# Patient Record
Sex: FEMALE | Race: ASIAN | Employment: OTHER | URBAN - METROPOLITAN AREA
[De-identification: names, ages, dates, MRNs, and addresses within clinical notes are randomized per-mention and may not be internally consistent; named-entity substitution may affect disease eponyms.]

---

## 2017-03-13 PROBLEM — I48.92 NEW ONSET ATRIAL FLUTTER (HCC): Status: ACTIVE | Noted: 2017-03-13

## 2017-08-07 PROBLEM — H81.12 BENIGN PAROXYSMAL POSITIONAL VERTIGO OF LEFT EAR: Status: ACTIVE | Noted: 2017-08-07

## 2018-03-16 ENCOUNTER — TELEPHONE (OUTPATIENT)
Dept: FAMILY MEDICINE CLINIC | Age: 78
End: 2018-03-16

## 2018-03-19 ENCOUNTER — OFFICE VISIT (OUTPATIENT)
Dept: FAMILY MEDICINE CLINIC | Age: 78
End: 2018-03-19
Payer: MEDICARE

## 2018-03-19 VITALS
HEART RATE: 84 BPM | WEIGHT: 107 LBS | TEMPERATURE: 97.9 F | DIASTOLIC BLOOD PRESSURE: 90 MMHG | HEIGHT: 59 IN | BODY MASS INDEX: 21.57 KG/M2 | SYSTOLIC BLOOD PRESSURE: 172 MMHG | RESPIRATION RATE: 16 BRPM | OXYGEN SATURATION: 96 %

## 2018-03-19 DIAGNOSIS — H81.312 VERTIGO, AURAL, LEFT: ICD-10-CM

## 2018-03-19 DIAGNOSIS — I25.810 ATHEROSCLEROSIS OF CORONARY ARTERY BYPASS GRAFT OF NATIVE HEART WITHOUT ANGINA PECTORIS: ICD-10-CM

## 2018-03-19 DIAGNOSIS — R51.9 NONINTRACTABLE HEADACHE, UNSPECIFIED CHRONICITY PATTERN, UNSPECIFIED HEADACHE TYPE: ICD-10-CM

## 2018-03-19 DIAGNOSIS — I20.9 ANGINAL SYNDROME (HCC): ICD-10-CM

## 2018-03-19 DIAGNOSIS — I48.92 ATRIAL FLUTTER, UNSPECIFIED TYPE (HCC): ICD-10-CM

## 2018-03-19 DIAGNOSIS — I10 ESSENTIAL HYPERTENSION: Primary | ICD-10-CM

## 2018-03-19 PROCEDURE — 1090F PRES/ABSN URINE INCON ASSESS: CPT | Performed by: NURSE PRACTITIONER

## 2018-03-19 PROCEDURE — G8420 CALC BMI NORM PARAMETERS: HCPCS | Performed by: NURSE PRACTITIONER

## 2018-03-19 PROCEDURE — 4040F PNEUMOC VAC/ADMIN/RCVD: CPT | Performed by: NURSE PRACTITIONER

## 2018-03-19 PROCEDURE — G8598 ASA/ANTIPLAT THER USED: HCPCS | Performed by: NURSE PRACTITIONER

## 2018-03-19 PROCEDURE — G8400 PT W/DXA NO RESULTS DOC: HCPCS | Performed by: NURSE PRACTITIONER

## 2018-03-19 PROCEDURE — 1123F ACP DISCUSS/DSCN MKR DOCD: CPT | Performed by: NURSE PRACTITIONER

## 2018-03-19 PROCEDURE — G8427 DOCREV CUR MEDS BY ELIG CLIN: HCPCS | Performed by: NURSE PRACTITIONER

## 2018-03-19 PROCEDURE — 99213 OFFICE O/P EST LOW 20 MIN: CPT | Performed by: NURSE PRACTITIONER

## 2018-03-19 PROCEDURE — 1036F TOBACCO NON-USER: CPT | Performed by: NURSE PRACTITIONER

## 2018-03-19 PROCEDURE — G8482 FLU IMMUNIZE ORDER/ADMIN: HCPCS | Performed by: NURSE PRACTITIONER

## 2018-03-19 RX ORDER — POTASSIUM CHLORIDE 20 MEQ/1
TABLET, EXTENDED RELEASE ORAL
Qty: 30 TABLET | Refills: 2 | Status: SHIPPED | OUTPATIENT
Start: 2018-03-19 | End: 2019-03-27 | Stop reason: SDUPTHER

## 2018-03-19 RX ORDER — ATORVASTATIN CALCIUM 40 MG/1
TABLET, FILM COATED ORAL
Qty: 30 TABLET | Refills: 3 | Status: SHIPPED | OUTPATIENT
Start: 2018-03-19 | End: 2018-09-17 | Stop reason: SDUPTHER

## 2018-03-19 RX ORDER — MECLIZINE HYDROCHLORIDE 25 MG/1
25 TABLET ORAL 3 TIMES DAILY PRN
Qty: 90 TABLET | Refills: 0 | Status: SHIPPED | OUTPATIENT
Start: 2018-03-19 | End: 2019-03-27 | Stop reason: SDUPTHER

## 2018-03-19 RX ORDER — ATENOLOL 25 MG/1
25 TABLET ORAL 2 TIMES DAILY
Qty: 60 TABLET | Refills: 5 | Status: SHIPPED | OUTPATIENT
Start: 2018-03-19 | End: 2019-03-27 | Stop reason: SDUPTHER

## 2018-03-19 ASSESSMENT — ENCOUNTER SYMPTOMS
DIARRHEA: 0
DOUBLE VISION: 0
WHEEZING: 0
BLURRED VISION: 1
NAUSEA: 0
PHOTOPHOBIA: 0
CONSTIPATION: 0
COUGH: 0
VOMITING: 0
SHORTNESS OF BREATH: 0

## 2018-03-19 NOTE — PROGRESS NOTES
HPI:  Patient comes in today for   Chief Complaint   Patient presents with    Headache     started 1 week ago pt states \"left side of face and eye hurts goes to the back of head\"     Hypertension     pt's BP high at this time 180/98    . Patient states she has not checked her BP at home for the past week since she has had a headache. She was previously on atenolol but is not currently. Patient has taken tylenol for the headache with relief. Prior to Visit Medications    Medication Sig Taking?  Authorizing Provider   meclizine (ANTIVERT) 25 MG tablet Take 1 tablet by mouth 3 times daily as needed for Dizziness Yes Qi Baker CNP   potassium chloride (KLOR-CON M) 20 MEQ extended release tablet TAKE ONE TABLET BY MOUTH EVERY DAY Yes Qi Baker CNP   apixaban (ELIQUIS) 5 MG TABS tablet TAKE ONE TABLET BY MOUTH TWO TIMES A DAY Yes Qi Baker CNP   atorvastatin (LIPITOR) 40 MG tablet TAKE ONE TABLET BY MOUTH EVERY DAY Yes Qi Baker CNP   atenolol (TENORMIN) 25 MG tablet Take 1 tablet by mouth 2 times daily Yes Qi Baker CNP   omeprazole (PRILOSEC) 40 MG delayed release capsule TAKE ONE CAPSULE BY MOUTH EVERY DAY Yes Qi Baker CNP   RANEXA 500 MG extended release tablet TAKE ONE TABLET BY MOUTH TWO TIMES A DAY Yes Chris Fabian DO   furosemide (LASIX) 40 MG tablet Take 1 tablet by mouth every other day Yes Qi Baker CNP   amLODIPine (NORVASC) 10 MG tablet TAKE ONE TABLET BY MOUTH EVERY DAY Yes Chris Fabian DO   nebivolol (BYSTOLIC) 20 MG TABS tablet Take 1 tablet by mouth daily Yes Qi Baker CNP   PROAIR  (90 Base) MCG/ACT inhaler INHALE TWO PUFFS BY MOUTH EVERY SIX HOURS AS NEEDED for wheezing  Yes Qi Baker CNP   hydrocortisone (ANUSOL-HC) 25 MG suppository Place 1 suppository rectally 2 times daily Yes Suzy Fulton MD   aspirin 81 MG EC tablet Take 81 mg by mouth daily Yes Historical Provider, MD   vitamin B-12 (CYANOCOBALAMIN) 1000 MCG tablet Take 1,000 mcg by mouth daily Yes Historical Provider, MD   vitamin E 400 UNIT capsule Take 400 Units by mouth daily. Yes Historical Provider, MD   folic acid (FOLVITE) 1 MG tablet Take 1 mg by mouth daily. Yes Historical Provider, MD         Allergies   Allergen Reactions    Dronedarone Hives         Review of Systems  Review of Systems   Constitutional: Negative for malaise/fatigue and weight loss. Eyes: Positive for blurred vision. Negative for double vision and photophobia. Respiratory: Negative for cough, shortness of breath and wheezing. Cardiovascular: Negative for chest pain and palpitations. Gastrointestinal: Negative for constipation, diarrhea, nausea and vomiting. Neurological: Positive for dizziness (intermittent chronic) and headaches. Negative for weakness. VS:  BP (!) 172/90   Pulse 84   Temp 97.9 °F (36.6 °C) (Oral)   Resp 16   Ht 4' 11\" (1.499 m)   Wt 107 lb (48.5 kg)   SpO2 96%   BMI 21.61 kg/m²     Physical Exam  Physical Exam   Constitutional: She is oriented to person, place, and time. She appears well-developed and well-nourished. No distress. HENT:   Head: Normocephalic and atraumatic. Right Ear: External ear normal.   Left Ear: External ear normal.   Nose: Nose normal.   Mouth/Throat: Oropharynx is clear and moist.   Eyes: EOM are normal. Pupils are equal, round, and reactive to light. Neck: No tracheal deviation present. No thyromegaly present. Cardiovascular: Normal rate, regular rhythm and intact distal pulses. No murmur heard. Pulmonary/Chest: Effort normal and breath sounds normal. She has no wheezes. She has no rales. She exhibits no tenderness. Abdominal: Soft. Bowel sounds are normal. There is no tenderness. Lymphadenopathy:     She has no cervical adenopathy. Neurological: She is alert and oriented to person, place, and time. No cranial nerve deficit. Coordination normal.   Skin: Skin is warm and dry.    Psychiatric: She

## 2018-03-21 ENCOUNTER — TELEPHONE (OUTPATIENT)
Dept: FAMILY MEDICINE CLINIC | Age: 78
End: 2018-03-21

## 2018-03-21 NOTE — TELEPHONE ENCOUNTER
Patient called to ask if patient should be on Bystolic 23XT and Atenolol 25mg bid. Spoke to [facility] she would like to hold the Atenolol for now and have patient daughter monitor BP and headaches then call with update on Friday. Patient daughter and pharmacy notified.

## 2018-05-01 ENCOUNTER — APPOINTMENT (OUTPATIENT)
Dept: CT IMAGING | Age: 78
DRG: 292 | End: 2018-05-01
Payer: MEDICARE

## 2018-05-01 ENCOUNTER — HOSPITAL ENCOUNTER (INPATIENT)
Age: 78
LOS: 2 days | Discharge: HOME | DRG: 292 | End: 2018-05-03
Attending: EMERGENCY MEDICINE | Admitting: INTERNAL MEDICINE
Payer: MEDICARE

## 2018-05-01 ENCOUNTER — APPOINTMENT (OUTPATIENT)
Dept: GENERAL RADIOLOGY | Age: 78
DRG: 292 | End: 2018-05-01
Payer: MEDICARE

## 2018-05-01 DIAGNOSIS — I50.9 ACUTE ON CHRONIC CONGESTIVE HEART FAILURE, UNSPECIFIED CONGESTIVE HEART FAILURE TYPE: Primary | ICD-10-CM

## 2018-05-01 PROBLEM — I50.31 ACUTE DIASTOLIC CHF (CONGESTIVE HEART FAILURE) (HCC): Status: ACTIVE | Noted: 2018-05-01

## 2018-05-01 LAB
ALBUMIN SERPL-MCNC: 4 G/DL (ref 3.5–5.2)
ALBUMIN SERPL-MCNC: 4.2 G/DL (ref 3.5–5.2)
ALP BLD-CCNC: 104 U/L (ref 35–104)
ALP BLD-CCNC: 113 U/L (ref 35–104)
ALT SERPL-CCNC: 15 U/L (ref 0–32)
ALT SERPL-CCNC: 15 U/L (ref 0–32)
ANION GAP SERPL CALCULATED.3IONS-SCNC: 12 MMOL/L (ref 7–16)
APTT: 36.7 SEC (ref 24.5–35.1)
AST SERPL-CCNC: 31 U/L (ref 0–31)
AST SERPL-CCNC: 32 U/L (ref 0–31)
BASOPHILS ABSOLUTE: 0.03 E9/L (ref 0–0.2)
BASOPHILS RELATIVE PERCENT: 0.5 % (ref 0–2)
BILIRUB SERPL-MCNC: 1.3 MG/DL (ref 0–1.2)
BILIRUB SERPL-MCNC: 1.9 MG/DL (ref 0–1.2)
BILIRUBIN DIRECT: 0.6 MG/DL (ref 0–0.3)
BILIRUBIN, INDIRECT: 1.3 MG/DL (ref 0–1)
BUN BLDV-MCNC: 23 MG/DL (ref 8–23)
CALCIUM SERPL-MCNC: 9.1 MG/DL (ref 8.6–10.2)
CHLORIDE BLD-SCNC: 100 MMOL/L (ref 98–107)
CO2: 27 MMOL/L (ref 22–29)
CREAT SERPL-MCNC: 0.6 MG/DL (ref 0.5–1)
EKG ATRIAL RATE: 87 BPM
EKG P-R INTERVAL: 154 MS
EKG Q-T INTERVAL: 364 MS
EKG QRS DURATION: 84 MS
EKG QTC CALCULATION (BAZETT): 438 MS
EKG R AXIS: 94 DEGREES
EKG T AXIS: -18 DEGREES
EKG VENTRICULAR RATE: 87 BPM
EOSINOPHILS ABSOLUTE: 0.18 E9/L (ref 0.05–0.5)
EOSINOPHILS RELATIVE PERCENT: 2.8 % (ref 0–6)
GFR AFRICAN AMERICAN: >60
GFR NON-AFRICAN AMERICAN: >60 ML/MIN/1.73
GLUCOSE BLD-MCNC: 95 MG/DL (ref 74–109)
HCT VFR BLD CALC: 42.1 % (ref 34–48)
HEMOGLOBIN: 13.9 G/DL (ref 11.5–15.5)
IMMATURE GRANULOCYTES #: 0.03 E9/L
IMMATURE GRANULOCYTES %: 0.5 % (ref 0–5)
INR BLD: 1.3
LIPASE: 23 U/L (ref 13–60)
LYMPHOCYTES ABSOLUTE: 1.41 E9/L (ref 1.5–4)
LYMPHOCYTES RELATIVE PERCENT: 21.6 % (ref 20–42)
MAGNESIUM: 1.8 MG/DL (ref 1.6–2.6)
MCH RBC QN AUTO: 31.2 PG (ref 26–35)
MCHC RBC AUTO-ENTMCNC: 33 % (ref 32–34.5)
MCV RBC AUTO: 94.6 FL (ref 80–99.9)
MONOCYTES ABSOLUTE: 0.58 E9/L (ref 0.1–0.95)
MONOCYTES RELATIVE PERCENT: 8.9 % (ref 2–12)
NEUTROPHILS ABSOLUTE: 4.3 E9/L (ref 1.8–7.3)
NEUTROPHILS RELATIVE PERCENT: 65.7 % (ref 43–80)
PDW BLD-RTO: 14.3 FL (ref 11.5–15)
PLATELET # BLD: 111 E9/L (ref 130–450)
PMV BLD AUTO: 11.3 FL (ref 7–12)
POTASSIUM SERPL-SCNC: 4.5 MMOL/L (ref 3.5–5)
PRO-BNP: 1288 PG/ML (ref 0–450)
PROTHROMBIN TIME: 15 SEC (ref 9.3–12.4)
RBC # BLD: 4.45 E12/L (ref 3.5–5.5)
SODIUM BLD-SCNC: 139 MMOL/L (ref 132–146)
TOTAL PROTEIN: 6.9 G/DL (ref 6.4–8.3)
TOTAL PROTEIN: 7.6 G/DL (ref 6.4–8.3)
TROPONIN: <0.01 NG/ML (ref 0–0.03)
TROPONIN: <0.01 NG/ML (ref 0–0.03)
WBC # BLD: 6.5 E9/L (ref 4.5–11.5)

## 2018-05-01 PROCEDURE — 6360000004 HC RX CONTRAST MEDICATION: Performed by: RADIOLOGY

## 2018-05-01 PROCEDURE — 93005 ELECTROCARDIOGRAM TRACING: CPT | Performed by: EMERGENCY MEDICINE

## 2018-05-01 PROCEDURE — 6360000002 HC RX W HCPCS: Performed by: INTERNAL MEDICINE

## 2018-05-01 PROCEDURE — 6370000000 HC RX 637 (ALT 250 FOR IP): Performed by: INTERNAL MEDICINE

## 2018-05-01 PROCEDURE — 2500000003 HC RX 250 WO HCPCS: Performed by: EMERGENCY MEDICINE

## 2018-05-01 PROCEDURE — 6360000002 HC RX W HCPCS: Performed by: EMERGENCY MEDICINE

## 2018-05-01 PROCEDURE — 83735 ASSAY OF MAGNESIUM: CPT

## 2018-05-01 PROCEDURE — 83880 ASSAY OF NATRIURETIC PEPTIDE: CPT

## 2018-05-01 PROCEDURE — 84484 ASSAY OF TROPONIN QUANT: CPT

## 2018-05-01 PROCEDURE — 85730 THROMBOPLASTIN TIME PARTIAL: CPT

## 2018-05-01 PROCEDURE — 1200000000 HC SEMI PRIVATE

## 2018-05-01 PROCEDURE — 96374 THER/PROPH/DIAG INJ IV PUSH: CPT

## 2018-05-01 PROCEDURE — 71045 X-RAY EXAM CHEST 1 VIEW: CPT

## 2018-05-01 PROCEDURE — 99285 EMERGENCY DEPT VISIT HI MDM: CPT

## 2018-05-01 PROCEDURE — 36415 COLL VENOUS BLD VENIPUNCTURE: CPT

## 2018-05-01 PROCEDURE — 83690 ASSAY OF LIPASE: CPT

## 2018-05-01 PROCEDURE — 6370000000 HC RX 637 (ALT 250 FOR IP): Performed by: EMERGENCY MEDICINE

## 2018-05-01 PROCEDURE — 85025 COMPLETE CBC W/AUTO DIFF WBC: CPT

## 2018-05-01 PROCEDURE — 93306 TTE W/DOPPLER COMPLETE: CPT

## 2018-05-01 PROCEDURE — 80076 HEPATIC FUNCTION PANEL: CPT

## 2018-05-01 PROCEDURE — 80053 COMPREHEN METABOLIC PANEL: CPT

## 2018-05-01 PROCEDURE — 85610 PROTHROMBIN TIME: CPT

## 2018-05-01 PROCEDURE — 71275 CT ANGIOGRAPHY CHEST: CPT

## 2018-05-01 PROCEDURE — 96376 TX/PRO/DX INJ SAME DRUG ADON: CPT

## 2018-05-01 RX ORDER — ASPIRIN 81 MG/1
81 TABLET ORAL DAILY
Status: DISCONTINUED | OUTPATIENT
Start: 2018-05-01 | End: 2018-05-03 | Stop reason: HOSPADM

## 2018-05-01 RX ORDER — SILDENAFIL CITRATE 20 MG/1
10 TABLET ORAL 3 TIMES DAILY
Status: DISCONTINUED | OUTPATIENT
Start: 2018-05-02 | End: 2018-05-03 | Stop reason: HOSPADM

## 2018-05-01 RX ORDER — ATORVASTATIN CALCIUM 40 MG/1
40 TABLET, FILM COATED ORAL DAILY
Status: DISCONTINUED | OUTPATIENT
Start: 2018-05-01 | End: 2018-05-03 | Stop reason: HOSPADM

## 2018-05-01 RX ORDER — FOLIC ACID 1 MG/1
1 TABLET ORAL DAILY
Status: DISCONTINUED | OUTPATIENT
Start: 2018-05-01 | End: 2018-05-03 | Stop reason: HOSPADM

## 2018-05-01 RX ORDER — MECLIZINE HCL 12.5 MG/1
25 TABLET ORAL 3 TIMES DAILY PRN
Status: DISCONTINUED | OUTPATIENT
Start: 2018-05-01 | End: 2018-05-03 | Stop reason: HOSPADM

## 2018-05-01 RX ORDER — POTASSIUM CHLORIDE 20 MEQ/1
20 TABLET, EXTENDED RELEASE ORAL DAILY
Status: DISCONTINUED | OUTPATIENT
Start: 2018-05-01 | End: 2018-05-02

## 2018-05-01 RX ORDER — ALBUTEROL SULFATE 90 UG/1
1 AEROSOL, METERED RESPIRATORY (INHALATION) EVERY 6 HOURS PRN
Status: DISCONTINUED | OUTPATIENT
Start: 2018-05-01 | End: 2018-05-03 | Stop reason: HOSPADM

## 2018-05-01 RX ORDER — RANOLAZINE 500 MG/1
500 TABLET, EXTENDED RELEASE ORAL 2 TIMES DAILY
Status: DISCONTINUED | OUTPATIENT
Start: 2018-05-01 | End: 2018-05-03 | Stop reason: HOSPADM

## 2018-05-01 RX ORDER — ONDANSETRON 2 MG/ML
4 INJECTION INTRAMUSCULAR; INTRAVENOUS EVERY 6 HOURS PRN
Status: DISCONTINUED | OUTPATIENT
Start: 2018-05-01 | End: 2018-05-03 | Stop reason: HOSPADM

## 2018-05-01 RX ORDER — PANTOPRAZOLE SODIUM 40 MG/1
40 TABLET, DELAYED RELEASE ORAL
Status: DISCONTINUED | OUTPATIENT
Start: 2018-05-02 | End: 2018-05-03 | Stop reason: HOSPADM

## 2018-05-01 RX ORDER — FUROSEMIDE 10 MG/ML
40 INJECTION INTRAMUSCULAR; INTRAVENOUS ONCE
Status: COMPLETED | OUTPATIENT
Start: 2018-05-01 | End: 2018-05-01

## 2018-05-01 RX ORDER — FUROSEMIDE 10 MG/ML
40 INJECTION INTRAMUSCULAR; INTRAVENOUS 2 TIMES DAILY
Status: DISCONTINUED | OUTPATIENT
Start: 2018-05-01 | End: 2018-05-03 | Stop reason: HOSPADM

## 2018-05-01 RX ORDER — AMLODIPINE BESYLATE 5 MG/1
10 TABLET ORAL DAILY
Status: DISCONTINUED | OUTPATIENT
Start: 2018-05-01 | End: 2018-05-03 | Stop reason: HOSPADM

## 2018-05-01 RX ORDER — ATENOLOL 50 MG/1
25 TABLET ORAL 2 TIMES DAILY
Status: DISCONTINUED | OUTPATIENT
Start: 2018-05-01 | End: 2018-05-03 | Stop reason: HOSPADM

## 2018-05-01 RX ORDER — LABETALOL HYDROCHLORIDE 5 MG/ML
10 INJECTION, SOLUTION INTRAVENOUS ONCE
Status: COMPLETED | OUTPATIENT
Start: 2018-05-01 | End: 2018-05-01

## 2018-05-01 RX ADMIN — LABETALOL HYDROCHLORIDE 10 MG: 5 INJECTION, SOLUTION INTRAVENOUS at 03:13

## 2018-05-01 RX ADMIN — FOLIC ACID 1 MG: 1 TABLET ORAL at 11:23

## 2018-05-01 RX ADMIN — FUROSEMIDE 40 MG: 10 INJECTION, SOLUTION INTRAMUSCULAR; INTRAVENOUS at 17:53

## 2018-05-01 RX ADMIN — ASPIRIN 81 MG: 81 TABLET, COATED ORAL at 11:23

## 2018-05-01 RX ADMIN — ATENOLOL 50 MG: 50 TABLET ORAL at 07:34

## 2018-05-01 RX ADMIN — ATORVASTATIN CALCIUM 40 MG: 40 TABLET, FILM COATED ORAL at 11:22

## 2018-05-01 RX ADMIN — IOPAMIDOL 80 ML: 755 INJECTION, SOLUTION INTRAVENOUS at 02:28

## 2018-05-01 RX ADMIN — AMLODIPINE BESYLATE 10 MG: 5 TABLET ORAL at 07:33

## 2018-05-01 RX ADMIN — APIXABAN 5 MG: 5 TABLET, FILM COATED ORAL at 20:20

## 2018-05-01 RX ADMIN — RANOLAZINE 500 MG: 500 TABLET, FILM COATED, EXTENDED RELEASE ORAL at 20:21

## 2018-05-01 RX ADMIN — POTASSIUM CHLORIDE 20 MEQ: 20 TABLET, EXTENDED RELEASE ORAL at 11:22

## 2018-05-01 RX ADMIN — NITROGLYCERIN 1 INCH: 20 OINTMENT TOPICAL at 04:20

## 2018-05-01 RX ADMIN — FUROSEMIDE 40 MG: 10 INJECTION, SOLUTION INTRAMUSCULAR; INTRAVENOUS at 04:21

## 2018-05-01 ASSESSMENT — ENCOUNTER SYMPTOMS
CHEST TIGHTNESS: 0
RHINORRHEA: 0
CONSTIPATION: 0
NAUSEA: 0
VOMITING: 0
WHEEZING: 0
COUGH: 0
ABDOMINAL PAIN: 0
SHORTNESS OF BREATH: 0
DIARRHEA: 0
SORE THROAT: 0
TROUBLE SWALLOWING: 0
BLOOD IN STOOL: 0

## 2018-05-01 ASSESSMENT — PAIN SCALES - GENERAL
PAINLEVEL_OUTOF10: 3
PAINLEVEL_OUTOF10: 0

## 2018-05-01 ASSESSMENT — PAIN DESCRIPTION - DESCRIPTORS: DESCRIPTORS: ACHING

## 2018-05-01 ASSESSMENT — PAIN DESCRIPTION - LOCATION: LOCATION: HEAD

## 2018-05-02 PROBLEM — E44.1 MILD PROTEIN-CALORIE MALNUTRITION (HCC): Chronic | Status: ACTIVE | Noted: 2018-05-02

## 2018-05-02 LAB
ANION GAP SERPL CALCULATED.3IONS-SCNC: 11 MMOL/L (ref 7–16)
BASOPHILS ABSOLUTE: 0.05 E9/L (ref 0–0.2)
BASOPHILS RELATIVE PERCENT: 0.8 % (ref 0–2)
BUN BLDV-MCNC: 15 MG/DL (ref 8–23)
CALCIUM SERPL-MCNC: 9 MG/DL (ref 8.6–10.2)
CHLORIDE BLD-SCNC: 98 MMOL/L (ref 98–107)
CHOLESTEROL, TOTAL: 133 MG/DL (ref 0–199)
CO2: 33 MMOL/L (ref 22–29)
CREAT SERPL-MCNC: 0.8 MG/DL (ref 0.5–1)
EOSINOPHILS ABSOLUTE: 0.21 E9/L (ref 0.05–0.5)
EOSINOPHILS RELATIVE PERCENT: 3.4 % (ref 0–6)
GFR AFRICAN AMERICAN: >60
GFR NON-AFRICAN AMERICAN: >60 ML/MIN/1.73
GLUCOSE BLD-MCNC: 99 MG/DL (ref 74–109)
HCT VFR BLD CALC: 43.1 % (ref 34–48)
HDLC SERPL-MCNC: 66 MG/DL
HEMOGLOBIN: 14.3 G/DL (ref 11.5–15.5)
IMMATURE GRANULOCYTES #: 0.01 E9/L
IMMATURE GRANULOCYTES %: 0.2 % (ref 0–5)
LDL CHOLESTEROL CALCULATED: 55 MG/DL (ref 0–99)
LYMPHOCYTES ABSOLUTE: 1.77 E9/L (ref 1.5–4)
LYMPHOCYTES RELATIVE PERCENT: 29.1 % (ref 20–42)
MAGNESIUM: 1.8 MG/DL (ref 1.6–2.6)
MCH RBC QN AUTO: 30.6 PG (ref 26–35)
MCHC RBC AUTO-ENTMCNC: 33.2 % (ref 32–34.5)
MCV RBC AUTO: 92.3 FL (ref 80–99.9)
MONOCYTES ABSOLUTE: 0.65 E9/L (ref 0.1–0.95)
MONOCYTES RELATIVE PERCENT: 10.7 % (ref 2–12)
NEUTROPHILS ABSOLUTE: 3.4 E9/L (ref 1.8–7.3)
NEUTROPHILS RELATIVE PERCENT: 55.8 % (ref 43–80)
PDW BLD-RTO: 14.3 FL (ref 11.5–15)
PHOSPHORUS: 4.2 MG/DL (ref 2.5–4.5)
PLATELET # BLD: 145 E9/L (ref 130–450)
PMV BLD AUTO: 11.5 FL (ref 7–12)
POTASSIUM SERPL-SCNC: 3 MMOL/L (ref 3.5–5)
RBC # BLD: 4.67 E12/L (ref 3.5–5.5)
SODIUM BLD-SCNC: 142 MMOL/L (ref 132–146)
TRIGL SERPL-MCNC: 61 MG/DL (ref 0–149)
TSH SERPL DL<=0.05 MIU/L-ACNC: 2.98 UIU/ML (ref 0.27–4.2)
VLDLC SERPL CALC-MCNC: 12 MG/DL
WBC # BLD: 6.1 E9/L (ref 4.5–11.5)

## 2018-05-02 PROCEDURE — 85025 COMPLETE CBC W/AUTO DIFF WBC: CPT

## 2018-05-02 PROCEDURE — 6370000000 HC RX 637 (ALT 250 FOR IP): Performed by: INTERNAL MEDICINE

## 2018-05-02 PROCEDURE — 6360000002 HC RX W HCPCS: Performed by: INTERNAL MEDICINE

## 2018-05-02 PROCEDURE — 1200000000 HC SEMI PRIVATE

## 2018-05-02 PROCEDURE — 36415 COLL VENOUS BLD VENIPUNCTURE: CPT

## 2018-05-02 PROCEDURE — 80061 LIPID PANEL: CPT

## 2018-05-02 PROCEDURE — 80048 BASIC METABOLIC PNL TOTAL CA: CPT

## 2018-05-02 PROCEDURE — 83735 ASSAY OF MAGNESIUM: CPT

## 2018-05-02 PROCEDURE — 80074 ACUTE HEPATITIS PANEL: CPT

## 2018-05-02 PROCEDURE — 84100 ASSAY OF PHOSPHORUS: CPT

## 2018-05-02 PROCEDURE — 84443 ASSAY THYROID STIM HORMONE: CPT

## 2018-05-02 RX ORDER — POTASSIUM CHLORIDE 20 MEQ/1
40 TABLET, EXTENDED RELEASE ORAL 2 TIMES DAILY WITH MEALS
Status: DISCONTINUED | OUTPATIENT
Start: 2018-05-02 | End: 2018-05-03 | Stop reason: HOSPADM

## 2018-05-02 RX ADMIN — RANOLAZINE 500 MG: 500 TABLET, FILM COATED, EXTENDED RELEASE ORAL at 10:21

## 2018-05-02 RX ADMIN — RANOLAZINE 500 MG: 500 TABLET, FILM COATED, EXTENDED RELEASE ORAL at 21:29

## 2018-05-02 RX ADMIN — ATENOLOL 25 MG: 50 TABLET ORAL at 21:29

## 2018-05-02 RX ADMIN — APIXABAN 5 MG: 5 TABLET, FILM COATED ORAL at 10:20

## 2018-05-02 RX ADMIN — ATORVASTATIN CALCIUM 40 MG: 40 TABLET, FILM COATED ORAL at 10:20

## 2018-05-02 RX ADMIN — ASPIRIN 81 MG: 81 TABLET, COATED ORAL at 10:21

## 2018-05-02 RX ADMIN — SILDENAFIL 10 MG: 20 TABLET ORAL at 21:30

## 2018-05-02 RX ADMIN — AMLODIPINE BESYLATE 10 MG: 5 TABLET ORAL at 09:00

## 2018-05-02 RX ADMIN — SILDENAFIL 10 MG: 20 TABLET ORAL at 15:21

## 2018-05-02 RX ADMIN — ATENOLOL 25 MG: 50 TABLET ORAL at 10:20

## 2018-05-02 RX ADMIN — APIXABAN 5 MG: 5 TABLET, FILM COATED ORAL at 21:29

## 2018-05-02 RX ADMIN — POTASSIUM CHLORIDE 40 MEQ: 20 TABLET, EXTENDED RELEASE ORAL at 10:20

## 2018-05-02 RX ADMIN — PANTOPRAZOLE SODIUM 40 MG: 40 TABLET, DELAYED RELEASE ORAL at 06:57

## 2018-05-02 RX ADMIN — SILDENAFIL 10 MG: 20 TABLET ORAL at 10:28

## 2018-05-02 RX ADMIN — FOLIC ACID 1 MG: 1 TABLET ORAL at 10:20

## 2018-05-02 RX ADMIN — FUROSEMIDE 40 MG: 10 INJECTION, SOLUTION INTRAMUSCULAR; INTRAVENOUS at 10:19

## 2018-05-02 RX ADMIN — FUROSEMIDE 40 MG: 10 INJECTION, SOLUTION INTRAMUSCULAR; INTRAVENOUS at 17:34

## 2018-05-02 RX ADMIN — POTASSIUM CHLORIDE 40 MEQ: 20 TABLET, EXTENDED RELEASE ORAL at 17:34

## 2018-05-02 ASSESSMENT — PAIN SCALES - GENERAL
PAINLEVEL_OUTOF10: 0
PAINLEVEL_OUTOF10: 0

## 2018-05-03 VITALS
OXYGEN SATURATION: 92 % | TEMPERATURE: 98.6 F | SYSTOLIC BLOOD PRESSURE: 100 MMHG | RESPIRATION RATE: 18 BRPM | DIASTOLIC BLOOD PRESSURE: 52 MMHG | HEART RATE: 69 BPM | HEIGHT: 59 IN | WEIGHT: 110 LBS | BODY MASS INDEX: 22.18 KG/M2

## 2018-05-03 LAB
ANION GAP SERPL CALCULATED.3IONS-SCNC: 10 MMOL/L (ref 7–16)
BASOPHILS ABSOLUTE: 0.05 E9/L (ref 0–0.2)
BASOPHILS RELATIVE PERCENT: 1.1 % (ref 0–2)
BUN BLDV-MCNC: 21 MG/DL (ref 8–23)
CALCIUM SERPL-MCNC: 9.1 MG/DL (ref 8.6–10.2)
CHLORIDE BLD-SCNC: 99 MMOL/L (ref 98–107)
CO2: 29 MMOL/L (ref 22–29)
CREAT SERPL-MCNC: 1.1 MG/DL (ref 0.5–1)
EOSINOPHILS ABSOLUTE: 0.16 E9/L (ref 0.05–0.5)
EOSINOPHILS RELATIVE PERCENT: 3.5 % (ref 0–6)
GFR AFRICAN AMERICAN: 58
GFR NON-AFRICAN AMERICAN: 48 ML/MIN/1.73
GLUCOSE BLD-MCNC: 92 MG/DL (ref 74–109)
HAV IGM SER IA-ACNC: NORMAL
HCT VFR BLD CALC: 41.2 % (ref 34–48)
HEMOGLOBIN: 13.5 G/DL (ref 11.5–15.5)
HEPATITIS B CORE IGM ANTIBODY: NORMAL
HEPATITIS B SURFACE ANTIGEN INTERPRETATION: NORMAL
HEPATITIS C ANTIBODY INTERPRETATION: NORMAL
IMMATURE GRANULOCYTES #: 0.01 E9/L
IMMATURE GRANULOCYTES %: 0.2 % (ref 0–5)
LYMPHOCYTES ABSOLUTE: 1.57 E9/L (ref 1.5–4)
LYMPHOCYTES RELATIVE PERCENT: 34.3 % (ref 20–42)
MCH RBC QN AUTO: 30.6 PG (ref 26–35)
MCHC RBC AUTO-ENTMCNC: 32.8 % (ref 32–34.5)
MCV RBC AUTO: 93.4 FL (ref 80–99.9)
MONOCYTES ABSOLUTE: 0.63 E9/L (ref 0.1–0.95)
MONOCYTES RELATIVE PERCENT: 13.8 % (ref 2–12)
NEUTROPHILS ABSOLUTE: 2.16 E9/L (ref 1.8–7.3)
NEUTROPHILS RELATIVE PERCENT: 47.1 % (ref 43–80)
PDW BLD-RTO: 14.5 FL (ref 11.5–15)
PLATELET # BLD: 144 E9/L (ref 130–450)
PMV BLD AUTO: 11.6 FL (ref 7–12)
POTASSIUM SERPL-SCNC: 4.7 MMOL/L (ref 3.5–5)
RBC # BLD: 4.41 E12/L (ref 3.5–5.5)
SODIUM BLD-SCNC: 138 MMOL/L (ref 132–146)
WBC # BLD: 4.6 E9/L (ref 4.5–11.5)

## 2018-05-03 PROCEDURE — 80048 BASIC METABOLIC PNL TOTAL CA: CPT

## 2018-05-03 PROCEDURE — 85025 COMPLETE CBC W/AUTO DIFF WBC: CPT

## 2018-05-03 PROCEDURE — 36415 COLL VENOUS BLD VENIPUNCTURE: CPT

## 2018-05-03 PROCEDURE — 6370000000 HC RX 637 (ALT 250 FOR IP): Performed by: INTERNAL MEDICINE

## 2018-05-03 PROCEDURE — 6360000002 HC RX W HCPCS: Performed by: INTERNAL MEDICINE

## 2018-05-03 RX ORDER — SILDENAFIL CITRATE 20 MG/1
10 TABLET ORAL 3 TIMES DAILY
Qty: 30 TABLET | Refills: 0 | Status: SHIPPED | OUTPATIENT
Start: 2018-05-03 | End: 2018-06-04 | Stop reason: SDUPTHER

## 2018-05-03 RX ADMIN — FUROSEMIDE 40 MG: 10 INJECTION, SOLUTION INTRAMUSCULAR; INTRAVENOUS at 08:57

## 2018-05-03 RX ADMIN — ATORVASTATIN CALCIUM 40 MG: 40 TABLET, FILM COATED ORAL at 08:52

## 2018-05-03 RX ADMIN — RANOLAZINE 500 MG: 500 TABLET, FILM COATED, EXTENDED RELEASE ORAL at 08:52

## 2018-05-03 RX ADMIN — AMLODIPINE BESYLATE 10 MG: 5 TABLET ORAL at 08:57

## 2018-05-03 RX ADMIN — SILDENAFIL 10 MG: 20 TABLET ORAL at 14:27

## 2018-05-03 RX ADMIN — SILDENAFIL 10 MG: 20 TABLET ORAL at 08:52

## 2018-05-03 RX ADMIN — FOLIC ACID 1 MG: 1 TABLET ORAL at 08:57

## 2018-05-03 RX ADMIN — APIXABAN 5 MG: 5 TABLET, FILM COATED ORAL at 08:57

## 2018-05-03 RX ADMIN — PANTOPRAZOLE SODIUM 40 MG: 40 TABLET, DELAYED RELEASE ORAL at 05:34

## 2018-05-03 RX ADMIN — POTASSIUM CHLORIDE 40 MEQ: 20 TABLET, EXTENDED RELEASE ORAL at 08:52

## 2018-05-03 RX ADMIN — ASPIRIN 81 MG: 81 TABLET, COATED ORAL at 08:52

## 2018-05-04 ENCOUNTER — CARE COORDINATION (OUTPATIENT)
Dept: CASE MANAGEMENT | Age: 78
End: 2018-05-04

## 2018-05-07 ENCOUNTER — OFFICE VISIT (OUTPATIENT)
Dept: FAMILY MEDICINE CLINIC | Age: 78
End: 2018-05-07
Payer: MEDICARE

## 2018-05-07 VITALS
WEIGHT: 104 LBS | BODY MASS INDEX: 20.96 KG/M2 | RESPIRATION RATE: 16 BRPM | OXYGEN SATURATION: 98 % | HEIGHT: 59 IN | TEMPERATURE: 97.9 F | SYSTOLIC BLOOD PRESSURE: 122 MMHG | HEART RATE: 56 BPM | DIASTOLIC BLOOD PRESSURE: 60 MMHG

## 2018-05-07 DIAGNOSIS — I50.31 ACUTE DIASTOLIC CHF (CONGESTIVE HEART FAILURE) (HCC): Primary | ICD-10-CM

## 2018-05-07 DIAGNOSIS — G47.00 INSOMNIA, UNSPECIFIED TYPE: ICD-10-CM

## 2018-05-07 DIAGNOSIS — I10 ESSENTIAL HYPERTENSION: ICD-10-CM

## 2018-05-07 DIAGNOSIS — R07.9 CHEST PAIN, UNSPECIFIED TYPE: ICD-10-CM

## 2018-05-07 DIAGNOSIS — R63.0 POOR APPETITE: ICD-10-CM

## 2018-05-07 DIAGNOSIS — E44.1 MILD PROTEIN-CALORIE MALNUTRITION (HCC): ICD-10-CM

## 2018-05-07 PROCEDURE — 1111F DSCHRG MED/CURRENT MED MERGE: CPT | Performed by: NURSE PRACTITIONER

## 2018-05-07 PROCEDURE — 99496 TRANSJ CARE MGMT HIGH F2F 7D: CPT | Performed by: NURSE PRACTITIONER

## 2018-05-07 RX ORDER — AMLODIPINE BESYLATE 10 MG/1
TABLET ORAL
Qty: 30 TABLET | Refills: 3 | Status: SHIPPED | OUTPATIENT
Start: 2018-05-07 | End: 2018-10-12 | Stop reason: SDUPTHER

## 2018-05-07 ASSESSMENT — ENCOUNTER SYMPTOMS
ABDOMINAL PAIN: 0
COUGH: 0
NAUSEA: 0
SHORTNESS OF BREATH: 1
DIARRHEA: 0
CHEST TIGHTNESS: 1
WHEEZING: 1
SORE THROAT: 0
CONSTIPATION: 0
RHINORRHEA: 1
VOMITING: 0

## 2018-05-18 RX ORDER — RANOLAZINE 500 MG/1
500 TABLET, EXTENDED RELEASE ORAL 2 TIMES DAILY
Qty: 60 TABLET | Refills: 3 | Status: SHIPPED | OUTPATIENT
Start: 2018-05-18 | End: 2018-10-05 | Stop reason: SDUPTHER

## 2018-06-04 RX ORDER — SILDENAFIL CITRATE 20 MG/1
10 TABLET ORAL 3 TIMES DAILY
Qty: 30 TABLET | Refills: 1 | Status: CANCELLED | OUTPATIENT
Start: 2018-06-04

## 2018-06-04 RX ORDER — SILDENAFIL CITRATE 20 MG/1
10 TABLET ORAL 3 TIMES DAILY
Qty: 30 TABLET | Refills: 1 | Status: SHIPPED | OUTPATIENT
Start: 2018-06-04 | End: 2018-08-03 | Stop reason: SDUPTHER

## 2018-07-02 DIAGNOSIS — I25.810 ATHEROSCLEROSIS OF CORONARY ARTERY BYPASS GRAFT OF NATIVE HEART WITHOUT ANGINA PECTORIS: ICD-10-CM

## 2018-08-02 DIAGNOSIS — I10 ESSENTIAL HYPERTENSION: ICD-10-CM

## 2018-08-02 RX ORDER — NEBIVOLOL 20 MG/1
20 TABLET ORAL DAILY
Qty: 90 TABLET | Refills: 2 | Status: SHIPPED | OUTPATIENT
Start: 2018-08-02 | End: 2019-03-27 | Stop reason: SDUPTHER

## 2018-08-03 RX ORDER — SILDENAFIL CITRATE 20 MG/1
TABLET ORAL
Qty: 30 TABLET | Refills: 2 | Status: SHIPPED | OUTPATIENT
Start: 2018-08-03 | End: 2018-10-14 | Stop reason: SDUPTHER

## 2018-08-22 ENCOUNTER — HOSPITAL ENCOUNTER (OUTPATIENT)
Dept: PULMONOLOGY | Age: 78
Discharge: HOME | End: 2018-08-22
Payer: MEDICARE

## 2018-08-22 PROCEDURE — 94010 BREATHING CAPACITY TEST: CPT

## 2018-08-22 PROCEDURE — 94060 EVALUATION OF WHEEZING: CPT

## 2018-09-17 DIAGNOSIS — I25.810 ATHEROSCLEROSIS OF CORONARY ARTERY BYPASS GRAFT OF NATIVE HEART WITHOUT ANGINA PECTORIS: ICD-10-CM

## 2018-09-17 RX ORDER — ATORVASTATIN CALCIUM 40 MG/1
TABLET, FILM COATED ORAL
Qty: 30 TABLET | Refills: 0 | Status: SHIPPED | OUTPATIENT
Start: 2018-09-17 | End: 2019-03-11 | Stop reason: SDUPTHER

## 2018-10-05 RX ORDER — RANOLAZINE 500 MG/1
500 TABLET, EXTENDED RELEASE ORAL 2 TIMES DAILY
Qty: 60 TABLET | Refills: 3 | Status: SHIPPED | OUTPATIENT
Start: 2018-10-05 | End: 2019-03-11 | Stop reason: SDUPTHER

## 2018-10-12 DIAGNOSIS — I10 ESSENTIAL HYPERTENSION: ICD-10-CM

## 2018-10-12 RX ORDER — OMEPRAZOLE 40 MG/1
CAPSULE, DELAYED RELEASE ORAL
Qty: 30 CAPSULE | Refills: 4 | Status: SHIPPED | OUTPATIENT
Start: 2018-10-12 | End: 2019-03-27 | Stop reason: SDUPTHER

## 2018-10-12 RX ORDER — AMLODIPINE BESYLATE 10 MG/1
TABLET ORAL
Qty: 30 TABLET | Refills: 3 | Status: SHIPPED | OUTPATIENT
Start: 2018-10-12 | End: 2019-03-27 | Stop reason: SDUPTHER

## 2018-10-15 RX ORDER — SILDENAFIL CITRATE 20 MG/1
TABLET ORAL
Qty: 30 TABLET | Refills: 1 | Status: SHIPPED | OUTPATIENT
Start: 2018-10-15 | End: 2018-11-14 | Stop reason: SDUPTHER

## 2018-10-29 DIAGNOSIS — I25.810 ATHEROSCLEROSIS OF CORONARY ARTERY BYPASS GRAFT OF NATIVE HEART WITHOUT ANGINA PECTORIS: ICD-10-CM

## 2018-10-29 RX ORDER — APIXABAN 5 MG/1
TABLET, FILM COATED ORAL
Qty: 60 TABLET | Refills: 1 | Status: SHIPPED | OUTPATIENT
Start: 2018-10-29 | End: 2019-01-21 | Stop reason: SDUPTHER

## 2018-11-14 RX ORDER — SILDENAFIL CITRATE 20 MG/1
TABLET ORAL
Qty: 30 TABLET | Refills: 2 | Status: SHIPPED | OUTPATIENT
Start: 2018-11-14 | End: 2019-01-14 | Stop reason: SDUPTHER

## 2019-01-14 RX ORDER — SILDENAFIL CITRATE 20 MG/1
TABLET ORAL
Qty: 30 TABLET | Refills: 1 | Status: SHIPPED | OUTPATIENT
Start: 2019-01-14 | End: 2019-03-27 | Stop reason: SDUPTHER

## 2019-01-21 DIAGNOSIS — I25.810 ATHEROSCLEROSIS OF CORONARY ARTERY BYPASS GRAFT OF NATIVE HEART WITHOUT ANGINA PECTORIS: ICD-10-CM

## 2019-02-01 ENCOUNTER — APPOINTMENT (OUTPATIENT)
Dept: CT IMAGING | Age: 79
End: 2019-02-01
Payer: MEDICARE

## 2019-02-01 ENCOUNTER — HOSPITAL ENCOUNTER (EMERGENCY)
Age: 79
Discharge: HOME | End: 2019-02-02
Attending: EMERGENCY MEDICINE
Payer: MEDICARE

## 2019-02-01 VITALS
WEIGHT: 101 LBS | RESPIRATION RATE: 16 BRPM | DIASTOLIC BLOOD PRESSURE: 68 MMHG | BODY MASS INDEX: 18.58 KG/M2 | OXYGEN SATURATION: 98 % | HEART RATE: 80 BPM | HEIGHT: 62 IN | SYSTOLIC BLOOD PRESSURE: 151 MMHG | TEMPERATURE: 97.7 F

## 2019-02-01 DIAGNOSIS — R10.11 RIGHT UPPER QUADRANT ABDOMINAL PAIN: ICD-10-CM

## 2019-02-01 DIAGNOSIS — R11.0 NAUSEA: Primary | ICD-10-CM

## 2019-02-01 LAB
ALBUMIN SERPL-MCNC: 4.9 G/DL (ref 3.5–5.2)
ALP BLD-CCNC: 154 U/L (ref 35–104)
ALT SERPL-CCNC: 15 U/L (ref 0–32)
ANION GAP SERPL CALCULATED.3IONS-SCNC: 16 MMOL/L (ref 7–16)
APTT: 51.1 SEC (ref 24.5–35.1)
AST SERPL-CCNC: 32 U/L (ref 0–31)
BASOPHILS ABSOLUTE: 0.06 E9/L (ref 0–0.2)
BASOPHILS RELATIVE PERCENT: 0.9 % (ref 0–2)
BILIRUB SERPL-MCNC: 1.6 MG/DL (ref 0–1.2)
BUN BLDV-MCNC: 10 MG/DL (ref 8–23)
CALCIUM SERPL-MCNC: 10 MG/DL (ref 8.6–10.2)
CHLORIDE BLD-SCNC: 96 MMOL/L (ref 98–107)
CO2: 27 MMOL/L (ref 22–29)
CREAT SERPL-MCNC: 0.6 MG/DL (ref 0.5–1)
EOSINOPHILS ABSOLUTE: 0.1 E9/L (ref 0.05–0.5)
EOSINOPHILS RELATIVE PERCENT: 1.6 % (ref 0–6)
GFR AFRICAN AMERICAN: >60
GFR NON-AFRICAN AMERICAN: >60 ML/MIN/1.73
GLUCOSE BLD-MCNC: 112 MG/DL (ref 74–99)
HCT VFR BLD CALC: 50.6 % (ref 34–48)
HEMOGLOBIN: 17.2 G/DL (ref 11.5–15.5)
IMMATURE GRANULOCYTES #: 0.01 E9/L
IMMATURE GRANULOCYTES %: 0.2 % (ref 0–5)
LACTIC ACID: 1.6 MMOL/L (ref 0.5–2.2)
LIPASE: 23 U/L (ref 13–60)
LYMPHOCYTES ABSOLUTE: 1.28 E9/L (ref 1.5–4)
LYMPHOCYTES RELATIVE PERCENT: 20.2 % (ref 20–42)
MAGNESIUM: 1.9 MG/DL (ref 1.6–2.6)
MCH RBC QN AUTO: 30.9 PG (ref 26–35)
MCHC RBC AUTO-ENTMCNC: 34 % (ref 32–34.5)
MCV RBC AUTO: 90.8 FL (ref 80–99.9)
MONOCYTES ABSOLUTE: 0.45 E9/L (ref 0.1–0.95)
MONOCYTES RELATIVE PERCENT: 7.1 % (ref 2–12)
NEUTROPHILS ABSOLUTE: 4.45 E9/L (ref 1.8–7.3)
NEUTROPHILS RELATIVE PERCENT: 70 % (ref 43–80)
PDW BLD-RTO: 13.2 FL (ref 11.5–15)
PLATELET # BLD: 184 E9/L (ref 130–450)
PMV BLD AUTO: 10.7 FL (ref 7–12)
POTASSIUM REFLEX MAGNESIUM: 3.4 MMOL/L (ref 3.5–5)
RBC # BLD: 5.57 E12/L (ref 3.5–5.5)
SODIUM BLD-SCNC: 139 MMOL/L (ref 132–146)
TOTAL PROTEIN: 9.1 G/DL (ref 6.4–8.3)
TROPONIN: <0.01 NG/ML (ref 0–0.03)
WBC # BLD: 6.4 E9/L (ref 4.5–11.5)

## 2019-02-01 PROCEDURE — 99284 EMERGENCY DEPT VISIT MOD MDM: CPT

## 2019-02-01 PROCEDURE — 2580000003 HC RX 258: Performed by: STUDENT IN AN ORGANIZED HEALTH CARE EDUCATION/TRAINING PROGRAM

## 2019-02-01 PROCEDURE — 83690 ASSAY OF LIPASE: CPT

## 2019-02-01 PROCEDURE — 83605 ASSAY OF LACTIC ACID: CPT

## 2019-02-01 PROCEDURE — 74177 CT ABD & PELVIS W/CONTRAST: CPT

## 2019-02-01 PROCEDURE — 84484 ASSAY OF TROPONIN QUANT: CPT

## 2019-02-01 PROCEDURE — 80053 COMPREHEN METABOLIC PANEL: CPT

## 2019-02-01 PROCEDURE — 96374 THER/PROPH/DIAG INJ IV PUSH: CPT

## 2019-02-01 PROCEDURE — 85025 COMPLETE CBC W/AUTO DIFF WBC: CPT

## 2019-02-01 PROCEDURE — 6360000004 HC RX CONTRAST MEDICATION: Performed by: RADIOLOGY

## 2019-02-01 PROCEDURE — 36415 COLL VENOUS BLD VENIPUNCTURE: CPT

## 2019-02-01 PROCEDURE — 85730 THROMBOPLASTIN TIME PARTIAL: CPT

## 2019-02-01 PROCEDURE — 83735 ASSAY OF MAGNESIUM: CPT

## 2019-02-01 PROCEDURE — 6360000002 HC RX W HCPCS: Performed by: STUDENT IN AN ORGANIZED HEALTH CARE EDUCATION/TRAINING PROGRAM

## 2019-02-01 PROCEDURE — 96375 TX/PRO/DX INJ NEW DRUG ADDON: CPT

## 2019-02-01 RX ORDER — 0.9 % SODIUM CHLORIDE 0.9 %
1000 INTRAVENOUS SOLUTION INTRAVENOUS ONCE
Status: COMPLETED | OUTPATIENT
Start: 2019-02-01 | End: 2019-02-02

## 2019-02-01 RX ORDER — ONDANSETRON 4 MG/1
4 TABLET, ORALLY DISINTEGRATING ORAL EVERY 8 HOURS PRN
Qty: 30 TABLET | Refills: 0 | Status: SHIPPED | OUTPATIENT
Start: 2019-02-01 | End: 2019-03-27 | Stop reason: SDUPTHER

## 2019-02-01 RX ORDER — ONDANSETRON 2 MG/ML
4 INJECTION INTRAMUSCULAR; INTRAVENOUS ONCE
Status: COMPLETED | OUTPATIENT
Start: 2019-02-01 | End: 2019-02-01

## 2019-02-01 RX ORDER — BUMETANIDE 1 MG/1
2 TABLET ORAL DAILY
COMMUNITY
End: 2019-03-27 | Stop reason: SDUPTHER

## 2019-02-01 RX ORDER — MORPHINE SULFATE 4 MG/ML
4 INJECTION, SOLUTION INTRAMUSCULAR; INTRAVENOUS ONCE
Status: COMPLETED | OUTPATIENT
Start: 2019-02-01 | End: 2019-02-01

## 2019-02-01 RX ADMIN — IOPAMIDOL 80 ML: 755 INJECTION, SOLUTION INTRAVENOUS at 23:01

## 2019-02-01 RX ADMIN — MORPHINE SULFATE 4 MG: 4 INJECTION, SOLUTION INTRAMUSCULAR; INTRAVENOUS at 22:46

## 2019-02-01 RX ADMIN — ONDANSETRON 4 MG: 2 INJECTION INTRAMUSCULAR; INTRAVENOUS at 22:46

## 2019-02-01 RX ADMIN — SODIUM CHLORIDE 1000 ML: 9 INJECTION, SOLUTION INTRAVENOUS at 22:46

## 2019-02-01 ASSESSMENT — PAIN DESCRIPTION - FREQUENCY: FREQUENCY: INTERMITTENT

## 2019-02-01 ASSESSMENT — PAIN SCALES - GENERAL
PAINLEVEL_OUTOF10: 7
PAINLEVEL_OUTOF10: 7

## 2019-02-01 ASSESSMENT — PAIN DESCRIPTION - PAIN TYPE: TYPE: ACUTE PAIN

## 2019-02-01 ASSESSMENT — PAIN DESCRIPTION - ORIENTATION: ORIENTATION: RIGHT;UPPER

## 2019-02-01 ASSESSMENT — PAIN DESCRIPTION - ONSET: ONSET: ON-GOING

## 2019-02-01 ASSESSMENT — PAIN DESCRIPTION - LOCATION: LOCATION: ABDOMEN

## 2019-02-01 ASSESSMENT — PAIN DESCRIPTION - DESCRIPTORS: DESCRIPTORS: DISCOMFORT;DULL

## 2019-02-02 ASSESSMENT — ENCOUNTER SYMPTOMS
NAUSEA: 1
WHEEZING: 0
TROUBLE SWALLOWING: 0
BELCHING: 0
ABDOMINAL DISTENTION: 0
HEMATOCHEZIA: 0
PHOTOPHOBIA: 0
CONSTIPATION: 0
SORE THROAT: 0
VOICE CHANGE: 0
COUGH: 0
DIARRHEA: 0
CHEST TIGHTNESS: 0
SHORTNESS OF BREATH: 0
ABDOMINAL PAIN: 1
HEMATEMESIS: 0
VOMITING: 0

## 2019-02-04 ENCOUNTER — CARE COORDINATION (OUTPATIENT)
Dept: CARE COORDINATION | Age: 79
End: 2019-02-04

## 2019-02-08 LAB
EKG ATRIAL RATE: 78 BPM
EKG P-R INTERVAL: 252 MS
EKG Q-T INTERVAL: 320 MS
EKG QRS DURATION: 86 MS
EKG QTC CALCULATION (BAZETT): 364 MS
EKG R AXIS: 151 DEGREES
EKG T AXIS: -59 DEGREES
EKG VENTRICULAR RATE: 78 BPM

## 2019-03-08 DIAGNOSIS — I25.810 ATHEROSCLEROSIS OF CORONARY ARTERY BYPASS GRAFT OF NATIVE HEART WITHOUT ANGINA PECTORIS: ICD-10-CM

## 2019-03-11 RX ORDER — RANOLAZINE 500 MG/1
500 TABLET, EXTENDED RELEASE ORAL 2 TIMES DAILY
Qty: 14 TABLET | Refills: 0 | Status: SHIPPED | OUTPATIENT
Start: 2019-03-11 | End: 2019-03-25 | Stop reason: SDUPTHER

## 2019-03-11 RX ORDER — ATORVASTATIN CALCIUM 40 MG/1
TABLET, FILM COATED ORAL
Qty: 14 TABLET | Refills: 0 | Status: SHIPPED | OUTPATIENT
Start: 2019-03-11 | End: 2019-03-27 | Stop reason: SDUPTHER

## 2019-03-16 DIAGNOSIS — I25.810 ATHEROSCLEROSIS OF CORONARY ARTERY BYPASS GRAFT OF NATIVE HEART WITHOUT ANGINA PECTORIS: ICD-10-CM

## 2019-03-25 RX ORDER — RANOLAZINE 500 MG/1
500 TABLET, EXTENDED RELEASE ORAL 2 TIMES DAILY
Qty: 14 TABLET | Refills: 0 | Status: SHIPPED | OUTPATIENT
Start: 2019-03-25 | End: 2019-03-27 | Stop reason: SDUPTHER

## 2019-03-27 ENCOUNTER — CARE COORDINATION (OUTPATIENT)
Dept: CARE COORDINATION | Age: 79
End: 2019-03-27

## 2019-03-27 ENCOUNTER — OFFICE VISIT (OUTPATIENT)
Dept: FAMILY MEDICINE CLINIC | Age: 79
End: 2019-03-27
Payer: MEDICARE

## 2019-03-27 VITALS
BODY MASS INDEX: 19.32 KG/M2 | HEART RATE: 59 BPM | DIASTOLIC BLOOD PRESSURE: 76 MMHG | SYSTOLIC BLOOD PRESSURE: 128 MMHG | OXYGEN SATURATION: 98 % | TEMPERATURE: 98.6 F | HEIGHT: 62 IN | RESPIRATION RATE: 16 BRPM | WEIGHT: 105 LBS

## 2019-03-27 DIAGNOSIS — I25.810 ATHEROSCLEROSIS OF CORONARY ARTERY BYPASS GRAFT OF NATIVE HEART WITHOUT ANGINA PECTORIS: ICD-10-CM

## 2019-03-27 DIAGNOSIS — R11.0 NAUSEA: ICD-10-CM

## 2019-03-27 DIAGNOSIS — I50.31 ACUTE DIASTOLIC CHF (CONGESTIVE HEART FAILURE) (HCC): Primary | ICD-10-CM

## 2019-03-27 DIAGNOSIS — I10 ESSENTIAL HYPERTENSION: ICD-10-CM

## 2019-03-27 DIAGNOSIS — K21.9 GASTROESOPHAGEAL REFLUX DISEASE WITHOUT ESOPHAGITIS: ICD-10-CM

## 2019-03-27 DIAGNOSIS — F51.01 PRIMARY INSOMNIA: ICD-10-CM

## 2019-03-27 DIAGNOSIS — I25.84 CORONARY ARTERY DISEASE DUE TO CALCIFIED CORONARY LESION: ICD-10-CM

## 2019-03-27 DIAGNOSIS — I25.10 CORONARY ARTERY DISEASE DUE TO CALCIFIED CORONARY LESION: ICD-10-CM

## 2019-03-27 DIAGNOSIS — H81.312 VERTIGO, AURAL, LEFT: ICD-10-CM

## 2019-03-27 PROCEDURE — G8427 DOCREV CUR MEDS BY ELIG CLIN: HCPCS | Performed by: FAMILY MEDICINE

## 2019-03-27 PROCEDURE — G8420 CALC BMI NORM PARAMETERS: HCPCS | Performed by: FAMILY MEDICINE

## 2019-03-27 PROCEDURE — 1123F ACP DISCUSS/DSCN MKR DOCD: CPT | Performed by: FAMILY MEDICINE

## 2019-03-27 PROCEDURE — 1036F TOBACCO NON-USER: CPT | Performed by: FAMILY MEDICINE

## 2019-03-27 PROCEDURE — G8598 ASA/ANTIPLAT THER USED: HCPCS | Performed by: FAMILY MEDICINE

## 2019-03-27 PROCEDURE — 4040F PNEUMOC VAC/ADMIN/RCVD: CPT | Performed by: FAMILY MEDICINE

## 2019-03-27 PROCEDURE — G8400 PT W/DXA NO RESULTS DOC: HCPCS | Performed by: FAMILY MEDICINE

## 2019-03-27 PROCEDURE — G8510 SCR DEP NEG, NO PLAN REQD: HCPCS | Performed by: FAMILY MEDICINE

## 2019-03-27 PROCEDURE — G8484 FLU IMMUNIZE NO ADMIN: HCPCS | Performed by: FAMILY MEDICINE

## 2019-03-27 PROCEDURE — 3288F FALL RISK ASSESSMENT DOCD: CPT | Performed by: FAMILY MEDICINE

## 2019-03-27 PROCEDURE — 1090F PRES/ABSN URINE INCON ASSESS: CPT | Performed by: FAMILY MEDICINE

## 2019-03-27 PROCEDURE — 99214 OFFICE O/P EST MOD 30 MIN: CPT | Performed by: FAMILY MEDICINE

## 2019-03-27 RX ORDER — AMITRIPTYLINE HYDROCHLORIDE 10 MG/1
10 TABLET, FILM COATED ORAL NIGHTLY
Qty: 90 TABLET | Refills: 1 | Status: SHIPPED | OUTPATIENT
Start: 2019-03-27 | End: 2019-12-05 | Stop reason: SDUPTHER

## 2019-03-27 RX ORDER — NEBIVOLOL 20 MG/1
20 TABLET ORAL DAILY
Qty: 90 TABLET | Refills: 2 | Status: SHIPPED | OUTPATIENT
Start: 2019-03-27 | End: 2019-06-10 | Stop reason: SDUPTHER

## 2019-03-27 RX ORDER — AMLODIPINE BESYLATE 10 MG/1
TABLET ORAL
Qty: 30 TABLET | Refills: 3 | Status: SHIPPED | OUTPATIENT
Start: 2019-03-27 | End: 2019-08-29 | Stop reason: SDUPTHER

## 2019-03-27 RX ORDER — SILDENAFIL CITRATE 20 MG/1
TABLET ORAL
Qty: 30 TABLET | Refills: 1 | Status: SHIPPED | OUTPATIENT
Start: 2019-03-27 | End: 2019-08-19 | Stop reason: SDUPTHER

## 2019-03-27 RX ORDER — ATENOLOL 25 MG/1
25 TABLET ORAL 2 TIMES DAILY
Qty: 60 TABLET | Refills: 5 | Status: SHIPPED | OUTPATIENT
Start: 2019-03-27 | End: 2019-09-15

## 2019-03-27 RX ORDER — RANOLAZINE 500 MG/1
500 TABLET, EXTENDED RELEASE ORAL 2 TIMES DAILY
Qty: 14 TABLET | Refills: 0 | Status: SHIPPED | OUTPATIENT
Start: 2019-03-27 | End: 2019-04-24 | Stop reason: SDUPTHER

## 2019-03-27 RX ORDER — FOLIC ACID 1 MG/1
1 TABLET ORAL DAILY
Qty: 30 TABLET | Refills: 2 | Status: SHIPPED | OUTPATIENT
Start: 2019-03-27 | End: 2019-07-10 | Stop reason: SDUPTHER

## 2019-03-27 RX ORDER — ATORVASTATIN CALCIUM 40 MG/1
TABLET, FILM COATED ORAL
Qty: 14 TABLET | Refills: 0 | Status: SHIPPED | OUTPATIENT
Start: 2019-03-27 | End: 2019-05-13 | Stop reason: SDUPTHER

## 2019-03-27 RX ORDER — OMEPRAZOLE 40 MG/1
CAPSULE, DELAYED RELEASE ORAL
Qty: 30 CAPSULE | Refills: 4 | Status: SHIPPED | OUTPATIENT
Start: 2019-03-27 | End: 2019-07-10 | Stop reason: SDUPTHER

## 2019-03-27 RX ORDER — BUMETANIDE 1 MG/1
2 TABLET ORAL DAILY
Qty: 60 TABLET | Refills: 2 | Status: SHIPPED | OUTPATIENT
Start: 2019-03-27 | End: 2019-12-05 | Stop reason: SDUPTHER

## 2019-03-27 RX ORDER — HYDROCORTISONE ACETATE 25 MG/1
25 SUPPOSITORY RECTAL 2 TIMES DAILY
Qty: 10 SUPPOSITORY | Refills: 1 | Status: SHIPPED | OUTPATIENT
Start: 2019-03-27 | End: 2019-09-15

## 2019-03-27 RX ORDER — POTASSIUM CHLORIDE 20 MEQ/1
TABLET, EXTENDED RELEASE ORAL
Qty: 30 TABLET | Refills: 2 | Status: SHIPPED
Start: 2019-03-27 | End: 2022-02-18 | Stop reason: SDUPTHER

## 2019-03-27 RX ORDER — ONDANSETRON 4 MG/1
4 TABLET, ORALLY DISINTEGRATING ORAL EVERY 8 HOURS PRN
Qty: 30 TABLET | Refills: 0 | Status: SHIPPED | OUTPATIENT
Start: 2019-03-27 | End: 2019-09-15

## 2019-03-27 RX ORDER — MECLIZINE HYDROCHLORIDE 25 MG/1
25 TABLET ORAL 3 TIMES DAILY PRN
Qty: 90 TABLET | Refills: 0 | Status: SHIPPED | OUTPATIENT
Start: 2019-03-27 | End: 2019-09-15

## 2019-03-27 RX ORDER — FUROSEMIDE 40 MG/1
40 TABLET ORAL EVERY OTHER DAY
Qty: 60 TABLET | Refills: 1 | Status: SHIPPED | OUTPATIENT
Start: 2019-03-27 | End: 2019-09-15

## 2019-03-27 ASSESSMENT — PATIENT HEALTH QUESTIONNAIRE - PHQ9
SUM OF ALL RESPONSES TO PHQ9 QUESTIONS 1 & 2: 0
2. FEELING DOWN, DEPRESSED OR HOPELESS: 0
1. LITTLE INTEREST OR PLEASURE IN DOING THINGS: 0
SUM OF ALL RESPONSES TO PHQ QUESTIONS 1-9: 0
SUM OF ALL RESPONSES TO PHQ QUESTIONS 1-9: 0

## 2019-03-27 ASSESSMENT — ENCOUNTER SYMPTOMS
WHEEZING: 0
BLOOD IN STOOL: 0
CONSTIPATION: 0
DIARRHEA: 0

## 2019-04-01 ENCOUNTER — CARE COORDINATION (OUTPATIENT)
Dept: CARE COORDINATION | Age: 79
End: 2019-04-01

## 2019-04-05 ENCOUNTER — TELEPHONE (OUTPATIENT)
Dept: FAMILY MEDICINE CLINIC | Age: 79
End: 2019-04-05

## 2019-04-08 ENCOUNTER — CARE COORDINATION (OUTPATIENT)
Dept: CARE COORDINATION | Age: 79
End: 2019-04-08

## 2019-04-15 ENCOUNTER — CARE COORDINATION (OUTPATIENT)
Dept: CARE COORDINATION | Age: 79
End: 2019-04-15

## 2019-04-15 NOTE — LETTER
4/15/2019     Blair Brown  [address]    Dear Blair Brown    I recently attempted to contact you to discuss your healthcare needs. My name is Duane Greco and I am a registered nurse who partners with David Thomson DO to improve patients health. As a member of your health care team, I would work with other providers involved in your care, offer education for your specific health conditions, and connect you with additional resources as needed. I will collaborate with Kim Pollack DO to support you in following your treatment plan. The additional support I provide is no additional cost to you. My primary focus is to help you achieve specific goals and improve your health. I look forward to working with you. Please contact me at your earliest convenience at [phone].     In good health,    Duane Greco RN

## 2019-04-24 DIAGNOSIS — I25.84 CORONARY ARTERY DISEASE DUE TO CALCIFIED CORONARY LESION: ICD-10-CM

## 2019-04-24 DIAGNOSIS — I25.10 CORONARY ARTERY DISEASE DUE TO CALCIFIED CORONARY LESION: ICD-10-CM

## 2019-04-24 RX ORDER — RANOLAZINE 500 MG/1
500 TABLET, EXTENDED RELEASE ORAL 2 TIMES DAILY
Qty: 60 TABLET | Refills: 1 | Status: SHIPPED | OUTPATIENT
Start: 2019-04-24 | End: 2019-06-18 | Stop reason: SDUPTHER

## 2019-05-13 DIAGNOSIS — I25.810 ATHEROSCLEROSIS OF CORONARY ARTERY BYPASS GRAFT OF NATIVE HEART WITHOUT ANGINA PECTORIS: ICD-10-CM

## 2019-05-13 RX ORDER — ATORVASTATIN CALCIUM 40 MG/1
TABLET, FILM COATED ORAL
Qty: 30 TABLET | Refills: 2 | Status: SHIPPED | OUTPATIENT
Start: 2019-05-13 | End: 2019-08-29 | Stop reason: SDUPTHER

## 2019-05-13 NOTE — TELEPHONE ENCOUNTER
Requested Prescriptions     Pending Prescriptions Disp Refills    atorvastatin (LIPITOR) 40 MG tablet 14 tablet 0     Sig: TAKE ONE TABLET BY MOUTH EVERY DAY       Next appt is   Last appt was 3/27/2019

## 2019-06-10 DIAGNOSIS — I10 ESSENTIAL HYPERTENSION: ICD-10-CM

## 2019-06-10 RX ORDER — NEBIVOLOL 20 MG/1
20 TABLET ORAL DAILY
Qty: 90 TABLET | Refills: 2 | Status: SHIPPED
Start: 2019-06-10 | End: 2020-03-05 | Stop reason: SDUPTHER

## 2019-06-18 DIAGNOSIS — I25.10 CORONARY ARTERY DISEASE DUE TO CALCIFIED CORONARY LESION: ICD-10-CM

## 2019-06-18 DIAGNOSIS — I25.84 CORONARY ARTERY DISEASE DUE TO CALCIFIED CORONARY LESION: ICD-10-CM

## 2019-06-18 RX ORDER — RANOLAZINE 500 MG/1
500 TABLET, EXTENDED RELEASE ORAL 2 TIMES DAILY
Qty: 60 TABLET | Refills: 1 | Status: SHIPPED | OUTPATIENT
Start: 2019-06-18 | End: 2019-07-24 | Stop reason: SDUPTHER

## 2019-06-24 ENCOUNTER — OFFICE VISIT (OUTPATIENT)
Dept: FAMILY MEDICINE CLINIC | Age: 79
End: 2019-06-24
Payer: MEDICARE

## 2019-06-24 VITALS
BODY MASS INDEX: 21.41 KG/M2 | HEART RATE: 65 BPM | SYSTOLIC BLOOD PRESSURE: 126 MMHG | RESPIRATION RATE: 12 BRPM | HEIGHT: 58 IN | WEIGHT: 102 LBS | TEMPERATURE: 98.2 F | DIASTOLIC BLOOD PRESSURE: 60 MMHG | OXYGEN SATURATION: 95 %

## 2019-06-24 DIAGNOSIS — S32.050D CLOSED COMPRESSION FRACTURE OF L5 LUMBAR VERTEBRA WITH ROUTINE HEALING, SUBSEQUENT ENCOUNTER: Primary | ICD-10-CM

## 2019-06-24 DIAGNOSIS — M54.16 LUMBAR BACK PAIN WITH RADICULOPATHY AFFECTING RIGHT LOWER EXTREMITY: Primary | ICD-10-CM

## 2019-06-24 DIAGNOSIS — M54.16 LUMBAR BACK PAIN WITH RADICULOPATHY AFFECTING RIGHT LOWER EXTREMITY: ICD-10-CM

## 2019-06-24 DIAGNOSIS — I25.810 ATHEROSCLEROSIS OF CORONARY ARTERY BYPASS GRAFT OF NATIVE HEART WITHOUT ANGINA PECTORIS: ICD-10-CM

## 2019-06-24 PROCEDURE — 1123F ACP DISCUSS/DSCN MKR DOCD: CPT | Performed by: FAMILY MEDICINE

## 2019-06-24 PROCEDURE — G8420 CALC BMI NORM PARAMETERS: HCPCS | Performed by: FAMILY MEDICINE

## 2019-06-24 PROCEDURE — G8598 ASA/ANTIPLAT THER USED: HCPCS | Performed by: FAMILY MEDICINE

## 2019-06-24 PROCEDURE — 1036F TOBACCO NON-USER: CPT | Performed by: FAMILY MEDICINE

## 2019-06-24 PROCEDURE — 1090F PRES/ABSN URINE INCON ASSESS: CPT | Performed by: FAMILY MEDICINE

## 2019-06-24 PROCEDURE — G8427 DOCREV CUR MEDS BY ELIG CLIN: HCPCS | Performed by: FAMILY MEDICINE

## 2019-06-24 PROCEDURE — 99213 OFFICE O/P EST LOW 20 MIN: CPT | Performed by: FAMILY MEDICINE

## 2019-06-24 PROCEDURE — 4040F PNEUMOC VAC/ADMIN/RCVD: CPT | Performed by: FAMILY MEDICINE

## 2019-06-24 PROCEDURE — G8400 PT W/DXA NO RESULTS DOC: HCPCS | Performed by: FAMILY MEDICINE

## 2019-06-24 RX ORDER — PREDNISONE 5 MG/1
TABLET ORAL
Qty: 21 EACH | Refills: 0 | Status: SHIPPED | OUTPATIENT
Start: 2019-06-24 | End: 2019-09-15

## 2019-06-24 ASSESSMENT — ENCOUNTER SYMPTOMS
ABDOMINAL DISTENTION: 0
ABDOMINAL PAIN: 0
CHEST TIGHTNESS: 0
BACK PAIN: 0
APNEA: 0

## 2019-06-24 NOTE — PROGRESS NOTES
HPI:  Patient comes in today for   Chief Complaint   Patient presents with    Lower Back Pain     Pt ha c/o Lower Rt sided back pain that shoots down Rt leg. Pt states she injured her back while watering plants 1 week ago. Has tried OTC tylenol, and creams with no relief. Rates pain 5/10   . Pulled a fire pot 10 days ago and she got acute lumbar pain, now radiating down right buttocks, thigh and not below the Knee. Review of Systems  Review of Systems   Constitutional: Negative for activity change and appetite change. HENT: Negative for congestion and ear discharge. Respiratory: Negative for apnea and chest tightness. Gastrointestinal: Negative for abdominal distention and abdominal pain. Endocrine: Negative for cold intolerance. Genitourinary: Negative for difficulty urinating. Musculoskeletal: Negative for arthralgias and back pain. Neurological: Negative for dizziness and facial asymmetry. PAin radiating Right lower extremity     Psychiatric/Behavioral: Negative for agitation. PE:  VS:  /60   Pulse 65   Temp 98.2 °F (36.8 °C) (Oral)   Resp 12   Ht 4' 10\" (1.473 m)   Wt 102 lb (46.3 kg)   LMP  (LMP Unknown)   SpO2 95%   Breastfeeding? No   BMI 21.32 kg/m²   Physical Exam   Constitutional: She appears well-developed. HENT:   Head: Normocephalic. Eyes: Pupils are equal, round, and reactive to light. Neck: Normal range of motion. No thyromegaly present. Cardiovascular: Normal rate. No murmur heard. Pulmonary/Chest: Effort normal.   Abdominal: Soft. She exhibits no distension. Musculoskeletal: Normal range of motion. Neurological: She is alert. Skin: Skin is warm. Capillary refill takes less than 2 seconds. Psychiatric: She has a normal mood and affect. Assessment/Plan:  Alexa Chaidez was seen today for lower back pain.     Diagnoses and all orders for this visit:    Lumbar back pain with radiculopathy affecting right lower extremity  - XR LUMBAR SPINE (MIN 4 VIEWS); Future  -     predniSONE 5 MG (21) TBPK; Take 6 pills on day 1, 5 pills on day 2,4 pills on day 3, 3 pills on day 4, 2 pills on day 5, 1 pill on day 6. Atherosclerosis of coronary artery bypass graft of native heart without angina pectoris  -     apixaban (ELIQUIS) 5 MG TABS tablet; TAKE ONE TABLET BY MOUTH TWO TIMES A DAY          CANDIDA Walters.

## 2019-07-02 ENCOUNTER — TELEPHONE (OUTPATIENT)
Dept: FAMILY MEDICINE CLINIC | Age: 79
End: 2019-07-02

## 2019-07-02 DIAGNOSIS — M54.16 LUMBAR BACK PAIN WITH RADICULOPATHY AFFECTING RIGHT LOWER EXTREMITY: ICD-10-CM

## 2019-07-02 DIAGNOSIS — R93.7 ABNORMAL MRI, LUMBAR SPINE: Primary | ICD-10-CM

## 2019-07-02 DIAGNOSIS — S32.050D CLOSED COMPRESSION FRACTURE OF L5 LUMBAR VERTEBRA WITH ROUTINE HEALING, SUBSEQUENT ENCOUNTER: ICD-10-CM

## 2019-07-10 DIAGNOSIS — K21.9 GASTROESOPHAGEAL REFLUX DISEASE WITHOUT ESOPHAGITIS: ICD-10-CM

## 2019-07-10 DIAGNOSIS — M54.16 LUMBAR BACK PAIN WITH RADICULOPATHY AFFECTING RIGHT LOWER EXTREMITY: ICD-10-CM

## 2019-07-10 DIAGNOSIS — R93.7 ABNORMAL MRI, LUMBAR SPINE: Primary | ICD-10-CM

## 2019-07-10 DIAGNOSIS — R93.7 ABNORMAL MRI, LUMBAR SPINE: ICD-10-CM

## 2019-07-10 RX ORDER — FOLIC ACID 1 MG/1
1 TABLET ORAL DAILY
Qty: 30 TABLET | Refills: 2 | Status: SHIPPED | OUTPATIENT
Start: 2019-07-10 | End: 2019-11-10 | Stop reason: SDUPTHER

## 2019-07-10 RX ORDER — OMEPRAZOLE 40 MG/1
CAPSULE, DELAYED RELEASE ORAL
Qty: 30 CAPSULE | Refills: 4 | Status: SHIPPED
Start: 2019-07-10 | End: 2020-02-10 | Stop reason: SDUPTHER

## 2019-07-24 DIAGNOSIS — I25.10 CORONARY ARTERY DISEASE DUE TO CALCIFIED CORONARY LESION: ICD-10-CM

## 2019-07-24 DIAGNOSIS — I25.84 CORONARY ARTERY DISEASE DUE TO CALCIFIED CORONARY LESION: ICD-10-CM

## 2019-07-24 RX ORDER — RANOLAZINE 500 MG/1
500 TABLET, EXTENDED RELEASE ORAL 2 TIMES DAILY
Qty: 60 TABLET | Refills: 1 | Status: SHIPPED | OUTPATIENT
Start: 2019-07-24 | End: 2019-10-16 | Stop reason: SDUPTHER

## 2019-07-24 NOTE — TELEPHONE ENCOUNTER
Requested Prescriptions      No prescriptions requested or ordered in this encounter       Next appt is Visit date not found  Last appt was 6/24/2019

## 2019-08-05 ENCOUNTER — CARE COORDINATION (OUTPATIENT)
Dept: CARE COORDINATION | Age: 79
End: 2019-08-05

## 2019-08-15 ENCOUNTER — CARE COORDINATION (OUTPATIENT)
Dept: CARE COORDINATION | Age: 79
End: 2019-08-15

## 2019-08-15 NOTE — CARE COORDINATION
Patient Excluded from Care Coordination? Yes     The patient will be excluded from Care Coordination for the following reason: Patient declined care coordination.   Feels she is doing very well and has no needs

## 2019-08-19 RX ORDER — SILDENAFIL CITRATE 20 MG/1
TABLET ORAL
Qty: 45 TABLET | Refills: 1 | Status: SHIPPED | OUTPATIENT
Start: 2019-08-19 | End: 2019-10-22 | Stop reason: SDUPTHER

## 2019-08-28 RX ORDER — ALBUTEROL SULFATE 90 UG/1
AEROSOL, METERED RESPIRATORY (INHALATION)
Qty: 18 G | Refills: 2 | Status: SHIPPED
Start: 2019-08-28 | End: 2021-09-10 | Stop reason: SDUPTHER

## 2019-08-29 DIAGNOSIS — I25.810 ATHEROSCLEROSIS OF CORONARY ARTERY BYPASS GRAFT OF NATIVE HEART WITHOUT ANGINA PECTORIS: ICD-10-CM

## 2019-08-29 DIAGNOSIS — I10 ESSENTIAL HYPERTENSION: ICD-10-CM

## 2019-08-29 RX ORDER — ATORVASTATIN CALCIUM 40 MG/1
TABLET, FILM COATED ORAL
Qty: 30 TABLET | Refills: 2 | Status: SHIPPED | OUTPATIENT
Start: 2019-08-29 | End: 2019-12-05 | Stop reason: SDUPTHER

## 2019-08-29 RX ORDER — AMLODIPINE BESYLATE 10 MG/1
TABLET ORAL
Qty: 30 TABLET | Refills: 3 | Status: SHIPPED | OUTPATIENT
Start: 2019-08-29 | End: 2020-01-06

## 2019-09-15 ENCOUNTER — HOSPITAL ENCOUNTER (INPATIENT)
Age: 79
LOS: 6 days | Discharge: HOME HEALTH | DRG: 308 | End: 2019-09-21
Attending: EMERGENCY MEDICINE | Admitting: INTERNAL MEDICINE
Payer: MEDICARE

## 2019-09-15 ENCOUNTER — APPOINTMENT (OUTPATIENT)
Dept: GENERAL RADIOLOGY | Age: 79
DRG: 308 | End: 2019-09-15
Payer: MEDICARE

## 2019-09-15 DIAGNOSIS — I48.91 ATRIAL FIBRILLATION WITH RVR (HCC): Primary | ICD-10-CM

## 2019-09-15 DIAGNOSIS — R42 DIZZINESS: ICD-10-CM

## 2019-09-15 LAB
ANION GAP SERPL CALCULATED.3IONS-SCNC: 12 MMOL/L (ref 7–16)
BASOPHILS ABSOLUTE: 0.04 E9/L (ref 0–0.2)
BASOPHILS RELATIVE PERCENT: 0.8 % (ref 0–2)
BUN BLDV-MCNC: 10 MG/DL (ref 8–23)
CALCIUM SERPL-MCNC: 9.7 MG/DL (ref 8.6–10.2)
CHLORIDE BLD-SCNC: 103 MMOL/L (ref 98–107)
CO2: 25 MMOL/L (ref 22–29)
CREAT SERPL-MCNC: 0.7 MG/DL (ref 0.5–1)
EOSINOPHILS ABSOLUTE: 0.16 E9/L (ref 0.05–0.5)
EOSINOPHILS RELATIVE PERCENT: 3.1 % (ref 0–6)
GFR AFRICAN AMERICAN: >60
GFR NON-AFRICAN AMERICAN: >60 ML/MIN/1.73
GLUCOSE BLD-MCNC: 142 MG/DL (ref 74–99)
HCT VFR BLD CALC: 43.4 % (ref 34–48)
HEMOGLOBIN: 14.9 G/DL (ref 11.5–15.5)
IMMATURE GRANULOCYTES #: 0.02 E9/L
IMMATURE GRANULOCYTES %: 0.4 % (ref 0–5)
LYMPHOCYTES ABSOLUTE: 1.37 E9/L (ref 1.5–4)
LYMPHOCYTES RELATIVE PERCENT: 26.8 % (ref 20–42)
MAGNESIUM: 2.1 MG/DL (ref 1.6–2.6)
MCH RBC QN AUTO: 32.5 PG (ref 26–35)
MCHC RBC AUTO-ENTMCNC: 34.3 % (ref 32–34.5)
MCV RBC AUTO: 94.6 FL (ref 80–99.9)
MONOCYTES ABSOLUTE: 0.53 E9/L (ref 0.1–0.95)
MONOCYTES RELATIVE PERCENT: 10.4 % (ref 2–12)
NEUTROPHILS ABSOLUTE: 3 E9/L (ref 1.8–7.3)
NEUTROPHILS RELATIVE PERCENT: 58.5 % (ref 43–80)
PDW BLD-RTO: 12.6 FL (ref 11.5–15)
PLATELET # BLD: 140 E9/L (ref 130–450)
PMV BLD AUTO: 10.2 FL (ref 7–12)
POTASSIUM SERPL-SCNC: 3.7 MMOL/L (ref 3.5–5)
PRO-BNP: 700 PG/ML (ref 0–450)
RBC # BLD: 4.59 E12/L (ref 3.5–5.5)
SODIUM BLD-SCNC: 140 MMOL/L (ref 132–146)
TROPONIN: <0.01 NG/ML (ref 0–0.03)
TSH SERPL DL<=0.05 MIU/L-ACNC: 2.15 UIU/ML (ref 0.27–4.2)
WBC # BLD: 5.1 E9/L (ref 4.5–11.5)

## 2019-09-15 PROCEDURE — 83880 ASSAY OF NATRIURETIC PEPTIDE: CPT

## 2019-09-15 PROCEDURE — 84484 ASSAY OF TROPONIN QUANT: CPT

## 2019-09-15 PROCEDURE — 84443 ASSAY THYROID STIM HORMONE: CPT

## 2019-09-15 PROCEDURE — 2580000003 HC RX 258: Performed by: STUDENT IN AN ORGANIZED HEALTH CARE EDUCATION/TRAINING PROGRAM

## 2019-09-15 PROCEDURE — 36415 COLL VENOUS BLD VENIPUNCTURE: CPT

## 2019-09-15 PROCEDURE — 93005 ELECTROCARDIOGRAM TRACING: CPT | Performed by: STUDENT IN AN ORGANIZED HEALTH CARE EDUCATION/TRAINING PROGRAM

## 2019-09-15 PROCEDURE — 83735 ASSAY OF MAGNESIUM: CPT

## 2019-09-15 PROCEDURE — 99285 EMERGENCY DEPT VISIT HI MDM: CPT

## 2019-09-15 PROCEDURE — 71046 X-RAY EXAM CHEST 2 VIEWS: CPT

## 2019-09-15 PROCEDURE — 2060000000 HC ICU INTERMEDIATE R&B

## 2019-09-15 PROCEDURE — 85025 COMPLETE CBC W/AUTO DIFF WBC: CPT

## 2019-09-15 PROCEDURE — 80048 BASIC METABOLIC PNL TOTAL CA: CPT

## 2019-09-15 PROCEDURE — 2500000003 HC RX 250 WO HCPCS: Performed by: STUDENT IN AN ORGANIZED HEALTH CARE EDUCATION/TRAINING PROGRAM

## 2019-09-15 RX ORDER — 0.9 % SODIUM CHLORIDE 0.9 %
500 INTRAVENOUS SOLUTION INTRAVENOUS ONCE
Status: COMPLETED | OUTPATIENT
Start: 2019-09-15 | End: 2019-09-15

## 2019-09-15 RX ORDER — DILTIAZEM HYDROCHLORIDE 5 MG/ML
10 INJECTION INTRAVENOUS ONCE
Status: DISCONTINUED | OUTPATIENT
Start: 2019-09-15 | End: 2019-09-21 | Stop reason: HOSPADM

## 2019-09-15 RX ADMIN — DILTIAZEM HYDROCHLORIDE 5 MG/HR: 5 INJECTION INTRAVENOUS at 20:46

## 2019-09-15 RX ADMIN — SODIUM CHLORIDE 500 ML: 9 INJECTION, SOLUTION INTRAVENOUS at 19:02

## 2019-09-15 ASSESSMENT — ENCOUNTER SYMPTOMS
ABDOMINAL PAIN: 0
WHEEZING: 0
SORE THROAT: 0
BLOOD IN STOOL: 0
BACK PAIN: 0
SHORTNESS OF BREATH: 0
CHEST TIGHTNESS: 0
RHINORRHEA: 0
DIARRHEA: 0
VOMITING: 0
COUGH: 0
CONSTIPATION: 0
NAUSEA: 0

## 2019-09-15 ASSESSMENT — PAIN SCALES - GENERAL: PAINLEVEL_OUTOF10: 3

## 2019-09-15 ASSESSMENT — PAIN DESCRIPTION - DESCRIPTORS: DESCRIPTORS: ACHING

## 2019-09-15 ASSESSMENT — PAIN DESCRIPTION - LOCATION: LOCATION: CHEST

## 2019-09-15 ASSESSMENT — PAIN DESCRIPTION - PAIN TYPE: TYPE: ACUTE PAIN

## 2019-09-15 NOTE — ED PROVIDER NOTES
she will get lightheaded and at times short of breath. My findings: Soha Layne is a [de-identified] y.o. female whom is in no distress. Physical exam reveals patient is alert and oriented. Pupils equal reactive light. Lungs are clear and equal bilaterally, abdomen soft nontender. No peripheral edema. Her heart is regular while at rest but, with less of exertion, she will go into atrial fibrillation with RVR rates as high as 140. My plan: Symptomatic and supportive care. Labs, x-ray, EKG, rate control. Electronically signed by Aristeo Rodriguez DO on 9/15/19 at 8:07 PM          [TG]   2008 Patient's A. fib with RVR was initially paroxysmal, and seems to be more steadily in A. fib with RVR at this point. Will initiate Cardizem. [TG]      ED Course User Index  [TG] Aristeo Rodriguez DO       --------------------------------------------- PAST HISTORY ---------------------------------------------  Past Medical History:  has a past medical history of CAD (coronary artery disease), GERD (gastroesophageal reflux disease), History of echocardiogram, History of echocardiogram, Hyperlipidemia, Hypertension, MI (myocardial infarction) ([address] Migraines, and Normal cardiac stress test.    Past Surgical History:  has a past surgical history that includes Cholecystectomy; Coronary artery bypass graft; Cardiac catheterization; ECHO Compl W Dop Color Flow (2/27/2012); ECHO Compl W Dop Color Flow (3/25/2013); and Colonoscopy (4/16/13). Social History:  reports that she quit smoking about 19 years ago. Her smoking use included cigarettes. She has a 10.00 pack-year smoking history. She has never used smokeless tobacco. She reports that she does not drink alcohol or use drugs. Family History: family history is not on file. The patients home medications have been reviewed.     Allergies: Dronedarone    -------------------------------------------------- RESULTS CHEST STANDARD (2 VW)   Final Result   1. 8mm diameter rounded nodular density overlying right lung base on   PA view, suggesting overlapping breast nipple shadow. If confirmation   is clinically indicated, repeat frontal inspiratory view of the chest   with nipple markers in place may be obtained. Lungs are otherwise   clear bilaterally. 2. Enlarged globular cardiac silhouette. Pericardial effusion cannot   be excluded. EKG: This EKG is signed and interpreted by me. Rate: 80  Rhythm: Sinus  Interpretation: Sinus rhythm with a right axis deviation. No ST elevations or depressions. No signs of acute ischemia. Comparison: changes compared to previous EKG      ------------------------- NURSING NOTES AND VITALS REVIEWED ---------------------------  Date / Time Roomed:  9/15/2019  5:59 PM  ED Bed Assignment:  05/05    The nursing notes within the ED encounter and vital signs as below have been reviewed. Patient Vitals for the past 24 hrs:   BP Temp Temp src Pulse Resp SpO2 Height Weight   09/15/19 1846 (!) 161/101 -- -- 117 20 -- -- --   09/15/19 1800 119/81 97 °F (36.1 °C) Oral 122 18 96 % 4' 10\" (1.473 m) 102 lb (46.3 kg)       Oxygen Saturation Interpretation: Normal    ------------------------------------------ PROGRESS NOTES ------------------------------------------    Counseling:  I have spoken with the patient and discussed todays results, in addition to providing specific details for the plan of care and counseling regarding the diagnosis and prognosis. Their questions are answered at this time and they are agreeable with the plan of admission.    --------------------------------- ADDITIONAL PROVIDER NOTES ---------------------------------  Consultations:  Spoke with Dr. Rodriguez Daniels. Discussed case. They will admit the patient.     This patient's ED course included: a personal history and physicial examination, re-evaluation prior to disposition, multiple bedside re-evaluations, IV medications,

## 2019-09-15 NOTE — ED NOTES
The patient was ambulated to the bathroom she complained of dizziness and she was unsteady. She was a contact guard assist to the bathroom. Dr. Carlton Thomason notified.       Tianna Delong RN  09/15/19 9541

## 2019-09-15 NOTE — ED NOTES
Orthostatic BP and pulse obtained. The patient's heart rhythm changed several times during the procedure including ST, Afib, and NSR. Dr. Lynn Prescott notified.       Lesle Sacks, RN  09/15/19 1771

## 2019-09-16 LAB
ALBUMIN SERPL-MCNC: 4.1 G/DL (ref 3.5–5.2)
ALP BLD-CCNC: 119 U/L (ref 35–104)
ALT SERPL-CCNC: 10 U/L (ref 0–32)
ANION GAP SERPL CALCULATED.3IONS-SCNC: 10 MMOL/L (ref 7–16)
AST SERPL-CCNC: 22 U/L (ref 0–31)
BILIRUB SERPL-MCNC: 1.6 MG/DL (ref 0–1.2)
BUN BLDV-MCNC: 7 MG/DL (ref 8–23)
CALCIUM SERPL-MCNC: 9 MG/DL (ref 8.6–10.2)
CHLORIDE BLD-SCNC: 107 MMOL/L (ref 98–107)
CHOLESTEROL, TOTAL: 123 MG/DL (ref 0–199)
CO2: 26 MMOL/L (ref 22–29)
CREAT SERPL-MCNC: 0.6 MG/DL (ref 0.5–1)
EKG ATRIAL RATE: 80 BPM
EKG P AXIS: 46 DEGREES
EKG P-R INTERVAL: 130 MS
EKG Q-T INTERVAL: 416 MS
EKG QRS DURATION: 86 MS
EKG QTC CALCULATION (BAZETT): 479 MS
EKG R AXIS: 116 DEGREES
EKG T AXIS: 36 DEGREES
EKG VENTRICULAR RATE: 80 BPM
GFR AFRICAN AMERICAN: >60
GFR NON-AFRICAN AMERICAN: >60 ML/MIN/1.73
GLUCOSE BLD-MCNC: 86 MG/DL (ref 74–99)
HBA1C MFR BLD: 5.1 % (ref 4–5.6)
HCT VFR BLD CALC: 42 % (ref 34–48)
HDLC SERPL-MCNC: 63 MG/DL
HEMOGLOBIN: 13.9 G/DL (ref 11.5–15.5)
LDL CHOLESTEROL CALCULATED: 49 MG/DL (ref 0–99)
LEFT VENTRICULAR EJECTION FRACTION MODE: NORMAL
LV EF: 64 %
LV EF: 64 %
MAGNESIUM: 2.1 MG/DL (ref 1.6–2.6)
MCH RBC QN AUTO: 31.7 PG (ref 26–35)
MCHC RBC AUTO-ENTMCNC: 33.1 % (ref 32–34.5)
MCV RBC AUTO: 95.7 FL (ref 80–99.9)
PDW BLD-RTO: 12.6 FL (ref 11.5–15)
PHOSPHORUS: 2.7 MG/DL (ref 2.5–4.5)
PLATELET # BLD: 129 E9/L (ref 130–450)
PMV BLD AUTO: 10.3 FL (ref 7–12)
POTASSIUM SERPL-SCNC: 4.1 MMOL/L (ref 3.5–5)
RBC # BLD: 4.39 E12/L (ref 3.5–5.5)
SODIUM BLD-SCNC: 143 MMOL/L (ref 132–146)
TOTAL PROTEIN: 6.5 G/DL (ref 6.4–8.3)
TRIGL SERPL-MCNC: 55 MG/DL (ref 0–149)
TROPONIN: <0.01 NG/ML (ref 0–0.03)
TROPONIN: <0.01 NG/ML (ref 0–0.03)
VLDLC SERPL CALC-MCNC: 11 MG/DL
WBC # BLD: 4.3 E9/L (ref 4.5–11.5)

## 2019-09-16 PROCEDURE — 80061 LIPID PANEL: CPT

## 2019-09-16 PROCEDURE — 83036 HEMOGLOBIN GLYCOSYLATED A1C: CPT

## 2019-09-16 PROCEDURE — 2580000003 HC RX 258: Performed by: INTERNAL MEDICINE

## 2019-09-16 PROCEDURE — 36415 COLL VENOUS BLD VENIPUNCTURE: CPT

## 2019-09-16 PROCEDURE — 93306 TTE W/DOPPLER COMPLETE: CPT

## 2019-09-16 PROCEDURE — 85027 COMPLETE CBC AUTOMATED: CPT

## 2019-09-16 PROCEDURE — 80053 COMPREHEN METABOLIC PANEL: CPT

## 2019-09-16 PROCEDURE — 83735 ASSAY OF MAGNESIUM: CPT

## 2019-09-16 PROCEDURE — 6370000000 HC RX 637 (ALT 250 FOR IP): Performed by: INTERNAL MEDICINE

## 2019-09-16 PROCEDURE — 93010 ELECTROCARDIOGRAM REPORT: CPT | Performed by: INTERNAL MEDICINE

## 2019-09-16 PROCEDURE — 93005 ELECTROCARDIOGRAM TRACING: CPT | Performed by: INTERNAL MEDICINE

## 2019-09-16 PROCEDURE — 84484 ASSAY OF TROPONIN QUANT: CPT

## 2019-09-16 PROCEDURE — 84100 ASSAY OF PHOSPHORUS: CPT

## 2019-09-16 PROCEDURE — 2060000000 HC ICU INTERMEDIATE R&B

## 2019-09-16 RX ORDER — BUMETANIDE 1 MG/1
2 TABLET ORAL DAILY
Status: DISCONTINUED | OUTPATIENT
Start: 2019-09-16 | End: 2019-09-21 | Stop reason: HOSPADM

## 2019-09-16 RX ORDER — ATORVASTATIN CALCIUM 40 MG/1
40 TABLET, FILM COATED ORAL DAILY
Status: DISCONTINUED | OUTPATIENT
Start: 2019-09-16 | End: 2019-09-21 | Stop reason: HOSPADM

## 2019-09-16 RX ORDER — ACETAMINOPHEN 325 MG/1
650 TABLET ORAL EVERY 4 HOURS PRN
Status: DISCONTINUED | OUTPATIENT
Start: 2019-09-16 | End: 2019-09-21 | Stop reason: HOSPADM

## 2019-09-16 RX ORDER — ALBUTEROL SULFATE 90 UG/1
2 AEROSOL, METERED RESPIRATORY (INHALATION) EVERY 6 HOURS PRN
Status: DISCONTINUED | OUTPATIENT
Start: 2019-09-16 | End: 2019-09-21 | Stop reason: HOSPADM

## 2019-09-16 RX ORDER — PANTOPRAZOLE SODIUM 40 MG/1
40 TABLET, DELAYED RELEASE ORAL
Status: DISCONTINUED | OUTPATIENT
Start: 2019-09-16 | End: 2019-09-21 | Stop reason: HOSPADM

## 2019-09-16 RX ORDER — POTASSIUM CHLORIDE 20 MEQ/1
20 TABLET, EXTENDED RELEASE ORAL 2 TIMES DAILY
Status: DISCONTINUED | OUTPATIENT
Start: 2019-09-16 | End: 2019-09-21 | Stop reason: HOSPADM

## 2019-09-16 RX ORDER — SILDENAFIL CITRATE 20 MG/1
10 TABLET ORAL 3 TIMES DAILY
Status: DISCONTINUED | OUTPATIENT
Start: 2019-09-16 | End: 2019-09-21 | Stop reason: HOSPADM

## 2019-09-16 RX ORDER — SODIUM CHLORIDE 0.9 % (FLUSH) 0.9 %
10 SYRINGE (ML) INJECTION EVERY 12 HOURS SCHEDULED
Status: DISCONTINUED | OUTPATIENT
Start: 2019-09-16 | End: 2019-09-21 | Stop reason: HOSPADM

## 2019-09-16 RX ORDER — SODIUM CHLORIDE 0.9 % (FLUSH) 0.9 %
10 SYRINGE (ML) INJECTION PRN
Status: DISCONTINUED | OUTPATIENT
Start: 2019-09-16 | End: 2019-09-21 | Stop reason: HOSPADM

## 2019-09-16 RX ORDER — RANOLAZINE 500 MG/1
500 TABLET, EXTENDED RELEASE ORAL 2 TIMES DAILY
Status: DISCONTINUED | OUTPATIENT
Start: 2019-09-16 | End: 2019-09-21 | Stop reason: HOSPADM

## 2019-09-16 RX ORDER — FOLIC ACID 1 MG/1
1 TABLET ORAL DAILY
Status: DISCONTINUED | OUTPATIENT
Start: 2019-09-16 | End: 2019-09-21 | Stop reason: HOSPADM

## 2019-09-16 RX ORDER — NEBIVOLOL 5 MG/1
20 TABLET ORAL DAILY
Status: DISCONTINUED | OUTPATIENT
Start: 2019-09-16 | End: 2019-09-16

## 2019-09-16 RX ORDER — AMITRIPTYLINE HYDROCHLORIDE 10 MG/1
10 TABLET, FILM COATED ORAL NIGHTLY
Status: DISCONTINUED | OUTPATIENT
Start: 2019-09-16 | End: 2019-09-21 | Stop reason: HOSPADM

## 2019-09-16 RX ADMIN — RANOLAZINE 500 MG: 500 TABLET, FILM COATED, EXTENDED RELEASE ORAL at 20:08

## 2019-09-16 RX ADMIN — SILDENAFIL CITRATE 10 MG: 20 TABLET ORAL at 20:08

## 2019-09-16 RX ADMIN — APIXABAN 2.5 MG: 2.5 TABLET, FILM COATED ORAL at 20:08

## 2019-09-16 RX ADMIN — SILDENAFIL CITRATE 10 MG: 20 TABLET ORAL at 14:07

## 2019-09-16 RX ADMIN — RANOLAZINE 500 MG: 500 TABLET, FILM COATED, EXTENDED RELEASE ORAL at 09:39

## 2019-09-16 RX ADMIN — AMITRIPTYLINE HYDROCHLORIDE 10 MG: 10 TABLET, FILM COATED ORAL at 01:30

## 2019-09-16 RX ADMIN — POTASSIUM CHLORIDE 20 MEQ: 20 TABLET, EXTENDED RELEASE ORAL at 01:31

## 2019-09-16 RX ADMIN — SILDENAFIL CITRATE 10 MG: 20 TABLET ORAL at 09:39

## 2019-09-16 RX ADMIN — FOLIC ACID 1 MG: 1 TABLET ORAL at 09:40

## 2019-09-16 RX ADMIN — POTASSIUM CHLORIDE 20 MEQ: 20 TABLET, EXTENDED RELEASE ORAL at 09:40

## 2019-09-16 RX ADMIN — RANOLAZINE 500 MG: 500 TABLET, FILM COATED, EXTENDED RELEASE ORAL at 01:30

## 2019-09-16 RX ADMIN — Medication 10 ML: at 20:07

## 2019-09-16 RX ADMIN — PANTOPRAZOLE SODIUM 40 MG: 40 TABLET, DELAYED RELEASE ORAL at 05:43

## 2019-09-16 RX ADMIN — BUMETANIDE 2 MG: 1 TABLET ORAL at 09:39

## 2019-09-16 RX ADMIN — ATORVASTATIN CALCIUM 40 MG: 40 TABLET, FILM COATED ORAL at 09:40

## 2019-09-16 RX ADMIN — AMITRIPTYLINE HYDROCHLORIDE 10 MG: 10 TABLET, FILM COATED ORAL at 20:08

## 2019-09-16 RX ADMIN — APIXABAN 5 MG: 5 TABLET, FILM COATED ORAL at 01:30

## 2019-09-16 RX ADMIN — NEBIVOLOL HYDROCHLORIDE 20 MG: 5 TABLET ORAL at 09:39

## 2019-09-16 RX ADMIN — POTASSIUM CHLORIDE 20 MEQ: 20 TABLET, EXTENDED RELEASE ORAL at 20:09

## 2019-09-16 RX ADMIN — Medication 10 ML: at 09:44

## 2019-09-16 RX ADMIN — APIXABAN 5 MG: 5 TABLET, FILM COATED ORAL at 09:40

## 2019-09-16 ASSESSMENT — PAIN SCALES - GENERAL
PAINLEVEL_OUTOF10: 0

## 2019-09-16 NOTE — PROGRESS NOTES
Dr. Cecille Lugo did call concerning this patients low heart rate. Doctor was notified that stat EKG was performed and Bystolic was discontinued by Dr. Janice Terry. No new orders were given at this time.

## 2019-09-16 NOTE — PROGRESS NOTES
[facility]  Internal Medicine  -Resident Progress Note-    PCP:  Geovany Jung DO  Admitting Physician:  Meet Foster DO  Consultants:  None at this time cardiolgoy  Chief Complaint:    Chief Complaint   Patient presents with    Dizziness     PT had sudden onset of dizziness. per EMS pt in AFIB and Aflutter       History of Present Illness  Bobbi was seen and examined at bedside today; family was present for the examination. Cardizem drip was stopped. HR is now controlled. Family states that Mohit Bobby has been missing doses of her medications at home. She still complains of intermittent palpitations. Review of Systems  All bolded are positive; please see HPI  General:  Fever, chills, diaphoresis, fatigue, malaise, night sweats, weight loss  Psychological:  Anxiety, disorientation, hallucinations. ENT:  Epistaxis, headaches, vertigo, visual changes. Cardiovascular:  Chest pain, irregular heartbeats, palpitations, paroxysmal nocturnal dyspnea. Respiratory:  Shortness of breath, coughing, sputum production, hemoptysis, wheezing, orthopnea.   Gastrointestinal:  Nausea, vomiting, diarrhea, heartburn, constipation, abdominal pain, hematemesis, hematochezia, melena, acholic stools  Genito-Urinary:  Dysuria, urgency, frequency, hematuria  Musculoskeletal:  Joint pain, joint stiffness, joint swelling, muscle pain  Neurology:  Headache, focal neurological deficits, weakness, numbness, paresthesia  Derm:  Rashes, ulcers, excoriations, bruising  Extremities:  Decreased ROM, peripheral edema, mottling    Physical Examination  Vitals:  /75   Pulse 82   Temp 97.9 °F (36.6 °C) (Oral)   Resp 16   Ht 4' 10\" (1.473 m)   Wt 98 lb 11.2 oz (44.8 kg)   LMP  (LMP Unknown)   SpO2 95%   BMI 20.63 kg/m²   General Appearance:  awake, alert, and oriented to person, place, time, and purpose; appears stated age and cooperative; no apparent distress no labored breathing  HEENT:  PERRL; EOMI; sclera

## 2019-09-16 NOTE — H&P
left ventricle shows normal wall thicknesses, fairly good systolic function, ejection fraction is 62%. There is no definite focal wall abnormalities seen. Diastolic filling is indeterminate. There is evidence of rapid left ventricular filling seen. 3.  The mitral valve appears structurally normal and shows no significant mitral stenosis with a mitral valve area of 4.6 cm2 by pressure half time and the maximal gradient of 6.7 mmHg. There is 1+ mitral regurgitation. 4.  The aortic valve appear structurally normal shows a maximal gradient of 10.7 mmHg. Aortic valve area estimated at 1.7 cm2. However, on visualization, there is no evidence of significant aortic stenosis nor insufficiency seen. 5.  There is no pulmonic stenosis. There is 2+ pulmonic insufficiency. There is severe tricuspid regurgitation with the tricuspid valve showing lack of coaptation of the leaflets, most likely secondary to tricuspid annular dilatation. There is severe tricuspid regurgitation with pulmonary pressures measured at 46 mmHg. However, given the fact that there is not a thin-defined jet pulmonary pressures may be actually somewhat higher. 6.  There is no significant pericardial effusion nor any definite  intracavitary mass or thrombus or shunt identified. The vena cava appears to be dilated most likely secondary to severe tricuspid regurgitation, pulmonary hypertension, elevated right-sided filling pressures. Aortic arch is normal in size and shows no evidence of coarctation of aorta.      ED TRIAGE VITALS  BP: (!) 161/101, Temp: 97 °F (36.1 °C), Pulse: 117, Resp: 20, SpO2: 96 %    Vitals:    09/15/19 1800 09/15/19 1846   BP: 119/81 (!) 161/101   Pulse: 122 117   Resp: 18 20   Temp: 97 °F (36.1 °C)    TempSrc: Oral    SpO2: 96%    Weight: 102 lb (46.3 kg)    Height: 4' 10\" (1.473 m)          Histories  Past Medical History:   Diagnosis Date    CAD (coronary artery disease)     GERD (gastroesophageal reflux disease)     History of echocardiogram 03/14/2017    EF 62%    History of echocardiogram 05/01/2018    EF 62%    Hyperlipidemia     Hypertension     [address] [facility]my[facility]) [address] [address]     Migraines     Normal cardiac stress test 5/2010     Past Surgical History:   Procedure Laterality Date    CARDIAC CATHETERIZATION      2002    CHOLECYSTECTOMY      COLONOSCOPY  4/16/13    DR CAMERON     CORONARY ARTERY BYPASS GRAFT      CABG x 4 in 2000    ECHO COMPL W DOP COLOR FLOW  2/27/2012         ECHO COMPL W DOP COLOR FLOW  3/25/2013          History reviewed. No pertinent family history. Home Medications  Prior to Admission medications    Medication Sig Start Date End Date Taking? Authorizing Provider   atorvastatin (LIPITOR) 40 MG tablet TAKE ONE TABLET BY MOUTH EVERY DAY 8/29/19   Reesa Fabry, DO   amLODIPine (NORVASC) 10 MG tablet TAKE ONE TABLET BY MOUTH EVERY DAY 8/29/19   Reesa Fabry, DO   albuterol sulfate HFA (VENTOLIN HFA) 108 (90 Base) MCG/ACT inhaler INHALE TWO PUFFS BY MOUTH EVERY 6 HOURS AS NEEDED FOR WHEEZING 8/28/19   MAYCOL Valerio - CNP   sildenafil (REVATIO) 20 MG tablet take 1/2 tablet by mouth 3 times daily.  8/19/19   Reesa Fabry, DO   ranolazine (RANEXA) 500 MG extended release tablet Take 1 tablet by mouth 2 times daily 7/24/19   MAYCOL Valerio - CNP   folic acid (FOLVITE) 1 MG tablet Take 1 tablet by mouth daily 7/10/19   Reesa Fabry, DO   omeprazole (PRILOSEC) 40 MG delayed release capsule TAKE ONE CAPSULE BY MOUTH EVERY DAY 7/10/19   Reesa Fabry, DO   apixaban (ELIQUIS) 5 MG TABS tablet TAKE ONE TABLET BY MOUTH TWO TIMES A DAY 6/24/19   Reesa Fabry, DO   predniSONE 5 MG (21) TBPK Take 6 pills on day 1, 5 pills on day 2,4 pills on day 3, 3 pills on day 4, 2 pills on day 5, 1 pill on day 6. 6/24/19   Reesa Fabry, DO   nebivolol (BYSTOLIC) 20 MG TABS tablet Take 1 tablet by mouth daily 6/10/19   Reesa Fabry, DO Physically abused: Not on file     Forced sexual activity: Not on file   Other Topics Concern    Not on file   Social History Narrative    Not on file       Review of Systems  All bolded are positive; please see HPI  General:  Fever, chills, diaphoresis, fatigue, malaise, night sweats, weight loss dizziness  Psychological:  Anxiety, disorientation, hallucinations. ENT:  Epistaxis, headaches, vertigo, visual changes. Cardiovascular:  Chest pain, irregular heartbeats, palpitations, paroxysmal nocturnal dyspnea. Respiratory:  Shortness of breath, coughing, sputum production, hemoptysis, wheezing, orthopnea.   Gastrointestinal:  Nausea, vomiting, diarrhea, heartburn, constipation, abdominal pain, hematemesis, hematochezia, melena, acholic stools  Genito-Urinary:  Dysuria, urgency, frequency, hematuria  Musculoskeletal:  Joint pain, joint stiffness, joint swelling, muscle pain  Neurology:  Headache, focal neurological deficits, weakness, numbness, paresthesia  Derm:  Rashes, ulcers, excoriations, bruising  Extremities:  Decreased ROM, peripheral edema, mottling    Physical Examination  Vitals:  BP (!) 161/101   Pulse 117   Temp 97 °F (36.1 °C) (Oral)   Resp 20   Ht 4' 10\" (1.473 m)   Wt 102 lb (46.3 kg)   LMP  (LMP Unknown)   SpO2 96%   BMI 21.32 kg/m²   General Appearance:  awake, alert, and oriented to person, place, time, and purpose; appears stated age and cooperative; no apparent distress no labored breathing  HEENT:  NCAT; PERRL; conjunctiva pink, sclera clear  Neck:  no adenopathy, bruit, JVD, tenderness, masses, or nodules; supple, symmetrical, trachea midline, thyroid not enlarged  Lung:  clear to auscultation bilaterally; no use of accessory muscles; no rhonchi, rales, or crackles  Heart:  Irregularly irregular, varying between 80-115without murmur, rub, or gallop +systolic murmur  Abdomen:  soft, nontender, nondistended; normoactive bowel sounds; no organomegaly  Extremities:  extremities

## 2019-09-16 NOTE — CARE COORDINATION
SOCIAL WORK / DISCHARGE PLANNING:  Sw met with pt at bedside to discuss transition to care. Pt reports to reside alone, denies having any dme. States dtr comes to check on her and assist prn. Sw consult noted for [facility]. Pt has used [facility] in the past and is willing to use them again. Referral called to [facility] [address] [place] [address]. Sw will follow, assist prn.                  Electronically signed by VIANCA Mcknight on 9/16/2019 at 3:13 PM

## 2019-09-16 NOTE — PROGRESS NOTES
[facility] notified this RN that patient had two pauses. First pause 4.56 & second pause 2.4. Dr. Taylor Yun notified face-to-face. Message left for Dr. Lilliana Rodney on answering machine at office. Awaiting orders.

## 2019-09-17 LAB
ALBUMIN SERPL-MCNC: 4.1 G/DL (ref 3.5–5.2)
ALP BLD-CCNC: 126 U/L (ref 35–104)
ALT SERPL-CCNC: 11 U/L (ref 0–32)
ANION GAP SERPL CALCULATED.3IONS-SCNC: 12 MMOL/L (ref 7–16)
AST SERPL-CCNC: 22 U/L (ref 0–31)
BILIRUB SERPL-MCNC: 1.3 MG/DL (ref 0–1.2)
BUN BLDV-MCNC: 19 MG/DL (ref 8–23)
CALCIUM SERPL-MCNC: 9.4 MG/DL (ref 8.6–10.2)
CHLORIDE BLD-SCNC: 103 MMOL/L (ref 98–107)
CO2: 25 MMOL/L (ref 22–29)
CREAT SERPL-MCNC: 0.8 MG/DL (ref 0.5–1)
GFR AFRICAN AMERICAN: >60
GFR NON-AFRICAN AMERICAN: >60 ML/MIN/1.73
GLUCOSE BLD-MCNC: 92 MG/DL (ref 74–99)
HCT VFR BLD CALC: 45.3 % (ref 34–48)
HEMOGLOBIN: 15 G/DL (ref 11.5–15.5)
MCH RBC QN AUTO: 32 PG (ref 26–35)
MCHC RBC AUTO-ENTMCNC: 33.1 % (ref 32–34.5)
MCV RBC AUTO: 96.6 FL (ref 80–99.9)
PDW BLD-RTO: 12.7 FL (ref 11.5–15)
PLATELET # BLD: 146 E9/L (ref 130–450)
PMV BLD AUTO: 10.8 FL (ref 7–12)
POTASSIUM SERPL-SCNC: 4.1 MMOL/L (ref 3.5–5)
RBC # BLD: 4.69 E12/L (ref 3.5–5.5)
SODIUM BLD-SCNC: 140 MMOL/L (ref 132–146)
TOTAL PROTEIN: 6.9 G/DL (ref 6.4–8.3)
WBC # BLD: 5.7 E9/L (ref 4.5–11.5)

## 2019-09-17 PROCEDURE — 6370000000 HC RX 637 (ALT 250 FOR IP): Performed by: INTERNAL MEDICINE

## 2019-09-17 PROCEDURE — 85027 COMPLETE CBC AUTOMATED: CPT

## 2019-09-17 PROCEDURE — 36415 COLL VENOUS BLD VENIPUNCTURE: CPT

## 2019-09-17 PROCEDURE — 2580000003 HC RX 258: Performed by: INTERNAL MEDICINE

## 2019-09-17 PROCEDURE — 80053 COMPREHEN METABOLIC PANEL: CPT

## 2019-09-17 PROCEDURE — 2060000000 HC ICU INTERMEDIATE R&B

## 2019-09-17 RX ADMIN — Medication 10 ML: at 20:23

## 2019-09-17 RX ADMIN — AMITRIPTYLINE HYDROCHLORIDE 10 MG: 10 TABLET, FILM COATED ORAL at 20:18

## 2019-09-17 RX ADMIN — POTASSIUM CHLORIDE 20 MEQ: 20 TABLET, EXTENDED RELEASE ORAL at 08:32

## 2019-09-17 RX ADMIN — SILDENAFIL CITRATE 10 MG: 20 TABLET ORAL at 14:12

## 2019-09-17 RX ADMIN — Medication 10 ML: at 14:51

## 2019-09-17 RX ADMIN — ATORVASTATIN CALCIUM 40 MG: 40 TABLET, FILM COATED ORAL at 14:10

## 2019-09-17 RX ADMIN — SILDENAFIL CITRATE 10 MG: 20 TABLET ORAL at 20:18

## 2019-09-17 RX ADMIN — SILDENAFIL CITRATE 10 MG: 20 TABLET ORAL at 08:34

## 2019-09-17 RX ADMIN — APIXABAN 2.5 MG: 2.5 TABLET, FILM COATED ORAL at 20:19

## 2019-09-17 RX ADMIN — RANOLAZINE 500 MG: 500 TABLET, FILM COATED, EXTENDED RELEASE ORAL at 20:18

## 2019-09-17 RX ADMIN — PANTOPRAZOLE SODIUM 40 MG: 40 TABLET, DELAYED RELEASE ORAL at 06:40

## 2019-09-17 RX ADMIN — BUMETANIDE 2 MG: 1 TABLET ORAL at 08:32

## 2019-09-17 RX ADMIN — FOLIC ACID 1 MG: 1 TABLET ORAL at 08:32

## 2019-09-17 RX ADMIN — RANOLAZINE 500 MG: 500 TABLET, FILM COATED, EXTENDED RELEASE ORAL at 08:32

## 2019-09-17 RX ADMIN — APIXABAN 2.5 MG: 2.5 TABLET, FILM COATED ORAL at 08:32

## 2019-09-17 RX ADMIN — POTASSIUM CHLORIDE 20 MEQ: 20 TABLET, EXTENDED RELEASE ORAL at 20:18

## 2019-09-17 ASSESSMENT — PAIN SCALES - GENERAL
PAINLEVEL_OUTOF10: 0

## 2019-09-17 NOTE — PROCEDURES
[address]                                 ECHOCARDIOGRAM    PATIENT NAME: Scar Haynes                   :        1940  MED REC NO:   [id number]                            ROOM:       0636  ACCOUNT NO:   [de-identified]                           ADMIT DATE: 09/15/2019  PROVIDER:     José Asher MD    STUDY PERFORMED:  2019    ECHOCARDIOGRAPHER:  Te Barnes. INDICATIONS:  Atrial fibrillation, rapid ventricular response rate. 2-D MEASUREMENTS:  Left ventricular outflow tract 2.0 cm. Right  ventricle diastole 3.7. Left ventricle diastole 3.6, systole 2.2. Septal and posterior wall thickness of the left ventricle 1.1 cm. Left  atrial dimension 3.7 cm. Left atrial area 14.9 cm2. Right atrial area  34 cm2. Aortic root diameter 3.0 cm. Left atrial second measurement  dimension 4.4 cm. Aortic valve cusp separation 1.3 cm. IMPRESSION:  1. The right ventricle is moderately dilated at 3.8 cm. The right  atrium is severely dilated at 35 cm2. Left atrium is mildly dilated. Left ventricle and aortic root remained within normal limits. 2.  The left ventricle shows normal wall thicknesses, good  contractility. Ejection fraction is 64%. There is diastolic filling  dysfunction, stage I. There is some flattening of the interventricular  septum noted most likely due to a pressure volume overload from the  right ventricle. There are no other definite focal wall motion  abnormalities seen. 3.  The mitral valve appears structurally normal, shows a maximal  gradient of 2.6 mmHg. Mitral valve area by pressure half-time 6.5 cm2. There is no significant mitral stenosis. There is trace to 1+ mitral  regurgitation only. 4.  The aortic valve is sclerotic, but the leaflets open well. Maximal  gradient 6.9 mmHg. Aortic valve area 2 cm2.   There is no significant  aortic stenosis nor

## 2019-09-17 NOTE — PROGRESS NOTES
[address] [facility]  Internal Medicine  -Resident Progress Note-    PCP:  Toni Brandon DO  Admitting Physician:  Treva Woodruff DO  Consultants:  None at this time cardiolgoy  Chief Complaint:    Chief Complaint   Patient presents with    Dizziness     PT had sudden onset of dizziness. per EMS pt in AFIB and Aflutter       History of Present Illness  Bobbi was seen and examined at bedside today; No family was present for examination. She was bradycardic yesterday and b-blocker stopped. Pt reports diminished appetite. Agrees to try to become more ambulatory today. Pt reports no other concerns. Review of Systems  All bolded are positive; please see HPI  General:  Fever, chills, diaphoresis, fatigue, malaise, night sweats, weight loss  Psychological:  Anxiety, disorientation, hallucinations. ENT:  Epistaxis, headaches, vertigo, visual changes. Cardiovascular:  Chest pain, irregular heartbeats, palpitations, paroxysmal nocturnal dyspnea. Respiratory:  Shortness of breath, coughing, sputum production, hemoptysis, wheezing, orthopnea.   Gastrointestinal:  Nausea, vomiting, diarrhea, heartburn, constipation, abdominal pain, hematemesis, hematochezia, melena, acholic stools  Genito-Urinary:  Dysuria, urgency, frequency, hematuria  Musculoskeletal:  Joint pain, joint stiffness, joint swelling, muscle pain  Neurology:  Headache, focal neurological deficits, weakness, numbness, paresthesia  Derm:  Rashes, ulcers, excoriations, bruising  Extremities:  Decreased ROM, peripheral edema, mottling    Physical Examination  Vitals:  /64   Pulse 55   Temp 97.9 °F (36.6 °C)   Resp 16   Ht 4' 10\" (1.473 m)   Wt 97 lb 4.8 oz (44.1 kg)   LMP  (LMP Unknown)   SpO2 99%   BMI 20.34 kg/m²   General Appearance:  awake, alert, and oriented to person, place, time, and purpose; appears stated age and cooperative; no apparent distress no labored breathing  HEENT:  PERRL; EOMI; sclera clear; buccal mucosa - 8.3 g/dL    Alb 4.1 3.5 - 5.2 g/dL    Total Bilirubin 1.3 (H) 0.0 - 1.2 mg/dL    Alkaline Phosphatase 126 (H) 35 - 104 U/L    ALT 11 0 - 32 U/L    AST 22 0 - 31 U/L   CBC    Collection Time: 09/17/19  6:49 AM   Result Value Ref Range    WBC 5.7 4.5 - 11.5 E9/L    RBC 4.69 3.50 - 5.50 E12/L    Hemoglobin 15.0 11.5 - 15.5 g/dL    Hematocrit 45.3 34.0 - 48.0 %    MCV 96.6 80.0 - 99.9 fL    MCH 32.0 26.0 - 35.0 pg    MCHC 33.1 32.0 - 34.5 %    RDW 12.7 11.5 - 15.0 fL    Platelets 935 439 - 965 E9/L    MPV 10.8 7.0 - 12.0 fL       Imaging  Xr Chest Standard (2 Vw)    Result Date: 9/15/2019  Location:[facility] Exam: XR CHEST (2 VW) Indications: Chest pain, atrial fibrillation, coronary artery disease, hypertension, former smoker Findings: PA and lateral views of the chest were obtained and compared to the previous exam of 5/1/2018 . Multiple poststernotomy wires and mediastinal clips are identified. 8mm diameter rounded nodular density is noted overlying the right lung base on the frontal view, not localized on the lateral view and located in a position suggestive of overlapping nipple shadow from the breast. Mild soft tissue density within the lung apices most likely represents chronic pleural thickening. The lungs are otherwise clear bilaterally. No pleural effusion or pneumothorax is seen bilaterally. Cardiac silhouette enlarged and globular, similar to previous study. No findings to suggest pulmonary edema are noted. The mediastinal contour and visualized osseous structures are not significantly changed. Degenerative changes of the thoracic spine are seen. 1. 8mm diameter rounded nodular density overlying right lung base on PA view, suggesting overlapping breast nipple shadow. If confirmation is clinically indicated, repeat frontal inspiratory view of the chest with nipple markers in place may be obtained. Lungs are otherwise clear bilaterally. 2. Enlarged globular cardiac silhouette.  Pericardial effusion cannot be excluded. Microbiology  None        Assessment and Plan  Patient is a [de-identified] y.o. female who presented with dizziness. The active problem list is as follows:    · Atrial fibrillation/flutter with RVR  · Cor pulmonale  · Coronary artery disease s/p CABG x 4  · Severe tricuspid regurgitation  · Pulmonic insufficiency  · Pulmonary hypertension moderate to severe  · Ebstein's anomaly  · Essential hypertension  · Hyperlipidemia  · COPD  · Previous tobacco abuse     -Off cardizem drip  -Bistolic discontinued due to patient being bradycardic yesterday  -Cardiology consulted. If pacemaker is needed, cardiology recommends she should have it done at tertiary care center due to severity of her TP and cor pulmonale.  -Echocardiogram EF 08%, diastolic dysfucntion stage I, severe TR, pulmonary HTN  -Cardiac enzymes have remained normal  -Encouraged medicine compliance at home    · Routine labs in AM.  · DVT prophylaxis. · Please see orders for further management and care.     The plan was discussed with Dr. Radha Dawson    9:03 AM  9/17/2019

## 2019-09-17 NOTE — CONSULTS
Past Surgical History:   Procedure Laterality Date    CARDIAC CATHETERIZATION         2002    CHOLECYSTECTOMY        COLONOSCOPY   4/16/13     DR CAMERON     CORONARY ARTERY BYPASS GRAFT         CABG x 4 in 2000    ECHO COMPL W DOP COLOR FLOW   2/27/2012          ECHO COMPL W DOP COLOR FLOW   3/25/2013               Family History   History reviewed. No pertinent family history.        Home Medications  Home Medications           Prior to Admission medications    Medication Sig Start Date End Date Taking? Authorizing Provider   atorvastatin (LIPITOR) 40 MG tablet TAKE ONE TABLET BY MOUTH EVERY DAY 8/29/19     Kim Smoker, DO   amLODIPine (NORVASC) 10 MG tablet TAKE ONE TABLET BY MOUTH EVERY DAY 8/29/19     Kim Smoker, DO   albuterol sulfate HFA (VENTOLIN HFA) 108 (90 Base) MCG/ACT inhaler INHALE TWO PUFFS BY MOUTH EVERY 6 HOURS AS NEEDED FOR WHEEZING 8/28/19     MAYCOL Chacko CNP   sildenafil (REVATIO) 20 MG tablet take 1/2 tablet by mouth 3 times daily.  8/19/19     Kim Smoker, DO   ranolazine (RANEXA) 500 MG extended release tablet Take 1 tablet by mouth 2 times daily 7/24/19     MAYCOL Chacko CNP   folic acid (FOLVITE) 1 MG tablet Take 1 tablet by mouth daily 7/10/19     Kim Smoker, DO   omeprazole (PRILOSEC) 40 MG delayed release capsule TAKE ONE CAPSULE BY MOUTH EVERY DAY 7/10/19     Kim Smoker, DO   apixaban (ELIQUIS) 5 MG TABS tablet TAKE ONE TABLET BY MOUTH TWO TIMES A DAY 6/24/19     Kim Smoker, DO   predniSONE 5 MG (21) TBPK Take 6 pills on day 1, 5 pills on day 2,4 pills on day 3, 3 pills on day 4, 2 pills on day 5, 1 pill on day 6. 6/24/19     Kim Smoker, DO   nebivolol (BYSTOLIC) 20 MG TABS tablet Take 1 tablet by mouth daily 6/10/19     Kim Smoker, DO   bumetanide (BUMEX) 1 MG tablet Take 2 tablets by mouth daily 3/27/19     Kim Smoker, DO   potassium chloride (KLOR-CON M) 20 MEQ extended file   Other Topics Concern    Not on file   Social History Narrative    Not on file            Review of Systems  All bolded are positive; please see HPI  General:  Fever, chills, diaphoresis, fatigue, malaise, night sweats, weight loss dizziness  Psychological:  Anxiety, disorientation, hallucinations. ENT:  Epistaxis, headaches, vertigo, visual changes. Cardiovascular:  Chest pain, irregular heartbeats, palpitations, paroxysmal nocturnal dyspnea. Respiratory:  Shortness of breath, coughing, sputum production, hemoptysis, wheezing, orthopnea. Gastrointestinal:  Nausea, vomiting, diarrhea, heartburn, constipation, abdominal pain, hematemesis, hematochezia, melena, acholic stools  Genito-Urinary:  Dysuria, urgency, frequency, hematuria  Musculoskeletal:  Joint pain, joint stiffness, joint swelling, muscle pain  Neurology:  Headache, focal neurological deficits, weakness, numbness, paresthesia  Derm:  Rashes, ulcers, excoriations, bruising  Extremities:  Decreased ROM, peripheral edema, mottling     Physical Examination  Vitals:  BP (!) 161/101   Pulse 117   Temp 97 °F (36.1 °C) (Oral)   Resp 20   Ht 4' 10\" (1.473 m)   Wt 102 lb (46.3 kg)   LMP  (LMP Unknown)   SpO2 96%   BMI 21.32 kg/m²   General Appearance:  awake, alert, and oriented to person, place, time, and purpose; appears stated age and cooperative; no apparent distress no labored breathing  HEENT:  NCAT; PERRL; conjunctiva pink, sclera clear  Neck:  no adenopathy, bruit, JVD, tenderness, masses, or nodules; supple, symmetrical, trachea midline, thyroid not enlarged  Lung:  clear to auscultation bilaterally; no use of accessory muscles; no rhonchi, rales, or crackles  Heart: Sinus kayla 52; no rub, or gallop +systolic murmur grade 3/6 TR  Abdomen:  soft, nontender, nondistended; normoactive bowel sounds; no organomegaly  Extremities:  extremities normal, atraumatic, no cyanosis or edema  Musculokeletal:  no joint swelling, no muscle tenderness.

## 2019-09-18 LAB
ALBUMIN SERPL-MCNC: 3.8 G/DL (ref 3.5–5.2)
ALP BLD-CCNC: 125 U/L (ref 35–104)
ALT SERPL-CCNC: 12 U/L (ref 0–32)
ANION GAP SERPL CALCULATED.3IONS-SCNC: 10 MMOL/L (ref 7–16)
AST SERPL-CCNC: 25 U/L (ref 0–31)
BILIRUB SERPL-MCNC: 1.1 MG/DL (ref 0–1.2)
BUN BLDV-MCNC: 21 MG/DL (ref 8–23)
CALCIUM SERPL-MCNC: 9.4 MG/DL (ref 8.6–10.2)
CHLORIDE BLD-SCNC: 103 MMOL/L (ref 98–107)
CO2: 28 MMOL/L (ref 22–29)
CREAT SERPL-MCNC: 0.9 MG/DL (ref 0.5–1)
GFR AFRICAN AMERICAN: >60
GFR NON-AFRICAN AMERICAN: >60 ML/MIN/1.73
GLUCOSE BLD-MCNC: 88 MG/DL (ref 74–99)
HCT VFR BLD CALC: 44.4 % (ref 34–48)
HEMOGLOBIN: 14.9 G/DL (ref 11.5–15.5)
MCH RBC QN AUTO: 32 PG (ref 26–35)
MCHC RBC AUTO-ENTMCNC: 33.6 % (ref 32–34.5)
MCV RBC AUTO: 95.5 FL (ref 80–99.9)
PDW BLD-RTO: 12.3 FL (ref 11.5–15)
PLATELET # BLD: 156 E9/L (ref 130–450)
PMV BLD AUTO: 10.6 FL (ref 7–12)
POTASSIUM SERPL-SCNC: 4.3 MMOL/L (ref 3.5–5)
RBC # BLD: 4.65 E12/L (ref 3.5–5.5)
SODIUM BLD-SCNC: 141 MMOL/L (ref 132–146)
TOTAL PROTEIN: 6.9 G/DL (ref 6.4–8.3)
WBC # BLD: 5.6 E9/L (ref 4.5–11.5)

## 2019-09-18 PROCEDURE — 2580000003 HC RX 258: Performed by: INTERNAL MEDICINE

## 2019-09-18 PROCEDURE — 36415 COLL VENOUS BLD VENIPUNCTURE: CPT

## 2019-09-18 PROCEDURE — 85027 COMPLETE CBC AUTOMATED: CPT

## 2019-09-18 PROCEDURE — 97161 PT EVAL LOW COMPLEX 20 MIN: CPT | Performed by: PHYSICAL THERAPIST

## 2019-09-18 PROCEDURE — 80053 COMPREHEN METABOLIC PANEL: CPT

## 2019-09-18 PROCEDURE — 6370000000 HC RX 637 (ALT 250 FOR IP): Performed by: INTERNAL MEDICINE

## 2019-09-18 PROCEDURE — 2060000000 HC ICU INTERMEDIATE R&B

## 2019-09-18 PROCEDURE — 97530 THERAPEUTIC ACTIVITIES: CPT | Performed by: PHYSICAL THERAPIST

## 2019-09-18 RX ADMIN — APIXABAN 2.5 MG: 2.5 TABLET, FILM COATED ORAL at 09:46

## 2019-09-18 RX ADMIN — PANTOPRAZOLE SODIUM 40 MG: 40 TABLET, DELAYED RELEASE ORAL at 06:03

## 2019-09-18 RX ADMIN — APIXABAN 2.5 MG: 2.5 TABLET, FILM COATED ORAL at 20:52

## 2019-09-18 RX ADMIN — Medication 10 ML: at 09:48

## 2019-09-18 RX ADMIN — RANOLAZINE 500 MG: 500 TABLET, FILM COATED, EXTENDED RELEASE ORAL at 20:51

## 2019-09-18 RX ADMIN — POTASSIUM CHLORIDE 20 MEQ: 20 TABLET, EXTENDED RELEASE ORAL at 20:52

## 2019-09-18 RX ADMIN — SILDENAFIL CITRATE 10 MG: 20 TABLET ORAL at 09:48

## 2019-09-18 RX ADMIN — FOLIC ACID 1 MG: 1 TABLET ORAL at 09:46

## 2019-09-18 RX ADMIN — BUMETANIDE 2 MG: 1 TABLET ORAL at 09:46

## 2019-09-18 RX ADMIN — SILDENAFIL CITRATE 10 MG: 20 TABLET ORAL at 20:52

## 2019-09-18 RX ADMIN — SILDENAFIL CITRATE 10 MG: 20 TABLET ORAL at 13:27

## 2019-09-18 RX ADMIN — AMITRIPTYLINE HYDROCHLORIDE 10 MG: 10 TABLET, FILM COATED ORAL at 20:52

## 2019-09-18 RX ADMIN — ATORVASTATIN CALCIUM 40 MG: 40 TABLET, FILM COATED ORAL at 09:47

## 2019-09-18 RX ADMIN — RANOLAZINE 500 MG: 500 TABLET, FILM COATED, EXTENDED RELEASE ORAL at 09:47

## 2019-09-18 RX ADMIN — Medication 10 ML: at 20:51

## 2019-09-18 RX ADMIN — POTASSIUM CHLORIDE 20 MEQ: 20 TABLET, EXTENDED RELEASE ORAL at 09:50

## 2019-09-18 NOTE — PROGRESS NOTES
Room #:   3362/5434-17  Patient Name: Bertell Lefort    PHYSICAL THERAPY INITIAL EVALUATION    Tentative placement recommendation: Home Health Physical Therapy if needed  Equipment recommendation: none at this time      Plan of care: Patient will be seen   daily Monday-Friday  for therapeutic exercise, functional retraining, endurance activities, balance exercises, family and patient education. AM-PAC Basic Mobility        AM-[facility] Mobility Inpatient   How much difficulty turning over in bed?: None  How much difficulty sitting down on / standing up from a chair with arms?: A Little  How much difficulty moving from lying on back to sitting on side of bed?: A Little  How much help from another person moving to and from a bed to a chair?: A Little  How much help from another person needed to walk in hospital room?: A Little  How much help from another person for climbing 3-5 steps with a railing?: A Little  AM-[facility] Inpatient Mobility Raw Score : 19  AM-PAC Inpatient T-Scale Score : 45.44  Mobility Inpatient CMS 0-100% Score: 41.77  Mobility Inpatient CMS G-Code Modifier : CK    Order:  EVALUATE AND TREAT    Diagnosis/Problem list:    1. Atrial fibrillation with RVR ([address]    2.  Dizziness        Patient Active Problem List   Diagnosis    Chest pain radiating to jaw    CAD (coronary artery disease), native coronary artery    Atherosclerosis of coronary artery bypass graft    Postsurgical aortocoronary bypass status    Other and unspecified angina pectoris    Chest pain    Essential hypertension    Paresthesia of both hands    New onset atrial flutter (HCC)    Benign paroxysmal positional vertigo of left ear    Acute diastolic CHF (congestive heart failure) (HCC)    Mild protein-calorie malnutrition (HCC)    Atrial fibrillation with RVR (HCC)       Past medical history:       Diagnosis Date    CAD (coronary artery disease)     GERD (gastroesophageal reflux disease)     History of echocardiogram

## 2019-09-18 NOTE — PROGRESS NOTES
[facility] [facility]  Internal Medicine  -Resident Progress Note-    PCP:  Kimberly Boyd DO  Admitting Physician:  Gayatri Tirado DO  Consultants:  None at this time cardiolgoy  Chief Complaint:    Chief Complaint   Patient presents with    Dizziness     PT had sudden onset of dizziness. per EMS pt in AFIB and Aflutter       History of Present Illness  Bobbi was seen and examined at bedside today; family was present for examination. Pt reports feeling weak and tired today. Denies dizziness and lightheadedness. Pacemaker is recommended by cardiology. Reports diminished appetite. No other complaints or concerns. Review of Systems  All bolded are positive; please see HPI  General:  Fever, chills, diaphoresis, fatigue, malaise, night sweats, weight loss  Psychological:  Anxiety, disorientation, hallucinations. ENT:  Epistaxis, headaches, vertigo, visual changes. Cardiovascular:  Chest pain, irregular heartbeats, palpitations, paroxysmal nocturnal dyspnea. Respiratory:  Shortness of breath, coughing, sputum production, hemoptysis, wheezing, orthopnea.   Gastrointestinal:  Nausea, vomiting, diarrhea, heartburn, constipation, abdominal pain, hematemesis, hematochezia, melena, acholic stools  Genito-Urinary:  Dysuria, urgency, frequency, hematuria  Musculoskeletal:  Joint pain, joint stiffness, joint swelling, muscle pain  Neurology:  Headache, focal neurological deficits, weakness, numbness, paresthesia  Derm:  Rashes, ulcers, excoriations, bruising  Extremities:  Decreased ROM, peripheral edema, mottling    Physical Examination  Vitals:  /71   Pulse 61   Temp 97.9 °F (36.6 °C) (Oral)   Resp 15   Ht 4' 10\" (1.473 m)   Wt 96 lb 8 oz (43.8 kg)   LMP  (LMP Unknown)   SpO2 96%   BMI 20.17 kg/m²   General Appearance:  awake, alert, and oriented to person, place, time, and purpose; appears stated age and cooperative; no apparent distress no labored breathing  HEENT:  PERRL; EOMI; sclera tricuspid regurgitation  · Pulmonic insufficiency  · Pulmonary hypertension moderate to severe  · Ebstein's anomaly  · Essential hypertension  · Hyperlipidemia  · COPD  · Previous tobacco abuse     -Off cardizem drip  -Bistolic discontinued due to patient being bradycardic   -Cardiology consulted. Recommends pacemaker, cardiology recommends she should have it done at tertiary care center due to severity of her TP and cor pulmonale. To be set up by cardiology for transfer  -Echocardiogram EF 67%, diastolic dysfucntion stage I, severe TR, pulmonary HTN  -Cardiac enzymes have remained normal  -Encouraged medicine compliance at home    · Routine labs in AM.  · DVT prophylaxis. · Please see orders for further management and care.     The plan was discussed with Dr. Alf Hardy    10:00 AM  9/18/2019

## 2019-09-19 LAB
ALBUMIN SERPL-MCNC: 3.9 G/DL (ref 3.5–5.2)
ALP BLD-CCNC: 127 U/L (ref 35–104)
ALT SERPL-CCNC: 17 U/L (ref 0–32)
ANION GAP SERPL CALCULATED.3IONS-SCNC: 11 MMOL/L (ref 7–16)
AST SERPL-CCNC: 27 U/L (ref 0–31)
BILIRUB SERPL-MCNC: 1.5 MG/DL (ref 0–1.2)
BUN BLDV-MCNC: 16 MG/DL (ref 8–23)
CALCIUM SERPL-MCNC: 9.2 MG/DL (ref 8.6–10.2)
CHLORIDE BLD-SCNC: 100 MMOL/L (ref 98–107)
CO2: 29 MMOL/L (ref 22–29)
CREAT SERPL-MCNC: 0.8 MG/DL (ref 0.5–1)
EKG ATRIAL RATE: 159 BPM
EKG ATRIAL RATE: 43 BPM
EKG P AXIS: 48 DEGREES
EKG P-R INTERVAL: 128 MS
EKG Q-T INTERVAL: 454 MS
EKG Q-T INTERVAL: 520 MS
EKG QRS DURATION: 82 MS
EKG QRS DURATION: 82 MS
EKG QTC CALCULATION (BAZETT): 439 MS
EKG QTC CALCULATION (BAZETT): 460 MS
EKG R AXIS: 104 DEGREES
EKG R AXIS: 108 DEGREES
EKG T AXIS: 0 DEGREES
EKG T AXIS: 38 DEGREES
EKG VENTRICULAR RATE: 43 BPM
EKG VENTRICULAR RATE: 62 BPM
GFR AFRICAN AMERICAN: >60
GFR NON-AFRICAN AMERICAN: >60 ML/MIN/1.73
GLUCOSE BLD-MCNC: 89 MG/DL (ref 74–99)
HCT VFR BLD CALC: 44.5 % (ref 34–48)
HEMOGLOBIN: 15.1 G/DL (ref 11.5–15.5)
MCH RBC QN AUTO: 32.3 PG (ref 26–35)
MCHC RBC AUTO-ENTMCNC: 33.9 % (ref 32–34.5)
MCV RBC AUTO: 95.3 FL (ref 80–99.9)
PDW BLD-RTO: 12.4 FL (ref 11.5–15)
PLATELET # BLD: 157 E9/L (ref 130–450)
PMV BLD AUTO: 10.8 FL (ref 7–12)
POTASSIUM SERPL-SCNC: 3.6 MMOL/L (ref 3.5–5)
RBC # BLD: 4.67 E12/L (ref 3.5–5.5)
SODIUM BLD-SCNC: 140 MMOL/L (ref 132–146)
TOTAL PROTEIN: 7.1 G/DL (ref 6.4–8.3)
WBC # BLD: 5.2 E9/L (ref 4.5–11.5)

## 2019-09-19 PROCEDURE — 2060000000 HC ICU INTERMEDIATE R&B

## 2019-09-19 PROCEDURE — 6370000000 HC RX 637 (ALT 250 FOR IP): Performed by: INTERNAL MEDICINE

## 2019-09-19 PROCEDURE — 80053 COMPREHEN METABOLIC PANEL: CPT

## 2019-09-19 PROCEDURE — 97530 THERAPEUTIC ACTIVITIES: CPT

## 2019-09-19 PROCEDURE — 97110 THERAPEUTIC EXERCISES: CPT

## 2019-09-19 PROCEDURE — 2580000003 HC RX 258: Performed by: INTERNAL MEDICINE

## 2019-09-19 PROCEDURE — 97165 OT EVAL LOW COMPLEX 30 MIN: CPT

## 2019-09-19 PROCEDURE — 2700000000 HC OXYGEN THERAPY PER DAY

## 2019-09-19 PROCEDURE — 85027 COMPLETE CBC AUTOMATED: CPT

## 2019-09-19 PROCEDURE — 36415 COLL VENOUS BLD VENIPUNCTURE: CPT

## 2019-09-19 RX ADMIN — SILDENAFIL CITRATE 10 MG: 20 TABLET ORAL at 22:03

## 2019-09-19 RX ADMIN — APIXABAN 2.5 MG: 2.5 TABLET, FILM COATED ORAL at 09:08

## 2019-09-19 RX ADMIN — POTASSIUM CHLORIDE 20 MEQ: 20 TABLET, EXTENDED RELEASE ORAL at 22:04

## 2019-09-19 RX ADMIN — FOLIC ACID 1 MG: 1 TABLET ORAL at 09:08

## 2019-09-19 RX ADMIN — BUMETANIDE 2 MG: 1 TABLET ORAL at 09:08

## 2019-09-19 RX ADMIN — RANOLAZINE 500 MG: 500 TABLET, FILM COATED, EXTENDED RELEASE ORAL at 22:04

## 2019-09-19 RX ADMIN — Medication 10 ML: at 09:08

## 2019-09-19 RX ADMIN — SILDENAFIL CITRATE 10 MG: 20 TABLET ORAL at 13:30

## 2019-09-19 RX ADMIN — APIXABAN 2.5 MG: 2.5 TABLET, FILM COATED ORAL at 22:04

## 2019-09-19 RX ADMIN — Medication 10 ML: at 22:06

## 2019-09-19 RX ADMIN — RANOLAZINE 500 MG: 500 TABLET, FILM COATED, EXTENDED RELEASE ORAL at 09:08

## 2019-09-19 RX ADMIN — AMITRIPTYLINE HYDROCHLORIDE 10 MG: 10 TABLET, FILM COATED ORAL at 22:04

## 2019-09-19 RX ADMIN — ATORVASTATIN CALCIUM 40 MG: 40 TABLET, FILM COATED ORAL at 09:07

## 2019-09-19 RX ADMIN — PANTOPRAZOLE SODIUM 40 MG: 40 TABLET, DELAYED RELEASE ORAL at 06:02

## 2019-09-19 RX ADMIN — POTASSIUM CHLORIDE 20 MEQ: 20 TABLET, EXTENDED RELEASE ORAL at 09:08

## 2019-09-19 RX ADMIN — SILDENAFIL CITRATE 10 MG: 20 TABLET ORAL at 09:11

## 2019-09-19 ASSESSMENT — PAIN SCALES - GENERAL: PAINLEVEL_OUTOF10: 0

## 2019-09-19 NOTE — PROGRESS NOTES
Subjective:    +The patient is awake and alert. No p+roblems overnight. Denies chest pain, angina, and dyspnea. Denies abdominal pain. Tolerating diet. No nausea or vomiting. Current Facility-Administered Medications: sodium chloride flush 0.9 % injection 10 mL, 10 mL, Intravenous, 2 times per day  sodium chloride flush 0.9 % injection 10 mL, 10 mL, Intravenous, PRN  magnesium hydroxide (MILK OF MAGNESIA) 400 MG/5ML suspension 30 mL, 30 mL, Oral, Daily PRN  acetaminophen (TYLENOL) tablet 650 mg, 650 mg, Oral, Q4H PRN  perflutren lipid microspheres (DEFINITY) injection 1.65 mg, 1.5 mL, Intravenous, ONCE PRN  albuterol sulfate  (90 Base) MCG/ACT inhaler 2 puff, 2 puff, Inhalation, Q6H PRN  amitriptyline (ELAVIL) tablet 10 mg, 10 mg, Oral, Nightly  atorvastatin (LIPITOR) tablet 40 mg, 40 mg, Oral, Daily  bumetanide (BUMEX) tablet 2 mg, 2 mg, Oral, Daily  folic acid (FOLVITE) tablet 1 mg, 1 mg, Oral, Daily  pantoprazole (PROTONIX) tablet 40 mg, 40 mg, Oral, QAM AC  potassium chloride (KLOR-CON M) extended release tablet 20 mEq, 20 mEq, Oral, BID  ranolazine (RANEXA) extended release tablet 500 mg, 500 mg, Oral, BID  sildenafil (REVATIO) tablet 10 mg, 10 mg, Oral, TID  apixaban (ELIQUIS) tablet 2.5 mg, 2.5 mg, Oral, BID  diltiazem injection 10 mg, 10 mg, Intravenous, Once    Objective:    /79   Pulse 84   Temp 97.8 °F (36.6 °C) (Oral)   Resp 17   Ht 4' 10\" (1.473 m)   Wt 96 lb 8 oz (43.8 kg)   LMP  (LMP Unknown)   SpO2 95%   BMI 20.17 kg/m²   In: 310 [P.O.:300; I.V.:10]  Out: 0    In: 310   Out: 0  ,    Slow A Flutter//min with 2:1 AV block HR 74; grade 3/6 TR murmurs, no gallops, or rubs.   CTA bilaterally, no wheeze, rales or rhonchi  bowel sounds present, nontender, nondistended, no masses  No clubbing, cyanosis, or edema  No neuro changes     CBC:   Lab Results   Component Value Date    WBC 5.6 09/18/2019    RBC 4.65 09/18/2019    HGB 14.9 09/18/2019    HCT 44.4 09/18/2019    MCV

## 2019-09-19 NOTE — PROGRESS NOTES
[facility] Transfer line called for an update on status of transfer. Transfer line stated they had no information of patient or transfer order. Patient information given   To begin transfer process. Informed [facility] is very full, may take awhile for patient to have bed available to transfer.      UNABLE TO FAX FACESHEET TO TRANSFER LINE   [phone]   Printer not working currently   HECTOR OrozcoN

## 2019-09-19 NOTE — PROGRESS NOTES
[address] [facility]  Internal Medicine  -Resident Progress Note-    PCP:  Toni Brandon DO  Admitting Physician:  Treva Woodruff DO  Consultants:  Cardiology  Chief Complaint:    Chief Complaint   Patient presents with    Dizziness     PT had sudden onset of dizziness. per EMS pt in AFIB and Aflutter       History of Present Illness  Bobbi was seen and examined at bedside today; family was present for examination. Denies dizziness and lightheadedness. Pacemaker is recommended by cardiology. Transfer has been set up to [facility]. She is anxious. Review of Systems  All bolded are positive; please see HPI  General:  Fever, chills, diaphoresis, fatigue, malaise, night sweats, weight loss  Psychological:  Anxiety, disorientation, hallucinations. ENT:  Epistaxis, headaches, vertigo, visual changes. Cardiovascular:  Chest pain, irregular heartbeats, palpitations, paroxysmal nocturnal dyspnea. Respiratory:  Shortness of breath, coughing, sputum production, hemoptysis, wheezing, orthopnea.   Gastrointestinal:  Nausea, vomiting, diarrhea, heartburn, constipation, abdominal pain, hematemesis, hematochezia, melena, acholic stools  Genito-Urinary:  Dysuria, urgency, frequency, hematuria  Musculoskeletal:  Joint pain, joint stiffness, joint swelling, muscle pain  Neurology:  Headache, focal neurological deficits, weakness, numbness, paresthesia  Derm:  Rashes, ulcers, excoriations, bruising  Extremities:  Decreased ROM, peripheral edema, mottling    Physical Examination  Vitals:  BP (!) 140/77   Pulse 69   Temp 98 °F (36.7 °C)   Resp 16   Ht 4' 10\" (1.473 m)   Wt 96 lb (43.5 kg)   LMP  (LMP Unknown)   SpO2 97%   BMI 20.06 kg/m²   General Appearance:  awake, alert, and oriented to person, place, time, and purpose; appears stated age and cooperative; no apparent distress no labored breathing  HEENT:  PERRL; EOMI; sclera clear; buccal mucosa moist  Neck:  supple; trachea midline; no thyromegaly; no JVD; no %    MCV 95.3 80.0 - 99.9 fL    MCH 32.3 26.0 - 35.0 pg    MCHC 33.9 32.0 - 34.5 %    RDW 12.4 11.5 - 15.0 fL    Platelets 344 060 - 303 E9/L    MPV 10.8 7.0 - 12.0 fL       Imaging  Xr Chest Standard (2 Vw)    Result Date: 9/15/2019  Location:200 Exam: XR CHEST (2 VW) Indications: Chest pain, atrial fibrillation, coronary artery disease, hypertension, former smoker Findings: PA and lateral views of the chest were obtained and compared to the previous exam of 5/1/2018 . Multiple poststernotomy wires and mediastinal clips are identified. 8mm diameter rounded nodular density is noted overlying the right lung base on the frontal view, not localized on the lateral view and located in a position suggestive of overlapping nipple shadow from the breast. Mild soft tissue density within the lung apices most likely represents chronic pleural thickening. The lungs are otherwise clear bilaterally. No pleural effusion or pneumothorax is seen bilaterally. Cardiac silhouette enlarged and globular, similar to previous study. No findings to suggest pulmonary edema are noted. The mediastinal contour and visualized osseous structures are not significantly changed. Degenerative changes of the thoracic spine are seen. 1. 8mm diameter rounded nodular density overlying right lung base on PA view, suggesting overlapping breast nipple shadow. If confirmation is clinically indicated, repeat frontal inspiratory view of the chest with nipple markers in place may be obtained. Lungs are otherwise clear bilaterally. 2. Enlarged globular cardiac silhouette. Pericardial effusion cannot be excluded. Microbiology  None        Assessment and Plan  Patient is a [de-identified] y.o. female who presented with dizziness.   The active problem list is as follows:    · Atrial fibrillation/flutter with RVR  · Cor pulmonale  · Coronary artery disease s/p CABG x 4  · Severe tricuspid regurgitation  · Pulmonic insufficiency  · Pulmonary hypertension moderate to

## 2019-09-19 NOTE — PROGRESS NOTES
Occupational Therapy  Occupational Therapy Initial Assessment      Date:2019  Patient Name: Fanta Alejandro  MRN: [id number]  : 1940  Room: [address] [place] [address]    Evaluating OT: Cyndy Knutson OTR/L [id number]    Recommendation for Placement: [facility] if needed  Recommended Adaptive Equipment: none anticipated  AM-PAC Daily Activity Raw Score: 1824    Diagnosis:   1. [facility] fibrillation with RVR ([facility]    2. Dizziness      Pertinent Medical History:   Past Medical History:   Diagnosis Date    CAD (coronary artery disease)     GERD (gastroesophageal reflux disease)     History of echocardiogram 2017    EF 62%    History of echocardiogram 2018    EF 62%    Hyperlipidemia     Hypertension     MI (myocardial infarction) ([facility]     Migraines     Normal cardiac stress test 2010      Precautions: Falls, A-fib (pt will be getting pacemaker placed at [facility] upon d/c)     Home Living: Patient lives alone in an apartment with Elevator to enter, dtr checks on patient and assists PRN  Bathroom Setup: tub shower with shower chair   Equipment owned: shower chair    Prior Level of Function: independent with ADLs, assist with IADLs from daughter; ambulated independently no device   Driving: no  Occupation: n/a    Pain Level: pt c/o 0/10 pain this session; nursing aware  Cognition: A&O: 4/4; Follows 2 step directions   Memory:  good     Sequencing:  good     Problem solving:  good     Judgement/safety:  fair       Functional Assessment:   Initial Eval Status  Date: 19 Treatment Status  Date: Short Term Goals  Treatment frequency: PRN   Feeding Set up   Independent    Grooming Supervision   Independent    UB Dressing Supervision   Independent    LB Dressing Supervision   Independent    Bathing Minimal Assist  Independent    Toileting Supervision   Independent    Bed Mobility  Supine to sit: Supervision   Sit to supine: n/t, pt up in chair at end of eval   Supine to sit:  Independent   Sit to supine: Motor Control []    Plan of Care:   ADL retraining [x]   Equipment needs [x]   Neuromuscular re-education [x] Energy Conservation Techniques [x]  Functional Transfer training [x] Patient and/or Family Education [x]  Functional Mobility training [x]  Environmental Modifications [x]  Cognitive re-training []   Compensatory techniques for ADLs [x]  Splinting Needs []   Positioning to improve overall function [x]   Therapeutic Activity [x]  Therapeutic Exercise  [x]  Visual/Perceptual: []    Delirium prevention/treatment  []   Other:  []    Rehab Potential: Good for established goals     Patient / Family Goal: None stated     Patient and/or family were instructed diagnosis, prognosis/goals and plan of care. Demonstrated fair understanding.     Tx Time in: 0  Tx Time out: 906  Evaluation + 10 treatment time    Darell Saunders, OTR/L [id number]

## 2019-09-20 LAB
ALBUMIN SERPL-MCNC: 4 G/DL (ref 3.5–5.2)
ALP BLD-CCNC: 133 U/L (ref 35–104)
ALT SERPL-CCNC: 15 U/L (ref 0–32)
ANION GAP SERPL CALCULATED.3IONS-SCNC: 11 MMOL/L (ref 7–16)
AST SERPL-CCNC: 29 U/L (ref 0–31)
BILIRUB SERPL-MCNC: 1.2 MG/DL (ref 0–1.2)
BUN BLDV-MCNC: 16 MG/DL (ref 8–23)
CALCIUM SERPL-MCNC: 9.5 MG/DL (ref 8.6–10.2)
CHLORIDE BLD-SCNC: 100 MMOL/L (ref 98–107)
CO2: 30 MMOL/L (ref 22–29)
CREAT SERPL-MCNC: 0.8 MG/DL (ref 0.5–1)
GFR AFRICAN AMERICAN: >60
GFR NON-AFRICAN AMERICAN: >60 ML/MIN/1.73
GLUCOSE BLD-MCNC: 87 MG/DL (ref 74–99)
HCT VFR BLD CALC: 44.2 % (ref 34–48)
HEMOGLOBIN: 15.2 G/DL (ref 11.5–15.5)
MCH RBC QN AUTO: 32.2 PG (ref 26–35)
MCHC RBC AUTO-ENTMCNC: 34.4 % (ref 32–34.5)
MCV RBC AUTO: 93.6 FL (ref 80–99.9)
PDW BLD-RTO: 12.2 FL (ref 11.5–15)
PLATELET # BLD: 163 E9/L (ref 130–450)
PMV BLD AUTO: 11.1 FL (ref 7–12)
POTASSIUM SERPL-SCNC: 3.5 MMOL/L (ref 3.5–5)
RBC # BLD: 4.72 E12/L (ref 3.5–5.5)
SODIUM BLD-SCNC: 141 MMOL/L (ref 132–146)
TOTAL PROTEIN: 6.7 G/DL (ref 6.4–8.3)
WBC # BLD: 5.5 E9/L (ref 4.5–11.5)

## 2019-09-20 PROCEDURE — 6370000000 HC RX 637 (ALT 250 FOR IP): Performed by: INTERNAL MEDICINE

## 2019-09-20 PROCEDURE — 2580000003 HC RX 258: Performed by: INTERNAL MEDICINE

## 2019-09-20 PROCEDURE — 97110 THERAPEUTIC EXERCISES: CPT

## 2019-09-20 PROCEDURE — 2060000000 HC ICU INTERMEDIATE R&B

## 2019-09-20 PROCEDURE — 85027 COMPLETE CBC AUTOMATED: CPT

## 2019-09-20 PROCEDURE — 97530 THERAPEUTIC ACTIVITIES: CPT

## 2019-09-20 PROCEDURE — 36415 COLL VENOUS BLD VENIPUNCTURE: CPT

## 2019-09-20 PROCEDURE — 80053 COMPREHEN METABOLIC PANEL: CPT

## 2019-09-20 PROCEDURE — 97116 GAIT TRAINING THERAPY: CPT

## 2019-09-20 RX ADMIN — BUMETANIDE 2 MG: 1 TABLET ORAL at 08:27

## 2019-09-20 RX ADMIN — APIXABAN 2.5 MG: 2.5 TABLET, FILM COATED ORAL at 08:28

## 2019-09-20 RX ADMIN — Medication 10 ML: at 08:28

## 2019-09-20 RX ADMIN — Medication 10 ML: at 20:41

## 2019-09-20 RX ADMIN — RANOLAZINE 500 MG: 500 TABLET, FILM COATED, EXTENDED RELEASE ORAL at 08:27

## 2019-09-20 RX ADMIN — AMITRIPTYLINE HYDROCHLORIDE 10 MG: 10 TABLET, FILM COATED ORAL at 20:40

## 2019-09-20 RX ADMIN — POTASSIUM CHLORIDE 20 MEQ: 20 TABLET, EXTENDED RELEASE ORAL at 20:41

## 2019-09-20 RX ADMIN — SILDENAFIL CITRATE 10 MG: 20 TABLET ORAL at 20:40

## 2019-09-20 RX ADMIN — ATORVASTATIN CALCIUM 40 MG: 40 TABLET, FILM COATED ORAL at 08:27

## 2019-09-20 RX ADMIN — FOLIC ACID 1 MG: 1 TABLET ORAL at 08:28

## 2019-09-20 RX ADMIN — RANOLAZINE 500 MG: 500 TABLET, FILM COATED, EXTENDED RELEASE ORAL at 20:41

## 2019-09-20 RX ADMIN — POTASSIUM CHLORIDE 20 MEQ: 20 TABLET, EXTENDED RELEASE ORAL at 08:29

## 2019-09-20 RX ADMIN — SILDENAFIL CITRATE 10 MG: 20 TABLET ORAL at 13:51

## 2019-09-20 RX ADMIN — APIXABAN 2.5 MG: 2.5 TABLET, FILM COATED ORAL at 20:40

## 2019-09-20 RX ADMIN — PANTOPRAZOLE SODIUM 40 MG: 40 TABLET, DELAYED RELEASE ORAL at 06:03

## 2019-09-20 RX ADMIN — SILDENAFIL CITRATE 10 MG: 20 TABLET ORAL at 08:27

## 2019-09-20 ASSESSMENT — PAIN SCALES - GENERAL
PAINLEVEL_OUTOF10: 0
PAINLEVEL_OUTOF10: 0

## 2019-09-20 NOTE — PROGRESS NOTES
Occupational Therapy  O.T. Bedside Treatment Note    Date:2019  Patient Name: Mini Duran  MRN: [id number]  : 1940  ROOM #: [id number]/[id number]    Problem list / Diagnosis:   Patient Active Problem List   Diagnosis    Chest pain radiating to jaw    CAD (coronary artery disease), native coronary artery    Atherosclerosis of coronary artery bypass graft    Postsurgical aortocoronary bypass status    Other and unspecified angina pectoris    Chest pain    Essential hypertension    Paresthesia of both hands    New onset atrial flutter (HCC)    Benign paroxysmal positional vertigo of left ear    Acute diastolic CHF (congestive heart failure) (HCC)    Mild protein-calorie malnutrition (HCC)    Atrial fibrillation with RVR ([facility]     Past Medical History:   Past Medical History:   Diagnosis Date    CAD (coronary artery disease)     GERD (gastroesophageal reflux disease)     History of echocardiogram 2017    EF 62%    History of echocardiogram 2018    EF 62%    Hyperlipidemia     Hypertension     MI (myocardial infarction) ([facility]     Migraines     Normal cardiac stress test 2010     Onset/Medical history: medical history reviewed  Past medical history: reviewed    The admitting diagnosis and active problem list as listed above have been reviewed prior to treatment. Nursing cleared the patient for treatment. Patient is agreeable to treatment. Discharge recommendations: Home  Equipment prescriptions needed: TBD    [facility] Daily Activity Inpatient        Precautions: falls    S:  - Pt cleared for treatment through nursing.  - Pain:  Pt c/o 0/10 pain; pt was willing to participate in therapy. Nurse notified. - no family present. O:    Function Assessment    Status  Comment  Goal    UE dressing:  Minimal Assist  To tie/adjust back of gown, and To change gown at EOB Independent    Bed Mobility:    Independent for supine to sit; Independent for scooting;    Independent for

## 2019-09-20 NOTE — PLAN OF CARE
Problem: Falls - Risk of:  Goal: Will remain free from falls  Description  Will remain free from falls  Outcome: Met This Shift     Problem: Falls - Risk of:  Goal: Absence of physical injury  Description  Absence of physical injury  Outcome: Met This Shift     Problem: Cardiac:  Goal: Ability to maintain an adequate cardiac output will improve  Description  Ability to maintain an adequate cardiac output will improve  Outcome: Met This Shift     Problem: Cardiac:  Goal: Hemodynamic stability will improve  Description  Hemodynamic stability will improve  Outcome: Met This Shift     Problem: Fluid Volume:  Goal: Ability to achieve and maintain adequate urine output will improve  Description  Ability to achieve and maintain adequate urine output will improve  Outcome: Met This Shift     Problem: Respiratory:  Goal: Respiratory status will improve  Description  Respiratory status will improve  Outcome: Met This Shift

## 2019-09-20 NOTE — PROGRESS NOTES
[address] [facility]  Internal Medicine  -Resident Progress Note-    PCP:  Alton Dawn DO  Admitting Physician:  Vania Dyson DO  Consultants:  Cardiology  Chief Complaint:    Chief Complaint   Patient presents with    Dizziness     PT had sudden onset of dizziness. per EMS pt in AFIB and Aflutter       History of Present Illness  Bobbi was seen and examined at bedside today; family was present for examination. . Pacemaker is recommended by cardiology. Awaiting bed at [facility]. She is anxious. Review of Systems  All bolded are positive; please see HPI  General:  Fever, chills, diaphoresis, fatigue, malaise, night sweats, weight loss  Psychological:  Anxiety, disorientation, hallucinations. ENT:  Epistaxis, headaches, vertigo, visual changes. Cardiovascular:  Chest pain, irregular heartbeats, palpitations, paroxysmal nocturnal dyspnea. Respiratory:  Shortness of breath, coughing, sputum production, hemoptysis, wheezing, orthopnea.   Gastrointestinal:  Nausea, vomiting, diarrhea, heartburn, constipation, abdominal pain, hematemesis, hematochezia, melena, acholic stools  Genito-Urinary:  Dysuria, urgency, frequency, hematuria  Musculoskeletal:  Joint pain, joint stiffness, joint swelling, muscle pain  Neurology:  Headache, focal neurological deficits, weakness, numbness, paresthesia  Derm:  Rashes, ulcers, excoriations, bruising  Extremities:  Decreased ROM, peripheral edema, mottling    Physical Examination  Vitals:  BP (!) 146/72   Pulse 94   Temp 97.7 °F (36.5 °C) (Oral)   Resp 18   Ht 4' 10\" (1.473 m)   Wt 100 lb (45.4 kg)   LMP  (LMP Unknown)   SpO2 95%   BMI 20.90 kg/m²   General Appearance:  awake, alert, and oriented to person, place, time, and purpose; appears stated age and cooperative; no apparent distress no labored breathing  HEENT:  PERRL; EOMI; sclera clear; buccal mucosa moist  Neck:  supple; trachea midline; no thyromegaly; no JVD; no bruits  Heart:  Irregularly irregular rate controlled; +systolic murmur  Lungs:  symmetrical; clear to auscultation bilaterally; no wheezes; no rhonchi; no rales  Abdomen:  soft, non-tender, non-distended; bowel sounds positive; no organomegaly or masses; no pain on palpation  Extremities:  peripheral pulses present; no peripheral edema; no ulcers  Neurologic:  alert and oriented x 3; no focal deficit; cranial nerves grossly intact  Skin:  no petechia; no hemorrhage; no wounds    Medications  Scheduled Meds    sodium chloride flush  10 mL Intravenous 2 times per day    amitriptyline  10 mg Oral Nightly    atorvastatin  40 mg Oral Daily    bumetanide  2 mg Oral Daily    folic acid  1 mg Oral Daily    pantoprazole  40 mg Oral QAM AC    potassium chloride  20 mEq Oral BID    ranolazine  500 mg Oral BID    sildenafil  10 mg Oral TID    apixaban  2.5 mg Oral BID    diltiazem  10 mg Intravenous Once     Infusion Meds     PRN Meds sodium chloride flush, magnesium hydroxide, acetaminophen, perflutren lipid microspheres, albuterol sulfate HFA    Laboratory Data  Recent Results (from the past 24 hour(s))   Comprehensive Metabolic Panel    Collection Time: 09/20/19  4:35 AM   Result Value Ref Range    Sodium 141 132 - 146 mmol/L    Potassium 3.5 3.5 - 5.0 mmol/L    Chloride 100 98 - 107 mmol/L    CO2 30 (H) 22 - 29 mmol/L    Anion Gap 11 7 - 16 mmol/L    Glucose 87 74 - 99 mg/dL    BUN 16 8 - 23 mg/dL    CREATININE 0.8 0.5 - 1.0 mg/dL    GFR Non-African American >60 >=60 mL/min/1.73    GFR African American >60     Calcium 9.5 8.6 - 10.2 mg/dL    Total Protein 6.7 6.4 - 8.3 g/dL    Alb 4.0 3.5 - 5.2 g/dL    Total Bilirubin 1.2 0.0 - 1.2 mg/dL    Alkaline Phosphatase 133 (H) 35 - 104 U/L    ALT 15 0 - 32 U/L    AST 29 0 - 31 U/L   CBC    Collection Time: 09/20/19  4:35 AM   Result Value Ref Range    WBC 5.5 4.5 - 11.5 E9/L    RBC 4.72 3.50 - 5.50 E12/L    Hemoglobin 15.2 11.5 - 15.5 g/dL    Hematocrit 44.2 34.0 - 48.0 %    MCV 93.6 80.0 - 99.9 fL    MCH hypertension  · Hyperlipidemia  · COPD  · Previous tobacco abuse     -Off cardizem drip  -Bistolic discontinued due to patient being bradycardic   -Cardiology consulted. Recommends pacemaker, cardiology recommends she should have it done at tertiary care center due to severity of her TP and cor pulmonale. Set up by cardiology for transfer to [facility]  -Echocardiogram EF 66%, diastolic dysfucntion stage I, severe TR, pulmonary HTN  -Cardiac enzymes have remained normal  -Encouraged medicine compliance at home  -Continue to await Bed at F    · Routine labs in AM.  · DVT prophylaxis. · Please see orders for further management and care. The plan was discussed with Dr. Gary Merino    10:03 AM  9/20/2019         I saw and evaluated the patient. I agree with the findings and the plan of care as documented in the resident's note.     Jesi Thomas D.O., FACOI  1:39 PM  9/20/2019

## 2019-09-20 NOTE — PROGRESS NOTES
Comments: RN made aware. Steps:  na  Treatment: Pt. Ambulated in perez, performed ankle pumps, long arc quad, heel raises x 10-15 reps. Comment: Pt. Remains at eob with OT. Comment: Call light left by patient. A: Yasmine. Well. Pt. Ambulated without HR going up. P: Continue with physical therapy   Uriah Zeng, MAC       GOALS to be met in 2 days. Bed mobility-  Independent                                         Transfers-Sit to stand-Independent                Gait:  Patient to ambulate 150 feet   Independent                   Increase strength in affected mm groups by 1/3 grade  Increase balance to allow for improvement towards functional goals. Increase endurance to allow for improvement towards functional goals.

## 2019-09-21 VITALS
HEIGHT: 58 IN | SYSTOLIC BLOOD PRESSURE: 145 MMHG | WEIGHT: 100 LBS | RESPIRATION RATE: 14 BRPM | TEMPERATURE: 98.4 F | DIASTOLIC BLOOD PRESSURE: 81 MMHG | HEART RATE: 76 BPM | OXYGEN SATURATION: 97 % | BODY MASS INDEX: 20.99 KG/M2

## 2019-09-21 ASSESSMENT — PAIN SCALES - GENERAL: PAINLEVEL_OUTOF10: 0

## 2019-09-21 NOTE — DISCHARGE INSTR - COC
Continuity of Care Form    Patient Name: Akosua Jennings   :  1940  MRN:  [id number]    Admit date:  9/15/2019  Discharge date:  ***    Code Status Order: Full Code   Advance Directives:   Advance Care Flowsheet Documentation     Date/Time Healthcare Directive Type of Healthcare Directive Copy in [address] Agent's Name Healthcare Agent's Phone Number    [phone]  No, patient does not have an advance directive for healthcare treatment -- -- -- -- --          Admitting Physician:  Prema Zhao DO  PCP: Adriana Cosme DO    Discharging Nurse: [facility] Unit/Room#: 1994/9370-53  Discharging Unit Phone Number: ***    Emergency Contact:   Extended Emergency Contact Information  Primary Emergency Contact: [facility] [place] [address] Esme Bautista of [address] Phone: [phone]  Relation: Brother/Sister    Past Surgical History:  Past Surgical History:   Procedure Laterality Date    CARDIAC CATHETERIZATION          CHOLECYSTECTOMY      COLONOSCOPY  13    DR CAMERON     CORONARY ARTERY BYPASS GRAFT      CABG x 4 in     ECHO COMPL W DOP COLOR FLOW  2012         ECHO COMPL W DOP COLOR FLOW  3/25/2013            Immunization History:   Immunization History   Administered Date(s) Administered    Influenza, High Dose (Fluzone 65 yrs and older) 2017    Influenza, Quadv, IM, PF (6 mo and older Fluzone, Flulaval, Fluarix, and 3 yrs and older Afluria) 03/15/2017    Influenza, Triv, inactivated, subunit, adjuvanted, IM (Fluad 65 yrs and older) 2018    Pneumococcal Conjugate 13-valent (Moxaezc35) 03/15/2017    Pneumococcal Polysaccharide (Svawrfqmu50) 2016    Zoster Recombinant (Shingrix) 2019, 2019       Active Problems:  Patient Active Problem List   Diagnosis Code    Chest pain radiating to jaw R07.9    CAD (coronary artery disease), native coronary artery I25.10    Atherosclerosis of coronary artery bypass graft I25.810    Postsurgical aortocoronary bypass status Z95.1    Other and unspecified angina pectoris I20.9    Chest pain R07.9    Essential hypertension I10    Paresthesia of both hands R20.2    New onset atrial flutter (HCC) I48.92    Benign paroxysmal positional vertigo of left ear H81.12    Acute diastolic CHF (congestive heart failure) ([facility]) I50.31    Mild protein-calorie malnutrition (HCC) E44.1    Atrial fibrillation with RVR ([facility]) I48.91       Isolation/Infection:   Isolation          No Isolation            Nurse Assessment:  Last Vital Signs: /70   Pulse 78   Temp 98.4 °F (36.9 °C) (Oral)   Resp 14   Ht 4' 10\" (1.473 m)   Wt 100 lb (45.4 kg)   LMP  (LMP Unknown)   SpO2 98%   BMI 20.90 kg/m²     Last documented pain score (0-10 scale): Pain Level: 0  Last Weight:   Wt Readings from Last 1 Encounters:   09/20/19 100 lb (45.4 kg)     Mental Status:  oriented and alert    IV Access:  - Peripheral IV - site  L Basilic, insertion date: 9/15/19    Nursing Mobility/ADLs:  Walking   Independent  Transfer  Independent  Bathing  Independent  Dressing  Independent  Toileting  Independent  Feeding  [address]  Independent  Med Delivery   whole    Wound Care Documentation and Therapy:        Elimination:  Continence:   · Bowel: Yes  · Bladder: Yes  Urinary Catheter: None   Colostomy/Ileostomy/Ileal Conduit: No       Date of Last BM: 9/20/2019    Intake/Output Summary (Last 24 hours) at 9/20/2019 2155  Last data filed at 9/20/2019 1036  Gross per 24 hour   Intake 120 ml   Output --   Net 120 ml     I/O last 3 completed shifts: In: 300 [P.O.:300]  Out: -     Safety Concerns: At Risk for Falls    Impairments/Disabilities:      None    Nutrition Therapy:  Current Nutrition Therapy:   - Oral Diet:  General    Routes of Feeding: Oral  Liquids:  Thin Liquids  Daily Fluid Restriction: no  Last Modified Barium Swallow with Video (Video Swallowing Test): not done    Treatments at the Time of Hospital Discharge:   Respiratory Treatments: NA   Oxygen Therapy:  is not on home oxygen therapy. Ventilator:    - No ventilator support    Rehab Therapies: Physical Therapy and Occupational Therapy  Weight Bearing Status/Restrictions: No weight bearing restirctions  Other Medical Equipment (for information only, NOT a DME order):  NONE  Other Treatments: NONE    Patient's personal belongings (please select all that are sent with patient):  Glasses, Dentures upper and lower    RN SIGNATURE:  Electronically signed by Ron Chacko RN on 9/20/19 at 9:59 PM    CASE MANAGEMENT/SOCIAL WORK SECTION    Inpatient Status Date: ***    Readmission Risk Assessment Score:  Readmission Risk              Risk of Unplanned Readmission:        11           Discharging to Facility/ Agency   · Name:   · Address:  · Phone:  · Fax:    Dialysis Facility (if applicable)   · Name:  · Address:  · Dialysis Schedule:  · Phone:  · Fax:    / signature: {Esignature:[id number]}    PHYSICIAN SECTION    Prognosis: {Prognosis:1883529878}    Condition at Discharge: [address] [place] [address] Patient Condition:[id number]}    Rehab Potential (if transferring to Rehab): {Prognosis:7311876189}    Recommended Labs or Other Treatments After Discharge: ***    Physician Certification: I certify the above information and transfer of Laci Oneil  is necessary for the continuing treatment of the diagnosis listed and that she requires {Admit to Appropriate Level of Care:25919} for {GREATER/LESS:894210223} 30 days.      Update Admission H&P: {CHP DME Changes in GLAPM:288336699}    PHYSICIAN SIGNATURE:  {Esignature:[id number]}

## 2019-09-22 NOTE — DISCHARGE SUMMARY
The patient was found to  have cardiac dysrhythmia. He was admitted to the hospital at this time. PAST MEDICAL HISTORY:  Positive for history of coronary artery disease,  gastroesophageal reflux disease, history of echocardiogram,  hyperlipidemia, hypertension, myocardial infarction, migraines. PHYSICAL EXAMINATION:  VITAL SIGNS:  Revealed blood pressure to be 161/101, pulse was 117,  respirations were 20. Afebrile. GENERAL APPEARANCE:  This is an 58-year-old white female, who is alert,  responsive; oriented to person, place, and time. HEENT:  Normal.  LUNGS:  Clear. HEART:  Irregular rhythm with rate varying between 60 and 115. Systolic  ejection murmur of 3/6 was noted. ABDOMEN:  Soft. EXTREMITIES:  No edema. While the patient was in the hospital, she had the following laboratory  studies performed:  Chest x-ray at this time did not show any  significant abnormalities other than an 8-mm diameter around the nodular  density of the right lung base. Needs to be followed up on an  outpatient basis. Remaining lab work was satisfactory. HOSPITAL COURSE OF STAY:  The patient was admitted directly to the  hospital to the immediate care unit. The patient was admitted under the  service of Dr. Robbin De La Garza and Dr. Juan Jose Sauceda. The patient did have cardiac  evaluation, followed at this time by Dr. Shelia Cole. Adjustments were made  in medications. For the most part, the patient did relatively well. Dr. Shelia Cole felt the need for a possible pacemaker would be needed. He  did make arrangements for the patient to go to [facility] under  Dr. Cassi García service. A bed became available, and the patient was  transferred there on the early morning of 09/21. At the time of  discharge on 09/21 revealed the following:  Her vital signs at this time  were blood pressure 145/81, pulse 78, respirations 18. She was  afebrile. The patient, otherwise, at the time of discharge was stable.    The patient will be maintained on

## 2019-10-16 DIAGNOSIS — I25.10 CORONARY ARTERY DISEASE DUE TO CALCIFIED CORONARY LESION: ICD-10-CM

## 2019-10-16 DIAGNOSIS — I25.84 CORONARY ARTERY DISEASE DUE TO CALCIFIED CORONARY LESION: ICD-10-CM

## 2019-10-16 RX ORDER — RANOLAZINE 500 MG/1
500 TABLET, EXTENDED RELEASE ORAL 2 TIMES DAILY
Qty: 60 TABLET | Refills: 1 | Status: SHIPPED | OUTPATIENT
Start: 2019-10-16 | End: 2019-12-05 | Stop reason: SDUPTHER

## 2019-10-22 RX ORDER — SILDENAFIL CITRATE 20 MG/1
TABLET ORAL
Qty: 45 TABLET | Refills: 0 | Status: SHIPPED | OUTPATIENT
Start: 2019-10-22 | End: 2019-12-05 | Stop reason: SDUPTHER

## 2019-10-28 RX ORDER — DOFETILIDE 0.12 MG/1
125 CAPSULE ORAL 2 TIMES DAILY
Qty: 60 CAPSULE | Refills: 3 | Status: SHIPPED | OUTPATIENT
Start: 2019-10-28 | End: 2020-01-09 | Stop reason: SDUPTHER

## 2019-11-11 RX ORDER — FOLIC ACID 1 MG/1
TABLET ORAL
Qty: 30 TABLET | Refills: 0 | Status: SHIPPED | OUTPATIENT
Start: 2019-11-11 | End: 2019-12-05 | Stop reason: SDUPTHER

## 2019-11-12 PROBLEM — I48.92 NEW ONSET ATRIAL FLUTTER (HCC): Status: RESOLVED | Noted: 2017-03-13 | Resolved: 2019-11-12

## 2019-12-05 ENCOUNTER — OFFICE VISIT (OUTPATIENT)
Dept: FAMILY MEDICINE CLINIC | Age: 79
End: 2019-12-05
Payer: MEDICARE

## 2019-12-05 VITALS
RESPIRATION RATE: 16 BRPM | OXYGEN SATURATION: 96 % | HEART RATE: 54 BPM | WEIGHT: 100.2 LBS | BODY MASS INDEX: 21.03 KG/M2 | TEMPERATURE: 98 F | HEIGHT: 58 IN | SYSTOLIC BLOOD PRESSURE: 122 MMHG | DIASTOLIC BLOOD PRESSURE: 72 MMHG

## 2019-12-05 DIAGNOSIS — F51.01 PRIMARY INSOMNIA: ICD-10-CM

## 2019-12-05 DIAGNOSIS — I50.31 ACUTE DIASTOLIC CHF (CONGESTIVE HEART FAILURE) (HCC): ICD-10-CM

## 2019-12-05 DIAGNOSIS — I25.10 CORONARY ARTERY DISEASE DUE TO CALCIFIED CORONARY LESION: ICD-10-CM

## 2019-12-05 DIAGNOSIS — E44.1 MILD PROTEIN-CALORIE MALNUTRITION (HCC): ICD-10-CM

## 2019-12-05 DIAGNOSIS — I25.84 CORONARY ARTERY DISEASE DUE TO CALCIFIED CORONARY LESION: ICD-10-CM

## 2019-12-05 DIAGNOSIS — I20.9 OTHER AND UNSPECIFIED ANGINA PECTORIS: ICD-10-CM

## 2019-12-05 DIAGNOSIS — I48.91 ATRIAL FIBRILLATION WITH RVR (HCC): ICD-10-CM

## 2019-12-05 DIAGNOSIS — I25.810 ATHEROSCLEROSIS OF CORONARY ARTERY BYPASS GRAFT OF NATIVE HEART WITHOUT ANGINA PECTORIS: ICD-10-CM

## 2019-12-05 PROCEDURE — 1123F ACP DISCUSS/DSCN MKR DOCD: CPT | Performed by: NURSE PRACTITIONER

## 2019-12-05 PROCEDURE — G8400 PT W/DXA NO RESULTS DOC: HCPCS | Performed by: NURSE PRACTITIONER

## 2019-12-05 PROCEDURE — G8598 ASA/ANTIPLAT THER USED: HCPCS | Performed by: NURSE PRACTITIONER

## 2019-12-05 PROCEDURE — G8484 FLU IMMUNIZE NO ADMIN: HCPCS | Performed by: NURSE PRACTITIONER

## 2019-12-05 PROCEDURE — 99213 OFFICE O/P EST LOW 20 MIN: CPT | Performed by: NURSE PRACTITIONER

## 2019-12-05 PROCEDURE — 1090F PRES/ABSN URINE INCON ASSESS: CPT | Performed by: NURSE PRACTITIONER

## 2019-12-05 PROCEDURE — G8427 DOCREV CUR MEDS BY ELIG CLIN: HCPCS | Performed by: NURSE PRACTITIONER

## 2019-12-05 PROCEDURE — 1036F TOBACCO NON-USER: CPT | Performed by: NURSE PRACTITIONER

## 2019-12-05 PROCEDURE — 4040F PNEUMOC VAC/ADMIN/RCVD: CPT | Performed by: NURSE PRACTITIONER

## 2019-12-05 PROCEDURE — G8420 CALC BMI NORM PARAMETERS: HCPCS | Performed by: NURSE PRACTITIONER

## 2019-12-05 RX ORDER — BUMETANIDE 1 MG/1
2 TABLET ORAL DAILY
Qty: 60 TABLET | Refills: 2 | Status: SHIPPED
Start: 2019-12-05 | End: 2020-05-28 | Stop reason: SDUPTHER

## 2019-12-05 RX ORDER — SILDENAFIL CITRATE 20 MG/1
TABLET ORAL
Qty: 45 TABLET | Refills: 3 | Status: SHIPPED
Start: 2019-12-05 | End: 2020-05-18

## 2019-12-05 RX ORDER — RANOLAZINE 500 MG/1
500 TABLET, EXTENDED RELEASE ORAL 2 TIMES DAILY
Qty: 60 TABLET | Refills: 2 | Status: SHIPPED
Start: 2019-12-05 | End: 2020-03-05 | Stop reason: SDUPTHER

## 2019-12-05 RX ORDER — FOLIC ACID 1 MG/1
1 TABLET ORAL DAILY
Qty: 30 TABLET | Refills: 3 | Status: SHIPPED
Start: 2019-12-05 | End: 2020-03-24

## 2019-12-05 RX ORDER — ATORVASTATIN CALCIUM 40 MG/1
TABLET, FILM COATED ORAL
Qty: 30 TABLET | Refills: 2 | Status: SHIPPED
Start: 2019-12-05 | End: 2020-03-05 | Stop reason: SDUPTHER

## 2019-12-05 RX ORDER — AMITRIPTYLINE HYDROCHLORIDE 10 MG/1
10 TABLET, FILM COATED ORAL NIGHTLY
Qty: 90 TABLET | Refills: 0 | Status: SHIPPED
Start: 2019-12-05 | End: 2020-09-21 | Stop reason: SDUPTHER

## 2019-12-05 ASSESSMENT — ENCOUNTER SYMPTOMS
VOMITING: 0
SHORTNESS OF BREATH: 0
NAUSEA: 0
COUGH: 0
CONSTIPATION: 0
DIARRHEA: 0
WHEEZING: 0

## 2019-12-24 ENCOUNTER — CARE COORDINATION (OUTPATIENT)
Dept: CARE COORDINATION | Age: 79
End: 2019-12-24

## 2020-01-02 ENCOUNTER — CARE COORDINATION (OUTPATIENT)
Dept: CARE COORDINATION | Age: 80
End: 2020-01-02

## 2020-01-02 SDOH — ECONOMIC STABILITY: TRANSPORTATION INSECURITY
IN THE PAST 12 MONTHS, HAS LACK OF TRANSPORTATION KEPT YOU FROM MEETINGS, WORK, OR FROM GETTING THINGS NEEDED FOR DAILY LIVING?: NO

## 2020-01-02 SDOH — HEALTH STABILITY: PHYSICAL HEALTH: ON AVERAGE, HOW MANY MINUTES DO YOU ENGAGE IN EXERCISE AT THIS LEVEL?: 0 MIN

## 2020-01-02 SDOH — SOCIAL STABILITY: SOCIAL NETWORK: HOW OFTEN DO YOU ATTEND CHURCH OR RELIGIOUS SERVICES?: NEVER

## 2020-01-02 SDOH — SOCIAL STABILITY: SOCIAL NETWORK
DO YOU BELONG TO ANY CLUBS OR ORGANIZATIONS SUCH AS CHURCH GROUPS UNIONS, FRATERNAL OR ATHLETIC GROUPS, OR SCHOOL GROUPS?: NO

## 2020-01-02 SDOH — SOCIAL STABILITY: SOCIAL NETWORK: ARE YOU MARRIED, WIDOWED, DIVORCED, SEPARATED, NEVER MARRIED, OR LIVING WITH A PARTNER?: WIDOWED

## 2020-01-02 SDOH — ECONOMIC STABILITY: TRANSPORTATION INSECURITY
IN THE PAST 12 MONTHS, HAS THE LACK OF TRANSPORTATION KEPT YOU FROM MEDICAL APPOINTMENTS OR FROM GETTING MEDICATIONS?: NO

## 2020-01-02 SDOH — ECONOMIC STABILITY: INCOME INSECURITY: HOW HARD IS IT FOR YOU TO PAY FOR THE VERY BASICS LIKE FOOD, HOUSING, MEDICAL CARE, AND HEATING?: NOT VERY HARD

## 2020-01-02 SDOH — SOCIAL STABILITY: SOCIAL NETWORK: IN A TYPICAL WEEK, HOW MANY TIMES DO YOU TALK ON THE PHONE WITH FAMILY, FRIENDS, OR NEIGHBORS?: TWICE A WEEK

## 2020-01-02 SDOH — HEALTH STABILITY: MENTAL HEALTH
STRESS IS WHEN SOMEONE FEELS TENSE, NERVOUS, ANXIOUS, OR CAN'T SLEEP AT NIGHT BECAUSE THEIR MIND IS TROUBLED. HOW STRESSED ARE YOU?: ONLY A LITTLE

## 2020-01-02 SDOH — ECONOMIC STABILITY: FOOD INSECURITY: WITHIN THE PAST 12 MONTHS, YOU WORRIED THAT YOUR FOOD WOULD RUN OUT BEFORE YOU GOT MONEY TO BUY MORE.: NEVER TRUE

## 2020-01-02 SDOH — ECONOMIC STABILITY: FOOD INSECURITY: WITHIN THE PAST 12 MONTHS, THE FOOD YOU BOUGHT JUST DIDN'T LAST AND YOU DIDN'T HAVE MONEY TO GET MORE.: NEVER TRUE

## 2020-01-02 SDOH — SOCIAL STABILITY: SOCIAL NETWORK: HOW OFTEN DO YOU GET TOGETHER WITH FRIENDS OR RELATIVES?: TWICE A WEEK

## 2020-01-02 SDOH — HEALTH STABILITY: PHYSICAL HEALTH: ON AVERAGE, HOW MANY DAYS PER WEEK DO YOU ENGAGE IN MODERATE TO STRENUOUS EXERCISE (LIKE A BRISK WALK)?: 0 DAYS

## 2020-01-02 SDOH — SOCIAL STABILITY: SOCIAL NETWORK: HOW OFTEN DO YOU ATTENT MEETINGS OF THE CLUB OR ORGANIZATION YOU BELONG TO?: NEVER

## 2020-01-02 NOTE — CARE COORDINATION
JEMMA called and spoke with Renae at [facility] to notify of pt's update apt number for her address. Demographics updated.

## 2020-01-02 NOTE — CARE COORDINATION
Ambulatory Care Coordination Note  1/2/2020  CM Risk Score: 2  Charlson 10 Year Mortality Risk Score: 79%     ACC: Facundo Real, GLORIA    Summary Note:   -ACM called and spoke with Jeff Luis, pt's daughter/caregiver, whom is listed on pt's HIPPA form. Discussed Care Coordination Program. I introduced myself and gave a description of the Care Coordination Program.  Jeff Luis is agreeable to enrollment for her mother. *Enrollment  -Jeff Luis states that her mother lives alone in an apartment setting.  -Jeff Luis handles pt's finances and takes her grocery shopping as pt doesn't drive. Jessie Nicolas goes over her mother's a couple days a week to assist pt with medications and occasionally takes food to her as well. *Medications   -Medications reviewed with Jeff Luis and she states that sometimes her mother forgets to take them. -ACM discussed pill pack option. Jeff Luis expressed that her mother will have the pills on the table in front of her and she will forget to take them. -ACM discussed the med minder and pill timer products and will send information. Jessie Nicolas states that pt takes Aspirin 81mg daily and Vit B12 1 tab daily. Meds not on med list currently. *CHF  -Jeff Luis states that her mother doesn't currently weigh herself. -ACM discussed the importance of daily weights and the CHF zone tool use. Will mail zone tool to pt and discuss at next outreach. -Reports that her mother follows a low to no salt added diet but doesn't really eat a lot as she forgets to eat at times.   -Denies any SOB, cough, chest pain, or swelling in her mother's legs, ankles, and feet.   -Pt sees Dr Jim Kussmaul and had a pacer placed at [facility] 9/2019. *Diet  -States that Toshiko drinks Ensure and [place] breakfast drinks and she will take left overs to her mom to eat at times.   -ACM discussed mobile meal options, she states that they contacted [facility]. and it was going to cost $100/week and her mother can't afford of a Mobility Aid:  No  Difficulty walking/impaired gait:  No  Dizziness:  Yes (Comment: vertigo occasionally)  Other Fall Risk:  No     Frequent urination at night?:  Yes  Do you use rails/bars?:  Yes  Do you have a non-slip tub mat?:  Yes     Are you experiencing loss of meaning?:  No  Are you experiencing loss of hope and peace?:  No     Thinking about your patient's physical health needs, are there any symptoms or problems (risk indicators) you are unsure about that require further investigation?:  No identified areas of uncertainly or problems already being investigated   Are the patients physical health problems impacting on their mental well-being?:  No identified areas of concern   Are there any problems with your patients lifestyle behaviors (alcohol, drugs, diet, exercise) that are impacting on physical or mental well-being?:  No identified areas of concern   Do you have any other concerns about your patients mental well-being?  How would you rate their severity and impact on the patient?:  Mild problems - don't interfere with function   How would you rate their home environment in terms of safety and stability (including domestic violence, insecure housing, neighbor harassment)?:  Consistently safe, supportive, stable, no identified problems   How do daily activities impact on the patient's well-being? (include current or anticipated unemployment, work, caregiving, access to transportation or other):  No identified problems or perceived positive benefits   How would you rate their social network (family, work, friends)?:  Restricted participation with some degree of social isolation   How would you rate their financial resources (including ability to afford all required medical care)?:  Financially secure, some resource challenges   How wells does the patient now understand their health and well-being (symptoms, signs or risk factors) and what they need to do to manage their health?:  Reasonable to good understanding and already engages in managing health or is willing to undertake better management   How well do you think your patient can engage in healthcare discussions? (Barriers include language, deafness, aphasia, alcohol or drug problems, learning difficulties, concentration):  Clear and open communication, no identified barriers   Do other services need to be involved to help this patient?:  Other care/services not in place and required   Are current services involved with this patient well-coordinated? (Include coordination with other services you are now recommendation): All required care/services in place and well-coordinated   Suggested Interventions and Community Resources  Fall Risk Prevention: In Process Medication Assistance Program:  Not Started   Medi Set or Pill Pack:  Not Started   Transportation Services:  Declined   Zone Management Tools: In Process         Set up/Review an Education Plan, Set up/Review Goals              Prior to Admission medications    Medication Sig Start Date End Date Taking?  Authorizing Provider   magnesium oxide (MAG-OX) 400 (241.3 Mg) MG TABS tablet TAKE ONE TABLET BY MOUTH DAILY 11/10/19  Yes Historical Provider, MD   ranolazine (RANEXA) 500 MG extended release tablet Take 1 tablet by mouth 2 times daily 12/5/19  Yes Ector Calvillo APRN - CNP   folic acid (FOLVITE) 1 MG tablet Take 1 tablet by mouth daily 12/5/19  Yes MAYCOL Mathew CNP   atorvastatin (LIPITOR) 40 MG tablet TAKE ONE TABLET BY MOUTH EVERY DAY 12/5/19  Yes MAYCOL Mathew - CNP   bumetanide (BUMEX) 1 MG tablet Take 2 tablets by mouth daily 12/5/19  Yes MAYCOL Mathew CNP   sildenafil (REVATIO) 20 MG tablet TAKE ONE-HALF TABLET BY MOUTH THREE TIMES DAILY 12/5/19  Yes MAYCOL Stout - CNP   amitriptyline (ELAVIL) 10 MG tablet TAKE 1 TABLET BY MOUTH NIGHTLY 12/5/19  Yes MAYCOL Stout - CNP   dofetilide (TIKOSYN) 125 MCG capsule Take 1 capsule by mouth 2 times

## 2020-01-02 NOTE — LETTER
1/2/2020    Amy Trevino  [address]    Dear Amy Trevino,    I enjoyed speaking with your daughter Gilbert Ferrer and wanted to send some additional information. [place] Trino, DO and I will work together to ensure your needs are met and help you achieve your health goals. We are committed to walk with you on this journey and look forward to working with you. Please feel free to contact me with any questions or concerns. I am available by phone or for appointments at the office. You can reach me at [phone] [facility]) or at [phone] (Work cell).          In good health,       Travis Kennedy, [address]

## 2020-01-03 NOTE — TELEPHONE ENCOUNTER
-Care coordination   -ACM will mail CHF zone tool, daily weight log, falls prevention handout, med minder information, Where should you go for care and right care, right time, right place handouts. -ACM will f/u in about 1 week for CHF f/u, check progress with daily weights, discuss mailed information, and assess needs.

## 2020-01-06 RX ORDER — AMLODIPINE BESYLATE 10 MG/1
TABLET ORAL
Qty: 30 TABLET | Refills: 0 | Status: SHIPPED
Start: 2020-01-06 | End: 2020-02-14 | Stop reason: SDUPTHER

## 2020-01-09 ENCOUNTER — CARE COORDINATION (OUTPATIENT)
Dept: CARE COORDINATION | Age: 80
End: 2020-01-09

## 2020-01-09 RX ORDER — DOFETILIDE 0.12 MG/1
125 CAPSULE ORAL 2 TIMES DAILY
Qty: 60 CAPSULE | Refills: 3 | Status: SHIPPED
Start: 2020-01-09 | End: 2020-03-05 | Stop reason: SDUPTHER

## 2020-01-16 ENCOUNTER — CARE COORDINATION (OUTPATIENT)
Dept: CARE COORDINATION | Age: 80
End: 2020-01-16

## 2020-01-23 ENCOUNTER — CARE COORDINATION (OUTPATIENT)
Dept: CARE COORDINATION | Age: 80
End: 2020-01-23

## 2020-01-23 NOTE — CARE COORDINATION
ACM left message for patient's daughter Angeline Media to return call to [phone]. Re: Follow up: CHF, mailed information, Review POC, Education, Goals. Contact information given.

## 2020-01-30 ENCOUNTER — CARE COORDINATION (OUTPATIENT)
Dept: CARE COORDINATION | Age: 80
End: 2020-01-30

## 2020-01-30 NOTE — CARE COORDINATION
that her mother is taking them as prescribed. Amara Barrett states that she has to look over the med minder information to see if it will be a good fit for her mother. *CHF  -Reports that her mother hasn't been weighing herself, ACM inquired about pt having a scale and she states that she does but the scale has been packed away since her mother moved into a new apt. -ACM discussed the importance of daily weights for CHF management.  -Denies any SOB, chest pain, cough, swelling  -Discussed and reinforced CHF zone tool use daily  -Denies increased salt intake    *Plan  -Care coordination  -ACM will send [employer] information  -Reinforce daily weights and CHF zone tool use  -Amara Barrett to contact provider office to discuss her concerns regarding the pt lack of nutrition and forgetfulness. -ACM will f/u in about 1 week to check progress and assess needs, and f/u regarding Anjum Began, and discuss possible Direction Home referral        Care Coordination Interventions    Program Enrollment:  Complex Care  Referral from Primary Care Provider:  No  Suggested Interventions and Community Resources  Fall Risk Prevention: In Process (Comment: 1/2-discussed, mailed falls prevention info)  Meals on Wheels:  Not Started (Comment: 1/2-discussed-declined [employer] d/t cost)  Medication Assistance Program:  Not Started (Comment: 1/2-discussed)  Medi Set or Pill Pack:  Not Started (Comment: 1/2-discussed )  Other Services:  Not Started (Comment: 1/2-discussed Direction Home)  Transportation Support:  Declined  Zone Management Tools: In Process (Comment: 1/2-discussed CHF zone tool, mailed)         Goals Addressed                 This Visit's Progress     Conditions and Symptoms   On track     I will schedule office visits, as directed by my provider. I will keep my appointment or reschedule if I have to cancel. I will notify my provider of any barriers to my plan of care.   I will follow my Zone Management

## 2020-02-06 ENCOUNTER — CARE COORDINATION (OUTPATIENT)
Dept: CARE COORDINATION | Age: 80
End: 2020-02-06

## 2020-02-10 RX ORDER — OMEPRAZOLE 40 MG/1
CAPSULE, DELAYED RELEASE ORAL
Qty: 30 CAPSULE | Refills: 4 | Status: SHIPPED
Start: 2020-02-10 | End: 2020-07-06

## 2020-02-13 ENCOUNTER — CARE COORDINATION (OUTPATIENT)
Dept: CARE COORDINATION | Age: 80
End: 2020-02-13

## 2020-02-13 NOTE — CARE COORDINATION
ACM left message for patient to return call to [phone]. Re: Follow up: CHF, check daily weights,  Review POC, Education, Goals. Contact information given.

## 2020-02-14 RX ORDER — AMLODIPINE BESYLATE 10 MG/1
TABLET ORAL
Qty: 30 TABLET | Refills: 2 | Status: SHIPPED
Start: 2020-02-14 | End: 2020-05-11

## 2020-02-20 ENCOUNTER — CARE COORDINATION (OUTPATIENT)
Dept: CARE COORDINATION | Age: 80
End: 2020-02-20

## 2020-02-20 NOTE — CARE COORDINATION
ACM left message for patient to return call. Re: Follow up: CHF, Review POC, Education, Goals. Contact information given.

## 2020-02-26 ENCOUNTER — CARE COORDINATION (OUTPATIENT)
Dept: CARE COORDINATION | Age: 80
End: 2020-02-26

## 2020-02-26 NOTE — LETTER
2/26/2020    [facility]     I have enjoyed working with you and your daughter Lorena Hanks to improve your health. I would like to continue to provide you support. However, I have been unable to reach you at, [phone] (home) . Please let me know if I can help answer any questions. If you would like continued access to your Nurse Care Coordinator, Fracisco Torres RN, you can reach me at [phone] or [phone]. I will wait to hear from you and will no longer reach out to you. Harry Agosto DO and his/her team will continue to provide care and be available for questions.       In good health,       [address] [facility]

## 2020-03-05 ENCOUNTER — OFFICE VISIT (OUTPATIENT)
Dept: FAMILY MEDICINE CLINIC | Age: 80
End: 2020-03-05
Payer: MEDICARE

## 2020-03-05 VITALS
TEMPERATURE: 98 F | DIASTOLIC BLOOD PRESSURE: 70 MMHG | BODY MASS INDEX: 20.36 KG/M2 | HEIGHT: 58 IN | SYSTOLIC BLOOD PRESSURE: 128 MMHG | WEIGHT: 97 LBS | OXYGEN SATURATION: 98 % | HEART RATE: 81 BPM

## 2020-03-05 PROCEDURE — 1123F ACP DISCUSS/DSCN MKR DOCD: CPT | Performed by: FAMILY MEDICINE

## 2020-03-05 PROCEDURE — G8484 FLU IMMUNIZE NO ADMIN: HCPCS | Performed by: FAMILY MEDICINE

## 2020-03-05 PROCEDURE — 4040F PNEUMOC VAC/ADMIN/RCVD: CPT | Performed by: FAMILY MEDICINE

## 2020-03-05 PROCEDURE — G0438 PPPS, INITIAL VISIT: HCPCS | Performed by: FAMILY MEDICINE

## 2020-03-05 RX ORDER — DOFETILIDE 0.12 MG/1
125 CAPSULE ORAL 2 TIMES DAILY
Qty: 60 CAPSULE | Refills: 3 | Status: SHIPPED
Start: 2020-03-05 | End: 2020-06-30

## 2020-03-05 RX ORDER — ATORVASTATIN CALCIUM 40 MG/1
TABLET, FILM COATED ORAL
Qty: 30 TABLET | Refills: 2 | Status: SHIPPED
Start: 2020-03-05 | End: 2020-05-28 | Stop reason: SDUPTHER

## 2020-03-05 RX ORDER — NEBIVOLOL 20 MG/1
20 TABLET ORAL DAILY
Qty: 90 TABLET | Refills: 2 | Status: SHIPPED
Start: 2020-03-05 | End: 2020-04-14 | Stop reason: SDUPTHER

## 2020-03-05 RX ORDER — RANOLAZINE 500 MG/1
500 TABLET, EXTENDED RELEASE ORAL 2 TIMES DAILY
Qty: 60 TABLET | Refills: 2 | Status: SHIPPED
Start: 2020-03-05 | End: 2020-06-08

## 2020-03-05 ASSESSMENT — PATIENT HEALTH QUESTIONNAIRE - PHQ9
SUM OF ALL RESPONSES TO PHQ QUESTIONS 1-9: 0
SUM OF ALL RESPONSES TO PHQ QUESTIONS 1-9: 0

## 2020-03-05 ASSESSMENT — LIFESTYLE VARIABLES: HOW OFTEN DO YOU HAVE A DRINK CONTAINING ALCOHOL: 0

## 2020-03-05 NOTE — PROGRESS NOTES
Rajinder, APRN - CNP   amitriptyline (ELAVIL) 10 MG tablet TAKE 1 TABLET BY MOUTH NIGHTLY Yes MAYCOL Stout CNP   apixaban (ELIQUIS) 2.5 MG TABS tablet Take 1 tablet by mouth 2 times daily Yes Gemini Mcneil DO   albuterol sulfate HFA (VENTOLIN HFA) 108 (90 Base) MCG/ACT inhaler INHALE TWO PUFFS BY MOUTH EVERY 6 HOURS AS NEEDED FOR WHEEZING Yes MAYCOL Diane CNP   potassium chloride (KLOR-CON M) 20 MEQ extended release tablet TAKE ONE TABLET BY MOUTH EVERY DAY  Patient taking differently: 20 mEq 2 times daily TAKE ONE TABLET BY MOUTH EVERY DAY Yes Kolby Tyson DO   sildenafil (REVATIO) 20 MG tablet TAKE ONE-HALF TABLET BY MOUTH THREE TIMES DAILY  MAYCOL Stout CNP         Past Medical History:   Diagnosis Date    CAD (coronary artery disease)     GERD (gastroesophageal reflux disease)     History of echocardiogram 03/14/2017    EF 62%    History of echocardiogram 05/01/2018    EF 62%    Hyperlipidemia     Hypertension     [facility]) [facility] [address]     Migraines     New onset atrial flutter ([facility] [address] 3/13/2017    Normal cardiac stress test 5/2010       Past Surgical History:   Procedure Laterality Date    CARDIAC CATHETERIZATION      2002    CHOLECYSTECTOMY      COLONOSCOPY  4/16/13    DR CAMERON     CORONARY ARTERY BYPASS GRAFT      CABG x 4 in 2000    ECHO COMPL W DOP COLOR FLOW  2/27/2012         ECHO COMPL W DOP COLOR FLOW  3/25/2013            No family history on file.     CareTeam (Including outside providers/suppliers regularly involved in providing care):   Patient Care Team:  Kolby Tyson DO as PCP - . Zac Ulrich,  as PCP - [facility]    Wt Readings from Last 3 Encounters:   03/05/20 97 lb (44 kg)   12/05/19 100 lb 3.2 oz (45.5 kg)   09/20/19 100 lb (45.4 kg)     Vitals:    03/05/20 1223   BP: 128/70   Pulse: 81   Temp: 98 °F (36.7 °C)   TempSrc: Oral   SpO2: 98%   Weight: 97 lb (44 kg)   Height: 4' 10\" without trying in the past 3 months?: (!) Yes  Do you eat fewer than 2 meals per day?: No  Have you seen a dentist within the past year?: (!) No  Body mass index is 20.27 kg/m².   Health Habits/Nutrition Interventions:  · none    Hearing/Vision:  No exam data present  Hearing/Vision  Do you or your family notice any trouble with your hearing?: No  Do you have difficulty driving, watching TV, or doing any of your daily activities because of your eyesight?: (!) Yes  Have you had an eye exam within the past year?: Yes  Hearing/Vision Interventions:  · none    Safety:  Safety  Do you have working smoke detectors?: Yes  Have all throw rugs been removed or fastened?: (!) No  Do you have non-slip mats or surfaces in all bathtubs/showers?: Yes  Do all of your stairways have a railing or banister?: Yes  Are your doorways, halls and stairs free of clutter?: Yes  Do you always fasten your seatbelt when you are in a car?: Yes  Safety Interventions:  · Home safety tips provided    Personalized Preventive Plan   Current Health Maintenance Status  Immunization History   Administered Date(s) Administered    Influenza, High Dose (Fluzone 65 yrs and older) 12/18/2017    Influenza, Quadv, IM, PF (6 mo and older Fluzone, Flulaval, Fluarix, and 3 yrs and older Afluria) 03/15/2017    Influenza, Triv, inactivated, subunit, adjuvanted, IM (Fluad 65 yrs and older) 09/12/2018    Pneumococcal Conjugate 13-valent ([id number]) 03/15/2017    Pneumococcal Polysaccharide ([id number]) 01/06/2016    Zoster Recombinant (Shingrix) 02/17/2019, 04/17/2019        Health Maintenance   Topic Date Due    DTaP/Tdap/Td vaccine (1 - Tdap) 03/16/1951    DEXA (modify frequency per FRAX score)  03/16/2004    Annual Wellness Visit (AWV)  05/29/2019    Flu vaccine (1) 09/01/2019    Lipid screen  09/16/2020    Potassium monitoring  09/20/2020    Creatinine monitoring  09/20/2020    Shingles Vaccine  Completed    Pneumococcal 65+ years Vaccine

## 2020-03-24 RX ORDER — FOLIC ACID 1 MG/1
TABLET ORAL
Qty: 30 TABLET | Refills: 0 | Status: SHIPPED
Start: 2020-03-24 | End: 2020-04-14 | Stop reason: SDUPTHER

## 2020-04-14 RX ORDER — FOLIC ACID 1 MG/1
TABLET ORAL
Qty: 30 TABLET | Refills: 3 | Status: SHIPPED
Start: 2020-04-14 | End: 2020-08-07

## 2020-04-14 RX ORDER — NEBIVOLOL HYDROCHLORIDE 20 MG/1
20 TABLET ORAL DAILY
Qty: 90 TABLET | Refills: 2 | Status: SHIPPED
Start: 2020-04-14 | End: 2020-09-21 | Stop reason: SDUPTHER

## 2020-05-11 RX ORDER — AMLODIPINE BESYLATE 10 MG/1
TABLET ORAL
Qty: 30 TABLET | Refills: 0 | Status: SHIPPED
Start: 2020-05-11 | End: 2020-06-26 | Stop reason: SDUPTHER

## 2020-05-18 PROBLEM — I49.5 SICK SINUS SYNDROME WITH TACHYCARDIA (HCC): Status: ACTIVE | Noted: 2020-05-18

## 2020-05-18 RX ORDER — SILDENAFIL CITRATE 20 MG/1
TABLET ORAL
Qty: 45 TABLET | Refills: 0 | Status: SHIPPED
Start: 2020-05-18 | End: 2020-09-21 | Stop reason: SDUPTHER

## 2020-05-28 RX ORDER — BUMETANIDE 1 MG/1
2 TABLET ORAL DAILY
Qty: 60 TABLET | Refills: 2 | Status: SHIPPED
Start: 2020-05-28 | End: 2020-09-21 | Stop reason: SDUPTHER

## 2020-05-28 RX ORDER — ATORVASTATIN CALCIUM 40 MG/1
TABLET, FILM COATED ORAL
Qty: 30 TABLET | Refills: 2 | Status: SHIPPED
Start: 2020-05-28 | End: 2020-06-23 | Stop reason: SDUPTHER

## 2020-06-08 RX ORDER — RANOLAZINE 500 MG/1
TABLET, EXTENDED RELEASE ORAL
Qty: 60 TABLET | Refills: 0 | Status: SHIPPED
Start: 2020-06-08 | End: 2020-08-06 | Stop reason: SDUPTHER

## 2020-06-22 RX ORDER — APIXABAN 2.5 MG/1
TABLET, FILM COATED ORAL
Qty: 60 TABLET | Refills: 0 | Status: SHIPPED
Start: 2020-06-22 | End: 2020-08-17 | Stop reason: SDUPTHER

## 2020-06-23 RX ORDER — AMLODIPINE BESYLATE 10 MG/1
TABLET ORAL
Qty: 30 TABLET | Refills: 0 | Status: CANCELLED | OUTPATIENT
Start: 2020-06-23

## 2020-06-23 RX ORDER — ATORVASTATIN CALCIUM 40 MG/1
TABLET, FILM COATED ORAL
Qty: 90 TABLET | Refills: 1 | Status: SHIPPED
Start: 2020-06-23 | End: 2020-08-24 | Stop reason: SDUPTHER

## 2020-06-26 RX ORDER — AMLODIPINE BESYLATE 10 MG/1
TABLET ORAL
Qty: 90 TABLET | Refills: 0 | Status: SHIPPED
Start: 2020-06-26 | End: 2020-09-21 | Stop reason: SDUPTHER

## 2020-06-30 RX ORDER — DOFETILIDE 0.12 MG/1
CAPSULE ORAL
Qty: 60 CAPSULE | Refills: 0 | Status: SHIPPED
Start: 2020-06-30 | End: 2020-08-20 | Stop reason: SDUPTHER

## 2020-07-06 RX ORDER — OMEPRAZOLE 40 MG/1
CAPSULE, DELAYED RELEASE ORAL
Qty: 30 CAPSULE | Refills: 0 | Status: SHIPPED
Start: 2020-07-06 | End: 2020-08-20 | Stop reason: SDUPTHER

## 2020-08-06 RX ORDER — RANOLAZINE 500 MG/1
TABLET, EXTENDED RELEASE ORAL
Qty: 60 TABLET | Refills: 3 | Status: SHIPPED
Start: 2020-08-06 | End: 2020-12-14

## 2020-08-07 RX ORDER — FOLIC ACID 1 MG/1
TABLET ORAL
Qty: 30 TABLET | Refills: 0 | Status: SHIPPED
Start: 2020-08-07 | End: 2020-09-21 | Stop reason: SDUPTHER

## 2020-08-20 RX ORDER — OMEPRAZOLE 40 MG/1
40 CAPSULE, DELAYED RELEASE ORAL DAILY
Qty: 30 CAPSULE | Refills: 2 | Status: SHIPPED
Start: 2020-08-20 | End: 2020-11-30 | Stop reason: SDUPTHER

## 2020-08-20 RX ORDER — DOFETILIDE 0.12 MG/1
CAPSULE ORAL
Qty: 60 CAPSULE | Refills: 2 | Status: SHIPPED
Start: 2020-08-20 | End: 2020-11-30 | Stop reason: SDUPTHER

## 2020-08-24 RX ORDER — ATORVASTATIN CALCIUM 40 MG/1
TABLET, FILM COATED ORAL
Qty: 90 TABLET | Refills: 1 | Status: SHIPPED
Start: 2020-08-24 | End: 2021-05-04 | Stop reason: SDUPTHER

## 2020-09-21 ENCOUNTER — OFFICE VISIT (OUTPATIENT)
Dept: FAMILY MEDICINE CLINIC | Age: 80
End: 2020-09-21
Payer: MEDICARE

## 2020-09-21 VITALS
RESPIRATION RATE: 16 BRPM | BODY MASS INDEX: 20.15 KG/M2 | OXYGEN SATURATION: 97 % | SYSTOLIC BLOOD PRESSURE: 110 MMHG | HEIGHT: 58 IN | HEART RATE: 88 BPM | TEMPERATURE: 97.1 F | WEIGHT: 96 LBS | DIASTOLIC BLOOD PRESSURE: 70 MMHG

## 2020-09-21 PROCEDURE — 1090F PRES/ABSN URINE INCON ASSESS: CPT | Performed by: FAMILY MEDICINE

## 2020-09-21 PROCEDURE — G8400 PT W/DXA NO RESULTS DOC: HCPCS | Performed by: FAMILY MEDICINE

## 2020-09-21 PROCEDURE — G8427 DOCREV CUR MEDS BY ELIG CLIN: HCPCS | Performed by: FAMILY MEDICINE

## 2020-09-21 PROCEDURE — 4040F PNEUMOC VAC/ADMIN/RCVD: CPT | Performed by: FAMILY MEDICINE

## 2020-09-21 PROCEDURE — 99214 OFFICE O/P EST MOD 30 MIN: CPT | Performed by: FAMILY MEDICINE

## 2020-09-21 PROCEDURE — G8420 CALC BMI NORM PARAMETERS: HCPCS | Performed by: FAMILY MEDICINE

## 2020-09-21 PROCEDURE — 1123F ACP DISCUSS/DSCN MKR DOCD: CPT | Performed by: FAMILY MEDICINE

## 2020-09-21 PROCEDURE — 1036F TOBACCO NON-USER: CPT | Performed by: FAMILY MEDICINE

## 2020-09-21 RX ORDER — AMLODIPINE BESYLATE 10 MG/1
TABLET ORAL
Qty: 30 TABLET | Refills: 3 | Status: SHIPPED
Start: 2020-09-21 | End: 2021-02-04 | Stop reason: SDUPTHER

## 2020-09-21 RX ORDER — SILDENAFIL CITRATE 20 MG/1
TABLET ORAL
Qty: 45 TABLET | Refills: 3 | Status: SHIPPED
Start: 2020-09-21 | End: 2021-03-02

## 2020-09-21 RX ORDER — BUMETANIDE 1 MG/1
2 TABLET ORAL DAILY
Qty: 60 TABLET | Refills: 3 | Status: SHIPPED
Start: 2020-09-21 | End: 2021-02-24

## 2020-09-21 RX ORDER — NEBIVOLOL HYDROCHLORIDE 20 MG/1
20 TABLET ORAL DAILY
Qty: 30 TABLET | Refills: 3 | Status: SHIPPED
Start: 2020-09-21 | End: 2021-03-04 | Stop reason: SDUPTHER

## 2020-09-21 RX ORDER — FOLIC ACID 1 MG/1
TABLET ORAL
Qty: 30 TABLET | Refills: 3 | Status: SHIPPED
Start: 2020-09-21 | End: 2021-02-04 | Stop reason: SDUPTHER

## 2020-09-21 RX ORDER — AMITRIPTYLINE HYDROCHLORIDE 10 MG/1
10 TABLET, FILM COATED ORAL NIGHTLY
Qty: 30 TABLET | Refills: 3 | Status: SHIPPED
Start: 2020-09-21 | End: 2020-10-29

## 2020-09-21 ASSESSMENT — ENCOUNTER SYMPTOMS
DIARRHEA: 0
BLOOD IN STOOL: 0
CONSTIPATION: 0
WHEEZING: 0

## 2020-09-21 NOTE — PROGRESS NOTES
HPI:  Patient comes in today for   Chief Complaint   Patient presents with    Chest Pain     Patient is complaining of chest pain when deep breathing.  Medication Refill     Patient is requestsing refills on all meds. Review of Systems  Review of Systems   Constitutional: Negative for chills and diaphoresis. HENT: Negative for ear discharge, ear pain, hearing loss, nosebleeds and tinnitus. Respiratory: Negative for wheezing. Cardiovascular: Negative for chest pain. Gastrointestinal: Negative for blood in stool, constipation and diarrhea. Genitourinary: Negative for dysuria, flank pain and hematuria. Musculoskeletal: Negative for arthralgias. Chest wall pain. on palpation. Skin: Negative for rash. Neurological: Negative for headaches. Hematological: Does not bruise/bleed easily. PE[de-identified]  /70   Pulse 88   Temp 97.1 °F (36.2 °C) (Temporal)   Resp 16   Ht 4' 10\" (1.473 m)   Wt 96 lb (43.5 kg)   LMP  (LMP Unknown)   SpO2 97%   BMI 20.06 kg/m²       Physical Exam  Constitutional:       Appearance: Normal appearance. She is well-developed. HENT:      Head: Normocephalic and atraumatic. Eyes:      General: No scleral icterus. Conjunctiva/sclera: Conjunctivae normal.      Pupils: Pupils are equal, round, and reactive to light. Neck:      Thyroid: No thyromegaly. Vascular: No JVD. Trachea: No tracheal deviation. Comments: Tender to ROM  Cardiovascular:      Rate and Rhythm: Normal rate and regular rhythm. Heart sounds: Normal heart sounds. No murmur. No gallop. Pulmonary:      Effort: Pulmonary effort is normal. No respiratory distress. Breath sounds: Normal breath sounds. No wheezing. Abdominal:      General: Abdomen is flat. Palpations: Abdomen is soft. Musculoskeletal:         General: No tenderness. Comments: Chest wall pain on palpation. Skin:     General: Skin is warm.       Capillary Refill: Capillary refill takes less than 2 seconds. Findings: No erythema or rash. Neurological:      General: No focal deficit present. Mental Status: She is alert and oriented to person, place, and time. Deep Tendon Reflexes: Reflexes normal.      Comments:        Psychiatric:         Mood and Affect: Mood normal.           Assessment/Plan:  Bobbi was seen today for chest pain and medication refill. Diagnoses and all orders for this visit:    Sick sinus syndrome with tachycardia ([facility])  -     magnesium oxide (MAG-OX) 400 (241.3 Mg) MG TABS tablet; TAKE ONE TABLET BY MOUTH DAILY    Essential hypertension  -     amLODIPine (NORVASC) 10 MG tablet; TAKE ONE TABLET BY MOUTH DAILY  -     nebivolol (BYSTOLIC) 20 MG TABS tablet; Take 1 tablet by mouth daily    Acute diastolic CHF (congestive heart failure) ([facility])  -     folic acid (FOLVITE) 1 MG tablet; TAKE ONE TABLET BY MOUTH DAILY  -     bumetanide (BUMEX) 1 MG tablet; Take 2 tablets by mouth daily  -     sildenafil (REVATIO) 20 MG tablet; TAKE ONE-HALF TABLET BY MOUTH THREE TIMES DAILY    Primary insomnia  -     amitriptyline (ELAVIL) 10 MG tablet; Take 1 tablet by mouth nightly    Atrial fibrillation with RVR ([facility])  -     apixaban (ELIQUIS) 2.5 MG TABS tablet; TAKE ONE TABLET BY MOUTH TWO TIMES A DAY    Mild protein-calorie malnutrition ([address]    Chest wall syndrome          CANDIDA Camp.

## 2020-10-12 ENCOUNTER — APPOINTMENT (OUTPATIENT)
Dept: CT IMAGING | Age: 80
End: 2020-10-12
Payer: MEDICARE

## 2020-10-12 ENCOUNTER — APPOINTMENT (OUTPATIENT)
Dept: CT IMAGING | Age: 80
DRG: 478 | End: 2020-10-12
Payer: MEDICARE

## 2020-10-12 ENCOUNTER — APPOINTMENT (OUTPATIENT)
Dept: GENERAL RADIOLOGY | Age: 80
DRG: 478 | End: 2020-10-12
Payer: MEDICARE

## 2020-10-12 ENCOUNTER — HOSPITAL ENCOUNTER (EMERGENCY)
Age: 80
Discharge: ANOTHER HOSPITAL | End: 2020-10-12
Attending: EMERGENCY MEDICINE
Payer: MEDICARE

## 2020-10-12 ENCOUNTER — HOSPITAL ENCOUNTER (INPATIENT)
Age: 80
LOS: 6 days | Discharge: HOME HEALTH | DRG: 478 | End: 2020-10-19
Attending: EMERGENCY MEDICINE | Admitting: SURGERY
Payer: MEDICARE

## 2020-10-12 VITALS
WEIGHT: 100 LBS | BODY MASS INDEX: 20.99 KG/M2 | TEMPERATURE: 97.9 F | OXYGEN SATURATION: 92 % | SYSTOLIC BLOOD PRESSURE: 114 MMHG | DIASTOLIC BLOOD PRESSURE: 52 MMHG | HEART RATE: 55 BPM | RESPIRATION RATE: 12 BRPM | HEIGHT: 58 IN

## 2020-10-12 PROBLEM — S32.040A CLOSED COMPRESSION FRACTURE OF L4 LUMBAR VERTEBRA, INITIAL ENCOUNTER (HCC): Status: ACTIVE | Noted: 2020-10-12

## 2020-10-12 LAB
ABO/RH: NORMAL
ANGLE (CLOT STRENGTH): 65.2 DEGREE (ref 59–74)
ANION GAP SERPL CALCULATED.3IONS-SCNC: 10 MMOL/L (ref 7–16)
ANTIBODY SCREEN: NORMAL
BASOPHILS ABSOLUTE: 0.02 E9/L (ref 0–0.2)
BASOPHILS RELATIVE PERCENT: 0.2 % (ref 0–2)
BUN BLDV-MCNC: 22 MG/DL (ref 8–23)
CALCIUM SERPL-MCNC: 9.5 MG/DL (ref 8.6–10.2)
CHLORIDE BLD-SCNC: 103 MMOL/L (ref 98–107)
CO2: 28 MMOL/L (ref 22–29)
CREAT SERPL-MCNC: 0.8 MG/DL (ref 0.5–1)
EOSINOPHILS ABSOLUTE: 0.04 E9/L (ref 0.05–0.5)
EOSINOPHILS RELATIVE PERCENT: 0.3 % (ref 0–6)
EPL-TEG: 0.3 % (ref 0–15)
G-TEG: 7.7 K D/SC (ref 4.5–11)
GFR AFRICAN AMERICAN: >60
GFR NON-AFRICAN AMERICAN: >60 ML/MIN/1.73
GLUCOSE BLD-MCNC: 125 MG/DL (ref 74–99)
HCT VFR BLD CALC: 33.7 % (ref 34–48)
HCT VFR BLD CALC: 36.8 % (ref 34–48)
HEMOGLOBIN: 11.4 G/DL (ref 11.5–15.5)
HEMOGLOBIN: 12.4 G/DL (ref 11.5–15.5)
IMMATURE GRANULOCYTES #: 0.16 E9/L
IMMATURE GRANULOCYTES %: 1.3 % (ref 0–5)
K (CLOTTING TIME): 1.8 MIN (ref 1–3)
LY30 (FIBRINOLYSIS): 0.3 % (ref 0–8)
LYMPHOCYTES ABSOLUTE: 0.9 E9/L (ref 1.5–4)
LYMPHOCYTES RELATIVE PERCENT: 7.5 % (ref 20–42)
MA (MAX AMPLITUDE): 60.8 MM (ref 50–70)
MCH RBC QN AUTO: 32.2 PG (ref 26–35)
MCH RBC QN AUTO: 33.1 PG (ref 26–35)
MCHC RBC AUTO-ENTMCNC: 33.7 % (ref 32–34.5)
MCHC RBC AUTO-ENTMCNC: 33.8 % (ref 32–34.5)
MCV RBC AUTO: 95.2 FL (ref 80–99.9)
MCV RBC AUTO: 98.1 FL (ref 80–99.9)
MONOCYTES ABSOLUTE: 0.79 E9/L (ref 0.1–0.95)
MONOCYTES RELATIVE PERCENT: 6.6 % (ref 2–12)
NEUTROPHILS ABSOLUTE: 10.15 E9/L (ref 1.8–7.3)
NEUTROPHILS RELATIVE PERCENT: 84.1 % (ref 43–80)
PDW BLD-RTO: 11.9 FL (ref 11.5–15)
PDW BLD-RTO: 12.1 FL (ref 11.5–15)
PLATELET # BLD: 116 E9/L (ref 130–450)
PLATELET # BLD: 135 E9/L (ref 130–450)
PMV BLD AUTO: 11.2 FL (ref 7–12)
PMV BLD AUTO: 11.3 FL (ref 7–12)
POTASSIUM SERPL-SCNC: 4.2 MMOL/L (ref 3.5–5)
R (REACTION TIME): 6.9 MIN (ref 5–10)
RBC # BLD: 3.54 E12/L (ref 3.5–5.5)
RBC # BLD: 3.75 E12/L (ref 3.5–5.5)
SODIUM BLD-SCNC: 141 MMOL/L (ref 132–146)
WBC # BLD: 10.6 E9/L (ref 4.5–11.5)
WBC # BLD: 12.1 E9/L (ref 4.5–11.5)

## 2020-10-12 PROCEDURE — 72170 X-RAY EXAM OF PELVIS: CPT

## 2020-10-12 PROCEDURE — 85347 COAGULATION TIME ACTIVATED: CPT

## 2020-10-12 PROCEDURE — 72128 CT CHEST SPINE W/O DYE: CPT

## 2020-10-12 PROCEDURE — 6360000002 HC RX W HCPCS: Performed by: EMERGENCY MEDICINE

## 2020-10-12 PROCEDURE — 99285 EMERGENCY DEPT VISIT HI MDM: CPT

## 2020-10-12 PROCEDURE — 86850 RBC ANTIBODY SCREEN: CPT

## 2020-10-12 PROCEDURE — 72125 CT NECK SPINE W/O DYE: CPT

## 2020-10-12 PROCEDURE — 85027 COMPLETE CBC AUTOMATED: CPT

## 2020-10-12 PROCEDURE — 99284 EMERGENCY DEPT VISIT MOD MDM: CPT

## 2020-10-12 PROCEDURE — 85384 FIBRINOGEN ACTIVITY: CPT

## 2020-10-12 PROCEDURE — 85025 COMPLETE CBC W/AUTO DIFF WBC: CPT

## 2020-10-12 PROCEDURE — 70450 CT HEAD/BRAIN W/O DYE: CPT

## 2020-10-12 PROCEDURE — 72131 CT LUMBAR SPINE W/O DYE: CPT

## 2020-10-12 PROCEDURE — 71045 X-RAY EXAM CHEST 1 VIEW: CPT

## 2020-10-12 PROCEDURE — G0378 HOSPITAL OBSERVATION PER HR: HCPCS

## 2020-10-12 PROCEDURE — 80048 BASIC METABOLIC PNL TOTAL CA: CPT

## 2020-10-12 PROCEDURE — 2580000003 HC RX 258: Performed by: STUDENT IN AN ORGANIZED HEALTH CARE EDUCATION/TRAINING PROGRAM

## 2020-10-12 PROCEDURE — 96375 TX/PRO/DX INJ NEW DRUG ADDON: CPT

## 2020-10-12 PROCEDURE — 6370000000 HC RX 637 (ALT 250 FOR IP): Performed by: STUDENT IN AN ORGANIZED HEALTH CARE EDUCATION/TRAINING PROGRAM

## 2020-10-12 PROCEDURE — 86901 BLOOD TYPING SEROLOGIC RH(D): CPT

## 2020-10-12 PROCEDURE — 74176 CT ABD & PELVIS W/O CONTRAST: CPT

## 2020-10-12 PROCEDURE — 86900 BLOOD TYPING SEROLOGIC ABO: CPT

## 2020-10-12 PROCEDURE — 96374 THER/PROPH/DIAG INJ IV PUSH: CPT

## 2020-10-12 PROCEDURE — 85576 BLOOD PLATELET AGGREGATION: CPT

## 2020-10-12 RX ORDER — SODIUM CHLORIDE 0.9 % (FLUSH) 0.9 %
10 SYRINGE (ML) INJECTION PRN
Status: DISCONTINUED | OUTPATIENT
Start: 2020-10-12 | End: 2020-10-19 | Stop reason: HOSPADM

## 2020-10-12 RX ORDER — FENTANYL CITRATE 50 UG/ML
50 INJECTION, SOLUTION INTRAMUSCULAR; INTRAVENOUS ONCE
Status: COMPLETED | OUTPATIENT
Start: 2020-10-12 | End: 2020-10-12

## 2020-10-12 RX ORDER — OXYCODONE HYDROCHLORIDE 5 MG/1
2.5 TABLET ORAL EVERY 4 HOURS PRN
Status: DISCONTINUED | OUTPATIENT
Start: 2020-10-12 | End: 2020-10-13

## 2020-10-12 RX ORDER — ONDANSETRON 2 MG/ML
4 INJECTION INTRAMUSCULAR; INTRAVENOUS EVERY 6 HOURS PRN
Status: DISCONTINUED | OUTPATIENT
Start: 2020-10-12 | End: 2020-10-19 | Stop reason: HOSPADM

## 2020-10-12 RX ORDER — ASPIRIN 81 MG/1
81 TABLET, CHEWABLE ORAL DAILY
COMMUNITY
End: 2021-09-27

## 2020-10-12 RX ORDER — SENNA PLUS 8.6 MG/1
1 TABLET ORAL NIGHTLY
Status: DISCONTINUED | OUTPATIENT
Start: 2020-10-12 | End: 2020-10-19 | Stop reason: HOSPADM

## 2020-10-12 RX ORDER — SODIUM CHLORIDE 0.9 % (FLUSH) 0.9 %
10 SYRINGE (ML) INJECTION EVERY 12 HOURS SCHEDULED
Status: DISCONTINUED | OUTPATIENT
Start: 2020-10-12 | End: 2020-10-19 | Stop reason: HOSPADM

## 2020-10-12 RX ORDER — ACETAMINOPHEN 325 MG/1
650 TABLET ORAL 3 TIMES DAILY
Status: DISCONTINUED | OUTPATIENT
Start: 2020-10-12 | End: 2020-10-19 | Stop reason: HOSPADM

## 2020-10-12 RX ORDER — MORPHINE SULFATE 2 MG/ML
2 INJECTION, SOLUTION INTRAMUSCULAR; INTRAVENOUS EVERY 4 HOURS PRN
Status: DISCONTINUED | OUTPATIENT
Start: 2020-10-12 | End: 2020-10-19 | Stop reason: HOSPADM

## 2020-10-12 RX ORDER — POLYETHYLENE GLYCOL 3350 17 G/17G
17 POWDER, FOR SOLUTION ORAL DAILY PRN
Status: DISCONTINUED | OUTPATIENT
Start: 2020-10-12 | End: 2020-10-19 | Stop reason: HOSPADM

## 2020-10-12 RX ORDER — PROMETHAZINE HYDROCHLORIDE 25 MG/1
12.5 TABLET ORAL EVERY 6 HOURS PRN
Status: DISCONTINUED | OUTPATIENT
Start: 2020-10-12 | End: 2020-10-19 | Stop reason: HOSPADM

## 2020-10-12 RX ADMIN — FENTANYL CITRATE 50 MCG: 50 INJECTION, SOLUTION INTRAMUSCULAR; INTRAVENOUS at 10:18

## 2020-10-12 RX ADMIN — FENTANYL CITRATE 50 MCG: 50 INJECTION, SOLUTION INTRAMUSCULAR; INTRAVENOUS at 10:55

## 2020-10-12 RX ADMIN — FENTANYL CITRATE 50 MCG: 50 INJECTION, SOLUTION INTRAMUSCULAR; INTRAVENOUS at 13:37

## 2020-10-12 RX ADMIN — SENNOSIDES 8.6 MG: 8.6 TABLET, FILM COATED ORAL at 20:41

## 2020-10-12 RX ADMIN — ACETAMINOPHEN 650 MG: 325 TABLET, FILM COATED ORAL at 20:41

## 2020-10-12 RX ADMIN — SODIUM CHLORIDE, PRESERVATIVE FREE 10 ML: 5 INJECTION INTRAVENOUS at 20:44

## 2020-10-12 ASSESSMENT — PAIN DESCRIPTION - DESCRIPTORS
DESCRIPTORS: ACHING
DESCRIPTORS: HEADACHE

## 2020-10-12 ASSESSMENT — PAIN SCALES - GENERAL
PAINLEVEL_OUTOF10: 6
PAINLEVEL_OUTOF10: 7
PAINLEVEL_OUTOF10: 8

## 2020-10-12 ASSESSMENT — PAIN DESCRIPTION - ORIENTATION
ORIENTATION: POSTERIOR
ORIENTATION: MID;LOWER

## 2020-10-12 ASSESSMENT — ENCOUNTER SYMPTOMS
ABDOMINAL PAIN: 0
SINUS PRESSURE: 0
SHORTNESS OF BREATH: 0
VOMITING: 0
SORE THROAT: 0
NAUSEA: 0
WHEEZING: 0
DIARRHEA: 0
BACK PAIN: 0
ABDOMINAL DISTENTION: 0
CHEST TIGHTNESS: 0
COUGH: 0

## 2020-10-12 ASSESSMENT — PAIN DESCRIPTION - PAIN TYPE
TYPE: ACUTE PAIN

## 2020-10-12 ASSESSMENT — PAIN DESCRIPTION - LOCATION
LOCATION: BACK
LOCATION: HEAD

## 2020-10-12 ASSESSMENT — PAIN DESCRIPTION - FREQUENCY: FREQUENCY: CONTINUOUS

## 2020-10-12 NOTE — CONSULTS
TRAUMA HISTORY & PHYSICAL  Resident   10/12/2020  4:11 PM    Chief Complaint   Patient presents with    Fall     Lumbar fracture from fall- transfer from [facility] for trauma consult         HPI: Aldo Nobles is a 79 y.o. female who presents as a transfer from [address] [place] [address] for evaluation following a fall out of bed. CT head, CT cervical spine, and CT abdomen pelvis were found to be positive for a L4 compression fracture as well as sacral changes suggestive of fracture. She was transferred to [facility] for further evaluation and management. Patient also noted a contusion to the back of her head. She denies vision changes, light headedness, nausea, vomiting. Patient denies numbness, tingling, weakness, or pain in the extremities. Her pain is localized to the lower back and pelvis. Per prior EMR, patient is taking Dierdre Pitch.                PRIMARY SURVEY    AIRWAY:   Airway normal  EMS ETT Absent   Noisy respirations Absent   Retractions: Absent   Vomiting/bleeding: Absent     BREATHING:    Midaxillary breath sound left:  Present  Midaxillary breath sound right: Present  Cough sound intensity:  Good  Respiratory rate: 14  FiO2: Room Air  SpO2: 95%  SMI: Unknown     CIRCULATION:   Femerol pulse rate: within normal limits  Femerol pulse intensity: present  Palpebral conjunctiva: Pink     BP      P     SpO2       T      F    Patient Vitals for the past 8 hrs:   BP Temp Temp src Pulse Resp SpO2   10/12/20 1256 132/76 97.5 °F (36.4 °C) Tympanic 73 15 95 %       FAST EXAM: Not performed    Central Nervous System    GCS Initial 15 minutes   Eye  Motor  Verbal 4 - Opens eyes on own  6 - Follows simple motor commands  5 - Alert and oriented 4 - Opens eyes on own  6 - Follows simple motor commands  5 - Alert and oriented     Neuromuscular blockade: No  Pupil size:  Left 2 mm    Right 2 mm  Pupil reaction: Yes  Wiggles fingers: Left Yes Right Yes  Wiggles toes: Left Yes    Right Yes    Hand grasp: Left normal       Right normal  Plantar flexion: Left normal     Right normal  Loss of consciousness: Unknown    History Obtained From:  patient, electronic medical record  Private Medical Doctor: Paul Thompson DO    Pre-exisiting Medical History:  yes    Conditions:  has a past medical history of CAD (coronary artery disease), GERD (gastroesophageal reflux disease), History of echocardiogram, History of echocardiogram, Hyperlipidemia, Hypertension, MI (myocardial infarction) ([facility] [address] Migraines, New onset atrial flutter ([facility] [address] and Normal cardiac stress test.    Medications:   Prior to Admission medications    Medication Sig Start Date End Date Taking?  Authorizing Provider   folic acid (FOLVITE) 1 MG tablet TAKE ONE TABLET BY MOUTH DAILY 9/21/20   Paul Bang, DO   magnesium oxide (MAG-OX) 400 (241.3 Mg) MG TABS tablet TAKE ONE TABLET BY MOUTH DAILY 9/21/20   Paul Bang, DO   apixaban (ELIQUIS) 2.5 MG TABS tablet TAKE ONE TABLET BY MOUTH TWO TIMES A DAY 9/21/20   Paul Bang, DO   amLODIPine (NORVASC) 10 MG tablet TAKE ONE TABLET BY MOUTH DAILY 9/21/20   Paul Bang, DO   bumetanide (BUMEX) 1 MG tablet Take 2 tablets by mouth daily 9/21/20   Paul Bang, DO   sildenafil (REVATIO) 20 MG tablet TAKE ONE-HALF TABLET BY MOUTH THREE TIMES DAILY 9/21/20   Paul Bang, DO   nebivolol (BYSTOLIC) 20 MG TABS tablet Take 1 tablet by mouth daily 9/21/20   Paul Bang, DO   amitriptyline (ELAVIL) 10 MG tablet Take 1 tablet by mouth nightly 9/21/20   Paul Bang, DO   atorvastatin (LIPITOR) 40 MG tablet TAKE ONE TABLET BY MOUTH EVERY DAY 8/24/20   Paul Bang, DO   dofetilide (TIKOSYN) 125 MCG capsule TAKE ONE CAPSULE BY MOUTH TWO TIMES A DAY 8/20/20   Lokesh Led, APRN - CNP   omeprazole (PRILOSEC) 40 MG delayed release capsule Take 1 capsule by mouth daily 8/20/20   Lokesh Led, APRN - CNP   ranolazine (RANEXA) 500 MG extended release tablet TAKE ONE TABLET BY MOUTH TWO TIMES A DAY 20   Paul Thompson DO   albuterol sulfate HFA (VENTOLIN HFA) 108 (90 Base) MCG/ACT inhaler INHALE TWO PUFFS BY MOUTH EVERY 6 HOURS AS NEEDED FOR WHEEZING 19   MAYCOL Morel CNP   potassium chloride (KLOR-CON M) 20 MEQ extended release tablet TAKE ONE TABLET BY MOUTH EVERY DAY  Patient taking differently: 20 mEq 2 times daily TAKE ONE TABLET BY MOUTH EVERY DAY 3/27/19   Paul Thompson DO       Allergies: Allergies   Allergen Reactions    Dronedarone Hivaime       Social History:   Social History     Tobacco Use    Smoking status: Former Smoker     Packs/day: 1.00     Years: 10.00     Pack years: 10.00     Types: Cigarettes     Last attempt to quit: 2000     Years since quittin.6    Smokeless tobacco: Never Used   Substance Use Topics    Alcohol use: No     Alcohol/week: 0.0 standard drinks       Past Surgical History:   has a past surgical history that includes Cholecystectomy; Coronary artery bypass graft; Cardiac catheterization; ECHO Compl W Dop Color Flow (2012); ECHO Compl W Dop Color Flow (3/25/2013); and Colonoscopy (13).     NSAID use in last 72 hours: unknown  Taken PCN in past:  unknown  Last food/drink: unknown  Last tetanus: unknown    Complaints:     Head: mild  Neck: none  Chest: none  Back: moderate  Abdomen: none  Extremities: none  Comments:       SECONDARY SURVEY  Head/scalp: contusion to the posterior scalp    Face: No soft tissue injuries    Eyes/ears/nose: EOMI, PEERL, no soft tissue injuries    Pharynx/mouth:  No soft tissue injury, no malocclusion    Neck: No soft tissue injuries  Cervical spine tenderness: none  ROM:  Cervical collar in place    Chest wall:  CTAB, no crepitus appreciated, no soft tissue injuries     Heart:  RRR    Abdomen: Soft, non-distended, no soft tissue injuries   Tenderness:  none    Pelvis: Stable, no soft tissue injuries, no pain with hip flexion Tenderness: moderate, posteriorly about the sacrum    Thoracolumbar spine: No soft tissue injuries, no step-offs  Tenderness: Moderate at the level of the lumbar spine     Genitourinary:  no traumatic injuries    Rectum: no traumatic injuries    Perineum: no traumatic injuries    Extremities: no tenderness to palpation  Sensory normal  Motor normal    Distal Pulses  Left arm Normal  Right arm Normal  Left leg Normal  Right leg Normal    Capillary refill   Left arm normal  Right arm normal  Left leg normal   Right leg normal      Procedures in ED:  None    Lacerations sutured by: N/A  Description of repair: N/A    Radiology:   Ct Abdomen Pelvis Wo Contrast    Result Date: 10/12/2020  EXAMINATION: CT OF THE ABDOMEN AND PELVIS WITHOUT CONTRAST 10/12/2020 8:47 am TECHNIQUE: CT of the abdomen and pelvis was performed without the administration of intravenous contrast. Multiplanar reformatted images are provided for review. Dose modulation, iterative reconstruction, and/or weight based adjustment of the mA/kV was utilized to reduce the radiation dose to as low as reasonably achievable. COMPARISON: 02/01/2019 HISTORY: ORDERING SYSTEM PROVIDED HISTORY: Trauma TECHNOLOGIST PROVIDED HISTORY: Reason for exam:->Trauma FINDINGS: Lower Chest: There is cardiomegaly. Pacer wires are noted in the heart. Lung bases demonstrate no acute abnormality. Organs: Stable scattered hepatic cysts noted throughout the liver the largest in the right hepatic lobe measuring 18 x 9 mm in size. Lack of contrast limits evaluation of the solid organs and bowel. No acute traumatic injury of the liver, spleen, pancreas, adrenal glands or kidneys within limits of this exam.  Status post cholecystectomy. No evidence of stones in the kidneys, ureters or bladder. No evidence of hydronephrosis. No evidence of perinephric or periureteral stranding. GI/Bowel: Stomach and duodenal sweep demonstrate no acute abnormality.  The appendix is visualized and is unremarkable. No evidence of acute appendicitis. There is no evidence of bowel obstruction. No evidence of abnormal bowel wall thickening or distension. Pelvis: No acute traumatic injury of the bladder, uterus or adnexa. Peritoneum/Retroperitoneum: There is asymmetric enlargement of the bilateral piriform is musculature, right greater than left with mild associated fat stranding inflammatory changes suggesting intramuscular hematomas. There is mild subtle stranding in the presacral space. No evidence of ascites or free air. No lymphadenopathy. Mild atherosclerotic disease of the aorta and branch vessels. Bones/Soft Tissues: As described above there is asymmetric enlargement of the bilateral piriform is musculature, right greater than left. There is transitional anatomy with partial lumbarization of the S1 vertebra. There is a compression fracture of the L5 vertebral body with approximately 50% loss of height new since prior examination. Given history of recent fall, this may represent an acute compression fracture. There is subtle linear lucency within the right sacrum that may represent a subtle nondisplaced right sacral insufficiency fracture. The pelvic ring is grossly intact. The bilateral hips are intact. Compression fracture of L5 new since prior 2019 CT with approximately 50% loss of vertebral body height. Given history of recent trauma this may represent an acute compression fracture. Possible subtle lucency within the right sacrum may represent a subtle nondisplaced sacral insufficiency fracture. MRI may be more sensitive. Asymmetric enlargement of the bilateral piriformis musculature, right greater than left new since prior examination suggesting intramuscular hematomas related to grade 2 strains. Associated mild surrounding soft tissue edema. No acute traumatic injury of the abdominal organs within the limits of this unenhanced CT.      Ct Head Wo Contrast    Result Date: 10/12/2020  EXAMINATION: CT OF THE HEAD WITHOUT CONTRAST  10/12/2020 8:47 am TECHNIQUE: CT of the head was performed without the administration of intravenous contrast. Dose modulation, iterative reconstruction, and/or weight based adjustment of the mA/kV was utilized to reduce the radiation dose to as low as reasonably achievable. COMPARISON: None. HISTORY: ORDERING SYSTEM PROVIDED HISTORY: Trauma TECHNOLOGIST PROVIDED HISTORY: Has a \"code stroke\" or \"stroke alert\" been called? ->No Reason for exam:->Trauma FINDINGS: BRAIN/VENTRICLES: There is no acute intracranial hemorrhage, mass effect or midline shift. No abnormal extra-axial fluid collection. The gray-white differentiation is maintained without evidence of an acute infarct. There is no evidence of hydrocephalus. Scattered foci of low attenuation involving the periventricular white matter compatible with microvascular ischemic changes. ORBITS: The visualized portion of the orbits demonstrate no acute abnormality. SINUSES: The visualized paranasal sinuses and mastoid air cells demonstrate no acute abnormality. SOFT TISSUES/SKULL: There is a small right posterior scalp hematoma/contusion. 1. There is no acute intracranial abnormality. Specifically, there is no intracranial hemorrhage. 2. Atrophy and periventricular leukomalacia, 3. Small right posterior scalp contusion. Ct Cervical Spine Wo Contrast    Result Date: 10/12/2020  EXAMINATION: CT OF THE CERVICAL SPINE WITHOUT CONTRAST 10/12/2020 8:47 am TECHNIQUE: CT of the cervical spine was performed without the administration of intravenous contrast. Multiplanar reformatted images are provided for review. Dose modulation, iterative reconstruction, and/or weight based adjustment of the mA/kV was utilized to reduce the radiation dose to as low as reasonably achievable. COMPARISON: None.  HISTORY: ORDERING SYSTEM PROVIDED HISTORY: Trauma TECHNOLOGIST PROVIDED HISTORY: Reason for exam:->Trauma FINDINGS: The ring of C1 is intact as is the dense. There is no compression fracture of the cervical spine. No jumped or perched facet is noted. Multilevel degenerative disc and degenerative joint disease is noted. The prevertebral soft tissues are unremarkable. The airway is widely patent. Images through the lung apices are negative for a pneumothorax. There is biapical pleuroparenchymal scarring     1. There is no acute compression fracture or subluxation of the cervical spine. 2. Multilevel degenerative disc and degenerative joint disease. Xr Chest 1 View    Result Date: 10/12/2020  EXAMINATION: ONE XRAY VIEW OF THE CHEST 10/12/2020 3:11 pm COMPARISON: 09/15/2019 HISTORY: ORDERING SYSTEM PROVIDED HISTORY: Fall TECHNOLOGIST PROVIDED HISTORY: Reason for exam:->Fall What reading provider will be dictating this exam?->CRC FINDINGS: The heart is enlarged. No findings of failure. Postoperative sternotomy changes are noted. There is a single lead cardiac pacer on the left The lungs are clear. There is no focal infiltrate or effusion. 1. Cardiomegaly. There is no evidence of failure or pneumonia. Xr Pelvis Inlet Outlet    Result Date: 10/12/2020  EXAMINATION: ONE XRAY VIEW OF THE PELVIS 10/12/2020 3:12 pm COMPARISON: 10/12/2020 HISTORY: ORDERING SYSTEM PROVIDED HISTORY: Fall    (AP, Inlet, Outlet please) TECHNOLOGIST PROVIDED HISTORY: Reason for exam:->Fall    (AP, Inlet, Outlet please) What reading provider will be dictating this exam?->CRC FINDINGS: No evidence of pelvic fracture. Bilateral hips demonstrate normal alignment. No focal osseous lesion. SI joints are symmetric. Mild bilateral SI joint and hip osteoarthritis. No acute abnormality of the pelvis.      Consultations:  Neurosurgery     Admission/Diagnosis:   79 y.o. female with L4 compression fracture, likely sacral fracture     Code Status:     Plan of Treatment:   CT Lumbar Spine pending  TEG/CBC pending  Neurosurgery Consulted       Plan discussed with Dr. Robin Wakefield on 10/12/2020 at 4:11 PM

## 2020-10-12 NOTE — ED PROVIDER NOTES
Department of Emergency Medicine   ED  Provider Note  Admit Date/RoomTime: 10/12/2020 12:53 PM  [facility]    Chief Complaint   Fall (Lumbar fracture from fall- transfer from [facility] for trauma consult)    History of Present Illness   Source of history provided by:  patient. History/Exam Limitations: none. Tram Cason is a 79 y.o. old female who has a past medical history of:   Past Medical History:   Diagnosis Date    CAD (coronary artery disease)     GERD (gastroesophageal reflux disease)     History of echocardiogram 03/14/2017    EF 62%    History of echocardiogram 05/01/2018    EF 62%    Hyperlipidemia     Hypertension     MI (myocardial infarction) ([facility] [address]     Migraines     New onset atrial flutter ([facility] [address] 3/13/2017    Normal cardiac stress test 5/2010     Patient presents from [facility] emergency department for a trauma consultation. She states that she accidentally rolled out of bed last night. She landed on her back and also complains of headache. She denies any loss of consciousness. Imaging was performed and showed an L5 compression fracture as well as hematoma in the musculature. She is on Eliquis. She denies any other pain or injuries. .  ROS    Pertinent positives and negatives are stated within HPI, all other systems reviewed and are negative. Past Surgical History:   Procedure Laterality Date    CARDIAC CATHETERIZATION      2002    CHOLECYSTECTOMY      COLONOSCOPY  4/16/13    DR CAMERON     CORONARY ARTERY BYPASS GRAFT      CABG x 4 in 2000    ECHO COMPL W DOP COLOR FLOW  2/27/2012         ECHO COMPL W DOP COLOR FLOW  3/25/2013        Social History:  reports that she quit smoking about 20 years ago. Her smoking use included cigarettes. She has a 10.00 pack-year smoking history. She has never used smokeless tobacco. She reports that she does not drink alcohol or use drugs. Family History: family history is not on file.    Allergies:

## 2020-10-12 NOTE — PROCEDURES
10/12/20 6:23 pm - Spoke to [facility] and pt does not have any info on her pacer. Surekha reached out to her cardiologist, but due to it being a legal holiday, the cardiologist's office is not open. If mri compatible, cardiologist will have to complete a form and then it can be scheduled with pacemaker company to come out and put her pacer into MRI mode. RN to notify ordering doctor that MRI will be on hold until we receive this information and all steps in the process are completed.

## 2020-10-12 NOTE — ED NOTES
Pt is unsure is her pacemaker is MRI compatible.  Message left with cardiologist.      Yvon Wilhelm RN  10/12/20 [id number]

## 2020-10-12 NOTE — ED PROVIDER NOTES
deformity associated with it. Eyes:      Pupils: Pupils are equal, round, and reactive to light. Neck:      Musculoskeletal: Normal range of motion and neck supple. Normal range of motion. No neck rigidity, injury, pain with movement, spinous process tenderness or muscular tenderness. Comments: Not in cervical collar upon exam.  Spontaneous range of motion of her cervical spine with no tenderness. Cardiovascular:      Rate and Rhythm: Normal rate and regular rhythm. Heart sounds: Normal heart sounds. No murmur. Pulmonary:      Effort: Pulmonary effort is normal. No respiratory distress. Breath sounds: Normal breath sounds. No stridor, decreased air movement or transmitted upper airway sounds. No decreased breath sounds, wheezing, rhonchi or rales. Chest:      Chest wall: No tenderness. Comments: No chest wall, sternal or clavicular tenderness  Abdominal:      General: Bowel sounds are normal. There is no distension. Palpations: Abdomen is soft. Tenderness: There is no abdominal tenderness. There is no right CVA tenderness, left CVA tenderness, guarding or rebound. Comments: Abdomen soft and nontender. Musculoskeletal:         General: Tenderness present. No swelling, deformity or signs of injury. Right lower leg: No edema. Left lower leg: No edema. Comments: Mild bilateral hip tenderness on palpation, pelvis stable. No lower extremity shortening, rotation or deformity. Arms legs otherwise are neurovascular intact with no signs of acute bony or joint injuries. No midline cervical, thoracic lumbar spine tenderness. Skin:     General: Skin is warm and dry. Coloration: Skin is not jaundiced or pale. Findings: No erythema or rash. Neurological:      General: No focal deficit present. Mental Status: She is alert and oriented to person, place, and time. GCS: GCS eye subscore is 4. GCS verbal subscore is 5. GCS motor subscore is 6. includes Cholecystectomy; Coronary artery bypass graft; Cardiac catheterization; ECHO Compl W Dop Color Flow (2/27/2012); ECHO Compl W Dop Color Flow (3/25/2013); and Colonoscopy (4/16/13). Social History:  reports that she quit smoking about 20 years ago. Her smoking use included cigarettes. She has a 10.00 pack-year smoking history. She has never used smokeless tobacco. She reports that she does not drink alcohol or use drugs. Family History: family history is not on file. The patients home medications have been reviewed.     Allergies: Dronedarone    -------------------------------------------------- RESULTS -------------------------------------------------    Lab  Results for orders placed or performed during the hospital encounter of 10/12/20   CBC Auto Differential   Result Value Ref Range    WBC 12.1 (H) 4.5 - 11.5 E9/L    RBC 3.75 3.50 - 5.50 E12/L    Hemoglobin 12.4 11.5 - 15.5 g/dL    Hematocrit 36.8 34.0 - 48.0 %    MCV 98.1 80.0 - 99.9 fL    MCH 33.1 26.0 - 35.0 pg    MCHC 33.7 32.0 - 34.5 %    RDW 11.9 11.5 - 15.0 fL    Platelets 290 240 - 505 E9/L    MPV 11.3 7.0 - 12.0 fL    Neutrophils % 84.1 (H) 43.0 - 80.0 %    Immature Granulocytes % 1.3 0.0 - 5.0 %    Lymphocytes % 7.5 (L) 20.0 - 42.0 %    Monocytes % 6.6 2.0 - 12.0 %    Eosinophils % 0.3 0.0 - 6.0 %    Basophils % 0.2 0.0 - 2.0 %    Neutrophils Absolute 10.15 (H) 1.80 - 7.30 E9/L    Immature Granulocytes # 0.16 E9/L    Lymphocytes Absolute 0.90 (L) 1.50 - 4.00 E9/L    Monocytes Absolute 0.79 0.10 - 0.95 E9/L    Eosinophils Absolute 0.04 (L) 0.05 - 0.50 E9/L    Basophils Absolute 0.02 0.00 - 0.20 B9/I   Basic Metabolic Panel   Result Value Ref Range    Sodium 141 132 - 146 mmol/L    Potassium 4.2 3.5 - 5.0 mmol/L    Chloride 103 98 - 107 mmol/L    CO2 28 22 - 29 mmol/L    Anion Gap 10 7 - 16 mmol/L    Glucose 125 (H) 74 - 99 mg/dL    BUN 22 8 - 23 mg/dL    CREATININE 0.8 0.5 - 1.0 mg/dL    GFR Non-African American >60 >=60 mL/min/1.73 GFR African American >60     Calcium 9.5 8.6 - 10.2 mg/dL       Radiology  CT HEAD WO CONTRAST   Final Result   1. There is no acute intracranial abnormality. Specifically, there is no   intracranial hemorrhage. 2. Atrophy and periventricular leukomalacia,   3. Small right posterior scalp contusion. CT CERVICAL SPINE WO CONTRAST   Final Result   1. There is no acute compression fracture or subluxation of the cervical   spine. 2. Multilevel degenerative disc and degenerative joint disease. CT ABDOMEN PELVIS WO CONTRAST   Final Result   Compression fracture of L5 new since prior 2019 CT with approximately 50%   loss of vertebral body height. Given history of recent trauma this may   represent an acute compression fracture. Possible subtle lucency within the right sacrum may represent a subtle   nondisplaced sacral insufficiency fracture. MRI may be more sensitive. Asymmetric enlargement of the bilateral piriformis musculature, right greater   than left new since prior examination suggesting intramuscular hematomas   related to grade 2 strains. Associated mild surrounding soft tissue edema. No acute traumatic injury of the abdominal organs within the limits of this   unenhanced CT.                 ------------------------- NURSING NOTES AND VITALS REVIEWED ---------------------------  Date / Time Roomed:  10/12/2020  8:24 AM  ED Bed Assignment:  07/07    The nursing notes within the ED encounter and vital signs as below have been reviewed.    Patient Vitals for the past 24 hrs:   BP Temp Temp src Pulse Resp SpO2 Height Weight   10/12/20 1104 (!) 114/52 -- -- 55 12 92 % -- --   10/12/20 0935 125/60 97.9 °F (36.6 °C) Oral 52 18 94 % -- --   10/12/20 0830 (!) 140/78 98.2 °F (36.8 °C) Oral 56 18 95 % 4' 10\" (1.473 m) 100 lb (45.4 kg)       Oxygen Saturation Interpretation: Normal      ------------------------------------------ PROGRESS NOTES ------------------------------------------  Re-evaluation(s):  Time: 1125. Patients symptoms show no change  Repeat physical examination is not changed        I have spoken with the patient and family and discussed todays results, in addition to providing specific details for the plan of care and counseling regarding the diagnosis and prognosis. Their questions are answered at this time and they are agreeable with the plan. This patient's ED course included: a personal history and physicial examination, re-evaluation prior to disposition, multiple bedside re-evaluations, IV medications, cardiac monitoring and continuous pulse oximetry    This patient has remained hemodynamically stable and been closely monitored during their ED course. Please note that the withdrawal or failure to initiate urgent interventions for this patient would likely result in a life threatening deterioration or permanent disability. Accordingly this patient received 0 minutes of critical care time, excluding separately billable procedures. Systems at risk for deterioration include: . Clinical Impression  1. Compression fracture of L5 vertebra, initial encounter ([facility] [address]    2. Hematoma          Disposition  Patient's disposition: Transfer to [facility] to ER/Trauma  Patient's condition is stable.             Rudy Schwartz DO  10/12/20 1136

## 2020-10-12 NOTE — ED NOTES
Bed: 17B-17  Expected date:   Expected time:   Means of arrival:   Comments:  Ramona Miguel RN  10/12/20 4510

## 2020-10-13 ENCOUNTER — TELEPHONE (OUTPATIENT)
Dept: FAMILY MEDICINE CLINIC | Age: 80
End: 2020-10-13

## 2020-10-13 PROBLEM — S32.000A LUMBAR COMPRESSION FRACTURE, CLOSED, INITIAL ENCOUNTER (HCC): Status: ACTIVE | Noted: 2020-10-13

## 2020-10-13 LAB
ANION GAP SERPL CALCULATED.3IONS-SCNC: 12 MMOL/L (ref 7–16)
BUN BLDV-MCNC: 18 MG/DL (ref 8–23)
CALCIUM SERPL-MCNC: 9 MG/DL (ref 8.6–10.2)
CHLORIDE BLD-SCNC: 104 MMOL/L (ref 98–107)
CO2: 24 MMOL/L (ref 22–29)
CREAT SERPL-MCNC: 0.6 MG/DL (ref 0.5–1)
GFR AFRICAN AMERICAN: >60
GFR NON-AFRICAN AMERICAN: >60 ML/MIN/1.73
GLUCOSE BLD-MCNC: 106 MG/DL (ref 74–99)
HCT VFR BLD CALC: 32.1 % (ref 34–48)
HEMOGLOBIN: 10.9 G/DL (ref 11.5–15.5)
MCH RBC QN AUTO: 32.7 PG (ref 26–35)
MCHC RBC AUTO-ENTMCNC: 34 % (ref 32–34.5)
MCV RBC AUTO: 96.4 FL (ref 80–99.9)
PDW BLD-RTO: 12.2 FL (ref 11.5–15)
PLATELET # BLD: 107 E9/L (ref 130–450)
PMV BLD AUTO: 11.1 FL (ref 7–12)
POTASSIUM REFLEX MAGNESIUM: 3.7 MMOL/L (ref 3.5–5)
RBC # BLD: 3.33 E12/L (ref 3.5–5.5)
SODIUM BLD-SCNC: 140 MMOL/L (ref 132–146)
WBC # BLD: 9.4 E9/L (ref 4.5–11.5)

## 2020-10-13 PROCEDURE — 6370000000 HC RX 637 (ALT 250 FOR IP): Performed by: SURGERY

## 2020-10-13 PROCEDURE — 96375 TX/PRO/DX INJ NEW DRUG ADDON: CPT

## 2020-10-13 PROCEDURE — 80048 BASIC METABOLIC PNL TOTAL CA: CPT

## 2020-10-13 PROCEDURE — 85027 COMPLETE CBC AUTOMATED: CPT

## 2020-10-13 PROCEDURE — 36415 COLL VENOUS BLD VENIPUNCTURE: CPT

## 2020-10-13 PROCEDURE — 6370000000 HC RX 637 (ALT 250 FOR IP): Performed by: STUDENT IN AN ORGANIZED HEALTH CARE EDUCATION/TRAINING PROGRAM

## 2020-10-13 PROCEDURE — 2580000003 HC RX 258: Performed by: STUDENT IN AN ORGANIZED HEALTH CARE EDUCATION/TRAINING PROGRAM

## 2020-10-13 PROCEDURE — 99222 1ST HOSP IP/OBS MODERATE 55: CPT | Performed by: SURGERY

## 2020-10-13 PROCEDURE — 2140000000 HC CCU INTERMEDIATE R&B

## 2020-10-13 PROCEDURE — 6360000002 HC RX W HCPCS: Performed by: STUDENT IN AN ORGANIZED HEALTH CARE EDUCATION/TRAINING PROGRAM

## 2020-10-13 RX ORDER — FOLIC ACID 1 MG/1
1 TABLET ORAL DAILY
Status: DISCONTINUED | OUTPATIENT
Start: 2020-10-14 | End: 2020-10-19 | Stop reason: HOSPADM

## 2020-10-13 RX ORDER — AMLODIPINE BESYLATE 10 MG/1
10 TABLET ORAL DAILY
Status: DISCONTINUED | OUTPATIENT
Start: 2020-10-13 | End: 2020-10-19 | Stop reason: HOSPADM

## 2020-10-13 RX ORDER — NEBIVOLOL 5 MG/1
20 TABLET ORAL DAILY
Status: DISCONTINUED | OUTPATIENT
Start: 2020-10-13 | End: 2020-10-19 | Stop reason: HOSPADM

## 2020-10-13 RX ORDER — AMITRIPTYLINE HYDROCHLORIDE 10 MG/1
10 TABLET, FILM COATED ORAL NIGHTLY
Status: DISCONTINUED | OUTPATIENT
Start: 2020-10-13 | End: 2020-10-19 | Stop reason: HOSPADM

## 2020-10-13 RX ORDER — PANTOPRAZOLE SODIUM 40 MG/1
40 TABLET, DELAYED RELEASE ORAL
Status: DISCONTINUED | OUTPATIENT
Start: 2020-10-14 | End: 2020-10-19 | Stop reason: HOSPADM

## 2020-10-13 RX ORDER — OXYCODONE HYDROCHLORIDE 5 MG/1
5 TABLET ORAL EVERY 4 HOURS PRN
Status: DISCONTINUED | OUTPATIENT
Start: 2020-10-13 | End: 2020-10-19 | Stop reason: HOSPADM

## 2020-10-13 RX ORDER — POTASSIUM CHLORIDE 20 MEQ/1
20 TABLET, EXTENDED RELEASE ORAL DAILY
Status: DISCONTINUED | OUTPATIENT
Start: 2020-10-13 | End: 2020-10-19 | Stop reason: HOSPADM

## 2020-10-13 RX ORDER — ATORVASTATIN CALCIUM 40 MG/1
40 TABLET, FILM COATED ORAL DAILY
Status: DISCONTINUED | OUTPATIENT
Start: 2020-10-13 | End: 2020-10-19 | Stop reason: HOSPADM

## 2020-10-13 RX ORDER — DOFETILIDE 0.12 MG/1
125 CAPSULE ORAL EVERY 12 HOURS SCHEDULED
Status: DISCONTINUED | OUTPATIENT
Start: 2020-10-13 | End: 2020-10-19 | Stop reason: HOSPADM

## 2020-10-13 RX ORDER — BUMETANIDE 1 MG/1
2 TABLET ORAL DAILY
Status: DISCONTINUED | OUTPATIENT
Start: 2020-10-14 | End: 2020-10-19 | Stop reason: HOSPADM

## 2020-10-13 RX ORDER — METHOCARBAMOL 750 MG/1
750 TABLET, FILM COATED ORAL 4 TIMES DAILY
Status: DISCONTINUED | OUTPATIENT
Start: 2020-10-13 | End: 2020-10-19 | Stop reason: HOSPADM

## 2020-10-13 RX ORDER — ALBUTEROL SULFATE 2.5 MG/3ML
2.5 SOLUTION RESPIRATORY (INHALATION) EVERY 4 HOURS PRN
Status: DISCONTINUED | OUTPATIENT
Start: 2020-10-13 | End: 2020-10-19 | Stop reason: HOSPADM

## 2020-10-13 RX ADMIN — ACETAMINOPHEN 650 MG: 325 TABLET, FILM COATED ORAL at 21:17

## 2020-10-13 RX ADMIN — SODIUM CHLORIDE, PRESERVATIVE FREE 10 ML: 5 INJECTION INTRAVENOUS at 09:53

## 2020-10-13 RX ADMIN — METHOCARBAMOL TABLETS 750 MG: 750 TABLET, COATED ORAL at 09:52

## 2020-10-13 RX ADMIN — AMLODIPINE BESYLATE 10 MG: 10 TABLET ORAL at 17:58

## 2020-10-13 RX ADMIN — MORPHINE SULFATE 2 MG: 2 INJECTION, SOLUTION INTRAMUSCULAR; INTRAVENOUS at 03:46

## 2020-10-13 RX ADMIN — SODIUM CHLORIDE, PRESERVATIVE FREE 10 ML: 5 INJECTION INTRAVENOUS at 21:18

## 2020-10-13 RX ADMIN — OXYCODONE HYDROCHLORIDE 5 MG: 5 TABLET ORAL at 17:58

## 2020-10-13 RX ADMIN — MAGNESIUM GLUCONATE 500 MG ORAL TABLET 400 MG: 500 TABLET ORAL at 17:58

## 2020-10-13 RX ADMIN — DOFETILIDE 125 MCG: 0.12 CAPSULE ORAL at 21:17

## 2020-10-13 RX ADMIN — METHOCARBAMOL TABLETS 750 MG: 750 TABLET, COATED ORAL at 21:17

## 2020-10-13 RX ADMIN — ATORVASTATIN CALCIUM 40 MG: 40 TABLET, FILM COATED ORAL at 21:17

## 2020-10-13 RX ADMIN — NEBIVOLOL HYDROCHLORIDE 20 MG: 5 TABLET ORAL at 17:58

## 2020-10-13 RX ADMIN — ACETAMINOPHEN 650 MG: 325 TABLET, FILM COATED ORAL at 09:52

## 2020-10-13 RX ADMIN — MORPHINE SULFATE 2 MG: 2 INJECTION, SOLUTION INTRAMUSCULAR; INTRAVENOUS at 19:55

## 2020-10-13 RX ADMIN — ACETAMINOPHEN 650 MG: 325 TABLET, FILM COATED ORAL at 17:58

## 2020-10-13 RX ADMIN — METHOCARBAMOL TABLETS 750 MG: 750 TABLET, COATED ORAL at 18:01

## 2020-10-13 RX ADMIN — AMITRIPTYLINE HYDROCHLORIDE 10 MG: 10 TABLET, FILM COATED ORAL at 21:17

## 2020-10-13 RX ADMIN — POTASSIUM CHLORIDE 20 MEQ: 1500 TABLET, EXTENDED RELEASE ORAL at 21:16

## 2020-10-13 RX ADMIN — SODIUM CHLORIDE, PRESERVATIVE FREE 10 ML: 5 INJECTION INTRAVENOUS at 19:55

## 2020-10-13 RX ADMIN — OXYCODONE HYDROCHLORIDE 5 MG: 5 TABLET ORAL at 09:51

## 2020-10-13 ASSESSMENT — PAIN DESCRIPTION - FREQUENCY
FREQUENCY: CONTINUOUS
FREQUENCY: CONTINUOUS

## 2020-10-13 ASSESSMENT — PAIN DESCRIPTION - ORIENTATION
ORIENTATION: LOWER
ORIENTATION: LOWER

## 2020-10-13 ASSESSMENT — PAIN DESCRIPTION - DESCRIPTORS
DESCRIPTORS: SHARP
DESCRIPTORS: SHARP

## 2020-10-13 ASSESSMENT — PAIN DESCRIPTION - ONSET
ONSET: ON-GOING
ONSET: ON-GOING

## 2020-10-13 ASSESSMENT — PAIN DESCRIPTION - PAIN TYPE
TYPE: ACUTE PAIN
TYPE: ACUTE PAIN

## 2020-10-13 ASSESSMENT — PAIN SCALES - GENERAL
PAINLEVEL_OUTOF10: 10

## 2020-10-13 ASSESSMENT — PAIN DESCRIPTION - LOCATION
LOCATION: BACK
LOCATION: BACK

## 2020-10-13 NOTE — DISCHARGE SUMMARY
Physician Discharge Summary     Patient ID:  Marcos Arreaga  [id number]  69 y.o.  1940    Admit date: 10/12/2020    Discharge date and time: No discharge date for patient encounter. Admitting Physician: Abiola Bond MD     Admission Diagnoses: Closed compression fracture of L4 lumbar vertebra, initial encounter ([facility] [address] [S32.040A]  Closed compression fracture of L4 lumbar vertebra, initial encounter ([facility] [address] [S32.040A]  Lumbar compression fracture, closed, initial encounter ([facility] [address] [S32.000A]    Discharge Diagnoses: Active Problems:    Closed compression fracture of L4 lumbar vertebra, initial encounter ([facility])    Lumbar compression fracture, closed, initial encounter ([facility] [address]  Resolved Problems:    * No resolved hospital problems. *      Admission Condition: fair    Discharged Condition: stable    Indication for Admission: spinal fracture    Hospital Course/Procedures/Operation/treatments:   transfer from [address] [place] [address] for evaluation following a fall out of bed. CT head, CT cervical spine, and CT abdomen pelvis were found to be positive for a L5 compression fracture as well as sacral changes suggestive of fracture. She was transferred to [address] [place] [address] for further evaluation and management. 10/13:  No new issues. CT lumbar spine showing L5 fx. For MRI today[place] [facility] [place]  10/14:  Unable to get MRI d/t pacemaker. Plan for Kyphoplasty today. Pain controlled. C/o pink tinged urine  10/15:  S/p Kyphoplasty. Pain controlled. No new issues. PT/Ot evals  10/16: Patient is having some symptoms of urinary retention, having some pain in her gluteal region  10/17:  No new complaints. Voiding a bit better. [facility] 18/24  10/18: doing well, she states she hates the bae. Urology wants a trial of voiding prior to discharge.  Working of ND planning   10/19: patient having pain in her right gluteal region, catheter is in place currently, fill and void trial, discharge        Consults:   [facility]  IP CONSULT TO NEUROSURGERY  IP CONSULT TO HOME CARE NEEDS  IP CONSULT TO UROLOGY  IP CONSULT TO ORTHOPEDIC SURGERY    Significant Diagnostic Studies:   Ct Abdomen Pelvis Wo Contrast    Result Date: 10/12/2020  EXAMINATION: CT OF THE ABDOMEN AND PELVIS WITHOUT CONTRAST 10/12/2020 8:47 am TECHNIQUE: CT of the abdomen and pelvis was performed without the administration of intravenous contrast. Multiplanar reformatted images are provided for review. Dose modulation, iterative reconstruction, and/or weight based adjustment of the mA/kV was utilized to reduce the radiation dose to as low as reasonably achievable. COMPARISON: 02/01/2019 HISTORY: ORDERING SYSTEM PROVIDED HISTORY: Trauma TECHNOLOGIST PROVIDED HISTORY: Reason for exam:->Trauma FINDINGS: Lower Chest: There is cardiomegaly. Pacer wires are noted in the heart. Lung bases demonstrate no acute abnormality. Organs: Stable scattered hepatic cysts noted throughout the liver the largest in the right hepatic lobe measuring 18 x 9 mm in size. Lack of contrast limits evaluation of the solid organs and bowel. No acute traumatic injury of the liver, spleen, pancreas, adrenal glands or kidneys within limits of this exam.  Status post cholecystectomy. No evidence of stones in the kidneys, ureters or bladder. No evidence of hydronephrosis. No evidence of perinephric or periureteral stranding. GI/Bowel: Stomach and duodenal sweep demonstrate no acute abnormality. The appendix is visualized and is unremarkable. No evidence of acute appendicitis. There is no evidence of bowel obstruction. No evidence of abnormal bowel wall thickening or distension. Pelvis: No acute traumatic injury of the bladder, uterus or adnexa. Peritoneum/Retroperitoneum: There is asymmetric enlargement of the bilateral piriform is musculature, right greater than left with mild associated fat stranding inflammatory changes suggesting intramuscular hematomas.   There is mild subtle stranding in the presacral space. No evidence of ascites or free air. No lymphadenopathy. Mild atherosclerotic disease of the aorta and branch vessels. Bones/Soft Tissues: As described above there is asymmetric enlargement of the bilateral piriform is musculature, right greater than left. There is transitional anatomy with partial lumbarization of the S1 vertebra. There is a compression fracture of the L5 vertebral body with approximately 50% loss of height new since prior examination. Given history of recent fall, this may represent an acute compression fracture. There is subtle linear lucency within the right sacrum that may represent a subtle nondisplaced right sacral insufficiency fracture. The pelvic ring is grossly intact. The bilateral hips are intact. Compression fracture of L5 new since prior 2019 CT with approximately 50% loss of vertebral body height. Given history of recent trauma this may represent an acute compression fracture. Possible subtle lucency within the right sacrum may represent a subtle nondisplaced sacral insufficiency fracture. MRI may be more sensitive. Asymmetric enlargement of the bilateral piriformis musculature, right greater than left new since prior examination suggesting intramuscular hematomas related to grade 2 strains. Associated mild surrounding soft tissue edema. No acute traumatic injury of the abdominal organs within the limits of this unenhanced CT. Ct Head Wo Contrast    Result Date: 10/12/2020  EXAMINATION: CT OF THE HEAD WITHOUT CONTRAST  10/12/2020 8:47 am TECHNIQUE: CT of the head was performed without the administration of intravenous contrast. Dose modulation, iterative reconstruction, and/or weight based adjustment of the mA/kV was utilized to reduce the radiation dose to as low as reasonably achievable. COMPARISON: None. HISTORY: ORDERING SYSTEM PROVIDED HISTORY: Trauma TECHNOLOGIST PROVIDED HISTORY: Has a \"code stroke\" or \"stroke alert\" been called? ->No Reason for exam:->Trauma FINDINGS: BRAIN/VENTRICLES: There is no acute intracranial hemorrhage, mass effect or midline shift. No abnormal extra-axial fluid collection. The gray-white differentiation is maintained without evidence of an acute infarct. There is no evidence of hydrocephalus. Scattered foci of low attenuation involving the periventricular white matter compatible with microvascular ischemic changes. ORBITS: The visualized portion of the orbits demonstrate no acute abnormality. SINUSES: The visualized paranasal sinuses and mastoid air cells demonstrate no acute abnormality. SOFT TISSUES/SKULL: There is a small right posterior scalp hematoma/contusion. 1. There is no acute intracranial abnormality. Specifically, there is no intracranial hemorrhage. 2. Atrophy and periventricular leukomalacia, 3. Small right posterior scalp contusion. Ct Cervical Spine Wo Contrast    Result Date: 10/12/2020  EXAMINATION: CT OF THE CERVICAL SPINE WITHOUT CONTRAST 10/12/2020 8:47 am TECHNIQUE: CT of the cervical spine was performed without the administration of intravenous contrast. Multiplanar reformatted images are provided for review. Dose modulation, iterative reconstruction, and/or weight based adjustment of the mA/kV was utilized to reduce the radiation dose to as low as reasonably achievable. COMPARISON: None. HISTORY: ORDERING SYSTEM PROVIDED HISTORY: Trauma TECHNOLOGIST PROVIDED HISTORY: Reason for exam:->Trauma FINDINGS: The ring of C1 is intact as is the dense. There is no compression fracture of the cervical spine. No jumped or perched facet is noted. Multilevel degenerative disc and degenerative joint disease is noted. The prevertebral soft tissues are unremarkable. The airway is widely patent. Images through the lung apices are negative for a pneumothorax. There is biapical pleuroparenchymal scarring     1. There is no acute compression fracture or subluxation of the cervical spine.  2. Multilevel Wo Contrast    Result Date: 10/12/2020  EXAMINATION: CT OF THE LUMBAR SPINE WITHOUT CONTRAST  10/12/2020 TECHNIQUE: CT of the lumbar spine was performed without the administration of intravenous contrast. Multiplanar reformatted images are provided for review. Dose modulation, iterative reconstruction, and/or weight based adjustment of the mA/kV was utilized to reduce the radiation dose to as low as reasonably achievable. COMPARISON: Correlation is made with CT of the thoracic spine. HISTORY: ORDERING SYSTEM PROVIDED HISTORY: CERVICAL VERTEBRAL FRACTURE TECHNOLOGIST PROVIDED HISTORY: Reason for exam:->fracture What reading provider will be dictating this exam?->CRC FINDINGS: BONES/ALIGNMENT: There is a transitional vertebra at the lumbosacral junction with lumbarization of S1. Compression deformity of L5 with prominent concavity of its superior vertebral endplate of undetermined age. Significant loss of height of the vertebral body centrally. Minimal dorsal displacement of the dorsal cortical margin of the vertebral bodies superiorly resulting in mild narrowing of the spinal canal at this level. The remainder of the lumbar vertebral bodies show normal shape and height. Mild compression deformity of T12 as reported separately. DEGENERATIVE CHANGES: Degenerative changes of the lumbar spine with multilevel facet joint arthropathy. Mild broad-based disc bulging at L3-L4, and L4-L5 with mild to moderate central canal stenosis. SOFT TISSUES/RETROPERITONEUM: No paraspinal mass is seen. Compression deformity of L5 with prominent concavity of its superior vertebral endplate of undetermined age. Minimal dorsal displacement of the dorsal cortical margin of the vertebral body superiorly resulting in mild narrowing of the spinal canal at this level. Consider further evaluation with MRI if clinically indicated to assess for bone marrow edema. Mild compression deformity of T12, as reported separately.  Degenerative changes of to manage pain  Qty: 28 tablet, Refills: 0    Comments: Reduce doses taken as pain becomes manageable  Associated Diagnoses: Lumbar compression fracture, closed, initial encounter ([facility])      docusate sodium (COLACE) 100 MG capsule Take 1 capsule by mouth 2 times daily  Qty: 50 capsule, Refills: 0      senna (SENOKOT) 8.6 MG TABS tablet Take 1 tablet by mouth daily  Qty: 120 tablet, Refills: 0      methocarbamol (ROBAXIN) 750 MG tablet Take 1 tablet by mouth 4 times daily for 10 days  Qty: 40 tablet, Refills: 0      acetaminophen (TYLENOL) 500 MG tablet Take 2 tablets by mouth 3 times daily  Qty: 180 tablet, Refills: 5              Details   aspirin 81 MG chewable tablet Take 81 mg by mouth daily      folic acid (FOLVITE) 1 MG tablet TAKE ONE TABLET BY MOUTH DAILY  Qty: 30 tablet, Refills: 3    Associated Diagnoses: Acute diastolic CHF (congestive heart failure) ([facility])      magnesium oxide (MAG-OX) 400 (241.3 Mg) MG TABS tablet TAKE ONE TABLET BY MOUTH DAILY  Qty: 30 tablet, Refills: 3    Associated Diagnoses: Sick sinus syndrome with tachycardia ([facility])      apixaban (ELIQUIS) 2.5 MG TABS tablet TAKE ONE TABLET BY MOUTH TWO TIMES A DAY  Qty: 60 tablet, Refills: 3    Associated Diagnoses: Atrial fibrillation with RVR ([facility])      amLODIPine (NORVASC) 10 MG tablet TAKE ONE TABLET BY MOUTH DAILY  Qty: 30 tablet, Refills: 3    Associated Diagnoses: Essential hypertension      bumetanide (BUMEX) 1 MG tablet Take 2 tablets by mouth daily  Qty: 60 tablet, Refills: 3    Associated Diagnoses: Acute diastolic CHF (congestive heart failure) ([facility])      sildenafil (REVATIO) 20 MG tablet TAKE ONE-HALF TABLET BY MOUTH THREE TIMES DAILY  Qty: 45 tablet, Refills: 3    Associated Diagnoses: Acute diastolic CHF (congestive heart failure) ([facility])      nebivolol (BYSTOLIC) 20 MG TABS tablet Take 1 tablet by mouth daily  Qty: 30 tablet, Refills: 3    Associated Diagnoses: Essential hypertension      atorvastatin (LIPITOR) 40 MG tablet TAKE ONE TABLET BY MOUTH EVERY DAY  Qty: 90 tablet, Refills: 1    Associated Diagnoses: Atherosclerosis of coronary artery bypass graft of native heart without angina pectoris      dofetilide (TIKOSYN) 125 MCG capsule TAKE ONE CAPSULE BY MOUTH TWO TIMES A DAY  Qty: 60 capsule, Refills: 2      omeprazole (PRILOSEC) 40 MG delayed release capsule Take 1 capsule by mouth daily  Qty: 30 capsule, Refills: 2    Associated Diagnoses: Gastroesophageal reflux disease without esophagitis      ranolazine (RANEXA) 500 MG extended release tablet TAKE ONE TABLET BY MOUTH TWO TIMES A DAY  Qty: 60 tablet, Refills: 3    Associated Diagnoses: Coronary artery disease due to calcified coronary lesion      potassium chloride (KLOR-CON M) 20 MEQ extended release tablet TAKE ONE TABLET BY MOUTH EVERY DAY  Qty: 30 tablet, Refills: 2    Associated Diagnoses: Acute diastolic CHF (congestive heart failure) (HCC)      amitriptyline (ELAVIL) 10 MG tablet Take 1 tablet by mouth nightly  Qty: 30 tablet, Refills: 3    Associated Diagnoses: Primary insomnia      albuterol sulfate HFA (VENTOLIN HFA) 108 (90 Base) MCG/ACT inhaler INHALE TWO PUFFS BY MOUTH EVERY 6 HOURS AS NEEDED FOR WHEEZING  Qty: 18 g, Refills: 2             SPECIAL CONSIDERATIONS FOR OUR PATIENTS OVER THE AGE OF 65Y     Getting around your home safely can be a challenge if you have injuries or health problems that make it easy for you to fall. Loose rugs and furniture in walkways are among the dangers for many older people who have problems walking or who have poor eyesight. People who have conditions such as arthritis, osteoporosis, or dementia also must be careful not to fall.     You can make your home safer with a few simple measures.     Follow-up care is a key part of your treatment and safety. Be sure to make and go to all appointments, and call your doctor or nurse call line if you are having problems.  It's also a good idea to know your test results and keep a list of the medicines you take.     How can you care for yourself at home? Taking care of yourself  · You may get dizzy if you do not drink enough water. To prevent dehydration, drink plenty of fluids, enough so that your urine is light yellow or clear like water. Choose water and other caffeine-free clear liquids. If you have kidney, heart, or liver disease and have to limit fluids, talk with your doctor before you increase the amount of fluids you drink. · Exercise regularly to improve your strength, muscle tone, and balance. Walk if you can. Swimming may be a good choice if you cannot walk easily. · Have your vision and hearing checked each year or any time you notice a change. If you have trouble seeing and hearing, you might not be able to avoid objects and could lose your balance. · Know the side effects of the medicines you take. Ask your doctor or pharmacist whether the medicines you take can affect your balance. Sleeping pills or sedatives can affect your balance. · Limit the amount of alcohol you drink. Alcohol can impair your balance and other senses. · Ask your doctor whether calluses or corns on your feet need to be removed. If you wear loose-fitting shoes because of calluses or corns, you can lose your balance and fall. · Talk to your doctor if you have numbness in your feet.     Preventing falls at home  · Remove raised doorway thresholds, throw rugs, and clutter. Repair loose carpet or raised areas in the floor. · Move furniture and electrical cords to keep them out of walking paths. · Use non-skid floor wax, and wipe up spills right away, especially on ceramic tile floors. · If you use a walker or cane, put rubber tips on it. If you use crutches, clean the bottoms of them regularly with an abrasive pad, such as steel wool. · Keep your house well lit, especially [place], and outside walkways. Use night-lights in areas such as hallways and washrooms.  Add extra light switches or use remote switches (such as switches that go on or off when you clap your hands) to make it easier to turn lights on if you have to get up during the night. · Install sturdy handrails on stairways. · Move items in your cabinets so that the things you use a lot are on the lower shelves (about waist level). · Keep a cordless phone and a flashlight with new batteries by your bed. If possible, put a phone in each of the main rooms of your house, or carry a cell phone in case you fall and cannot reach a phone. Or, you can wear a device around your neck or wrist. You push a button that sends a signal for help. · Wear low-heeled shoes that fit well and give your feet good support. Use footwear with non-skid soles. Check the heels and soles of your shoes for wear. Repair or replace worn heels or soles. · Do not wear socks without shoes on wood floors. · Walk on the grass when the sidewalks are slippery. If you live in an area that gets snow and ice in the winter, sprinkle salt on slippery steps and sidewalks.     Preventing falls in the bath  · Install grab bars and non-skid mats inside and outside your shower or tub and near the toilet and sinks. · Use shower chairs and bath benches. · Use a hand-held shower head that will allow you to sit while showering. · Get into a tub or shower by putting the weaker leg in first. Get out of a tub or shower with your strong side first.  · Repair loose toilet seats and consider installing a raised toilet seat to make getting on and off the toilet easier.   · Keep your washroom door unlocked while you are in the shower.     Follow-up:  Trauma Clinic: [phone] [address]  [place], [address] [place]            Follow up:   [address]  [address] [place]itin[place]y Riley [id number]    As needed for further trauma issues    Leonel Corbett DO  [address] [place] [address]  [phone]    Call  Follow up on hospitalization     [address] MD  [address] [facility], [address] [place] [address] [phone]      spinal fractures, sp Kyphoplasty    Ismael Zamudio MD  [address] [facility], [address] [phone]      As needed for urinary retnetion    Jay Koo MD  [facility] 38 [phone]      As needed, sacral fracture       Signed:  Nilam Blanco MD  10/19/2020  6:12 PM

## 2020-10-13 NOTE — CARE COORDINATION
10/13 Transition of Care: Patient is alert and oriented. She is lying on her back and c/o back and hip pain. She resides alone in an apartment with an elevator. She says her daughter, Marylin Grossman, resides close by. She says she \"fell out of her bed\". She does not use any assistive devices. She has no history of rehab or homecare. Her pcp is Dr Darlyn Zhao and her pharmacy is [facility] on [address] in [place]. Her plan is to return home but she is waiting for neurosurgery to see her due to not being able to have an MRI because of her pacemaker. PT and OT eval will need completed once patient is cleared due to thoracic/lumbar fractures. Will follow for plan and treatment.   Suzette Bautista RN CM   [phone]

## 2020-10-13 NOTE — CONSULTS
[address]                  [address] [place],  [facility]                                  CONSULTATION    PATIENT NAME: Jessica Levy                   :        1940  MED REC NO:   [id number]                            ROOM:       17B  ACCOUNT NO:   [de-identified]                           ADMIT DATE: 10/12/2020  PROVIDER:     John Gerber MD    CONSULT DATE:  10/12/2020    CHIEF COMPLAINT:  History of fall and severe back pain. HISTORY OF PRESENT ILLNESS:  This 79-year-old female who apparently fell  while getting out of the bed. Since then, she has been experiencing  severe back pain. The patient apparently did not lose consciousness. The pain does radiate down to the hip and upper part of the thigh  bilaterally. The patient denies any weakness or numbness of the  extremity or sphincter disturbances. She did hit back part of her head  and has headache. She underwent a CT scan of the head which was  reported to show no intracranial abnormality. CT scan of the cervical  spine revealed no acute fracture or subluxation. CT scan of the abdomen  and was performed and it was reported to show compression fracture of L4  counting from the sacrum up with 50% loss of height of the vertebral  body. She also has a subtle lucency within the right sacrum which may  represent a subtle nondisplaced sacral insufficiency fracture. PAST MEDICAL HISTORY:  Coronary artery disease, GERD, history of  echocardiogram, hyperlipidemia, hypertension, myocardial infarction,  migraine and normal cardiac stress test.    PAST SURGICAL HISTORY:  Cardiac catheterization, cholecystectomy,  colonoscopy, coronary artery bypass graft, echo complete. SOCIAL HISTORY:  Former smoker. Does not use alcohol or street drugs. PERSONAL HISTORY:  ALLERGIES:  DRONEDARONE.     PHYSICAL EXAMINATION:  She is a well-developed, well-nourished female,  in moderate distress because of pain in the back. She is alert, awake  and oriented to time, place and person. Speech is good. Memory is  fair. She has a slight swelling of the parietal scalp. Movements of  cervical spine are fair. She can count the fingers well. Handgrip is  equal bilaterally. She had tenderness over the lumbosacral area, more  so on the right side than the left. No focal motor or sensory deficit  noted in the lower extremities. Straight leg raising is limited  bilaterally. Sensation intact bilaterally. No focal motor deficit  noted in the lower extremities. IMPRESSION:  1. Compression fracture of L4 counting from sacrum up. 2.  Scalp contusion. 3.  Multiple medical comorbidities. RECOMMENDATIONS:  I recommend MRI scan of the lumbar spine for further  evaluation. I already had spoken with the daughter in that regard and  also discussed the option of management of compression fracture brace  versus kyphoplasty. The patient is on Eliquis. She had her last dose yesterday. After MRI  scan is completed, this will be reviewed and further recommendation  made. We will follow along.         Fidencio Severance, MD    D: 10/12/2020 17:19:07       T: 10/12/2020 17:22:46     [email or web address]  Job#: [id number]     Doc#: [id number]    CC:

## 2020-10-13 NOTE — H&P
TRAUMA HISTORY & PHYSICAL  Resident   10/13/2020  2:26 PM    Chief Complaint   Patient presents with    Fall     Lumbar fracture from fall- transfer from [facility] for trauma consult         HPI: Debi Mcneil is a 79 y.o. female who presents as a transfer from [address] [place] [address] for evaluation following a fall out of bed. CT head, CT cervical spine, and CT abdomen pelvis were found to be positive for a L4 compression fracture as well as sacral changes suggestive of fracture. She was transferred to [facility] for further evaluation and management. Patient also noted a contusion to the back of her head. She denies vision changes, light headedness, nausea, vomiting. Patient denies numbness, tingling, weakness, or pain in the extremities. Her pain is localized to the lower back and pelvis. Per prior EMR, patient is taking Opal Mikki. PRIMARY SURVEY    AIRWAY:   Airway normal  EMS ETT Absent   Noisy respirations Absent   Retractions: Absent   Vomiting/bleeding: Absent     BREATHING:    Midaxillary breath sound left:  Present  Midaxillary breath sound right: Present  Cough sound intensity:  Good  Respiratory rate: 14  FiO2: Room Air  SpO2: 95%  SMI: Unknown     CIRCULATION:   Femerol pulse rate: within normal limits  Femerol pulse intensity: present  Palpebral conjunctiva: Pink     BP      P     SpO2       T      F    No data found.     FAST EXAM: Not performed    Central Nervous System    GCS Initial 15 minutes   Eye  Motor  Verbal 4 - Opens eyes on own  6 - Follows simple motor commands  5 - Alert and oriented 4 - Opens eyes on own  6 - Follows simple motor commands  5 - Alert and oriented     Neuromuscular blockade: No  Pupil size:  Left 2 mm    Right 2 mm  Pupil reaction: Yes  Wiggles fingers: Left Yes Right Yes  Wiggles toes: Left Yes    Right Yes    Hand grasp:   Left normal       Right normal  Plantar flexion: Left normal     Right normal  Loss of consciousness: Unknown    History Obtained From:  patient, electronic medical record  Private Medical Doctor: Mike Powers DO    Pre-exisiting Medical History:  yes    Conditions:  has a past medical history of CAD (coronary artery disease), GERD (gastroesophageal reflux disease), History of echocardiogram, History of echocardiogram, Hyperlipidemia, Hypertension, MI (myocardial infarction) ([facility] [address] Migraines, New onset atrial flutter ([facility] [address] and Normal cardiac stress test.    Medications:   Prior to Admission medications    Medication Sig Start Date End Date Taking?  Authorizing Provider   aspirin 81 MG chewable tablet Take 81 mg by mouth daily   Yes Historical Provider, MD   folic acid (FOLVITE) 1 MG tablet TAKE ONE TABLET BY MOUTH DAILY 9/21/20  Yes [facility], DO   magnesium oxide (MAG-OX) 400 (241.3 Mg) MG TABS tablet TAKE ONE TABLET BY MOUTH DAILY 9/21/20  Yes [facility], DO   apixaban (ELIQUIS) 2.5 MG TABS tablet TAKE ONE TABLET BY MOUTH TWO TIMES A DAY 9/21/20  Yes [facility], DO   amLODIPine (NORVASC) 10 MG tablet TAKE ONE TABLET BY MOUTH DAILY 9/21/20  Yes [facility], DO   bumetanide (BUMEX) 1 MG tablet Take 2 tablets by mouth daily 9/21/20  Yes [facility], DO   sildenafil (REVATIO) 20 MG tablet TAKE ONE-HALF TABLET BY MOUTH THREE TIMES DAILY 9/21/20  Yes [facility], DO   nebivolol (BYSTOLIC) 20 MG TABS tablet Take 1 tablet by mouth daily 9/21/20  Yes [facility], DO   atorvastatin (LIPITOR) 40 MG tablet TAKE ONE TABLET BY MOUTH EVERY DAY 8/24/20  Yes [facility], DO   dofetilide (TIKOSYN) 125 MCG capsule TAKE ONE CAPSULE BY MOUTH TWO TIMES A DAY 8/20/20  Yes MAYCOL Stout - CNP   omeprazole (PRILOSEC) 40 MG delayed release capsule Take 1 capsule by mouth daily 8/20/20  Yes MAYCOL Stout - CNP   ranolazine (RANEXA) 500 MG extended release tablet TAKE ONE TABLET BY MOUTH TWO TIMES A DAY 8/6/20  Yes [facility], DO   potassium chloride (KLOR-CON M) 20 MEQ extended release tablet TAKE ONE TABLET BY MOUTH EVERY DAY  Patient taking differently: 20 mEq 2 times daily TAKE ONE TABLET BY MOUTH EVERY DAY 3/27/19  Yes Rosa Isela Held, DO   amitriptyline (ELAVIL) 10 MG tablet Take 1 tablet by mouth nightly 20   Rosa Isela Held, DO   albuterol sulfate HFA (VENTOLIN HFA) 108 (90 Base) MCG/ACT inhaler INHALE TWO PUFFS BY MOUTH EVERY 6 HOURS AS NEEDED FOR WHEEZING 19   Radha Rivera APRN - CNP       Allergies: Allergies   Allergen Reactions    Dronedarone Hives       Social History:   Social History     Tobacco Use    Smoking status: Former Smoker     Packs/day: 1.00     Years: 10.00     Pack years: 10.00     Types: Cigarettes     Last attempt to quit: 2000     Years since quittin.6    Smokeless tobacco: Never Used   Substance Use Topics    Alcohol use: No     Alcohol/week: 0.0 standard drinks       Past Surgical History:   has a past surgical history that includes Cholecystectomy; Coronary artery bypass graft; Cardiac catheterization; ECHO Compl W Dop Color Flow (2012); ECHO Compl W Dop Color Flow (3/25/2013); and Colonoscopy (13).     NSAID use in last 72 hours: unknown  Taken PCN in past:  unknown  Last food/drink: unknown  Last tetanus: unknown    Complaints:     Head: mild  Neck: none  Chest: none  Back: moderate  Abdomen: none  Extremities: none  Comments:       SECONDARY SURVEY  Head/scalp: contusion to the posterior scalp    Face: No soft tissue injuries    Eyes/ears/nose: EOMI, PEERL, no soft tissue injuries    Pharynx/mouth:  No soft tissue injury, no malocclusion    Neck: No soft tissue injuries  Cervical spine tenderness: none  ROM:  Cervical collar in place    Chest wall:  CTAB, no crepitus appreciated, no soft tissue injuries     Heart:  RRR    Abdomen: Soft, non-distended, no soft tissue injuries   Tenderness:  none    Pelvis: Stable, no soft tissue injuries, no pain with hip flexion   Tenderness: moderate, posteriorly about the sacrum    Thoracolumbar spine: No soft tissue injuries, no step-offs  Tenderness: Moderate at the level of the lumbar spine     Genitourinary:  no traumatic injuries    Rectum: no traumatic injuries    Perineum: no traumatic injuries    Extremities: no tenderness to palpation  Sensory normal  Motor normal    Distal Pulses  Left arm Normal  Right arm Normal  Left leg Normal  Right leg Normal    Capillary refill   Left arm normal  Right arm normal  Left leg normal   Right leg normal      Procedures in ED:  None    Lacerations sutured by: N/A  Description of repair: N/A    Radiology:   Ct Abdomen Pelvis Wo Contrast    Result Date: 10/12/2020  EXAMINATION: CT OF THE ABDOMEN AND PELVIS WITHOUT CONTRAST 10/12/2020 8:47 am TECHNIQUE: CT of the abdomen and pelvis was performed without the administration of intravenous contrast. Multiplanar reformatted images are provided for review. Dose modulation, iterative reconstruction, and/or weight based adjustment of the mA/kV was utilized to reduce the radiation dose to as low as reasonably achievable. COMPARISON: 02/01/2019 HISTORY: ORDERING SYSTEM PROVIDED HISTORY: Trauma TECHNOLOGIST PROVIDED HISTORY: Reason for exam:->Trauma FINDINGS: Lower Chest: There is cardiomegaly. Pacer wires are noted in the heart. Lung bases demonstrate no acute abnormality. Organs: Stable scattered hepatic cysts noted throughout the liver the largest in the right hepatic lobe measuring 18 x 9 mm in size. Lack of contrast limits evaluation of the solid organs and bowel. No acute traumatic injury of the liver, spleen, pancreas, adrenal glands or kidneys within limits of this exam.  Status post cholecystectomy. No evidence of stones in the kidneys, ureters or bladder. No evidence of hydronephrosis. No evidence of perinephric or periureteral stranding. GI/Bowel: Stomach and duodenal sweep demonstrate no acute abnormality. The appendix is visualized and is unremarkable. No evidence of acute appendicitis. There is no evidence of bowel obstruction. No evidence of abnormal bowel wall thickening or distension. Pelvis: No acute traumatic injury of the bladder, uterus or adnexa. Peritoneum/Retroperitoneum: There is asymmetric enlargement of the bilateral piriform is musculature, right greater than left with mild associated fat stranding inflammatory changes suggesting intramuscular hematomas. There is mild subtle stranding in the presacral space. No evidence of ascites or free air. No lymphadenopathy. Mild atherosclerotic disease of the aorta and branch vessels. Bones/Soft Tissues: As described above there is asymmetric enlargement of the bilateral piriform is musculature, right greater than left. There is transitional anatomy with partial lumbarization of the S1 vertebra. There is a compression fracture of the L5 vertebral body with approximately 50% loss of height new since prior examination. Given history of recent fall, this may represent an acute compression fracture. There is subtle linear lucency within the right sacrum that may represent a subtle nondisplaced right sacral insufficiency fracture. The pelvic ring is grossly intact. The bilateral hips are intact. Compression fracture of L5 new since prior 2019 CT with approximately 50% loss of vertebral body height. Given history of recent trauma this may represent an acute compression fracture. Possible subtle lucency within the right sacrum may represent a subtle nondisplaced sacral insufficiency fracture. MRI may be more sensitive. Asymmetric enlargement of the bilateral piriformis musculature, right greater than left new since prior examination suggesting intramuscular hematomas related to grade 2 strains. Associated mild surrounding soft tissue edema. No acute traumatic injury of the abdominal organs within the limits of this unenhanced CT.      Ct Head Wo Contrast    Result Date: 10/12/2020  EXAMINATION: CT OF THE HEAD WITHOUT CONTRAST  10/12/2020 8:47 am TECHNIQUE: CT of the head was performed without the administration of intravenous contrast. Dose modulation, iterative reconstruction, and/or weight based adjustment of the mA/kV was utilized to reduce the radiation dose to as low as reasonably achievable. COMPARISON: None. HISTORY: ORDERING SYSTEM PROVIDED HISTORY: Trauma TECHNOLOGIST PROVIDED HISTORY: Has a \"code stroke\" or \"stroke alert\" been called? ->No Reason for exam:->Trauma FINDINGS: BRAIN/VENTRICLES: There is no acute intracranial hemorrhage, mass effect or midline shift. No abnormal extra-axial fluid collection. The gray-white differentiation is maintained without evidence of an acute infarct. There is no evidence of hydrocephalus. Scattered foci of low attenuation involving the periventricular white matter compatible with microvascular ischemic changes. ORBITS: The visualized portion of the orbits demonstrate no acute abnormality. SINUSES: The visualized paranasal sinuses and mastoid air cells demonstrate no acute abnormality. SOFT TISSUES/SKULL: There is a small right posterior scalp hematoma/contusion. 1. There is no acute intracranial abnormality. Specifically, there is no intracranial hemorrhage. 2. Atrophy and periventricular leukomalacia, 3. Small right posterior scalp contusion. Ct Cervical Spine Wo Contrast    Result Date: 10/12/2020  EXAMINATION: CT OF THE CERVICAL SPINE WITHOUT CONTRAST 10/12/2020 8:47 am TECHNIQUE: CT of the cervical spine was performed without the administration of intravenous contrast. Multiplanar reformatted images are provided for review. Dose modulation, iterative reconstruction, and/or weight based adjustment of the mA/kV was utilized to reduce the radiation dose to as low as reasonably achievable. COMPARISON: None.  HISTORY: ORDERING SYSTEM PROVIDED HISTORY: Trauma TECHNOLOGIST PROVIDED HISTORY: Reason for exam:->Trauma FINDINGS: The ring of C1 is intact as is the dense.  There is no compression fracture of the cervical spine. No jumped or perched facet is noted. Multilevel degenerative disc and degenerative joint disease is noted. The prevertebral soft tissues are unremarkable. The airway is widely patent. Images through the lung apices are negative for a pneumothorax. There is biapical pleuroparenchymal scarring     1. There is no acute compression fracture or subluxation of the cervical spine. 2. Multilevel degenerative disc and degenerative joint disease. Xr Chest 1 View    Result Date: 10/12/2020  EXAMINATION: ONE XRAY VIEW OF THE CHEST 10/12/2020 3:11 pm COMPARISON: 09/15/2019 HISTORY: ORDERING SYSTEM PROVIDED HISTORY: Fall TECHNOLOGIST PROVIDED HISTORY: Reason for exam:->Fall What reading provider will be dictating this exam?->CRC FINDINGS: The heart is enlarged. No findings of failure. Postoperative sternotomy changes are noted. There is a single lead cardiac pacer on the left The lungs are clear. There is no focal infiltrate or effusion. 1. Cardiomegaly. There is no evidence of failure or pneumonia. Xr Pelvis Inlet Outlet    Result Date: 10/12/2020  EXAMINATION: ONE XRAY VIEW OF THE PELVIS 10/12/2020 3:12 pm COMPARISON: 10/12/2020 HISTORY: ORDERING SYSTEM PROVIDED HISTORY: Fall    (AP, Inlet, Outlet please) TECHNOLOGIST PROVIDED HISTORY: Reason for exam:->Fall    (AP, Inlet, Outlet please) What reading provider will be dictating this exam?->CRC FINDINGS: No evidence of pelvic fracture. Bilateral hips demonstrate normal alignment. No focal osseous lesion. SI joints are symmetric. Mild bilateral SI joint and hip osteoarthritis. No acute abnormality of the pelvis.      Consultations:  Neurosurgery     Admission/Diagnosis:   79 y.o. female with L4 compression fracture, likely sacral fracture     Code Status:     Plan of Treatment:   CT Lumbar Spine pending  TEG/CBC pending  Neurosurgery Consulted       Plan discussed with Dr. Tianna Jennings Gilbert Estimable on 10/13/2020 at 2:26 PM

## 2020-10-13 NOTE — PROGRESS NOTES
Call placed to MRI to relay that pacer information had been found in chart. Message left requesting fax number. Awaiting callback at this time.

## 2020-10-13 NOTE — TELEPHONE ENCOUNTER
Patient fell yesterday. Patient is at [facility] and fell yesterday fracturing l4. Esequiel Cervantes.

## 2020-10-13 NOTE — PROCEDURES
Patient Allan Estrella have an mri safely with her pacer/lead combination.  She is implanted with a dual chamber [employer] Fanny pacer generator with one lead- [id number] and one plug- Per technical services at [employer], because there is a plug in place of the second lead it is not MRI conditional. Cheryl Tamayo notified

## 2020-10-13 NOTE — PROGRESS NOTES
Contacted daughter BODØ in regards to med recc. Completion.      Electronically signed by Anita Roque RN on 10/12/2020 at 11:08 PM

## 2020-10-13 NOTE — PROGRESS NOTES
Physical Therapy    PT order received and medical chart reviewed 10/13 AM. Plan is for kyphoplasty tomorrow. Will hold until after procedure. Thank you.     Bassam Calvillo, PT, DPT  [id number] [facility]    with [facility]    Hyperlipidemia     Hypertension     Morbid obesity with BMI of 50.0-59.9, adult ([address]     Obstructive sleep apnea     On home oxygen therapy 2013    KELSEY treated with BiPAP     Osteoarthritis     Pacemaker     [place] dual pacer    Prostate enlargement        Past Surgical History:   Procedure Laterality Date    AORTIC VALVE REPLACEMENT  2/3/2010    tissue valve    CARDIAC CATHETERIZATION  1 20 2010    Severe AS. Mild MV stenosis. Normal coronary arteries. Normal LV systolic function. EF 70%. Moderate concentric LVH. SPAP 47/20.  CARDIAC CATHETERIZATION  8 12 2009    Transvenous dual chamber permanent pacemaer implantation.  CARDIAC CATHETERIZATION  11 16 2007    Normal coronary arterties. EF 65%. LVH. Mild to moderate AS w/ AV area of 1.47 squared. SPAP 45/19    CARDIOVASCULAR STRESS TEST  7 15 2009    Sinus rhythm w/ average heart rate of 60 bpm. Borderline to mild MT interval prolongation throughout majority of recording. Increased QRS duration compatible w/ right BBB. Intermittent episodes of Mobitz type 2 secondary AV block, manifested as non-conducted sinus impulses which were not premature. Intermittent episodes of high-grade AV block terminated by an idioventricular escape rhythm, 30bpm.    CARDIOVASCULAR STRESS TEST  11 09 2007    Normal stress test. HTN.  DM type 2.     COLONOSCOPY      DIALYSIS FISTULA CREATION  07/27/11    Rt Wrist    ENDOSCOPY, COLON, DIAGNOSTIC      EYE SURGERY      OTHER SURGICAL HISTORY  09/13/2016    FIBEROPTIC BRONCHOSCOPY    PACEMAKER PLACEMENT      MT COLONOSCOPY FLX DX W/COLLJ SPEC WHEN PFRMD N/A 9/7/2018    COLONOSCOPY performed by Stef Yost MD at [address] [facility]    MT EGD TRANSORAL BIOPSY SINGLE/MULTIPLE N/A 9/7/2018    EGD performed by Stef Yost MD at [address]  5yrs old   [address] ECHOCARDIOGRAM medications?: (!) Laundry, Banking/Finances, Food Preparation, Transportation, Taking Medications, None  ADL Interventions:  · Patient declines any further evaluation/treatment for this issue    Personalized Preventive Plan   Current Health Maintenance Status  Immunization History   Administered Date(s) Administered    Influenza 10/16/2012, 11/26/2013    Influenza Vaccine, unspecified formulation 10/05/2016    Influenza Virus Vaccine 09/29/2011, 12/04/2014, 10/13/2015, 09/19/2018, 09/01/2019    Influenza, Quadv, IM, (6 mo and older Fluzone, Flulaval, Fluarix and 3 yrs and older Afluria) 10/03/2017    Pneumococcal Conjugate 13-valent ([id number]) 01/14/2016    Pneumococcal Polysaccharide ([id number]) 09/29/2011, 04/15/2017    Zoster Recombinant (Shingrix) 10/28/2019        Health Maintenance   Topic Date Due    Diabetic retinal exam  07/28/1945    DTaP/Tdap/Td vaccine (1 - Tdap) 07[birth year]/1953    PSA counseling  09/12/2017    Annual Wellness Visit (AWV)  05/29/2019    Lipid screen  07/09/2019    Diabetic foot exam  07/10/2019    Shingles Vaccine (2 of 2) 12/23/2019    A1C test (Diabetic or Prediabetic)  07/28/2020    Potassium monitoring  02/06/2021    Creatinine monitoring  02/06/2021    Colon cancer screen colonoscopy  09/07/2028    Flu vaccine  Completed    Pneumococcal 65+ yrs at Risk Vaccine  Completed    Hepatitis A vaccine  Aged Out    Hib vaccine  Aged Out    Meningococcal (ACWY) vaccine  Aged Out     Recommendations for [place] Due: see orders and patient instructions/AVS.  .   Recommended screening schedule for the next 5-10 years is provided to the patient in written form: see Patient Instructions/AVS.    Graham Tapia was seen today for wellness program.    Diagnoses and all orders for this visit:    Routine general medical examination at a health care facility    Essential hypertension    ESRD (end stage renal disease) ([facility] [address]    Chronic obstructive pulmonary disease, unspecified COPD

## 2020-10-13 NOTE — PROGRESS NOTES
SPINE WO CONTRAST   Final Result   Compression deformity of L5 with prominent concavity of its superior   vertebral endplate of undetermined age. Minimal dorsal displacement of the dorsal cortical margin of the vertebral   body superiorly resulting in mild narrowing of the spinal canal at this level. Consider further evaluation with MRI if clinically indicated to assess for   bone marrow edema. Mild compression deformity of T12, as reported separately. Degenerative changes of the lumbar spine with mild broad-based disc bulging   at L3-L4 and L4-L5 and mild to moderate central canal stenosis. XR PELVIS INLET OUTLET   Final Result   No acute abnormality of the pelvis. XR CHEST 1 VIEW   Final Result   1. Cardiomegaly. There is no evidence of failure or pneumonia. MRI LUMBAR SPINE WO CONTRAST    (Results Pending)       PHYSICAL EXAM:   GCS:  4 - Opens eyes on own   6 - Follows simple motor commands  5 - Alert and oriented    Pupil size:  Left 3 mm Right 3 mm  Pupil reaction: Yes  Wiggles fingers: Left Yes Right Yes  Hand grasp:   Left normal   Right normal  Wiggles toes: Left Yes    Right Yes  Plantar flexion: Left normal  Right normal    PHYSICAL EXAM  General: No apparent distress, comfortable   HEENT: Trachea midline, no masses, Pupils equal round   Chest: Respiratory effort was normal with no retractions or use of accessory muscles. Cardiovascular: Extremities warm, well perfused,   Abdomen:  Soft and non distended.   No tenderness, guarding, rebound, or rigidity   Extremities: Moves all 4 extremeties, No pedal edema     Spine:     Spine Tenderness ROM   Cervical 0 /10 Normal   Thoracic 0 /10 Normal   Lumbar 3 /10 Normal     Musculoskeletal    Joint Tenderness Swelling ROM   Right shoulder absent absent normal   Left shoulder absent absent normal   Right elbow absent absent normal   Left elbow absent absent normal   Right wrist absent absent normal   Left wrist absent absent normal   Right hand grasp absent absent normal   Left hand grasp absent absent normal   Right hip absent absent normal   Left hip absent absent normal   Right knee absent absent normal   Left knee absent absent normal   Right ankle absent absent normal   Left ankle absent absent normal   Right foot absent absent normal   Left foot absent absent normal       CONSULTS:  Neurosurgery. PROCEDURES: none    INJURIES:  L5 comp fx. Active Problems:    Closed compression fracture of L4 lumbar vertebra, initial encounter ([facility] [address]  Resolved Problems:    * No resolved hospital problems. *        Assessment/Plan:       · Neuro:  GCS 15, L5 comp fx, L4 comp fx. MRI L spine pending. NSG consult. Multimodal pain control  · CV: HR near normal limits, no acute issues   · Pulm: tolerating room air    · GI: tolerating general diet   · Renal: no acute issues   · ID: afebrile, no acute issues     · Endocrine: no acute issues,   · MSK: no acute issues. PT/OT evals. Bedrest pending MRIs/NSG recs   · Heme: no acute issues      Bowel regime: colace, MOM   Pain control/Sedation: tylenol, oxy, morph  DVT prophylaxis: SCDs, lovenox    GI: diet   Glucose protocol: n/a  Mouth/Eye care: per patient, .    Keller: none     Code status:    Full Code    Patient/Family update:  As available     Disposition:    Continue current care      Electronically signed by Kenton Lozoya DO on 10/13/20 at 6:24 AM EDT

## 2020-10-13 NOTE — CARE COORDINATION
10/13 Update CM Note: Patient scheduled for Kyphoplasty in  per Dr Lizama Service. Will follow.   EDISON CASTRO CM

## 2020-10-13 NOTE — PROGRESS NOTES
TRAUMA SURGERY  ATTENDING PROGRESS NOTE  Patient seen and examined, agree with resident note, for remaining HP/Consult details please see resident HP/Consult note. CC: fall     S:   c/o lumbar and sacral pain,     O:   @BP (!) 146/65   Pulse 65   Temp 100 °F (37.8 °C) (Temporal)   Resp 20   Wt 86 lb 3.2 oz (39.1 kg)   LMP  (LMP Unknown)   SpO2 92%   BMI 18.02 kg/m² @    Gen - no apparent distress   Neuro - Awake, alert, attentive, GCS 14 mild confusion     HEENT - PERRL 3mm   Lungs - non labored, BS clear b/l    Heart - RR   Abdomen - SNT   Spine -   Lumbar tenderness T/C wnl   Ext- rom wnl NVI     A/P: s/p fall with compression fractures     Active Problems:    Closed compression fracture of L4 lumbar vertebra, initial encounter ([facility])  Resolved Problems:    * No resolved hospital problems. *      - Comp fx, NS following, kypho tomorrow. ..   - SMI deep breathing, Pain control   - Home meds, restarted held thinners,   - PTOT when able,       DVT prophylaxis: SCDs,     Riki Hinojosa MD FACS

## 2020-10-13 NOTE — CARE COORDINATION
10/13 CM Note: [facility] information regarding pacemaker found in [facility] notes and faxed to MRI.   Daria Delgadillo RN CM

## 2020-10-14 ENCOUNTER — ANESTHESIA EVENT (OUTPATIENT)
Dept: OPERATING ROOM | Age: 80
DRG: 478 | End: 2020-10-14
Payer: MEDICARE

## 2020-10-14 ENCOUNTER — APPOINTMENT (OUTPATIENT)
Dept: GENERAL RADIOLOGY | Age: 80
DRG: 478 | End: 2020-10-14
Payer: MEDICARE

## 2020-10-14 ENCOUNTER — ANESTHESIA (OUTPATIENT)
Dept: OPERATING ROOM | Age: 80
DRG: 478 | End: 2020-10-14
Payer: MEDICARE

## 2020-10-14 VITALS — SYSTOLIC BLOOD PRESSURE: 107 MMHG | OXYGEN SATURATION: 100 % | DIASTOLIC BLOOD PRESSURE: 73 MMHG

## 2020-10-14 PROCEDURE — 0QU03JZ SUPPLEMENT LUMBAR VERTEBRA WITH SYNTHETIC SUBSTITUTE, PERCUTANEOUS APPROACH: ICD-10-PCS | Performed by: NEUROLOGICAL SURGERY

## 2020-10-14 PROCEDURE — 3700000000 HC ANESTHESIA ATTENDED CARE: Performed by: NEUROLOGICAL SURGERY

## 2020-10-14 PROCEDURE — C1713 ANCHOR/SCREW BN/BN,TIS/BN: HCPCS | Performed by: NEUROLOGICAL SURGERY

## 2020-10-14 PROCEDURE — 7100000000 HC PACU RECOVERY - FIRST 15 MIN: Performed by: NEUROLOGICAL SURGERY

## 2020-10-14 PROCEDURE — 88311 DECALCIFY TISSUE: CPT

## 2020-10-14 PROCEDURE — 3600000014 HC SURGERY LEVEL 4 ADDTL 15MIN: Performed by: NEUROLOGICAL SURGERY

## 2020-10-14 PROCEDURE — 6360000002 HC RX W HCPCS: Performed by: ANESTHESIOLOGY

## 2020-10-14 PROCEDURE — 88307 TISSUE EXAM BY PATHOLOGIST: CPT

## 2020-10-14 PROCEDURE — 0QS03ZZ REPOSITION LUMBAR VERTEBRA, PERCUTANEOUS APPROACH: ICD-10-PCS | Performed by: NEUROLOGICAL SURGERY

## 2020-10-14 PROCEDURE — 6360000002 HC RX W HCPCS: Performed by: NEUROLOGICAL SURGERY

## 2020-10-14 PROCEDURE — 99232 SBSQ HOSP IP/OBS MODERATE 35: CPT | Performed by: SURGERY

## 2020-10-14 PROCEDURE — 2500000003 HC RX 250 WO HCPCS: Performed by: NEUROLOGICAL SURGERY

## 2020-10-14 PROCEDURE — 2709999900 HC NON-CHARGEABLE SUPPLY: Performed by: NEUROLOGICAL SURGERY

## 2020-10-14 PROCEDURE — 3600000004 HC SURGERY LEVEL 4 BASE: Performed by: NEUROLOGICAL SURGERY

## 2020-10-14 PROCEDURE — 7100000001 HC PACU RECOVERY - ADDTL 15 MIN: Performed by: NEUROLOGICAL SURGERY

## 2020-10-14 PROCEDURE — 72100 X-RAY EXAM L-S SPINE 2/3 VWS: CPT

## 2020-10-14 PROCEDURE — 3E0R33Z INTRODUCTION OF ANTI-INFLAMMATORY INTO SPINAL CANAL, PERCUTANEOUS APPROACH: ICD-10-PCS | Performed by: NEUROLOGICAL SURGERY

## 2020-10-14 PROCEDURE — 6360000002 HC RX W HCPCS: Performed by: STUDENT IN AN ORGANIZED HEALTH CARE EDUCATION/TRAINING PROGRAM

## 2020-10-14 PROCEDURE — 6370000000 HC RX 637 (ALT 250 FOR IP): Performed by: SURGERY

## 2020-10-14 PROCEDURE — 2500000003 HC RX 250 WO HCPCS: Performed by: NURSE ANESTHETIST, CERTIFIED REGISTERED

## 2020-10-14 PROCEDURE — 6370000000 HC RX 637 (ALT 250 FOR IP): Performed by: NEUROLOGICAL SURGERY

## 2020-10-14 PROCEDURE — 0PB43ZX EXCISION OF THORACIC VERTEBRA, PERCUTANEOUS APPROACH, DIAGNOSTIC: ICD-10-PCS | Performed by: NEUROLOGICAL SURGERY

## 2020-10-14 PROCEDURE — 2580000003 HC RX 258: Performed by: STUDENT IN AN ORGANIZED HEALTH CARE EDUCATION/TRAINING PROGRAM

## 2020-10-14 PROCEDURE — 0PS43ZZ REPOSITION THORACIC VERTEBRA, PERCUTANEOUS APPROACH: ICD-10-PCS | Performed by: NEUROLOGICAL SURGERY

## 2020-10-14 PROCEDURE — 2580000003 HC RX 258: Performed by: NURSE ANESTHETIST, CERTIFIED REGISTERED

## 2020-10-14 PROCEDURE — 3700000001 HC ADD 15 MINUTES (ANESTHESIA): Performed by: NEUROLOGICAL SURGERY

## 2020-10-14 PROCEDURE — 2580000003 HC RX 258: Performed by: NEUROLOGICAL SURGERY

## 2020-10-14 PROCEDURE — 2060000000 HC ICU INTERMEDIATE R&B

## 2020-10-14 PROCEDURE — 3209999900 FLUORO FOR SURGICAL PROCEDURES

## 2020-10-14 PROCEDURE — 0PU43JZ SUPPLEMENT THORACIC VERTEBRA WITH SYNTHETIC SUBSTITUTE, PERCUTANEOUS APPROACH: ICD-10-PCS | Performed by: NEUROLOGICAL SURGERY

## 2020-10-14 PROCEDURE — 6360000002 HC RX W HCPCS: Performed by: NURSE ANESTHETIST, CERTIFIED REGISTERED

## 2020-10-14 PROCEDURE — 0QB03ZX EXCISION OF LUMBAR VERTEBRA, PERCUTANEOUS APPROACH, DIAGNOSTIC: ICD-10-PCS | Performed by: NEUROLOGICAL SURGERY

## 2020-10-14 DEVICE — BONE CEMENT C01B HV-R WITH MIXER  US
Type: IMPLANTABLE DEVICE | Status: FUNCTIONAL
Brand: KYPHON® HV-R® BONE CEMENT AND KYPHON® MIXER PACK

## 2020-10-14 RX ORDER — LABETALOL HYDROCHLORIDE 5 MG/ML
5 INJECTION, SOLUTION INTRAVENOUS EVERY 10 MIN PRN
Status: DISCONTINUED | OUTPATIENT
Start: 2020-10-14 | End: 2020-10-14 | Stop reason: HOSPADM

## 2020-10-14 RX ORDER — PROMETHAZINE HYDROCHLORIDE 25 MG/ML
6.25 INJECTION, SOLUTION INTRAMUSCULAR; INTRAVENOUS
Status: DISCONTINUED | OUTPATIENT
Start: 2020-10-14 | End: 2020-10-14 | Stop reason: HOSPADM

## 2020-10-14 RX ORDER — ONDANSETRON 2 MG/ML
4 INJECTION INTRAMUSCULAR; INTRAVENOUS
Status: DISCONTINUED | OUTPATIENT
Start: 2020-10-14 | End: 2020-10-14 | Stop reason: HOSPADM

## 2020-10-14 RX ORDER — MEPERIDINE HYDROCHLORIDE 25 MG/ML
12.5 INJECTION INTRAMUSCULAR; INTRAVENOUS; SUBCUTANEOUS EVERY 5 MIN PRN
Status: DISCONTINUED | OUTPATIENT
Start: 2020-10-14 | End: 2020-10-14 | Stop reason: HOSPADM

## 2020-10-14 RX ORDER — PROPOFOL 10 MG/ML
INJECTION, EMULSION INTRAVENOUS PRN
Status: DISCONTINUED | OUTPATIENT
Start: 2020-10-14 | End: 2020-10-14 | Stop reason: SDUPTHER

## 2020-10-14 RX ORDER — FENTANYL CITRATE 50 UG/ML
INJECTION, SOLUTION INTRAMUSCULAR; INTRAVENOUS PRN
Status: DISCONTINUED | OUTPATIENT
Start: 2020-10-14 | End: 2020-10-14 | Stop reason: SDUPTHER

## 2020-10-14 RX ORDER — EPHEDRINE SULFATE/0.9% NACL/PF 50 MG/5 ML
SYRINGE (ML) INTRAVENOUS PRN
Status: DISCONTINUED | OUTPATIENT
Start: 2020-10-14 | End: 2020-10-14 | Stop reason: SDUPTHER

## 2020-10-14 RX ORDER — HYDRALAZINE HYDROCHLORIDE 20 MG/ML
5 INJECTION INTRAMUSCULAR; INTRAVENOUS EVERY 10 MIN PRN
Status: DISCONTINUED | OUTPATIENT
Start: 2020-10-14 | End: 2020-10-14 | Stop reason: HOSPADM

## 2020-10-14 RX ORDER — DEXAMETHASONE SODIUM PHOSPHATE 10 MG/ML
INJECTION, SOLUTION INTRAMUSCULAR; INTRAVENOUS PRN
Status: DISCONTINUED | OUTPATIENT
Start: 2020-10-14 | End: 2020-10-14 | Stop reason: SDUPTHER

## 2020-10-14 RX ORDER — METHYLPREDNISOLONE ACETATE 80 MG/ML
INJECTION, SUSPENSION INTRA-ARTICULAR; INTRALESIONAL; INTRAMUSCULAR; SOFT TISSUE PRN
Status: DISCONTINUED | OUTPATIENT
Start: 2020-10-14 | End: 2020-10-14 | Stop reason: HOSPADM

## 2020-10-14 RX ORDER — LIDOCAINE HYDROCHLORIDE AND EPINEPHRINE 10; 10 MG/ML; UG/ML
INJECTION, SOLUTION INFILTRATION; PERINEURAL PRN
Status: DISCONTINUED | OUTPATIENT
Start: 2020-10-14 | End: 2020-10-14 | Stop reason: HOSPADM

## 2020-10-14 RX ORDER — GLYCOPYRROLATE 1 MG/5 ML
SYRINGE (ML) INTRAVENOUS PRN
Status: DISCONTINUED | OUTPATIENT
Start: 2020-10-14 | End: 2020-10-14 | Stop reason: SDUPTHER

## 2020-10-14 RX ORDER — NEOSTIGMINE METHYLSULFATE 1 MG/ML
INJECTION, SOLUTION INTRAVENOUS PRN
Status: DISCONTINUED | OUTPATIENT
Start: 2020-10-14 | End: 2020-10-14 | Stop reason: SDUPTHER

## 2020-10-14 RX ORDER — ROCURONIUM BROMIDE 10 MG/ML
INJECTION, SOLUTION INTRAVENOUS PRN
Status: DISCONTINUED | OUTPATIENT
Start: 2020-10-14 | End: 2020-10-14 | Stop reason: SDUPTHER

## 2020-10-14 RX ORDER — LIDOCAINE HYDROCHLORIDE 20 MG/ML
INJECTION, SOLUTION INTRAVENOUS PRN
Status: DISCONTINUED | OUTPATIENT
Start: 2020-10-14 | End: 2020-10-14 | Stop reason: SDUPTHER

## 2020-10-14 RX ORDER — SODIUM CHLORIDE 9 MG/ML
INJECTION, SOLUTION INTRAVENOUS CONTINUOUS PRN
Status: DISCONTINUED | OUTPATIENT
Start: 2020-10-14 | End: 2020-10-14 | Stop reason: SDUPTHER

## 2020-10-14 RX ORDER — ONDANSETRON 2 MG/ML
INJECTION INTRAMUSCULAR; INTRAVENOUS PRN
Status: DISCONTINUED | OUTPATIENT
Start: 2020-10-14 | End: 2020-10-14 | Stop reason: SDUPTHER

## 2020-10-14 RX ADMIN — METHOCARBAMOL TABLETS 750 MG: 750 TABLET, COATED ORAL at 20:43

## 2020-10-14 RX ADMIN — Medication 3 MG: at 10:03

## 2020-10-14 RX ADMIN — ACETAMINOPHEN 650 MG: 325 TABLET, FILM COATED ORAL at 20:43

## 2020-10-14 RX ADMIN — DOFETILIDE 125 MCG: 0.12 CAPSULE ORAL at 20:43

## 2020-10-14 RX ADMIN — HYDROMORPHONE HYDROCHLORIDE 0.25 MG: 1 INJECTION, SOLUTION INTRAMUSCULAR; INTRAVENOUS; SUBCUTANEOUS at 11:05

## 2020-10-14 RX ADMIN — FENTANYL CITRATE 50 MCG: 50 INJECTION, SOLUTION INTRAMUSCULAR; INTRAVENOUS at 09:23

## 2020-10-14 RX ADMIN — ROCURONIUM BROMIDE 20 MG: 10 INJECTION, SOLUTION INTRAVENOUS at 09:23

## 2020-10-14 RX ADMIN — METHOCARBAMOL TABLETS 750 MG: 750 TABLET, COATED ORAL at 18:10

## 2020-10-14 RX ADMIN — POTASSIUM CHLORIDE 20 MEQ: 1500 TABLET, EXTENDED RELEASE ORAL at 16:14

## 2020-10-14 RX ADMIN — FOLIC ACID 1 MG: 1 TABLET ORAL at 16:08

## 2020-10-14 RX ADMIN — Medication 5 MG: at 09:28

## 2020-10-14 RX ADMIN — MORPHINE SULFATE 2 MG: 2 INJECTION, SOLUTION INTRAMUSCULAR; INTRAVENOUS at 05:56

## 2020-10-14 RX ADMIN — ATORVASTATIN CALCIUM 40 MG: 40 TABLET, FILM COATED ORAL at 20:43

## 2020-10-14 RX ADMIN — OXYCODONE HYDROCHLORIDE 5 MG: 5 TABLET ORAL at 16:14

## 2020-10-14 RX ADMIN — SODIUM CHLORIDE, PRESERVATIVE FREE 10 ML: 5 INJECTION INTRAVENOUS at 20:43

## 2020-10-14 RX ADMIN — CEFAZOLIN 1 G: 1 INJECTION, POWDER, FOR SOLUTION INTRAMUSCULAR; INTRAVENOUS at 09:29

## 2020-10-14 RX ADMIN — DEXAMETHASONE SODIUM PHOSPHATE 10 MG: 10 INJECTION, SOLUTION INTRAMUSCULAR; INTRAVENOUS at 09:30

## 2020-10-14 RX ADMIN — HYDROMORPHONE HYDROCHLORIDE 0.25 MG: 1 INJECTION, SOLUTION INTRAMUSCULAR; INTRAVENOUS; SUBCUTANEOUS at 10:54

## 2020-10-14 RX ADMIN — ONDANSETRON HYDROCHLORIDE 4 MG: 2 INJECTION, SOLUTION INTRAMUSCULAR; INTRAVENOUS at 09:29

## 2020-10-14 RX ADMIN — PROPOFOL 120 MG: 10 INJECTION, EMULSION INTRAVENOUS at 09:23

## 2020-10-14 RX ADMIN — LIDOCAINE HYDROCHLORIDE 70 MG: 20 INJECTION, SOLUTION INTRAVENOUS at 09:23

## 2020-10-14 RX ADMIN — SUGAMMADEX 78 MG: 100 INJECTION, SOLUTION INTRAVENOUS at 10:13

## 2020-10-14 RX ADMIN — SENNOSIDES 8.6 MG: 8.6 TABLET, FILM COATED ORAL at 20:43

## 2020-10-14 RX ADMIN — NEBIVOLOL HYDROCHLORIDE 20 MG: 5 TABLET ORAL at 16:08

## 2020-10-14 RX ADMIN — PANTOPRAZOLE SODIUM 40 MG: 40 TABLET, DELAYED RELEASE ORAL at 05:55

## 2020-10-14 RX ADMIN — Medication 0.6 MG: at 10:03

## 2020-10-14 RX ADMIN — AMLODIPINE BESYLATE 10 MG: 10 TABLET ORAL at 16:08

## 2020-10-14 RX ADMIN — AMITRIPTYLINE HYDROCHLORIDE 10 MG: 10 TABLET, FILM COATED ORAL at 20:43

## 2020-10-14 RX ADMIN — BUMETANIDE 2 MG: 1 TABLET ORAL at 16:08

## 2020-10-14 RX ADMIN — SODIUM CHLORIDE, PRESERVATIVE FREE 10 ML: 5 INJECTION INTRAVENOUS at 05:56

## 2020-10-14 RX ADMIN — SODIUM CHLORIDE: 9 INJECTION, SOLUTION INTRAVENOUS at 09:14

## 2020-10-14 ASSESSMENT — PULMONARY FUNCTION TESTS
PIF_VALUE: 0
PIF_VALUE: 1
PIF_VALUE: 0
PIF_VALUE: 17
PIF_VALUE: 5
PIF_VALUE: 15
PIF_VALUE: 1
PIF_VALUE: 2
PIF_VALUE: 20
PIF_VALUE: 2
PIF_VALUE: 10
PIF_VALUE: 16
PIF_VALUE: 21
PIF_VALUE: 5
PIF_VALUE: 25
PIF_VALUE: 0
PIF_VALUE: 20
PIF_VALUE: 19
PIF_VALUE: 17
PIF_VALUE: 2
PIF_VALUE: 1
PIF_VALUE: 21
PIF_VALUE: 18
PIF_VALUE: 18
PIF_VALUE: 21
PIF_VALUE: 1
PIF_VALUE: 1
PIF_VALUE: 21
PIF_VALUE: 24
PIF_VALUE: 23
PIF_VALUE: 1
PIF_VALUE: 16
PIF_VALUE: 1
PIF_VALUE: 20
PIF_VALUE: 2
PIF_VALUE: 1
PIF_VALUE: 18
PIF_VALUE: 1
PIF_VALUE: 18
PIF_VALUE: 19
PIF_VALUE: 21
PIF_VALUE: 2
PIF_VALUE: 22
PIF_VALUE: 19
PIF_VALUE: 23
PIF_VALUE: 21
PIF_VALUE: 2
PIF_VALUE: 1
PIF_VALUE: 20
PIF_VALUE: 20
PIF_VALUE: 22
PIF_VALUE: 21
PIF_VALUE: 17
PIF_VALUE: 2
PIF_VALUE: 17
PIF_VALUE: 1
PIF_VALUE: 1
PIF_VALUE: 18
PIF_VALUE: 20
PIF_VALUE: 24
PIF_VALUE: 5
PIF_VALUE: 17
PIF_VALUE: 2
PIF_VALUE: 0
PIF_VALUE: 21
PIF_VALUE: 20
PIF_VALUE: 1

## 2020-10-14 ASSESSMENT — PAIN SCALES - GENERAL
PAINLEVEL_OUTOF10: 10
PAINLEVEL_OUTOF10: 0
PAINLEVEL_OUTOF10: 5
PAINLEVEL_OUTOF10: 0
PAINLEVEL_OUTOF10: 8
PAINLEVEL_OUTOF10: 6
PAINLEVEL_OUTOF10: 6
PAINLEVEL_OUTOF10: 0
PAINLEVEL_OUTOF10: 9

## 2020-10-14 ASSESSMENT — PAIN DESCRIPTION - DESCRIPTORS
DESCRIPTORS: DISCOMFORT;SORE
DESCRIPTORS: ACHING;CONSTANT;DISCOMFORT;SORE
DESCRIPTORS: ACHING;DISCOMFORT;SORE
DESCRIPTORS: ACHING;CONSTANT;DISCOMFORT;SORE
DESCRIPTORS: ACHING;DISCOMFORT

## 2020-10-14 ASSESSMENT — PAIN DESCRIPTION - PAIN TYPE
TYPE: SURGICAL PAIN

## 2020-10-14 ASSESSMENT — PAIN DESCRIPTION - ORIENTATION
ORIENTATION: MID
ORIENTATION: MID;LOWER

## 2020-10-14 ASSESSMENT — PAIN DESCRIPTION - LOCATION
LOCATION: BACK

## 2020-10-14 ASSESSMENT — PAIN DESCRIPTION - ONSET
ONSET: ON-GOING
ONSET: ON-GOING

## 2020-10-14 ASSESSMENT — PAIN DESCRIPTION - FREQUENCY
FREQUENCY: CONTINUOUS
FREQUENCY: CONTINUOUS

## 2020-10-14 NOTE — BRIEF OP NOTE
Brief Postoperative Note      Patient: Debi Mcneil  YOB: 1940  MRN: [id number]    Date of Procedure: 10/14/2020    Pre-Op Diagnosis: . Compression Fracture T12 & L4/5  Post-Op Diagnosis: Same       Procedure(s):  T12, L5 KYPHOPLASTY, EPIDURAL INJECTION    Surgeon(s):  Ash Humphries MD    Assistant:  * No surgical staff found *    Anesthesia: General    Estimated Blood Loss (mL): Minimal    Complications: None    Specimens:   ID Type Source Tests Collected by Time Destination   A : T12, L5 bone biposy Bone Biopsy SURGICAL PATHOLOGY Ash Humphries MD 10/14/2020 9713        Implants:  Implant Name Type Inv.  Item Serial No.  Lot No. LRB No. Used Action   KIT CEMENT BONE W/KYPHON MIXER TUBE [facility] Cement KIT CEMENT BONE W/KYPHON MIXER TUBE [address] [id number] N/A 1 Implanted         Drains: * No LDAs found *    Findings: L5 counting from last rib, L4 counting from sacrum up  Electronically signed by Ash Humphries MD on 10/14/2020 at 10:15 AM

## 2020-10-14 NOTE — PROGRESS NOTES
Patient arrived to OR suite with heart monitor on. Placed in patient's chart and will return with patient.

## 2020-10-14 NOTE — PROGRESS NOTES
Patient found on 89% O2 on room air. Nasal cannula applied at 2L O2 and patent sating at 95%.  Will continue to monitor and assess patient

## 2020-10-14 NOTE — ANESTHESIA PRE PROCEDURE
Department of Anesthesiology  Preprocedure Note       Name:  Nicole Peralta   Age:  79 y.o.  :  1940                                          MRN:  [id number]         Date:  10/14/2020      Surgeon: Abel Diallo):  Jan Hansen MD    Procedure: Procedure(s):  L4, T12 KYPHOPLASTY    Medications prior to admission:   Prior to Admission medications    Medication Sig Start Date End Date Taking?  Authorizing Provider   aspirin 81 MG chewable tablet Take 81 mg by mouth daily   Yes Historical Provider, MD   folic acid (FOLVITE) 1 MG tablet TAKE ONE TABLET BY MOUTH DAILY 20  Yes Maribel Alicia DO   magnesium oxide (MAG-OX) 400 (241.3 Mg) MG TABS tablet TAKE ONE TABLET BY MOUTH DAILY 20  Yes Maribel Alicia DO   apixaban (ELIQUIS) 2.5 MG TABS tablet TAKE ONE TABLET BY MOUTH TWO TIMES A DAY 20  Yes Maribel Alicia DO   amLODIPine (NORVASC) 10 MG tablet TAKE ONE TABLET BY MOUTH DAILY 20  Yes Maribel Alicia DO   bumetanide (BUMEX) 1 MG tablet Take 2 tablets by mouth daily 20  Yes Maribel Alicia DO   sildenafil (REVATIO) 20 MG tablet TAKE ONE-HALF TABLET BY MOUTH THREE TIMES DAILY 20  Yes Maribel Alicia DO   nebivolol (BYSTOLIC) 20 MG TABS tablet Take 1 tablet by mouth daily 20  Yes Maribel Alicia DO   atorvastatin (LIPITOR) 40 MG tablet TAKE ONE TABLET BY MOUTH EVERY DAY 20  Yes Maribel Alicia DO   dofetilide (TIKOSYN) 125 MCG capsule TAKE ONE CAPSULE BY MOUTH TWO TIMES A DAY 20  Yes MAYCOL Stout CNP   omeprazole (PRILOSEC) 40 MG delayed release capsule Take 1 capsule by mouth daily 20  Yes MAYCOL Ribeiro CNP   ranolazine (RANEXA) 500 MG extended release tablet TAKE ONE TABLET BY MOUTH TWO TIMES A DAY 20  Yes Maribel Alicia DO   potassium chloride (KLOR-CON M) 20 MEQ extended release tablet TAKE ONE TABLET BY MOUTH EVERY DAY  Patient taking differently: 20 mEq 2 times daily TAKE ONE sodium chloride flush 0.9 % injection 10 mL  10 mL Intravenous 2 times per day Yessenia Holiday, DO   10 mL at 10/13/20 2118    sodium chloride flush 0.9 % injection 10 mL  10 mL Intravenous PRN Yessenia Holiday, DO   10 mL at 10/14/20 0556    acetaminophen (TYLENOL) tablet 650 mg  650 mg Oral TID Yessenia Holiday, DO   650 mg at 10/13/20 2117    promethazine (PHENERGAN) tablet 12.5 mg  12.5 mg Oral Q6H PRN Yessenia Holiday, DO        Or    ondansetron [facility]) injection 4 mg  4 mg Intravenous Q6H PRN Yessenia Holiday, DO        morphine (PF) injection 2 mg  2 mg Intravenous Q4H PRN Yessenia Holiday, DO   2 mg at 10/14/20 0556    polyethylene glycol (GLYCOLAX) packet 17 g  17 g Oral Daily PRN Yessenia Holiday, DO        senna (SENOKOT) tablet 8.6 mg  1 tablet Oral Nightly Yessenia Holiday, DO   8.6 mg at 10/12/20 2041       Allergies:     Allergies   Allergen Reactions    Dronedarone Hives       Problem List:    Patient Active Problem List   Diagnosis Code    Chest pain radiating to jaw R07.9    CAD (coronary artery disease), native coronary artery I25.10    Atherosclerosis of coronary artery bypass graft I25.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    Postsurgical aortocoronary bypass status Z95.1    Other and unspecified angina pectoris I20.9    Chest pain R07.9    Essential hypertension I10    Paresthesia of both hands R20.2    Benign paroxysmal positional vertigo of left ear H81.12    Acute diastolic CHF (congestive heart failure) ([facility]) I50.31    Mild protein-calorie malnutrition ([facility]) E44.1    Atrial fibrillation with RVR ([facility]) I48.91    Sick sinus syndrome with tachycardia ([facility]) I49.5    Closed compression fracture of L4 lumbar vertebra, initial encounter ([facility]) S32.040A    Lumbar compression fracture, closed, initial encounter ([facility] [address] S32.000A       Past Medical History:        Diagnosis Date    CAD (coronary artery disease)     GERD (gastroesophageal reflux disease)     History of echocardiogram 03/14/2017    EF 62%    History of Value Date     10/13/2020    K 3.7 10/13/2020     10/13/2020    CO2 24 10/13/2020    BUN 18 10/13/2020    CREATININE 0.6 10/13/2020    GFRAA >60 10/13/2020    LABGLOM >60 10/13/2020    GLUCOSE 106 10/13/2020    GLUCOSE 95 05/07/2012    PROT 6.7 09/20/2019    CALCIUM 9.0 10/13/2020    BILITOT 1.2 09/20/2019    ALKPHOS 133 09/20/2019    AST 29 09/20/2019    ALT 15 09/20/2019       POC Tests: No results for input(s): POCGLU, POCNA, POCK, POCCL, POCBUN, POCHEMO, POCHCT in the last 72 hours. Coags:   Lab Results   Component Value Date    PROTIME 15.0 05/01/2018    PROTIME 12.8 03/01/2012    INR 1.3 05/01/2018    APTT 51.1 02/01/2019       HCG (If Applicable): No results found for: PREGTESTUR, PREGSERUM, HCG, HCGQUANT     ABGs: No results found for: PHART, PO2ART, TCN3FBI, KJJ9LAW, BEART, T4GKXKPN     Type & Screen (If Applicable):  Lab Results   Component Value Date    LABABO O 03/01/2012    [facility] POS 03/01/2012       Drug/Infectious Status (If Applicable):  No results found for: HIV, HEPCAB    COVID-19 Screening (If Applicable): No results found for: COVID19      Anesthesia Evaluation  Patient summary reviewed and Nursing notes reviewed  Airway: Mallampati: II  TM distance: >3 FB   Neck ROM: full  Mouth opening: > = 3 FB Dental:    (+) edentulous      Pulmonary:Negative Pulmonary ROS and normal exam  breath sounds clear to auscultation                             Cardiovascular:    (+) hypertension:, angina:, past MI:, CAD:, CABG/stent:, dysrhythmias (SSS): atrial fibrillation, CHF:, hyperlipidemia        Rhythm: regular  Rate: normal                 ROS comment: IMPRESSION:  1. The right ventricle is moderately dilated at 3.8 cm. The right  atrium is severely dilated at 35 cm2. Left atrium is mildly dilated. Left ventricle and aortic root remained within normal limits. 2.  The left ventricle shows normal wall thicknesses, good  contractility. Ejection fraction is 64%.   There is diastolic filling  dysfunction, stage I. There is some flattening of the interventricular  septum noted most likely due to a pressure volume overload from the  right ventricle. There are no other definite focal wall motion  abnormalities seen. 3.  The mitral valve appears structurally normal, shows a maximal  gradient of 2.6 mmHg. Mitral valve area by pressure half-time 6.5 cm2. There is no significant mitral stenosis. There is trace to 1+ mitral  regurgitation only. 4.  The aortic valve is sclerotic, but the leaflets open well. Maximal  gradient 6.9 mmHg. Aortic valve area 2 cm2. There is no significant  aortic stenosis nor insufficiency seen. 5.  There is no pulmonic stenosis nor insufficiency. There is severe  tricuspid regurgitation with a posterior directed large volume jet with  wraparound phenomenon in the right atrium. This is due to lack of  coaptation of the leaflets of the tricuspid valve most likely due to  tricuspid annular dilatation. Pulmonary pressures are elevated and  estimated at 41 mmHg. However, the volume of the regurgitation jet may  be giving decreased numbers. Pulmonary pressures could actually be  higher given this. 6.  There is no significant pericardial effusion nor any definite  intracavitary mass or thrombus or shunt seen. Aortic arch remains  normal in size. No coarctation is seen.           Andrés Monson MD     D: 09/16/2019 21:24:48       T: 09/16/2019 21:28:09     [id number]  Job#: [id number]     Doc#: [id number]     Neuro/Psych:   (+) headaches:,             GI/Hepatic/Renal:   (+) GERD:,           Endo/Other: Negative Endo/Other ROS                    Abdominal:           Vascular: negative vascular ROS. Anesthesia Plan      general     ASA 3       Induction: intravenous. MIPS: Postoperative opioids intended and Prophylactic antiemetics administered. Anesthetic plan and risks discussed with patient.       Plan discussed with CRNA and attending.                 Megan Vasques MD   10/14/2020

## 2020-10-14 NOTE — PROGRESS NOTES
Nurse to nurse called to 8SE. Pt will go to 8501B after PACU. [facility] walked over patient belongings and placed in room (jewelry and dentures, etc).   Leonel Perez RN

## 2020-10-14 NOTE — PROGRESS NOTES
Floor Called, nurse to nurse given. Spoke with Nicanor Valdez . Patients test results review, VS reported to receiving nurse. Any and all important information regarding patient disclosed.

## 2020-10-14 NOTE — PROGRESS NOTES
PCP is Marv Baldwin DO  Office notified of admission.       Electronically signed by Rakesh Sandy RN MSN APRN-NP [facility] NP  CCNS CCRN 10/14/2020 7:43 AM

## 2020-10-14 NOTE — PROGRESS NOTES
TRAUMA SURGERY  ATTENDING PROGRESS NOTE      CC: fall     S:   c/o lumbar and sacral pain, a little improved from previous. O:   @/73   Pulse 66   Temp 98.4 °F (36.9 °C) (Temporal)   Resp 23   Wt 86 lb 3.2 oz (39.1 kg)   LMP  (LMP Unknown)   SpO2 99%   BMI 18.02 kg/m² @    Gen - no apparent distress   Neuro - Awake, alert, attentive, GCS 15     HEENT - PERRL 3mm   Lungs - non labored, BS clear b/l    Heart - RR   Abdomen - SNT   Spine -   Lumbar tenderness T/C wnl   Ext- rom wnl NVI     A/P: s/p fall with compression fractures     Active Problems:    Closed compression fracture of L4 lumbar vertebra, initial encounter ([facility])    Lumbar compression fracture, closed, initial encounter ([facility] [address]  Resolved Problems:    * No resolved hospital problems.  *      - Comp fx, NS following, kypho today   - SMI deep breathing, Pain control   - Home meds, restarted held thinners,   - PTOT when able,       DVT prophylaxis: SCDs,     Cielo Astudillo MD FACS

## 2020-10-14 NOTE — PROGRESS NOTES
Occupational Therapy  OT consult received. Chart reviewed. Will hold evaluation secondary to patient at OR for kypho this am with patient off floor in radiology this pm. Will re-attempt OT evaluation as schedule permits when patient is appropriate.  Aleja Gar, OTR/L #[id number]

## 2020-10-15 ENCOUNTER — APPOINTMENT (OUTPATIENT)
Dept: GENERAL RADIOLOGY | Age: 80
DRG: 478 | End: 2020-10-15
Payer: MEDICARE

## 2020-10-15 PROCEDURE — 97162 PT EVAL MOD COMPLEX 30 MIN: CPT

## 2020-10-15 PROCEDURE — 97165 OT EVAL LOW COMPLEX 30 MIN: CPT

## 2020-10-15 PROCEDURE — 73502 X-RAY EXAM HIP UNI 2-3 VIEWS: CPT

## 2020-10-15 PROCEDURE — 97535 SELF CARE MNGMENT TRAINING: CPT

## 2020-10-15 PROCEDURE — 1200000000 HC SEMI PRIVATE

## 2020-10-15 PROCEDURE — 6370000000 HC RX 637 (ALT 250 FOR IP): Performed by: NEUROLOGICAL SURGERY

## 2020-10-15 PROCEDURE — 97530 THERAPEUTIC ACTIVITIES: CPT

## 2020-10-15 PROCEDURE — 6360000002 HC RX W HCPCS: Performed by: SURGERY

## 2020-10-15 PROCEDURE — 99232 SBSQ HOSP IP/OBS MODERATE 35: CPT | Performed by: SURGERY

## 2020-10-15 PROCEDURE — 2580000003 HC RX 258: Performed by: NEUROLOGICAL SURGERY

## 2020-10-15 RX ADMIN — METHOCARBAMOL TABLETS 750 MG: 750 TABLET, COATED ORAL at 18:06

## 2020-10-15 RX ADMIN — ATORVASTATIN CALCIUM 40 MG: 40 TABLET, FILM COATED ORAL at 21:19

## 2020-10-15 RX ADMIN — METHOCARBAMOL TABLETS 750 MG: 750 TABLET, COATED ORAL at 21:19

## 2020-10-15 RX ADMIN — SODIUM CHLORIDE, PRESERVATIVE FREE 10 ML: 5 INJECTION INTRAVENOUS at 21:21

## 2020-10-15 RX ADMIN — SODIUM CHLORIDE, PRESERVATIVE FREE 10 ML: 5 INJECTION INTRAVENOUS at 09:15

## 2020-10-15 RX ADMIN — AMLODIPINE BESYLATE 10 MG: 10 TABLET ORAL at 09:14

## 2020-10-15 RX ADMIN — DOFETILIDE 125 MCG: 0.12 CAPSULE ORAL at 21:20

## 2020-10-15 RX ADMIN — METHOCARBAMOL TABLETS 750 MG: 750 TABLET, COATED ORAL at 13:50

## 2020-10-15 RX ADMIN — POTASSIUM CHLORIDE 20 MEQ: 1500 TABLET, EXTENDED RELEASE ORAL at 09:14

## 2020-10-15 RX ADMIN — ENOXAPARIN SODIUM 30 MG: 30 INJECTION SUBCUTANEOUS at 21:21

## 2020-10-15 RX ADMIN — NEBIVOLOL HYDROCHLORIDE 20 MG: 5 TABLET ORAL at 09:14

## 2020-10-15 RX ADMIN — BUMETANIDE 2 MG: 1 TABLET ORAL at 09:14

## 2020-10-15 RX ADMIN — ENOXAPARIN SODIUM 30 MG: 30 INJECTION SUBCUTANEOUS at 09:14

## 2020-10-15 RX ADMIN — ACETAMINOPHEN 650 MG: 325 TABLET, FILM COATED ORAL at 09:14

## 2020-10-15 RX ADMIN — ACETAMINOPHEN 650 MG: 325 TABLET, FILM COATED ORAL at 13:50

## 2020-10-15 RX ADMIN — PANTOPRAZOLE SODIUM 40 MG: 40 TABLET, DELAYED RELEASE ORAL at 05:49

## 2020-10-15 RX ADMIN — MAGNESIUM GLUCONATE 500 MG ORAL TABLET 400 MG: 500 TABLET ORAL at 09:14

## 2020-10-15 RX ADMIN — FOLIC ACID 1 MG: 1 TABLET ORAL at 09:14

## 2020-10-15 RX ADMIN — OXYCODONE HYDROCHLORIDE 5 MG: 5 TABLET ORAL at 05:49

## 2020-10-15 RX ADMIN — OXYCODONE HYDROCHLORIDE 5 MG: 5 TABLET ORAL at 00:49

## 2020-10-15 RX ADMIN — DOFETILIDE 125 MCG: 0.12 CAPSULE ORAL at 09:14

## 2020-10-15 RX ADMIN — OXYCODONE HYDROCHLORIDE 5 MG: 5 TABLET ORAL at 11:37

## 2020-10-15 RX ADMIN — ACETAMINOPHEN 650 MG: 325 TABLET, FILM COATED ORAL at 21:20

## 2020-10-15 RX ADMIN — METHOCARBAMOL TABLETS 750 MG: 750 TABLET, COATED ORAL at 09:14

## 2020-10-15 RX ADMIN — SENNOSIDES 8.6 MG: 8.6 TABLET, FILM COATED ORAL at 21:20

## 2020-10-15 RX ADMIN — AMITRIPTYLINE HYDROCHLORIDE 10 MG: 10 TABLET, FILM COATED ORAL at 21:19

## 2020-10-15 ASSESSMENT — PAIN DESCRIPTION - FREQUENCY: FREQUENCY: CONTINUOUS

## 2020-10-15 ASSESSMENT — PAIN SCALES - GENERAL
PAINLEVEL_OUTOF10: 5
PAINLEVEL_OUTOF10: 5
PAINLEVEL_OUTOF10: 6
PAINLEVEL_OUTOF10: 7
PAINLEVEL_OUTOF10: 5
PAINLEVEL_OUTOF10: 8
PAINLEVEL_OUTOF10: 5

## 2020-10-15 ASSESSMENT — PAIN DESCRIPTION - LOCATION: LOCATION: BACK

## 2020-10-15 ASSESSMENT — PAIN DESCRIPTION - DESCRIPTORS: DESCRIPTORS: ACHING;CONSTANT;DISCOMFORT;SORE

## 2020-10-15 ASSESSMENT — PAIN DESCRIPTION - ONSET: ONSET: ON-GOING

## 2020-10-15 ASSESSMENT — PAIN DESCRIPTION - ORIENTATION: ORIENTATION: MID;LOWER

## 2020-10-15 ASSESSMENT — PAIN DESCRIPTION - PAIN TYPE: TYPE: SURGICAL PAIN

## 2020-10-15 NOTE — PROGRESS NOTES
Physical Therapy    Physical Therapy Initial Assessment     Name: Sandra Pappas  : 1940  MRN: [id number]    Referring Provider:  Maria Teresa Johnson MD    Date of Service: 10/15/2020    Evaluating PT:  Hany Reeder PT, DPT    Room #:  3915/4251-V  Diagnosis:  Closed compression fracture L4  PMHx/PSHx:  HTN, MI, CAD, Migraines, GERD  Procedure/Surgery:  T12, L5 Kyphoplasty 10/14/2020  Precautions:  Falls, Spine, O2  Equipment Needs:  TBD  S/p fall out of bed. SUBJECTIVE:    Pt lives alone in an apt with 4 stairs to enter and 1 rail(s). Pt ambulated with no AD PTA. Pt reported independent with ADLs. Pt is not actively driving. Pt reported daughter provides transportation as needed. OBJECTIVE:   Initial Evaluation  Date: 10/15/2020 Treatment  NA Short Term/ Long Term   Goals   AM-PAC 6 Clicks 71/55     Was pt agreeable to Eval/treatment? Yes      Does pt have pain? Reported back pain, did not quantify. Bed Mobility  Rolling: Min A  Supine to sit: Min A  Sit to supine: Min A  Scooting: Min A  Supervision   Transfers Sit to stand: Min A  Stand to sit: Min A  Stand pivot: Min A  Supervision   Ambulation    125 feet with [employer] with Min A  >200 feet with [employer] with Supervision   Stair negotiation: ascended and descended  NT  4 steps with 1 rail with Supervision   ROM BUE:  WFL  BLE:  WFL     Strength BUE:  4/5  BLE:  4/5  Increase 1/3 grade MMT   Balance Sitting EOB:  SBA  Dynamic Standing:  Min A with [employer]  Sitting EOB:  Supervision  Dynamic Standing:  Supervision with [employer]     Pt is A & O x 2, person and place (hospital). Confusion noted with short term recall, with time of day, home set up.   Sensation:  Pt denied numbness and tingling to extremities  Edema:  None noted    Vitals:  Heart Rate at rest 61 Heart Rate with mobility 61 Heart Rate post session 60   SPO2 at rest 98% on 2L/min O2  SPOS in seated on RA 93-94% SPO2 with mobility 89% on RA SPO2 post session 90% on RA, recovered to 94% with O2 at 2L/min Therapeutic Exercises:  STS x 4, gait    Patient education  Pt educated on purpose of PT assessment, importance of mobility, safety with mobility, log roll/BLT/spine precautions, transfers, gait    Patient response to education:   Pt verbalized understanding Pt demonstrated skill Pt requires further education in this area   Yes  Partially with verbal cues and assist Yes      ASSESSMENT:    Comments:  Patient cleared by RN and agreeable to treatment. Patient found in partial side lying and educated on spine precautions (log roll, BLT) and proper positioning in bed. Patient with poor recall of education given with subsequent mobility. Patient required verbal cues and hands on assist to achieve seated EOB. Patient reported mild dizziness with positional change that did not fully resolve. Patient assisted to standing and given [facility] for gait assessment. Patient demonstrated slow gait speed with short stride length and mild unsteadiness. Mobility performed on room air and SpO2 skillfully monitored throughout and noted above. Patient quizzed on spine precautions while ambulating and not able to recall/recite and education again provided. Patient assisted back to supine with call light and tray table in reach. HOB elevated to comfort. O2 donned to recover SpO2 above 91%. Treatment:  Patient practiced and was instructed in the following treatment:     Bed mobility: Verbal/tactile cues for sequencing BUEs/BLEs for safe technique with rolling/supine<>sit task. Educated on spine precautions with poor implementation.  Transfer training: Verbal/tactile cues to facilitate proper hand placement, technique and safety during sit to stand task.  Gait training: Verbal and tactile cues to facilitate upright posture and safety as well as provided with physical assistance to complete task.  Therapeutic exercises: As noted above. Pt's/ family goals   1. To feel better.     Patient and or family understand(s) diagnosis, prognosis, and plan of care. Yes     PLAN OF CARE:    Current Treatment Recommendations     [x] Strengthening     [] ROM   [x] Balance Training   [x] Endurance Training   [x] Transfer Training   [x] Gait Training   [x] Stair Training   [x] Positioning   [x] Safety and Education Training   [x] Patient/Caregiver Education   [] HEP  [x] Other Spine precautions      PT care will be provided in accordance with the objectives noted above and progressed and updated when appropriate. Exercises and functional mobility practice will be used as well as appropriate assistive devices or modalities to obtain goals. Patient and family education will also be administered as needed. Frequency of treatments: 2-5x/week x 1-2 weeks. Time in  0936  Time out  1008    Total Treatment Time  23 minutes     Evaluation Time includes thorough review of current medical information, gathering information on past medical history/social history and prior level of function, completion of standardized testing/informal observation of tasks, assessment of data and education on plan of care and goals.     CPT codes:  [] Low Complexity PT evaluation 73488  [x] Moderate Complexity PT evaluation 70460  [] High Complexity PT evaluation 67058  [] PT Re-evaluation 19848  [] Gait training 48920 - minutes  [] Manual therapy 88364 - minutes  [x] Therapeutic activities 36080 23 minutes  [] Therapeutic exercises 02091 - minutes  [] Neuromuscular reeducation 24102 - minutes     Rajwinder Martinez, PT, DPT  License [id number]

## 2020-10-15 NOTE — PROGRESS NOTES
Occupational Therapy  OCCUPATIONAL THERAPY INITIAL EVALUATION      Date:10/15/2020  Patient Name: Hawa Gaona  MRN: [id number]  : 1940  Room: [address] [place] [address]    Evaluating OT: Priscila Pratt OTR/L #[id number]    Referring physician: Maritza Ashton MD   AM-PAC Daily Activity Raw Score:   Recommended Adaptive Equipment: TBD     Reason for Admission/Diagnosis: Fall out of bed, Compression Fracture T12 & L4/5  Pertinent Medical History/Surgery: s/p kyphoplasty T12, L5, epidural injection (10/14/20)   Precautions:  Falls, spinal precautions/ spinal neutral, tele, supplemental O2, bed alarm   Patient is a questionable historian regarding PLOF and home set-up/   Home Living: Pt lives alone in an apartment. Patient unable to recall what floor apartment was located on reporting it was either on 1st, 2nd or 3rd floor. Patient reports elevated in building. Bathroom setup: Patient unable to recall. Equipment owned: none  Prior Level of Function:   Per patient, I with ADLs ,  assist with IADLs. Patient reports she able to prepare light meals and family regularly come over and help out with home management and meals. Patient was using no device for functional mobility.    Driving: no                             Leisure:Enjoys knitting    Pain Level: Patient reported back pain with movement but unable to rate intensity  Cognition: A&O: self:yes, place:yes, time: yes, situation:yes  Follows 1-2 step directions with increased processing time and cues   Memory:  poor   Sequencing:  fair    Problem solving:  poor   Judgement/safety:  poor     Functional Assessment:   Initial Eval Status  Date: 10/15/20 Treatment Status  Date: Short Term Goals=LTG  Treatment frequency: PRN 2-4 x/week   Feeding Stand by Assist   Modified [place]    Grooming Minimal Assist   Washed hands while standing at sink with verbal/ tactile cues for sequencing and assistance for dispensing soap and management of faucet handles  Modified [place]     UB Dressing Minimal Assist   Modified [place]    LB Dressing Moderate Assist   Modified [place]    Bathing Moderate Assist   Modified [place]    Toileting Minimal Assist   Completed toileting using standard commode with assistance with management of LB garments and set-up to retrieve toilet paper due to environment in order to maintain precautions  Modified [place]    Bed Mobility  Supine to sit: Min A   Sit to supine: Min A  Verbal/ tactile cues for log roll technique  Supine to sit: Mod I   Sit to supine: Mod I   Functional Transfers Sit to stand: Min A  Stand to sit: Min A  Toilet Transfer: Min A  Using grab bar  Mod I using LRD   Functional Mobility Min A  Completed functional mobility without AD bed>bathroom with patient demonstrating unsteady gait and attempting to furniture walk. Hand held assistance provided and patient safety maintained.  Patient educated on safety and technique using fww with patient completing bathroom>bed functional mobility using fww with min A overall and verbal/ tactile cues for technique  Mod I using LRD   Balance Sitting:     Static:Supervision    Dynamic:Supervision  Standing: Min A     Activity Tolerance Fair  Good   Visual/  Perceptual Glasses: yes         Safety       Poor                  Good     Upper Extremities:    Treatment Status  Date: Short Term Goals=LTG   B UE UE ROM:   B shoulder active ROM: ~0-100 degrees  B elbow-hand active ROM: WFL      Strength: NT to maintain precautions     Strength: B:3+/5                     Coordination Fine/gross Motor Coordination:  Impaired, patient unable to complete opposition, slight dysmetria with B finger<>nose                            Hand dominance: R handed    Hearing: WFL  Sensation:  c/o numbness or tingling in B hands, patient reports at baseline  Tone:  WFL  Edema: none observed B UE                            Comments:  Upon arrival, patient semi-supine in bed and agreeable to session. OT evaluation performed with education provided regarding the purpose and benefits of OT session, along with mobility and I/ADL completion. Therapist educated patient on spinal precautions/ spinal neutral positioning as it relates to I/ADLs and functional mobility. Patient demonstrated impaired memory and impaired cognition with patient unable to recall precautions at end of session. Patient confused regarding situation. At end of session, patient semi-supine in bed with call light and phone within reach, along with all lines and tubes intact. Bed alarm on. Treatment: OT treatment provided this date includes:    ADL training-  Instruction/training on safety and adapted techniques for completion of ADLs: Patient completed toileting using standard commode with set-up due to environment configuration with toilet paper and min A for management of LB garments. Patient completed laura-hygiene while seated for safety with supervision. Patient washed hands while standing at sink with min A and assistance for dispensing soap and management of faucet handles Verbal/ tactile cues for sequencing.   Mobility training-  Instruction/training on safety and improved independence with functional mobility completed  without AD bed>bathroom with patient demonstrating unsteady gait and attempting to furniture walk. Patient safety maintained with hand held assistance provided. Patient educated on safety and technique using fww with patient completing bathroom>bed functional mobility using fww with min A overall and verbal/ tactile cues for technique   Spinal precautions: Education provided regarding spinal precautions/ spinal neutral positioning as it relates to I/ADLs and functional mobility. Patient recalled 0/3 precautions at end of session. Will continue to reinforce.   Skilled monitoring of O2 sats-  Skilled monitoring of the patient's response throughout treatment.     SpO2 at rest 97%, SpO2 during activity 86%,

## 2020-10-15 NOTE — OP NOTE
[address]                  [address] [id number],  [facility]                                OPERATIVE REPORT    PATIENT NAME: Bhavana Shah                   :        1940  MED REC NO:   [id number]                            ROOM:       [facility]  ACCOUNT NO:   [de-identified]                           ADMIT DATE: 10/12/2020  PROVIDER:     Juancho Cash MD    DATE OF PROCEDURE:  10/14/2020    PREOPERATIVE DIAGNOSIS:  Compression fracture T12 and L4 counting  thoracic from the rib and L4 from the lumbar spine. POSTOPERATIVE DIAGNOSIS:  Compression fracture T12 and L4 counting  thoracic from the rib and L4 from the lumbar spine, rule out metastasis  and intractable pain. OPERATION PERFORMED:  Balloon osteoplasty guided kyphoplasty T12 and L4  and injection of epidural steroid. SURGEON:  Juancho Cash MD    TECHNIQUE:  The patient was placed on the operating room table in supine  position and after satisfactory endotracheal general anesthesia had been  administered, the patient turned into prone position on the operating  room table. The lower back and the mid back were prepared with Betadine  scrub and solution and routinely draped. Using AP and lateral  fluoroscopy, the level of T12 was marked counting at the last rib. Then  a point which was 3 cm from the midline on the right side was  infiltrated with 1% lidocaine solution. A 3 mm incision was made over  this point. Through this, a trocar and cannula from the Kyphon kit was  inserted under fluoroscopic guidance to the pedicle of T12 into the body  of T12. The tip of needle was lying at the junction of pedicle and the  body, the trocar was removed. Bone biopsy needle was inserted and  advanced into the body of T12. A piece of bone was taken, which was  sent to Pathology. The balloon was inserted into the body of T12 and  inflated to a volume of 3 mL.   The balloon crossed the midline, not  necessitating insertion of second needle at this level. Moving down to the level of L4, counting from the sacrum up, a point  which was 3 cm from the midline on the left side was infiltrated with 1%  lidocaine solution. A 3 mm incision was made over this point. Through  this, a trocar and a cannula followed by the biopsy needle, followed by  the balloon, balloon inserted into body of L4 and inflated to a volume  of 1 mL. Balloon did not cross the midline, necessitating insertion of  second needle at this level. In a similar fashion, a point which was 3  cm from the midline on the right-sided level of L4 was infiltrated with  1% lidocaine solution and a 3 mm incision made over this point. Through  this, a trocar and cannula followed by the biopsy needle, followed by  the balloon. Then, the balloon was removed and bone cement was injected  passively using injecting tubes. 6.5 mL of the bone cement was injected  into the body of T12 and 6.5 mL was injected into the body of L4 under  fluoroscopic guidance. At the conclusion of the injection, the  injecting tubes were removed and the cannulas were removed and  Steri-Strips applied over the incision areas. The patient was left in  this position for 10 minutes. During this time, a Tuohy needle from the  epidural anesthesia tray was inserted into the epidural space at the  level of L3-L4. Once the needle was lying in the epidural space,  stylets were removed. Epidural catheter was inserted and advanced into  the epidural space. Hub was connected to the catheter and 80 mg of  Depo-Medrol was injected as a nerve block. The catheter and the needle  were removed. Band-Aid applied over the area of insertion of the needle  and the patient returned to recovery room in satisfactory state. The  patient tolerated the procedure well. Estimated blood loss was  negligible.         Lucian Mcbride MD    D: 10/14/2020 10:09:36       T: 10/14/2020 11:57:17

## 2020-10-15 NOTE — CARE COORDINATION
JUNIOR spoke with patient in room regarding therapy evals. She is unsure she can manage on her own at home right now she would consider [facility] possibly but wants JUNIOR to call her daughter and ask her input. JUNIOR called and spoke with Daughter, [facility]. She is also in agreement with [facility]. She states she would like [facility] if able. Referral to Jaiden Gomez there, await acceptance.

## 2020-10-15 NOTE — PROGRESS NOTES
TRAUMA SURGERY  ATTENDING PROGRESS NOTE      CC: fall     S:  Lumbar pain better just c/o pain in her buttocks. O:   @/60   Pulse 54   Temp 97 °F (36.1 °C) (Temporal)   Resp 16   Wt 86 lb 3.2 oz (39.1 kg)   LMP  (LMP Unknown)   SpO2 99%   BMI 18.02 kg/m² @    Gen - no apparent distress   Neuro - Awake, alert, attentive, GCS 15     HEENT - PERRL 3mm   Lungs - non labored, BS clear b/l    Heart - RR   Abdomen - SNT   Spine -   Lumbar tenderness T/C wnl   Ext- rom wnl NVI     A/P: s/p fall with compression fractures     Active Problems:    Closed compression fracture of L4 lumbar vertebra, initial encounter ([facility])    Lumbar compression fracture, closed, initial encounter ([facility] [address]  Resolved Problems:    * No resolved hospital problems.  *      - Comp fx, NS following, s/p kypho   - SMI deep breathing, Pain control   - Home meds, restarted held thinners,   - PTOT       DVT prophylaxis: SCDs, lovenox      Quinaa Perrin MD FACS

## 2020-10-16 LAB
BACTERIA: ABNORMAL /HPF
BILIRUBIN URINE: NEGATIVE
BLOOD, URINE: ABNORMAL
CLARITY: CLEAR
COLOR: YELLOW
GLUCOSE URINE: NEGATIVE MG/DL
KETONES, URINE: NEGATIVE MG/DL
LEUKOCYTE ESTERASE, URINE: ABNORMAL
NITRITE, URINE: NEGATIVE
PH UA: 6 (ref 5–9)
PROTEIN UA: NEGATIVE MG/DL
RBC UA: ABNORMAL /HPF (ref 0–2)
SPECIFIC GRAVITY UA: <=1.005 (ref 1–1.03)
UROBILINOGEN, URINE: 0.2 E.U./DL
WBC UA: ABNORMAL /HPF (ref 0–5)

## 2020-10-16 PROCEDURE — 81001 URINALYSIS AUTO W/SCOPE: CPT

## 2020-10-16 PROCEDURE — 6360000002 HC RX W HCPCS: Performed by: SURGERY

## 2020-10-16 PROCEDURE — 97530 THERAPEUTIC ACTIVITIES: CPT

## 2020-10-16 PROCEDURE — 6370000000 HC RX 637 (ALT 250 FOR IP): Performed by: NEUROLOGICAL SURGERY

## 2020-10-16 PROCEDURE — 1200000000 HC SEMI PRIVATE

## 2020-10-16 PROCEDURE — 99232 SBSQ HOSP IP/OBS MODERATE 35: CPT | Performed by: SURGERY

## 2020-10-16 PROCEDURE — 97535 SELF CARE MNGMENT TRAINING: CPT

## 2020-10-16 RX ADMIN — ACETAMINOPHEN 650 MG: 325 TABLET, FILM COATED ORAL at 09:38

## 2020-10-16 RX ADMIN — POTASSIUM CHLORIDE 20 MEQ: 1500 TABLET, EXTENDED RELEASE ORAL at 09:38

## 2020-10-16 RX ADMIN — BUMETANIDE 2 MG: 1 TABLET ORAL at 09:38

## 2020-10-16 RX ADMIN — DOFETILIDE 125 MCG: 0.12 CAPSULE ORAL at 20:44

## 2020-10-16 RX ADMIN — METHOCARBAMOL TABLETS 750 MG: 750 TABLET, COATED ORAL at 17:48

## 2020-10-16 RX ADMIN — AMITRIPTYLINE HYDROCHLORIDE 10 MG: 10 TABLET, FILM COATED ORAL at 20:43

## 2020-10-16 RX ADMIN — NEBIVOLOL HYDROCHLORIDE 20 MG: 5 TABLET ORAL at 09:38

## 2020-10-16 RX ADMIN — ENOXAPARIN SODIUM 30 MG: 30 INJECTION SUBCUTANEOUS at 20:43

## 2020-10-16 RX ADMIN — PANTOPRAZOLE SODIUM 40 MG: 40 TABLET, DELAYED RELEASE ORAL at 06:27

## 2020-10-16 RX ADMIN — ACETAMINOPHEN 650 MG: 325 TABLET, FILM COATED ORAL at 14:18

## 2020-10-16 RX ADMIN — METHOCARBAMOL TABLETS 750 MG: 750 TABLET, COATED ORAL at 14:18

## 2020-10-16 RX ADMIN — ATORVASTATIN CALCIUM 40 MG: 40 TABLET, FILM COATED ORAL at 20:44

## 2020-10-16 RX ADMIN — FOLIC ACID 1 MG: 1 TABLET ORAL at 09:38

## 2020-10-16 RX ADMIN — DOFETILIDE 125 MCG: 0.12 CAPSULE ORAL at 09:39

## 2020-10-16 RX ADMIN — METHOCARBAMOL TABLETS 750 MG: 750 TABLET, COATED ORAL at 20:43

## 2020-10-16 RX ADMIN — MAGNESIUM GLUCONATE 500 MG ORAL TABLET 400 MG: 500 TABLET ORAL at 09:38

## 2020-10-16 RX ADMIN — ACETAMINOPHEN 650 MG: 325 TABLET, FILM COATED ORAL at 20:43

## 2020-10-16 RX ADMIN — SENNOSIDES 8.6 MG: 8.6 TABLET, FILM COATED ORAL at 20:44

## 2020-10-16 RX ADMIN — METHOCARBAMOL TABLETS 750 MG: 750 TABLET, COATED ORAL at 09:38

## 2020-10-16 RX ADMIN — OXYCODONE HYDROCHLORIDE 5 MG: 5 TABLET ORAL at 10:51

## 2020-10-16 RX ADMIN — ENOXAPARIN SODIUM 30 MG: 30 INJECTION SUBCUTANEOUS at 09:39

## 2020-10-16 ASSESSMENT — PAIN SCALES - GENERAL
PAINLEVEL_OUTOF10: 0
PAINLEVEL_OUTOF10: 10
PAINLEVEL_OUTOF10: 4
PAINLEVEL_OUTOF10: 8
PAINLEVEL_OUTOF10: 5

## 2020-10-16 NOTE — PROGRESS NOTES
OT BEDSIDE TREATMENT NOTE      Date:10/16/2020  Patient Name: Layla Abbott  MRN: [id number]  : 1940  Room: [address] [place], [place] [address]     Per OT Eval:    Evaluating OT: Geremias Carrasco OTR/LEIGH #[id number]     Referring physician: Dorie King MD   AM-PAC Daily Activity Raw Score:   Recommended Adaptive Equipment: TBD      Reason for Admission/Diagnosis: Fall out of bed, Compression Fracture T12 & L4/5  Pertinent Medical History/Surgery: s/p kyphoplasty T12, L5, epidural injection (10/14/20)   Precautions:  Falls, spinal precautions/ spinal neutral, tele, supplemental O2, bed alarm   Patient is a questionable historian regarding PLOF and home set-up/   Home Living: Pt lives alone in an apartment. Patient unable to recall what floor apartment was located on reporting it was either on 1st, 2nd or 3rd floor. Patient reports elevated in building. Bathroom setup: Patient unable to recall. Equipment owned: none  Prior Level of Function:   Per patient, I with ADLs ,  assist with IADLs. Patient reports she able to prepare light meals and family regularly come over and help out with home management and meals. Patient was using no device for functional mobility.    Driving: no                             Leisure:Enjoys knitting     Pain Level: Patient complained of back pain this session, did not rate  Cognition: A&O: self:yes, place:yes, time: yes, situation:yes  Follows 1-2 step directions with increased processing time and cues              Memory:  poor              Sequencing:  fair               Problem solving:  poor              Judgement/safety:  poor                Functional Assessment:    Initial Eval Status  Date: 10/15/20 Treatment Status  Date: 10/16/20 Short Term Goals=LTG  Treatment frequency: PRN 2-4 x/week   Feeding Stand by Assist   Set-up  Pt able to grasp cup and bring to mouth Modified Mason    Grooming Minimal Assist   Washed hands while standing at sink with verbal/ tactile cues for sequencing and assistance for dispensing soap and management of faucet handles  SBA  Pt washed face, applied deodorant seated upright in chair at sink, cueing to follow through with task Modified Stafford     UB Dressing Minimal Assist  Tirso  Kane/jaime hospital gown seated upright in chair at sink  Modified Stafford    LB Dressing Moderate Assist  modA  Pt able to doff socks, requiring assistance to kane socks, being limited by pain  Modified Stafford    Bathing Moderate Assist   Tirso  Pt completed sponge bathing task seated/standing, with pt able to wash of UB, and stand to wash of buttocks/laura area, with assistance to wash of LE's Modified Stafford    Toileting Minimal Assist   Completed toileting using standard commode with assistance with management of LB garments and set-up to retrieve toilet paper due to environment in order to maintain precautions  Tirso  Pt completed toileting task on standard commode, with pt able to complete of hygiene task, requiring assistance with transfer and to pull underwear over hips for safety due to pain Modified Stafford    Bed Mobility  Supine to sit: Min A   Sit to supine: Min A  Verbal/ tactile cues for log roll technique Tirso  EOB<>Supine     Cueing on log roll technique  Supine to sit: Mod I   Sit to supine: Mod I   Functional Transfers Sit to stand: Min A  Stand to sit: Min A  Toilet Transfer: Min A  Using grab bar  CGA  Sit to Stand  Stand to Sit Mod I using LRD   Functional Mobility Min A  Completed functional mobility without AD bed>bathroom with patient demonstrating unsteady gait and attempting to furniture walk. Hand held assistance provided and patient safety maintained.  Patient educated on safety and technique using fww with patient completing bathroom>bed functional mobility using fww with min A overall and verbal/ tactile cues for technique  Tirso  Pt ambulated short house hold distance in room Bathroom<>EOB, with HHA, having of unsteady gait Mod I using LRD   Balance Sitting:     Static:Supervision    Dynamic:Supervision  Standing: Min A  Sitting:  SBA    Standing:  CGA     Activity Tolerance Fair  Fair-  Limited by pain Good   Visual/  Perceptual Glasses: yes           Safety       Poor                   Good      Upper Extremities:      Treatment Status  Date: Short Term Goals=LTG   B UE UE ROM:              B shoulder active ROM: ~0-100 degrees  B elbow-hand active ROM: WFL        Strength:            NT to maintain precautions      Strength: B:3+/5                      Coordination Fine/gross Motor Coordination:  Impaired, patient unable to complete opposition, slight dysmetria with B finger<>nose                                 Hand dominance: R handed     Hearing: WFL  Sensation:  c/o numbness or tingling in B hands, patient reports at baseline  Tone:  WFL  Edema: none observed B UE            Treatment: Upon arrival pt using of commode, aide present, agreeable to therapy. Pt completed of bed mobility, functional mobility, transfers and ADL tasks this session. Pt educated on precautions to follow, log roll technique to assist with bed mobility task, hand placement with transfers and techniques to assist with LB dressing task. Pt complaining of pain throughout session, with pt having difficulty with sitting upright on commode and chair due to pain, attempting to squat/stand to use of commode, requiring assistance lower self and sit on commode. At end of session, pt lying semi-supine in bed, breakfast meal set up, with pt denying of meal, all lines and tubes intact, call light within reach, nurse notified of session. · Pt has made fair progress towards set goals.    · Continue with current plan of care focusing on increasing of independency with ADL tasks      Treatment Time In: 7:48am         Treatment Time Out: 7:12am              Treatment Charges: Mins Units   Ther Ex  08705     Manual Therapy [address] [place] [address]     ADL/Home t 80244 24 2   Neuro Re-ed 86400     Group Therapy      Orthotic manage/training  34030     Non-Billable Time     Total Timed Treatment 24 2        Layla GILBERT/LEIGH [id number]

## 2020-10-16 NOTE — PROGRESS NOTES
Physical Therapy  Facility/Department: [address] NEURO SPINE  Daily Treatment Note  NAME: Fabiola Garcia  : 1940  MRN: [id number]    Date of Service: 10/16/2020     Referring Provider:  Cheryl Lee MD     Evaluating PT:  Dale Lovett, PT, DPT     Room #:  2281-G  Diagnosis:  Closed compression fracture L4  PMHx/PSHx:  HTN, MI, CAD, Migraines, GERD  Procedure/Surgery:  T12, L5 Kyphoplasty 10/14/2020  Precautions:  Falls, Spine, O2  Equipment Needs:   [employer]    SUBJECTIVE:     Pt lives alone in an apt with 4 stairs to enter and 1 rail(s). Pt ambulated with no AD PTA. Pt reported independent with ADLs. Pt is not actively driving. Pt reported daughter provides transportation as needed.     OBJECTIVE:    Initial Evaluation  Date: 10/15/2020 Treatment  10/16/2020 Short Term/ Long Term   Goals   AM-PAC 6 Clicks      Was pt agreeable to Eval/treatment? Yes   yes     Does pt have pain? Reported back pain, did not quantify.  no c/o pain     Bed Mobility  Rolling: Min A  Supine to sit: Min A  Sit to supine: Min A  Scooting: Min A Rolling: Min A  Supine to sit: Min A  Sit to supine: Min A  Scooting: Min A  Supervision   Transfers Sit to stand: Min A  Stand to sit: Min A  Stand pivot: Min A  Sit to stand: Min A  Stand to sit: Min A  Stand pivot: Min A Supervision   Ambulation    125 feet with [employer] with Min A 150', 150' Jaylen WW  >200 feet with [employer] with Supervision   Stair negotiation: ascended and descended  NT 4 stairs single rail Jaylen   4 steps with 1 rail with Supervision   ROM BUE:  WFL  BLE:  WFL       Strength BUE:  4/5  BLE:  4/5   Increase 1/3 grade MMT   Balance Sitting EOB:  SBA  Dynamic Standing:  Min A with [employer]  Sitting EOB:  SBA  Dynamic Standing:  Min A with [employer] Sitting EOB:  Supervision  Dynamic Standing:  Supervision with [employer]      Pt is A & O x 2, person and place (hospital).  Confusion noted  Sensation:  Pt denied numbness and tingling to extremities  Edema:  None noted    Patient education  Pt educated on role of PT    Patient response to education:   Pt verbalized understanding Pt demonstrated skill Pt requires further education in this area   x x x     ASSESSMENT:    Comments:  Pt received in supine agreeable to PT. Pt requiring steadying assistance for functional mobility. Confusion noted with poor carry over of safety cues. Verbal cues to maintain appropriate distance from [employer]. Patient would benefit from continued skilled PT to maximize functional mobility independence. Treatment:  Patient practiced and was instructed in the following treatment:     Bed mobility- verbal cues for positioning and sequencing to facilitate independence   Functional transfers-Verbal cues for proper positioning and sequencing to perform transfers safely with maximum independence.  Gait training-Verbal cues for proper positioning and sequencing using assistive device to maximize functional mobility independence.  Stair negotiation- verbal cues to facilitate independence    PLAN:      PLAN OF CARE:    Current Treatment Recommendations     [x] Strengthening     [x] ROM   [x] Balance Training   [x] Endurance Training   [x] Transfer Training   [x] Gait Training   [x] Stair Training   [x] Positioning   [x] Safety and Education Training   [x] Patient/Caregiver Education   [x] HEP  [] Other       Patient is making good progress towards established goals. Will continue with current POC.       Time in  1120  Time out  1145    Total Treatment Time  25 minutes     CPT codes:  [] Gait training 47075 0 minutes  [] Manual therapy 26815 0 minutes  [x] Therapeutic activities 32860 25 minutes  [] Therapeutic exercises 50654 0 minutes  [] Neuromuscular reeducation 56801 0 minutes    Jean Cody PT, DPT  [id number]

## 2020-10-16 NOTE — PROGRESS NOTES
TRAUMA SURGERY  ATTENDING PROGRESS NOTE      CC: fall     S:  Buttock pain and can't urinate. O:   @BP (!) 122/58   Pulse 53   Temp 98.4 °F (36.9 °C) (Temporal)   Resp 16   Ht 4' 10\" (1.473 m)   Wt 86 lb 3.2 oz (39.1 kg)   LMP  (LMP Unknown)   SpO2 92%   BMI 18.02 kg/m² @    Gen - no apparent distress   Neuro - Awake, alert, attentive, GCS 15     HEENT - PERRL 3mm   Lungs - non labored, BS clear b/l    Heart - RR   Abdomen - Soft + pressure and discomfort in lower abd   Spine -   Lumbar tenderness T/C wnl   Ext- rom wnl NVI     A/P: s/p fall with compression fractures     Active Problems:    Closed compression fracture of L4 lumbar vertebra, initial encounter ([facility])    Lumbar compression fracture, closed, initial encounter ([facility] [address]  Resolved Problems:    * No resolved hospital problems. *      - Comp fx, NS following, s/p kypho   - urinary retention, place bae,   - SMI deep breathing, Pain control   - Home meds, restarted held thinners,   - PTOT dc planning soon.        DVT prophylaxis: SCDs, jason Jordan MD FACS

## 2020-10-17 PROCEDURE — 6360000002 HC RX W HCPCS: Performed by: SURGERY

## 2020-10-17 PROCEDURE — 1200000000 HC SEMI PRIVATE

## 2020-10-17 PROCEDURE — 6370000000 HC RX 637 (ALT 250 FOR IP): Performed by: NEUROLOGICAL SURGERY

## 2020-10-17 PROCEDURE — 2580000003 HC RX 258: Performed by: NEUROLOGICAL SURGERY

## 2020-10-17 PROCEDURE — 99232 SBSQ HOSP IP/OBS MODERATE 35: CPT | Performed by: SURGERY

## 2020-10-17 RX ADMIN — OXYCODONE HYDROCHLORIDE 5 MG: 5 TABLET ORAL at 06:29

## 2020-10-17 RX ADMIN — BUMETANIDE 2 MG: 1 TABLET ORAL at 10:11

## 2020-10-17 RX ADMIN — POTASSIUM CHLORIDE 20 MEQ: 1500 TABLET, EXTENDED RELEASE ORAL at 10:12

## 2020-10-17 RX ADMIN — FOLIC ACID 1 MG: 1 TABLET ORAL at 10:12

## 2020-10-17 RX ADMIN — METHOCARBAMOL TABLETS 750 MG: 750 TABLET, COATED ORAL at 17:14

## 2020-10-17 RX ADMIN — AMLODIPINE BESYLATE 10 MG: 10 TABLET ORAL at 10:11

## 2020-10-17 RX ADMIN — DOFETILIDE 125 MCG: 0.12 CAPSULE ORAL at 20:39

## 2020-10-17 RX ADMIN — NEBIVOLOL HYDROCHLORIDE 20 MG: 5 TABLET ORAL at 10:11

## 2020-10-17 RX ADMIN — METHOCARBAMOL TABLETS 750 MG: 750 TABLET, COATED ORAL at 13:38

## 2020-10-17 RX ADMIN — ACETAMINOPHEN 650 MG: 325 TABLET, FILM COATED ORAL at 20:39

## 2020-10-17 RX ADMIN — ENOXAPARIN SODIUM 30 MG: 30 INJECTION SUBCUTANEOUS at 20:40

## 2020-10-17 RX ADMIN — ATORVASTATIN CALCIUM 40 MG: 40 TABLET, FILM COATED ORAL at 20:39

## 2020-10-17 RX ADMIN — OXYCODONE HYDROCHLORIDE 5 MG: 5 TABLET ORAL at 10:29

## 2020-10-17 RX ADMIN — ACETAMINOPHEN 650 MG: 325 TABLET, FILM COATED ORAL at 10:19

## 2020-10-17 RX ADMIN — METHOCARBAMOL TABLETS 750 MG: 750 TABLET, COATED ORAL at 20:39

## 2020-10-17 RX ADMIN — PANTOPRAZOLE SODIUM 40 MG: 40 TABLET, DELAYED RELEASE ORAL at 06:29

## 2020-10-17 RX ADMIN — OXYCODONE HYDROCHLORIDE 5 MG: 5 TABLET ORAL at 20:39

## 2020-10-17 RX ADMIN — AMITRIPTYLINE HYDROCHLORIDE 10 MG: 10 TABLET, FILM COATED ORAL at 20:39

## 2020-10-17 RX ADMIN — MAGNESIUM GLUCONATE 500 MG ORAL TABLET 400 MG: 500 TABLET ORAL at 10:11

## 2020-10-17 RX ADMIN — ACETAMINOPHEN 650 MG: 325 TABLET, FILM COATED ORAL at 13:39

## 2020-10-17 RX ADMIN — SENNOSIDES 8.6 MG: 8.6 TABLET, FILM COATED ORAL at 20:39

## 2020-10-17 RX ADMIN — DOFETILIDE 125 MCG: 0.12 CAPSULE ORAL at 10:11

## 2020-10-17 RX ADMIN — ENOXAPARIN SODIUM 30 MG: 30 INJECTION SUBCUTANEOUS at 10:19

## 2020-10-17 RX ADMIN — METHOCARBAMOL TABLETS 750 MG: 750 TABLET, COATED ORAL at 10:11

## 2020-10-17 ASSESSMENT — PAIN DESCRIPTION - LOCATION
LOCATION: BACK

## 2020-10-17 ASSESSMENT — PAIN DESCRIPTION - ORIENTATION
ORIENTATION: LOWER;MID
ORIENTATION: LOWER;MID
ORIENTATION: MID;LOWER
ORIENTATION: LOWER;MID

## 2020-10-17 ASSESSMENT — PAIN SCALES - GENERAL
PAINLEVEL_OUTOF10: 6
PAINLEVEL_OUTOF10: 9
PAINLEVEL_OUTOF10: 5
PAINLEVEL_OUTOF10: 6
PAINLEVEL_OUTOF10: 3
PAINLEVEL_OUTOF10: 0
PAINLEVEL_OUTOF10: 5
PAINLEVEL_OUTOF10: 9

## 2020-10-17 ASSESSMENT — PAIN DESCRIPTION - ONSET
ONSET: ON-GOING
ONSET: ON-GOING

## 2020-10-17 ASSESSMENT — PAIN DESCRIPTION - FREQUENCY
FREQUENCY: INTERMITTENT
FREQUENCY: CONTINUOUS

## 2020-10-17 ASSESSMENT — PAIN DESCRIPTION - DESCRIPTORS
DESCRIPTORS: PENETRATING;SORE;SPASM
DESCRIPTORS: ACHING;CONSTANT;DISCOMFORT
DESCRIPTORS: ACHING;DISCOMFORT;SORE

## 2020-10-17 ASSESSMENT — PAIN - FUNCTIONAL ASSESSMENT: PAIN_FUNCTIONAL_ASSESSMENT: PREVENTS OR INTERFERES WITH MANY ACTIVE NOT PASSIVE ACTIVITIES

## 2020-10-17 ASSESSMENT — PAIN DESCRIPTION - PAIN TYPE
TYPE: SURGICAL PAIN

## 2020-10-17 ASSESSMENT — PAIN DESCRIPTION - PROGRESSION: CLINICAL_PROGRESSION: NOT CHANGED

## 2020-10-17 NOTE — PLAN OF CARE
Problem: Falls - Risk of:  Goal: Will remain free from falls  Description: Will remain free from falls  10/17/2020 1451 by Mario Lopez RN  Outcome: Met This Shift     Problem: Falls - Risk of:  Goal: Absence of physical injury  Description: Absence of physical injury  10/17/2020 1451 by Mario Lopez RN  Outcome: Met This Shift     Problem: Pain:  Goal: Pain level will decrease  Description: Pain level will decrease  10/17/2020 1451 by Mario Lopez RN  Outcome: Met This Shift     Problem: Pain:  Goal: Control of acute pain  Description: Control of acute pain  10/17/2020 1451 by Mario Lopez RN  Outcome: Met This Shift     Problem: Pain:  Goal: Control of chronic pain  Description: Control of chronic pain  Outcome: Met This Shift

## 2020-10-17 NOTE — PROGRESS NOTES
I called and spoke to the answering service and she will send a message to Dr. Smooth Silva for new consult

## 2020-10-17 NOTE — CONSULTS
apparent distress. HEENT:  Normocephalic, atraumatic. Pupils equal, round. No scleral icterus. No conjunctival injection. Normal lips, teeth, and gums. No nasal discharge. Neck:  Supple, no masses. Heart:  RRR  Lungs:  No audible wheezing. Respirations symmetric and non-labored. Abdomen:  soft, nontender, no masses, no organomegaly, no peritoneal signs  Extremities:  No clubbing, cyanosis, or edema  Skin:  Warm and dry, no open lesions or rashes  Neuro:  Cranial nerves 2-12 intact, no focal deficits  Rectal: deferred  Genitalia:  deferred    LABS:    Lab Results   Component Value Date    WBC 9.4 10/13/2020    HGB 10.9 (L) 10/13/2020    HCT 32.1 (L) 10/13/2020    MCV 96.4 10/13/2020     (L) 10/13/2020       Lab Results   Component Value Date    CREATININE 0.6 10/13/2020           Lab Results   Component Value Date    LABURIN  01/15/2018     ,000 CFU/mL  Mixed steve isolated. Further workup and sensitivity testing  is not routinely indicated and will not be performed. Mixed steve isolated includes:  Mixed gram positive organisms  Gram negative rods         Lab Results   Component Value Date    BC 5 Days- no growth 05/01/2014       Lab Results   Component Value Date    BLOODCULT2 5 Days- no growth 05/01/2014       ASSESSMENT / PLAN:      1. Urinary retention after kyphoplasty. Postvoid residual 300 cc. Keller catheter inserted. Recommend encouraging regular bowel movements and giving a trial of voiding prior to discharge. May require urodynamics as an outpatient if no improvement over time. Should hopefully improve over time after initial spinal shock resolves.       Patricia Bower M.D.  4:06 PM  10/17/2020

## 2020-10-17 NOTE — PROGRESS NOTES
TRAUMA SURGERY  ATTENDING PROGRESS NOTE      CC: fall     S:  Still issues urinating. ...     O:   @BP (!) 145/68   Pulse 52   Temp 97 °F (36.1 °C) (Temporal)   Resp 16   Ht 4' 10\" (1.473 m)   Wt 86 lb 3.2 oz (39.1 kg)   LMP  (LMP Unknown)   SpO2 91%   BMI 18.02 kg/m² @    Gen - no apparent distress   Neuro - Awake, alert, attentive, GCS 15     HEENT - PERRL 3mm   Lungs - non labored, BS clear b/l    Heart - RR   Abdomen - Soft + pressure and discomfort in lower abd   Spine -   Lumbar tenderness T/C wnl   Ext- rom wnl NVI     A/P: s/p fall with compression fractures     Active Problems:    Closed compression fracture of L4 lumbar vertebra, initial encounter ([facility])    Lumbar compression fracture, closed, initial encounter ([facility] [address]  Resolved Problems:    * No resolved hospital problems. *      - Comp fx, NS following, s/p kypho   - urinary retention, place bae, urology   - SMI deep breathing, Pain control   - Home meds, restarted held thinners,   - PTOT dc planning soon. Possible today. ..        DVT prophylaxis: SCDs, jason Pressley MD FACS

## 2020-10-17 NOTE — PROGRESS NOTES
PO Kyphoplasty  Back pain is better  C/O bilateral hip pain.   Xray of the hips unremarkable              Will order bone scan

## 2020-10-17 NOTE — PROGRESS NOTES
Inserted 16 F catheter and returned 300 cc of clear yellow urine . Dr. Everette Sacks here and he stated if 300 cc of urine or greater Ok to leave Keller catheter in . Inflated balloon with 10 cc of water . Sterile technique and patient tolerated well .

## 2020-10-18 ENCOUNTER — APPOINTMENT (OUTPATIENT)
Dept: NUCLEAR MEDICINE | Age: 80
DRG: 478 | End: 2020-10-18
Payer: MEDICARE

## 2020-10-18 PROCEDURE — 97530 THERAPEUTIC ACTIVITIES: CPT

## 2020-10-18 PROCEDURE — 78306 BONE IMAGING WHOLE BODY: CPT | Performed by: RADIOLOGY

## 2020-10-18 PROCEDURE — 78306 BONE IMAGING WHOLE BODY: CPT

## 2020-10-18 PROCEDURE — 6360000002 HC RX W HCPCS: Performed by: SURGERY

## 2020-10-18 PROCEDURE — 99232 SBSQ HOSP IP/OBS MODERATE 35: CPT | Performed by: SURGERY

## 2020-10-18 PROCEDURE — 1200000000 HC SEMI PRIVATE

## 2020-10-18 PROCEDURE — 6370000000 HC RX 637 (ALT 250 FOR IP): Performed by: NEUROLOGICAL SURGERY

## 2020-10-18 PROCEDURE — A9503 TC99M MEDRONATE: HCPCS | Performed by: RADIOLOGY

## 2020-10-18 PROCEDURE — 3430000000 HC RX DIAGNOSTIC RADIOPHARMACEUTICAL: Performed by: RADIOLOGY

## 2020-10-18 RX ORDER — TC 99M MEDRONATE 20 MG/10ML
29 INJECTION, POWDER, LYOPHILIZED, FOR SOLUTION INTRAVENOUS
Status: COMPLETED | OUTPATIENT
Start: 2020-10-18 | End: 2020-10-18

## 2020-10-18 RX ADMIN — ACETAMINOPHEN 650 MG: 325 TABLET, FILM COATED ORAL at 09:28

## 2020-10-18 RX ADMIN — OXYCODONE HYDROCHLORIDE 5 MG: 5 TABLET ORAL at 09:31

## 2020-10-18 RX ADMIN — OXYCODONE HYDROCHLORIDE 5 MG: 5 TABLET ORAL at 21:27

## 2020-10-18 RX ADMIN — ACETAMINOPHEN 650 MG: 325 TABLET, FILM COATED ORAL at 21:27

## 2020-10-18 RX ADMIN — DOFETILIDE 125 MCG: 0.12 CAPSULE ORAL at 21:26

## 2020-10-18 RX ADMIN — MAGNESIUM GLUCONATE 500 MG ORAL TABLET 400 MG: 500 TABLET ORAL at 09:30

## 2020-10-18 RX ADMIN — BUMETANIDE 2 MG: 1 TABLET ORAL at 09:30

## 2020-10-18 RX ADMIN — FOLIC ACID 1 MG: 1 TABLET ORAL at 09:29

## 2020-10-18 RX ADMIN — PANTOPRAZOLE SODIUM 40 MG: 40 TABLET, DELAYED RELEASE ORAL at 07:00

## 2020-10-18 RX ADMIN — POTASSIUM CHLORIDE 20 MEQ: 1500 TABLET, EXTENDED RELEASE ORAL at 09:29

## 2020-10-18 RX ADMIN — ENOXAPARIN SODIUM 30 MG: 30 INJECTION SUBCUTANEOUS at 09:29

## 2020-10-18 RX ADMIN — METHOCARBAMOL TABLETS 750 MG: 750 TABLET, COATED ORAL at 17:51

## 2020-10-18 RX ADMIN — TC 99M MEDRONATE 29 MILLICURIE: 20 INJECTION, POWDER, LYOPHILIZED, FOR SOLUTION INTRAVENOUS at 12:06

## 2020-10-18 RX ADMIN — ACETAMINOPHEN 650 MG: 325 TABLET, FILM COATED ORAL at 13:02

## 2020-10-18 RX ADMIN — ENOXAPARIN SODIUM 30 MG: 30 INJECTION SUBCUTANEOUS at 21:29

## 2020-10-18 RX ADMIN — METHOCARBAMOL TABLETS 750 MG: 750 TABLET, COATED ORAL at 13:02

## 2020-10-18 RX ADMIN — METHOCARBAMOL TABLETS 750 MG: 750 TABLET, COATED ORAL at 09:30

## 2020-10-18 RX ADMIN — AMITRIPTYLINE HYDROCHLORIDE 10 MG: 10 TABLET, FILM COATED ORAL at 21:26

## 2020-10-18 RX ADMIN — METHOCARBAMOL TABLETS 750 MG: 750 TABLET, COATED ORAL at 21:27

## 2020-10-18 RX ADMIN — SENNOSIDES 8.6 MG: 8.6 TABLET, FILM COATED ORAL at 21:27

## 2020-10-18 RX ADMIN — NEBIVOLOL HYDROCHLORIDE 20 MG: 5 TABLET ORAL at 09:29

## 2020-10-18 RX ADMIN — AMLODIPINE BESYLATE 10 MG: 10 TABLET ORAL at 09:30

## 2020-10-18 RX ADMIN — DOFETILIDE 125 MCG: 0.12 CAPSULE ORAL at 09:30

## 2020-10-18 RX ADMIN — ATORVASTATIN CALCIUM 40 MG: 40 TABLET, FILM COATED ORAL at 21:26

## 2020-10-18 ASSESSMENT — PAIN DESCRIPTION - DESCRIPTORS
DESCRIPTORS: ACHING;DULL;THROBBING
DESCRIPTORS: ACHING;DULL;THROBBING

## 2020-10-18 ASSESSMENT — PAIN SCALES - GENERAL
PAINLEVEL_OUTOF10: 9
PAINLEVEL_OUTOF10: 9
PAINLEVEL_OUTOF10: 0
PAINLEVEL_OUTOF10: 10
PAINLEVEL_OUTOF10: 10
PAINLEVEL_OUTOF10: 2
PAINLEVEL_OUTOF10: 5

## 2020-10-18 ASSESSMENT — PAIN DESCRIPTION - PAIN TYPE
TYPE: ACUTE PAIN

## 2020-10-18 ASSESSMENT — PAIN DESCRIPTION - LOCATION
LOCATION: GENERALIZED

## 2020-10-18 ASSESSMENT — PAIN - FUNCTIONAL ASSESSMENT
PAIN_FUNCTIONAL_ASSESSMENT: PREVENTS OR INTERFERES WITH MANY ACTIVE NOT PASSIVE ACTIVITIES
PAIN_FUNCTIONAL_ASSESSMENT: PREVENTS OR INTERFERES WITH MANY ACTIVE NOT PASSIVE ACTIVITIES

## 2020-10-18 NOTE — PROGRESS NOTES
[facility] PROGRESS NOTE                                                                       10/18/2020        CHIEF UROLOGIC COMPLAINT: Urinary retention, PVR around 300 cc    HISTORY OF PRESENT ILLNESS:  Patient with severe back pain. Keller catheter draining well. This was inserted due to PVR of approximately 300 cc. REVIEW OF SYSTEMS:   CONSTITUTIONAL: negative  HEENT: negative  HEMATOLOGIC: negative  ENDOCRINE: negative  RESPIRATORY: negative  CV: negative  GI: negative  NEURO: negative  ORTHOPEDICS: Severe back pain  PSYCHIATRIC: negative  : as above    PAST FAMILY HISTORY:  History reviewed. No pertinent family history. PAST SOCIAL HISTORY:    Social History     Socioeconomic History    Marital status:      Spouse name: None    Number of children: 3    Years of education: 9    Highest education level: 9th grade   Occupational History    Occupation: homemaker   Social Needs    Financial resource strain: Not very hard    Food insecurity     Worry: Never true     Inability: Never true    Transportation needs     Medical: No     Non-medical: No   Tobacco Use    Smoking status: Former Smoker     Packs/day: 1.00     Years: 10.00     Pack years: 10.00     Types: Cigarettes     Last attempt to quit: 2000     Years since quittin.6    Smokeless tobacco: Never Used   Substance and Sexual Activity    Alcohol use: No     Alcohol/week: 0.0 standard drinks    Drug use: No    Sexual activity: None   Lifestyle    Physical activity     Days per week: 0 days     Minutes per session: 0 min    Stress:  Only a little   Relationships    Social connections     Talks on phone: Twice a week     Gets together: Twice a week     Attends [employer] service: Never     Active member of club or organization: No     Attends meetings of clubs or organizations: Never     Relationship status:     Intimate partner violence     Fear of current or ex partner: None     Emotionally abused: None     Physically abused: None     Forced sexual activity: None   Other Topics Concern    None   Social History Narrative    None       Scheduled Meds:   enoxaparin  30 mg Subcutaneous BID    methocarbamol  750 mg Oral 4x Daily    amitriptyline  10 mg Oral Nightly    amLODIPine  10 mg Oral Daily    atorvastatin  40 mg Oral Daily    bumetanide  2 mg Oral Daily    dofetilide  125 mcg Oral 2 times per day    folic acid  1 mg Oral Daily    magnesium oxide  400 mg Oral Daily    nebivolol  20 mg Oral Daily    pantoprazole  40 mg Oral QAM AC    potassium chloride  20 mEq Oral Daily    sodium chloride flush  10 mL Intravenous 2 times per day    acetaminophen  650 mg Oral TID    senna  1 tablet Oral Nightly     Continuous Infusions:  PRN Meds:.oxyCODONE, albuterol, sodium chloride flush, promethazine **OR** ondansetron, morphine, polyethylene glycol    BP (!) 105/59   Pulse 54   Temp 96.8 °F (36 °C) (Temporal)   Resp 16   Ht 4' 10\" (1.473 m)   Wt 86 lb 3.2 oz (39.1 kg)   LMP  (LMP Unknown)   SpO2 96%   BMI 18.02 kg/m²     Lab Results   Component Value Date    WBC 9.4 10/13/2020    HGB 10.9 (L) 10/13/2020    HCT 32.1 (L) 10/13/2020    MCV 96.4 10/13/2020     (L) 10/13/2020       Lab Results   Component Value Date    CREATININE 0.6 10/13/2020       No results found for: PSA    Lab Results   Component Value Date    LABURIN  01/15/2018     ,000 CFU/mL  Mixed steve isolated. Further workup and sensitivity testing  is not routinely indicated and will not be performed.   Mixed steve isolated includes:  Mixed gram positive organisms  Gram negative rods      LABURIN >100,000 CFU/ml 10/31/2016    LABURIN  05/02/2014     <10,000 CFU/mL  Mixed gram positive organisms  Gram negative rods       Lab Results   Component Value Date    BC 5 Days- no growth 05/01/2014       Lab Results   Component Value Date    BLOODCULT2 5 Days- no growth 05/01/2014       PHYSICAL EXAMINATION:  Skin dry, without rashes  Respirations non-labored, intact  Abdomen soft, non-tender, non-distended  Alert and oriented x3, in distress due to pain  Keller draining clear, yellow urine      ASSESSMENT AND PLAN:  1. Urinary retention. Having significant back issues. Once back issues are resolved and the patient is medically improved recommend trial of voiding with postvoid residual 6 hours later. No acute urologic issues at this time. Please call if any questions or concerns.     Remington Hartman M.D.  10/18/2020  1:38 PM

## 2020-10-18 NOTE — PROGRESS NOTES
PO Kyphoplasty  Back pain is better  C/O bilateral hip pain.   Xray of the hips unremarkable               Awaiting bone scan

## 2020-10-18 NOTE — PLAN OF CARE
Problem: Falls - Risk of:  Goal: Will remain free from falls  Description: Will remain free from falls  10/18/2020 1638 by Aracely Bojorquez RN  Outcome: Met This Shift     Problem: Falls - Risk of:  Goal: Absence of physical injury  Description: Absence of physical injury  10/18/2020 1638 by Aracely Bojorquez RN  Outcome: Met This Shift     Problem: Pain:  Goal: Pain level will decrease  Description: Pain level will decrease  10/18/2020 1638 by Aracely Bojorquez RN  Outcome: Met This Shift     Problem: Pain:  Goal: Control of acute pain  Description: Control of acute pain  10/18/2020 1638 by Aracely Bjoorquez RN  Outcome: Met This Shift     Problem: Pain:  Goal: Control of chronic pain  Description: Control of chronic pain  10/18/2020 1638 by Aracely Bojorquez RN  Outcome: Met This Shift     Problem: Skin Integrity:  Goal: Will show no infection signs and symptoms  Description: Will show no infection signs and symptoms  Outcome: Met This Shift     Problem: Skin Integrity:  Goal: Absence of new skin breakdown  Description: Absence of new skin breakdown  Outcome: Met This Shift

## 2020-10-18 NOTE — PROGRESS NOTES
TRAUMA SURGERY  ATTENDING PROGRESS NOTE      CC: fall     S:  Still with pain,     O:   @BP (!) 105/59   Pulse 54   Temp 96.8 °F (36 °C) (Temporal)   Resp 16   Ht 4' 10\" (1.473 m)   Wt 86 lb 3.2 oz (39.1 kg)   LMP  (LMP Unknown)   SpO2 96%   BMI 18.02 kg/m² @    Gen - no apparent distress   Neuro - Awake, alert, attentive, GCS 15     HEENT - PERRL 3mm   Lungs - non labored, BS clear b/l    Heart - RR   Abdomen - Soft + pressure and discomfort in lower abd   Spine -   Lumbar tenderness T/C wnl   Ext- rom wnl NVI     A/P: s/p fall with compression fractures     Active Problems:    Closed compression fracture of L4 lumbar vertebra, initial encounter ([facility])    Lumbar compression fracture, closed, initial encounter ([facility] [address]  Resolved Problems:    * No resolved hospital problems. *      - Comp fx, NS following, s/p kypho , bone scan ordered today. .   - urinary retention, bae, urology recs appreciated   - SMI deep breathing, Pain control   - Home meds, restarted held thinners,   - PTOT dc planning soon. Pending work up.        DVT prophylaxis: SCDs, jason Fontanez MD FACS

## 2020-10-18 NOTE — PROGRESS NOTES
94  Spo2 94%  POST      Patient education  Pt educated on spinal precautions, proper positioning and sequencing during ambulation and stair negotiation, and role of PT. Patient response to education:   Pt verbalized understanding Pt demonstrated skill Pt requires further education in this area   X X X     ASSESSMENT:    Comments:  Pt was received in supine agreeable to PT treatment. Required increased time and verbal cues to safely perform bed mobility and functional transfers. Ambulates with decreased speed and short step height and length. Required verbal cues and in regard to walker approximation, manipulation of the assistive device, and steadying assistance. Required increased time and steadying assistance to negotiate stairs. Pt was left sitting in bedside chair with call light in reach. Treatment:  Patient practiced and was instructed in the following treatment:     Bed mobility- verbal cues to promote functional independence   Functional transfers- verbal cues and assistance to facilitate proper positioning and sequencing   Gait training- verbal cues and assistance to facilitate proper positioning of assistive device   Stair negotiation- verbal cues and assistance to promote proper positioning and sequencing    PLAN:      PLAN OF CARE:    Current Treatment Recommendations     [x] Strengthening     [x] ROM   [x] Balance Training   [x] Endurance Training   [x] Transfer Training   [x] Gait Training   [x] Stair Training   [x] Positioning   [x] Safety and Education Training   [x] Patient/Caregiver Education   [x] HEP  [] Other       Patient is making good progress towards established goals. Will continue with current POC.       Time in  0830  Time out  0853    Total Treatment Time  23 minutes     CPT codes:  [] Gait training 83080 0 minutes  [] Manual therapy 69816 0 minutes  [x] Therapeutic activities 55940 23 minutes  [] Therapeutic exercises 12012 0 minutes  [] Neuromuscular reeducation 76273 0 minutes     Jennifer Viera, PHI Quispe PT, DPT  [id number]

## 2020-10-18 NOTE — PLAN OF CARE
Problem: Falls - Risk of:  Goal: Will remain free from falls  Description: Will remain free from falls  10/18/2020 0414 by Alla Marquez RN  Outcome: Met This Shift  10/17/2020 1451 by oCby House RN  Outcome: Met This Shift  Goal: Absence of physical injury  Description: Absence of physical injury  10/18/2020 0414 by Alla Marquez RN  Outcome: Met This Shift  10/17/2020 1451 by Coby House RN  Outcome: Met This Shift     Problem: Pain:  Goal: Pain level will decrease  Description: Pain level will decrease  10/18/2020 0414 by Alla Marquez RN  Outcome: Met This Shift  10/17/2020 1451 by Coby House RN  Outcome: Met This Shift  Goal: Control of acute pain  Description: Control of acute pain  10/18/2020 0414 by Alla Marquez RN  Outcome: Met This Shift  10/17/2020 1451 by Coby House RN  Outcome: Met This Shift  Goal: Control of chronic pain  Description: Control of chronic pain  10/18/2020 0414 by Alla Marquez RN  Outcome: Met This Shift  10/17/2020 1451 by Coby House RN  Outcome: Met This Shift     Problem:  Activity:  Goal: Ability to avoid complications of mobility impairment will improve  Description: Ability to avoid complications of mobility impairment will improve  Outcome: Met This Shift     Problem: Physical Regulation:  Goal: Will remain free from infection  Description: Will remain free from infection  Outcome: Met This Shift

## 2020-10-19 VITALS
TEMPERATURE: 98.9 F | BODY MASS INDEX: 18.09 KG/M2 | HEART RATE: 52 BPM | DIASTOLIC BLOOD PRESSURE: 55 MMHG | RESPIRATION RATE: 16 BRPM | SYSTOLIC BLOOD PRESSURE: 107 MMHG | HEIGHT: 58 IN | OXYGEN SATURATION: 94 % | WEIGHT: 86.2 LBS

## 2020-10-19 PROCEDURE — 6360000002 HC RX W HCPCS: Performed by: SURGERY

## 2020-10-19 PROCEDURE — 6370000000 HC RX 637 (ALT 250 FOR IP): Performed by: NEUROLOGICAL SURGERY

## 2020-10-19 PROCEDURE — 2580000003 HC RX 258: Performed by: NEUROLOGICAL SURGERY

## 2020-10-19 PROCEDURE — 27197 CLSD TX PELVIC RING FX: CPT | Performed by: ORTHOPAEDIC SURGERY

## 2020-10-19 PROCEDURE — 97530 THERAPEUTIC ACTIVITIES: CPT

## 2020-10-19 PROCEDURE — 99221 1ST HOSP IP/OBS SF/LOW 40: CPT | Performed by: ORTHOPAEDIC SURGERY

## 2020-10-19 PROCEDURE — 99232 SBSQ HOSP IP/OBS MODERATE 35: CPT | Performed by: SURGERY

## 2020-10-19 RX ORDER — DOCUSATE SODIUM 100 MG/1
100 CAPSULE, LIQUID FILLED ORAL 2 TIMES DAILY
Qty: 50 CAPSULE | Refills: 0 | Status: SHIPPED | OUTPATIENT
Start: 2020-10-19 | End: 2020-10-19 | Stop reason: SDUPTHER

## 2020-10-19 RX ORDER — SENNOSIDES 8.6 MG
1 TABLET ORAL DAILY
Qty: 120 TABLET | Refills: 0 | Status: SHIPPED | OUTPATIENT
Start: 2020-10-19 | End: 2020-10-19 | Stop reason: SDUPTHER

## 2020-10-19 RX ORDER — SENNOSIDES 8.6 MG
1 TABLET ORAL DAILY
Qty: 120 TABLET | Refills: 0 | Status: SHIPPED | OUTPATIENT
Start: 2020-10-19 | End: 2021-07-08 | Stop reason: ALTCHOICE

## 2020-10-19 RX ORDER — ACETAMINOPHEN 500 MG
1000 TABLET ORAL 3 TIMES DAILY
Qty: 180 TABLET | Refills: 5 | Status: SHIPPED | OUTPATIENT
Start: 2020-10-19 | End: 2020-10-19 | Stop reason: SDUPTHER

## 2020-10-19 RX ORDER — OXYCODONE HYDROCHLORIDE 5 MG/1
5 TABLET ORAL EVERY 6 HOURS PRN
Qty: 28 TABLET | Refills: 0 | Status: SHIPPED | OUTPATIENT
Start: 2020-10-19 | End: 2020-10-19 | Stop reason: SDUPTHER

## 2020-10-19 RX ORDER — DOCUSATE SODIUM 100 MG/1
100 CAPSULE, LIQUID FILLED ORAL 2 TIMES DAILY
Qty: 50 CAPSULE | Refills: 0 | Status: SHIPPED | OUTPATIENT
Start: 2020-10-19 | End: 2021-07-08 | Stop reason: ALTCHOICE

## 2020-10-19 RX ORDER — OXYCODONE HYDROCHLORIDE 5 MG/1
5 TABLET ORAL EVERY 6 HOURS PRN
Qty: 28 TABLET | Refills: 0 | Status: SHIPPED | OUTPATIENT
Start: 2020-10-19 | End: 2020-10-26

## 2020-10-19 RX ORDER — METHOCARBAMOL 750 MG/1
750 TABLET, FILM COATED ORAL 4 TIMES DAILY
Qty: 40 TABLET | Refills: 0 | Status: SHIPPED | OUTPATIENT
Start: 2020-10-19 | End: 2020-10-29

## 2020-10-19 RX ORDER — ACETAMINOPHEN 500 MG
1000 TABLET ORAL 3 TIMES DAILY
Qty: 180 TABLET | Refills: 5 | Status: SHIPPED | OUTPATIENT
Start: 2020-10-19 | End: 2021-07-08

## 2020-10-19 RX ORDER — METHOCARBAMOL 750 MG/1
750 TABLET, FILM COATED ORAL 4 TIMES DAILY
Qty: 40 TABLET | Refills: 0 | Status: SHIPPED | OUTPATIENT
Start: 2020-10-19 | End: 2020-10-19

## 2020-10-19 RX ADMIN — PANTOPRAZOLE SODIUM 40 MG: 40 TABLET, DELAYED RELEASE ORAL at 06:43

## 2020-10-19 RX ADMIN — DOFETILIDE 125 MCG: 0.12 CAPSULE ORAL at 09:42

## 2020-10-19 RX ADMIN — MAGNESIUM GLUCONATE 500 MG ORAL TABLET 400 MG: 500 TABLET ORAL at 09:42

## 2020-10-19 RX ADMIN — SODIUM CHLORIDE, PRESERVATIVE FREE 10 ML: 5 INJECTION INTRAVENOUS at 09:43

## 2020-10-19 RX ADMIN — ACETAMINOPHEN 650 MG: 325 TABLET, FILM COATED ORAL at 09:42

## 2020-10-19 RX ADMIN — FOLIC ACID 1 MG: 1 TABLET ORAL at 09:42

## 2020-10-19 RX ADMIN — OXYCODONE HYDROCHLORIDE 5 MG: 5 TABLET ORAL at 02:26

## 2020-10-19 RX ADMIN — METHOCARBAMOL TABLETS 750 MG: 750 TABLET, COATED ORAL at 09:42

## 2020-10-19 RX ADMIN — OXYCODONE HYDROCHLORIDE 5 MG: 5 TABLET ORAL at 14:11

## 2020-10-19 RX ADMIN — METHOCARBAMOL TABLETS 750 MG: 750 TABLET, COATED ORAL at 14:09

## 2020-10-19 RX ADMIN — ACETAMINOPHEN 650 MG: 325 TABLET, FILM COATED ORAL at 14:09

## 2020-10-19 RX ADMIN — METHOCARBAMOL TABLETS 750 MG: 750 TABLET, COATED ORAL at 18:49

## 2020-10-19 RX ADMIN — AMLODIPINE BESYLATE 10 MG: 10 TABLET ORAL at 09:42

## 2020-10-19 RX ADMIN — POTASSIUM CHLORIDE 20 MEQ: 1500 TABLET, EXTENDED RELEASE ORAL at 09:43

## 2020-10-19 RX ADMIN — BUMETANIDE 2 MG: 1 TABLET ORAL at 09:42

## 2020-10-19 RX ADMIN — ENOXAPARIN SODIUM 30 MG: 30 INJECTION SUBCUTANEOUS at 09:43

## 2020-10-19 RX ADMIN — NEBIVOLOL HYDROCHLORIDE 20 MG: 5 TABLET ORAL at 09:42

## 2020-10-19 ASSESSMENT — PAIN SCALES - GENERAL
PAINLEVEL_OUTOF10: 10
PAINLEVEL_OUTOF10: 0
PAINLEVEL_OUTOF10: 10
PAINLEVEL_OUTOF10: 6
PAINLEVEL_OUTOF10: 10

## 2020-10-19 ASSESSMENT — PAIN DESCRIPTION - LOCATION
LOCATION: GENERALIZED
LOCATION: GENERALIZED

## 2020-10-19 ASSESSMENT — PAIN DESCRIPTION - PAIN TYPE
TYPE: ACUTE PAIN
TYPE: ACUTE PAIN

## 2020-10-19 ASSESSMENT — PAIN DESCRIPTION - DESCRIPTORS: DESCRIPTORS: ACHING;DULL;THROBBING

## 2020-10-19 ASSESSMENT — PAIN - FUNCTIONAL ASSESSMENT: PAIN_FUNCTIONAL_ASSESSMENT: PREVENTS OR INTERFERES SOME ACTIVE ACTIVITIES AND ADLS

## 2020-10-19 NOTE — PLAN OF CARE
Problem: Falls - Risk of:  Goal: Will remain free from falls  Description: Will remain free from falls  10/19/2020 1856 by Brianna Drew RN  Outcome: Completed  10/19/2020 0458 by Yesenia Ling RN  Outcome: Met This Shift  Goal: Absence of physical injury  Description: Absence of physical injury  10/19/2020 1856 by Brianna Drew RN  Outcome: Completed  10/19/2020 0458 by Yesenia Ling RN  Outcome: Met This Shift     Problem: Pain:  Goal: Pain level will decrease  Description: Pain level will decrease  10/19/2020 1856 by Brianna Drew RN  Outcome: Completed  10/19/2020 0458 by Yesenia Ling RN  Outcome: Met This Shift  Goal: Control of acute pain  Description: Control of acute pain  10/19/2020 1856 by Brianna Drew RN  Outcome: Completed  10/19/2020 0458 by Yesenia Ling RN  Outcome: Met This Shift  Goal: Control of chronic pain  Description: Control of chronic pain  10/19/2020 1856 by Brianna Drew RN  Outcome: Completed  10/19/2020 0458 by Yesenia Ling RN  Outcome: Met This Shift     Problem: Skin Integrity:  Goal: Will show no infection signs and symptoms  Description: Will show no infection signs and symptoms  10/19/2020 1856 by Brianna Drew RN  Outcome: Completed  10/19/2020 0458 by Yesenia Ling RN  Outcome: Met This Shift  Goal: Absence of new skin breakdown  Description: Absence of new skin breakdown  10/19/2020 1856 by Brianna Drew RN  Outcome: Completed  10/19/2020 0458 by Yesenia Ling RN  Outcome: Met This Shift     Problem:  Activity:  Goal: Ability to avoid complications of mobility impairment will improve  Description: Ability to avoid complications of mobility impairment will improve  10/19/2020 1856 by Brianna Drew RN  Outcome: Completed  10/19/2020 0458 by Yesenia Ling RN  Outcome: Ongoing  Goal: Ability to tolerate increased activity will improve  Description: Ability to tolerate increased activity will improve  10/19/2020 1856 by Virgen Dempsey, RN  Outcome: Completed  10/19/2020 0458 by Dilip Castro, RN  Outcome: Ongoing     Problem:  Bowel/Gastric:  Goal: Gastrointestinal status for postoperative course will improve  Description: Gastrointestinal status for postoperative course will improve  Outcome: Completed     Problem: Health Behavior:  Goal: Identification of resources available to assist in meeting health care needs will improve  Description: Identification of resources available to assist in meeting health care needs will improve  Outcome: Completed  Goal: Ability to state signs and symptoms to report to health care provider will improve  Description: Ability to state signs and symptoms to report to health care provider will improve  Outcome: Completed     Problem: Physical Regulation:  Goal: Ability to maintain clinical measurements within normal limits will improve  Description: Ability to maintain clinical measurements within normal limits will improve  Outcome: Completed  Goal: Will remain free from infection  Description: Will remain free from infection  Outcome: Completed

## 2020-10-19 NOTE — PROGRESS NOTES
Called pt's daughter Fabiola Salas to inform her of pt being discharged. Also told her that the scripts were sent to [facility] on [address]. Fabiola Salas stated she will be here in a half an hour.

## 2020-10-19 NOTE — PROGRESS NOTES
TRAUMA SURGERY  ATTENDING PROGRESS NOTE      CC: fall     S:  Still with pain,     O:   @/61   Pulse 60   Temp 98 °F (36.7 °C) (Temporal)   Resp 12   Ht 4' 10\" (1.473 m)   Wt 86 lb 3.2 oz (39.1 kg)   LMP  (LMP Unknown)   SpO2 97%   BMI 18.02 kg/m² @    Gen - no apparent distress   Neuro - Awake, alert, attentive, GCS 15     HEENT - PERRL 3mm   Lungs - non labored, BS clear b/l    Heart - RR   Abdomen - Soft + pressure and discomfort in lower abd   Spine -   Lumbar tenderness T/C wnl   Ext- rom wnl NVI     A/P: s/p fall with compression fractures     Active Problems:    Closed compression fracture of L4 lumbar vertebra, initial encounter ([facility])    Lumbar compression fracture, closed, initial encounter ([facility] [address]  Resolved Problems:    * No resolved hospital problems. *      - Comp fx, NS following, s/p kypho , bone scan ordered insufficency fx sacrum  Will ask ortho to see. - urinary retention, bae, urology recs appreciated   - SMI deep breathing, Pain control   - Home meds, restarted held thinners,   - PTOT dc planning soon. Pending work up.        DVT prophylaxis: SCDs, jason Vaughn MD FACS

## 2020-10-19 NOTE — CONSULTS
Department of Orthopedic Surgery  Resident Consult Note          Reason for Consult:  Right leg pain     HISTORY OF PRESENT ILLNESS:       Patient is a 79 y.o. female who presents with the chief complaint of fall out of bed. CT of the abdomen and pelvis were suspicious for right sacral insufficiency fracture and orthopedics was consulted. The patient states she is having some discomfort in her right gluteal region. She denies pain shooting down her legs, saddle anesthesia. She denies any pain to her tailbone. Denies numbness/tingling/paresthesias. Denies any other orthopedic complaints at this time.          Past Medical History:        Diagnosis Date    CAD (coronary artery disease)     GERD (gastroesophageal reflux disease)     History of echocardiogram 03/14/2017    EF 62%    History of echocardiogram 05/01/2018    EF 62%    Hyperlipidemia     Hypertension     [facility] [address]     Migraines     New onset atrial flutter ([facility] [address] 3/13/2017    Normal cardiac stress test 5/2010     Past Surgical History:        Procedure Laterality Date    CARDIAC CATHETERIZATION      2002    CHOLECYSTECTOMY      COLONOSCOPY  4/16/13    DR CAMERON     CORONARY ARTERY BYPASS GRAFT      CABG x 4 in 2000    ECHO COMPL W DOP COLOR FLOW  2/27/2012         ECHO COMPL W DOP COLOR FLOW  3/25/2013         SPINE SURGERY N/A 10/14/2020    T12, L5 KYPHOPLASTY, EPIDURAL INJECTION performed by Veda Victoria MD at [address] [place] [address]     Current Medications:   Current Facility-Administered Medications: enoxaparin (LOVENOX) injection 30 mg, 30 mg, Subcutaneous, BID  oxyCODONE (ROXICODONE) immediate release tablet 5 mg, 5 mg, Oral, Q4H PRN  methocarbamol (ROBAXIN) tablet 750 mg, 750 mg, Oral, 4x Daily  albuterol (PROVENTIL) nebulizer solution 2.5 mg, 2.5 mg, Nebulization, Q4H PRN  amitriptyline (ELAVIL) tablet 10 mg, 10 mg, Oral, Nightly  amLODIPine (NORVASC) tablet 10 mg, 10 mg, Oral, Daily  atorvastatin (LIPITOR) tablet 40 mg, dizziness  BEHAVIOR/PSYCH:  negative for increased agitation and anxiety    PHYSICAL EXAM:    VITALS:  BP (!) 107/55   Pulse 52   Temp 98.9 °F (37.2 °C) (Oral)   Resp 16   Ht 4' 10\" (1.473 m)   Wt 86 lb 3.2 oz (39.1 kg)   LMP  (LMP Unknown)   SpO2 94%   BMI 18.02 kg/m²   CONSTITUTIONAL:  awake, alert, cooperative, no apparent distress, and appears stated age  MUSCULOSKELETAL:  Bilateral lower Extremity:  Skin intact  non tender to palpation over the bilateral sacrum. Some discomfort to palpation of the gluteal region. Compartments soft and compressible, calf non-tender  +DP & PT pulses, Brisk Cap refill, Toes warm and perfused  Sensation grossly intact superficial/deep peroneal,saphenous,sural,tibial n. distributions  · +GS/TA/EHL. Secondary Exam:   bilateralUE: No obvious signs of trauma. -TTP to fingers, hand, wrist, forearm, elbow, humerus, shoulder or clavicle. Pelvis: -TTP, -Log roll,    DATA:    CBC:   Lab Results   Component Value Date    WBC 9.4 10/13/2020    RBC 3.33 10/13/2020    HGB 10.9 10/13/2020    HCT 32.1 10/13/2020    MCV 96.4 10/13/2020    MCH 32.7 10/13/2020    MCHC 34.0 10/13/2020    RDW 12.2 10/13/2020     10/13/2020    MPV 11.1 10/13/2020     PT/INR:    Lab Results   Component Value Date    PROTIME 15.0 05/01/2018    PROTIME 12.8 03/01/2012    INR 1.3 05/01/2018     CRP/ESR:   Lab Results   Component Value Date    SEDRATE 5 06/19/2017     Lactic Acid :   Lab Results   Component Value Date    LACTA 1.6 02/01/2019       Radiology Review:  10/19/20 - CT abdomen and pelvis demonstrates possible insufficiency fracture of the right side of the sacrum. There does appear to be sacralization of L5. No other acute fractures or dislocations noted. IMPRESSION:   · Possible Right sacral insufficiency fracture. When correlated clinically the patient has no pain with palpation of the sacrum so any acute fracture is of low suspicion.      PLAN:  Patient may be weight bearing as stable to external rotation stress grossly. ELECTRONICALLY signed by:    Kiran Flannery MD  10/26/20   This is been dictated utilizing voice recognition software. All efforts have been made to make the note accurate although inadvertent errors may be present.

## 2020-10-19 NOTE — PROGRESS NOTES
PO Kyphoplasty. C/O hip pain. Xrays hips neg for fracture revealed  Findings compatible with degenerative changes    Findings consistent with an insuffiencey fracture of the sacrum      Orthopedic consulted.  Await ortho input

## 2020-10-19 NOTE — CARE COORDINATION
10/19, SW spoke with daughter-Albertina on patient and discharge plan remains home with daughter. Was awaiting to have results from the bone scan/ortho recommendations. Og Graves from [address] still following patient and will begin [facility] once patient is home with daughter. Patient has [employer] in room from [address] [facility]. Daughter to be transportation for patient. SW to follow for further discharge planning needs.     Daren Garcia, [facility]

## 2020-10-19 NOTE — PROGRESS NOTES
Resident called regarding the removal of the bae. He wanted me to load her bae/bladder with 50ml normal saline, remove bae and see how much she voids. A second resident called and asked me if it was done as I was finishing the first task to add 500ml then pull the bae and see how much she voids. If she voids she can discharge, if not she will need a consult to urology. Placed 380ml of warm NS in bladder. Patient said she was feeling like she had to pee and uncomfortable (she is petite) so I pulled the bae and she only voided 200ml.

## 2020-10-19 NOTE — PROGRESS NOTES
Physical Therapy  Facility/Department: [address] NEURO SPINE  Daily Treatment Note  NAME: Hanna Hicks  : 1940  MRN: [id number]    Date of Service: 10/19/2020  Referring Joo Ozuna MD     Evaluating PT: Davy Van, PT, DPT     Room #:  4963-D  Diagnosis:  Closed compression fracture L4  PMHx/PSHx:  HTN, MI, CAD, Migraines, GERD  Procedure/Surgery:  T12, L5 Kyphoplasty 10/14/2020  Precautions:  Falls, Spine,   Equipment Needs:   WW     SUBJECTIVE:     Pt lives alone in an apt with 4 stairs to enter and 1 rail(s).  Pt ambulated with no AD PTA. Pt reported independent with ADLs. Pt is not actively driving. Pt reported daughter provides transportation as needed.     OBJECTIVE:    Initial Evaluation  Date: 10/15/2020 Treatment  10/19/2020 Short Term/ Long Term   Goals   AM-PAC 6 Clicks 52/46       Was pt agreeable to Eval/treatment? Malathi Nate     Does pt have pain? Reported back pain, did not quantify.  Back, Buttock (not quantified)     Bed Mobility  Rolling: Min A  Supine to sit: Min A  Sit to supine: Min A  Scooting: Min A Rolling: SBA  Supine to sit: SBA  Sit to supine: NT  Scooting: SBA Supervision   Transfers Sit to stand: Min A  Stand to sit: Min A  Stand pivot: Min A  Sit to stand: Jaylen  Stand to sit: Jaylen  Stand pivot: Jaylen [address] Supervision   Ambulation    125 feet with [address] with Min A 75', 75' WW SBA >200 feet with [facility] Supervision   Stair negotiation: ascended and descended  NT 4 steps with 1 rail SBA 4 steps with 1 rail with Supervision   ROM BUE:  WFL  BLE:  WFL       Strength BUE:  4/5  BLE:  4/5   Increase 1/3 grade MMT   Balance Sitting EOB:  SBA  Dynamic Standing:  Min A with [address] Sitting EOB:  SBA  Dynamic Standing:  Jaylen [address] Sitting EOB:  Supervision  Dynamic Standing:  Supervision with [address]      Pt is A & O x 2, person and place (hospital).  Confusion noted  Sensation:  Pt denied numbness and tingling to extremities  Edema:  None noted    Patient education  Pt educated on proper positioning and sequencing during functional transfers, ambulation, and stair negotiation. Patient response to education:   Pt verbalized understanding Pt demonstrated skill Pt requires further education in this area   X X X     ASSESSMENT:    Comments:  Pt was received in supine agreeable to PT treatment. Required increased time and verbal cues to perform bed mobility and functional transfers. Ambulates with decreased speed and reciprocal pattern. Required verbal cues to facilitate proper walker approximation and proper positioning of feet during ambulation, as pt exhibits scissoring gait. Distance limited by pain. Required increased time and verbal cues related to positioning and sequencing to negotiate stairs. Pt was left sitting in bedside chair with call light in reach. TSM in room. Treatment:  Patient practiced and was instructed in the following treatment:     Bed mobility- verbal cues to promote functional independence   Functional transfers- verbal cues and assistance to facilitate proper positioning and sequencing   Gait training- verbal cues to facilitate proper positioning of feet and assistive device   Stair negotiation- verbal cues to promote proper positioning and sequencing    PLAN:      PLAN OF CARE:    Current Treatment Recommendations     [x] Strengthening     [x] ROM   [x] Balance Training   [x] Endurance Training   [x] Transfer Training   [x] Gait Training   [x] Stair Training   [x] Positioning   [x] Safety and Education Training   [x] Patient/Caregiver Education   [x] HEP  [] Other       Patient is making good progress towards established goals. Will continue with current POC.       Time in  1000  Time out  1025    Total Treatment Time  25 minutes     CPT codes:  [] Gait training 51089 0 minutes  [] Manual therapy 83054 0 minutes  [x] Therapeutic activities 83314 25 minutes  [] Therapeutic exercises 28282 0 minutes  [] Neuromuscular reeducation 92309 0 minutes     Germania Chand Zaria Spain, SPT  Ozzie Mean PT, DPT  [id number]

## 2020-10-19 NOTE — PLAN OF CARE
Problem: Falls - Risk of:  Goal: Will remain free from falls  Description: Will remain free from falls  10/19/2020 0458 by Vilma Cody RN  Outcome: Met This Shift  10/18/2020 1638 by Janelle Patricio RN  Outcome: Met This Shift  Goal: Absence of physical injury  Description: Absence of physical injury  10/19/2020 0458 by Vilma Cody RN  Outcome: Met This Shift  10/18/2020 1638 by Janelle Patricio RN  Outcome: Met This Shift     Problem: Pain:  Goal: Pain level will decrease  Description: Pain level will decrease  10/19/2020 0458 by Vilma Cody RN  Outcome: Met This Shift  10/18/2020 1638 by Janelle Patricio RN  Outcome: Met This Shift  Goal: Control of acute pain  Description: Control of acute pain  10/19/2020 0458 by Vilma Cody RN  Outcome: Met This Shift  10/18/2020 1638 by Janelle Patricio RN  Outcome: Met This Shift  Goal: Control of chronic pain  Description: Control of chronic pain  10/19/2020 0458 by Vilma Cody RN  Outcome: Met This Shift  10/18/2020 1638 by Janelle Patricio RN  Outcome: Met This Shift     Problem: Skin Integrity:  Goal: Will show no infection signs and symptoms  Description: Will show no infection signs and symptoms  10/19/2020 0458 by Vilma Cody RN  Outcome: Met This Shift  10/18/2020 1638 by Janelle Patricio RN  Outcome: Met This Shift  Goal: Absence of new skin breakdown  Description: Absence of new skin breakdown  10/19/2020 0458 by Vilma Cody RN  Outcome: Met This Shift  10/18/2020 1638 by Janelle Patricio RN  Outcome: Met This Shift

## 2020-10-20 ENCOUNTER — CARE COORDINATION (OUTPATIENT)
Dept: CASE MANAGEMENT | Age: 80
End: 2020-10-20

## 2020-10-20 NOTE — CARE COORDINATION
[facility] Transitions Initial Follow Up Call    Call within 2 business days of discharge: Yes    Patient: Brock Monteiro Patient : 1940   MRN: [id number]  Reason for Admission: closed fracture of fifth lumbar vertebra, s/p kyphoplasty epidural injection 10/14/20  Discharge Date: 10/19/20 RARS: Readmission Risk Score: 14      Last Discharge [facility]       Complaint Diagnosis Description Type Department Provider    10/12/20 Fall Closed fracture of fifth lumbar vertebra, unspecified fracture morphology, initial encounter ([facility] . .. ED to Hosp-Admission (Discharged) (ADMITTED) [facility] Carri Yoder MD; Bright Eloisa,     10/12/20 Fall Compression fracture of L5 vertebra, initial encounter ([facility] . .. ED (TRANSFER) [facility] Vance Walsh DO          Challenges to be reviewed by the provider   Additional needs identified to be addressed with provider No  none             Method of communication with provider : none    Advance Care Planning:   Does patient have an Advance Directive:  decision maker updated. Was this a readmission? No  Patient stated reason for admission: pain  Patients top risk factors for readmission: medical condition, polypharmacy and caregiver stress    Care Transition Nurse (CTN) contacted the family by telephone to perform post hospital discharge assessment. Verified name and  with family as identifiers. Provided introduction to self, and explanation of the CTN role. CTN reviewed discharge instructions, medical action plan and red flags with family who verbalized understanding. Family given an opportunity to ask questions and does not have any further questions or concerns at this time. Were discharge instructions available to patient? Yes. Reviewed appropriate site of care based on symptoms and resources available to patient including: PCP, Specialist and [address] [place] [address] Road. The family agrees to contact the PCP office for questions related to their healthcare. Medication reconciliation was declined by family. Advised obtaining a 90-day supply of all daily and as-needed medications. Non-face-to-face services provided:  Scheduled appointment with PCP-patient's daughter to call  Scheduled appointment with Specialist-patient's daughter to call neurosurgery  Obtained and reviewed discharge summary and/or continuity of care documents    Care Transitions 24 Hour Call    Schedule Follow Up Appointment with PCP:  Declined  Do you have a copy of your discharge instructions?:  Yes  Do you have all of your prescriptions and are they filled?:  Yes  Have you been contacted by a [facility] Pharmacist?:  No  Have you scheduled your follow up appointment?:  No  Were you discharged with any Home Care or Post Acute Services:  Yes  Post Acute Services:  Home Health (Comment: [facility]/20/[facility])  Do you feel like you have everything you need to keep you well at home?:  Yes    Care Transitions Interventions     Other Services:  Completed (Comment: [facility] patient advocacy, [facility] help desk)        -Spoke with patient's daughter Albertina(on communication release of information form) for initial care transition call post hospital discharge.   -Patient's daughter states her mother does remain in pain but BODØ was able to obtain new medications today as per AVS and gave her mother first dose of oxycodone around 10:30 am and just gave first dose of robaxin. Abril Yoon voiced concerns regarding hospitalization, CTN provided active listening/support. BODØ receptive to [facility] (1-) patient advocate contact number which CTN provided.  -Patient's daughter declined full med review, 1111F not entered. Abril Yoon states [facility] to visit between 1-2 pm today.   -CTN confirmed active with [facility], however BODØ states she has been having problems accessing-CTN provided BODØ with [facility] help desk phone number.  -Patient's daughter denies any needs, questions, or concerns at this time

## 2020-10-27 ENCOUNTER — CARE COORDINATION (OUTPATIENT)
Dept: CASE MANAGEMENT | Age: 80
End: 2020-10-27

## 2020-10-27 NOTE — CARE COORDINATION
[facility] Transitions Follow Up Call    10/27/2020    Patient: David Guzman  Patient : 1940   MRN: [id number]  Reason for Admission: Closed fracture of fifth lumbar vertebra  Discharge Date: 10/19/20 RARS: Readmission Risk Score: 15         Spoke with: HI, patient's daughter on HIPAA      Needs to be reviewed by the provider   Additional needs identified to be addressed with provider No  none       Method of communication with provider : none    Care Transition Nurse (CTN) contacted the family by telephone to follow up after admission on 10/12/20. Verified name and  with family as identifiers. Addressed changes since last contact: symptom management-Pain. Patient's daughter reports pain med and muscle relaxer make patient fall asleep. Patient is currently taking q 8 hours. Tina denies any numbness, tingling of extremities. Discussed repositioning to assist with pain control. HI denies any incontinence. HI denies any leg swelling. , self management-HF- Patient does not weigh daily. Patient's daughter instructed on hf zones and when to contact Physician and when to seek emergency medical attention. Per BODØ, patient does read labels to follow low sodium diet. , follow up appointment-Per HI, patient has vv with Dr. [place] D[place] on 10/28/20 and medication management-Denies medication changes. BODØ denies abnormal and uncontrolled bleeding. BODØ instructed to report abnormal bleeding and seek medical attention for uncontrolled bleeding or for falls/hitting head. Discharged needs reviewed: none  Follow up appointment completed? No    CTN reviewed discharge instructions, medical action plan and red flags with family and discussed any barriers to care and/or understanding of plan of care after discharge. Discussed appropriate site of care based on symptoms and resources available to patient including: PCP, Specialist, Home health and When to call 911. The family agrees to contact the PCP office for questions related to their healthcare. Patients top risk factors for readmission: medical condition  Interventions to address risk factors: Patient has vv with PCP tomorrow and follow up with Dr. Moon Conway on 11/4/20. Patient's daughter has reported lack of pain control to Dr. Oscar Nicholas office. Plan for follow-up call in 5-7 days based on severity of symptoms and risk factors. Plan for next call: symptom management-Pain control, self management-HF - sob, edema, follow up appointment-completed follow up with Dr. Adamaris Dolan and medication management-med changes or questions  CTN provided contact information for future needs. Care Transitions Subsequent and Final Call    Subsequent and Final Calls  Do you have any ongoing symptoms?:  Yes  Onset of Patient-reported symptoms:  Other  Patient-reported symptoms:  Pain  Have your medications changed?:  No  Do you have any questions related to your medications?:  No  Do you currently have any active services?:  Yes  Are you currently active with any services?:  Home Health  Do you have any needs or concerns that I can assist you with?:  No  Identified Barriers:  None  Care Transitions Interventions  No Identified Needs  Other Interventions:          Spoke with patient's daughter for follow up care transition call. Patient's daughter reports patient is hydrating appropriately and urinating without issue. Patient's daughter instructed on importance of nutrition, patient should eat 5 small meals if unable to eat 3 large meals. Per Juli Rainey, no need to follow up with Dr. Helder Ghosh. Patient's daughter denies any further needs, questions, or concerns at this time. Patient's daughter is agreeable to future follow up calls.          Follow Up  Future Appointments   Date Time Provider Bryanna Mayorga   10/28/2020  8:00 AM MAYCOL Corbett - CNP [facility]   11/4/2020  2:15 PM MD MASOUD Govea None       Nadeem Noe, RN

## 2020-10-29 ENCOUNTER — TELEMEDICINE (OUTPATIENT)
Dept: FAMILY MEDICINE CLINIC | Age: 80
End: 2020-10-29
Payer: MEDICARE

## 2020-10-29 PROCEDURE — 1123F ACP DISCUSS/DSCN MKR DOCD: CPT | Performed by: NURSE PRACTITIONER

## 2020-10-29 PROCEDURE — G8419 CALC BMI OUT NRM PARAM NOF/U: HCPCS | Performed by: NURSE PRACTITIONER

## 2020-10-29 PROCEDURE — 4040F PNEUMOC VAC/ADMIN/RCVD: CPT | Performed by: NURSE PRACTITIONER

## 2020-10-29 PROCEDURE — 1111F DSCHRG MED/CURRENT MED MERGE: CPT | Performed by: NURSE PRACTITIONER

## 2020-10-29 PROCEDURE — 1090F PRES/ABSN URINE INCON ASSESS: CPT | Performed by: NURSE PRACTITIONER

## 2020-10-29 PROCEDURE — 99213 OFFICE O/P EST LOW 20 MIN: CPT | Performed by: NURSE PRACTITIONER

## 2020-10-29 PROCEDURE — G8484 FLU IMMUNIZE NO ADMIN: HCPCS | Performed by: NURSE PRACTITIONER

## 2020-10-29 PROCEDURE — G8427 DOCREV CUR MEDS BY ELIG CLIN: HCPCS | Performed by: NURSE PRACTITIONER

## 2020-10-29 PROCEDURE — 1036F TOBACCO NON-USER: CPT | Performed by: NURSE PRACTITIONER

## 2020-10-29 PROCEDURE — G8400 PT W/DXA NO RESULTS DOC: HCPCS | Performed by: NURSE PRACTITIONER

## 2020-10-29 RX ORDER — HYDROCODONE BITARTRATE AND ACETAMINOPHEN 5; 325 MG/1; MG/1
1 TABLET ORAL EVERY 8 HOURS PRN
Qty: 21 TABLET | Refills: 0 | Status: SHIPPED | OUTPATIENT
Start: 2020-10-29 | End: 2020-11-05

## 2020-10-29 ASSESSMENT — ENCOUNTER SYMPTOMS
BACK PAIN: 1
COUGH: 0
VOMITING: 0
WHEEZING: 0
SHORTNESS OF BREATH: 0
CONSTIPATION: 0
DIARRHEA: 0
NAUSEA: 0

## 2020-10-29 NOTE — PROGRESS NOTES
TeleMedicine Video Visit    This visit was performed as a virtual video visit using a synchronous, two-way, audio-video telehealth technology platform. Patient identification was verified at the start of the visit, including the patient's telephone number and physical location. I discussed with the patient the nature of our telehealth visits, that:     1. Due to the nature of an audio- video modality, the only components of a physical exam that could be done are the elements supported by direct observation. 2. I would evaluate the patient and recommend diagnostics and treatments based on my assessment. 3. If it was felt that the patient should be evaluated in clinic or an emergency room setting, then they would be directed there. 4. Our sessions are not being recorded and that personal health information is protected. 5. Our team would provide follow up care in person if/when the patient needs it. Patient does agree to proceed with telemedicine consultation. Patient's location: daughter's home address in [address] other people involved in call, daughter. Time spent: Greater than 15    This visit was completed virtually using My Chart/Haiku/Susan    HPI:  Patient comes in today for   Chief Complaint   Patient presents with    Pain     still having pain to tailbone    Follow-Up from Hospital     discharged on 10/19   . Patient was on video visit with the help of her daughter. She was discharged from the hospital on 10/19 s/p kyphoplasty. She fell out of bed at night, she thinks she was dreaming and rolled out. She also had sacral changes on her XR suggestive of a fracture. She states she is still having a lot of pain in the tailbone area. She is out of her pain medication. She is getting around with her walker. She has OT coming to the home. There is no dressing to surgical wound and daughter denies any inflammation or drainage.      Prior to Visit Medications Medication Sig Taking? Authorizing Provider   HYDROcodone-acetaminophen (NORCO) 5-325 MG per tablet Take 1 tablet by mouth every 8 hours as needed for Pain for up to 7 days. Intended supply: 3 days.  Take lowest dose possible to manage pain Yes MAYCOL Stout CNP   apixaban (ELIQUIS) 2.5 MG TABS tablet TAKE ONE TABLET BY MOUTH TWO TIMES A DAY  MAYCOL Lemons CNP   acetaminophen (TYLENOL) 500 MG tablet Take 2 tablets by mouth 3 times daily  Rhoda MILEY Raza, DO   docusate sodium (COLACE) 100 MG capsule Take 1 capsule by mouth 2 times daily  Rhoda E Kaelvin, DO   senna (SENOKOT) 8.6 MG TABS tablet Take 1 tablet by mouth daily  Rhoda E Kaelvin, DO   methocarbamol (ROBAXIN) 750 MG tablet Take 1 tablet by mouth 4 times daily for 10 days  Rhoda Raza, DO   aspirin 81 MG chewable tablet Take 81 mg by mouth daily  Historical Provider, MD   folic acid (FOLVITE) 1 MG tablet TAKE ONE TABLET BY MOUTH DAILY  Leonel Corbett,    magnesium oxide (MAG-OX) 400 (241.3 Mg) MG TABS tablet TAKE ONE TABLET BY MOUTH DAILY  Leonel Corbett, DO   amLODIPine (NORVASC) 10 MG tablet TAKE ONE TABLET BY MOUTH DAILY  Leonel Corbett, DO   bumetanide (BUMEX) 1 MG tablet Take 2 tablets by mouth daily  Leonel Corbett, DO   sildenafil (REVATIO) 20 MG tablet TAKE ONE-HALF TABLET BY MOUTH THREE TIMES DAILY  Leonel Corbett, DO   nebivolol (BYSTOLIC) 20 MG TABS tablet Take 1 tablet by mouth daily  Leonel Corbett, DO   atorvastatin (LIPITOR) 40 MG tablet TAKE ONE TABLET BY MOUTH EVERY DAY  Leonel Corbett,    dofetilide (TIKOSYN) 125 MCG capsule TAKE ONE CAPSULE BY MOUTH TWO TIMES A DAY  MAYCOL Stout CNP   omeprazole (PRILOSEC) 40 MG delayed release capsule Take 1 capsule by mouth daily  MAYCOL Stout CNP   ranolazine (RANEXA) 500 MG extended release tablet TAKE ONE TABLET BY MOUTH TWO TIMES A DAY  Leonel Corbett,    albuterol sulfate HFA (VENTOLIN HFA) 108 (90 Base) MCG/ACT inhaler INHALE TWO PUFFS BY MOUTH EVERY 6 HOURS AS NEEDED FOR WHEEZING  Aleta Willingham APRN - CNP   potassium chloride (KLOR-CON M) 20 MEQ extended release tablet TAKE ONE TABLET BY MOUTH EVERY DAY  Patient taking differently: 20 mEq 2 times daily TAKE ONE TABLET BY MOUTH EVERY DAY  Ciaran Brown DO         Allergies   Allergen Reactions    Dronedarone Hives         Review of Systems  Review of Systems   Constitutional: Positive for activity change (decreased d/t surgery). Negative for appetite change, chills, diaphoresis and fever. Respiratory: Negative for cough, shortness of breath and wheezing. Cardiovascular: Positive for chest pain (left side around her pacemaker, normal for her). Negative for palpitations. Gastrointestinal: Negative for constipation, diarrhea, nausea and vomiting. Musculoskeletal: Positive for back pain (tailbone area). Neurological: Negative for dizziness, weakness and headaches. VS:  LMP  (LMP Unknown)     Physical Exam  Physical Exam  Constitutional:       Appearance: Normal appearance. Pulmonary:      Effort: Pulmonary effort is normal. No respiratory distress. Neurological:      Mental Status: She is alert and oriented to person, place, and time. Psychiatric:         Mood and Affect: Mood normal.         Behavior: Behavior normal.         Assessment/Plan:  Bobbi was seen today for pain and follow-up from hospital.    Diagnoses and all orders for this visit:    Surgical followup visit    Lumbar compression [facility], closed[facility]  -     HYDROcodone-acetaminophen ([address]) 5-325 MG per tablet; Take 1 tablet by mouth every 8 hours as needed for Pain for up to 7 days. Intended supply: 3 days.  Take lowest dose possible to manage pain  - Reviewed side effects of medication and patient verbalizes understanding.   - Advised to call back directly if there are further questions, or if these symptoms fail to improve as anticipated or worsen. S/P kyphoplasty  -     HYDROcodone-acetaminophen (NORCO) 5-325 MG per tablet; Take 1 tablet by mouth every 8 hours as needed for Pain for up to 7 days. Intended supply: 3 days. Take lowest dose possible to manage pain  - Reviewed side effects of medication and patient verbalizes understanding.   - Advised to call back directly if there are further questions, or if these symptoms fail to improve as anticipated or worsen. -  Will get flu vaccine as soon as she is up and better    Controlled Substance Monitoring:    Acute and Chronic Pain Monitoring:   RX Monitoring 10/29/2020   Periodic Controlled Substance Monitoring No signs of potential drug abuse or diversion identified.;Obtaining appropriate analgesic effect of treatment. Nolon Goodell is a 79 y.o. female being evaluated by a Virtual Visit (video visit) encounter to address concerns as mentioned above. A caregiver was present when appropriate. Due to this being a TeleHealth encounter ([facility] emergency), evaluation of the following organ systems was limited: Vitals/Constitutional/EENT/Resp/CV/GI//MS/Neuro/Skin/Heme-Lymph-Imm. Pursuant to the emergency declaration under the [address] [place] [address], [address] [place] [address] authority and the [employer] Act, this Virtual Visit was conducted with patient's (and/or legal guardian's) consent, to reduce the patient's risk of exposure to COVID-19 and provide necessary medical care. The patient (and/or legal guardian) has also been advised to contact this office for worsening conditions or problems, and seek emergency medical treatment and/or call 911 if deemed necessary.      Patient identification was verified at the start of the visit: Yes    Total time spent for this encounter: 15    Services were provided through a video synchronous discussion virtually to substitute for in-person clinic visit. Patient and provider were located at their individual homes. --MAYCOL Harris CNP on 10/29/2020 at 1:32 PM    An electronic signature was used to authenticate this note.

## 2020-11-02 ENCOUNTER — CARE COORDINATION (OUTPATIENT)
Dept: CASE MANAGEMENT | Age: 80
End: 2020-11-02

## 2020-11-02 NOTE — CARE COORDINATION
[facility] Transitions Follow Up Call    2020    Patient: Carlson Mallorie  Patient : 1940   MRN: [id number]  Reason for Admission: Closed fracture of fifth lumbar vertebra  Discharge Date: 10/19/20 RARS: Readmission Risk Score: 14         Spoke with: Patient's daughter, Faiza Samson. Faiza Samson is answering the questions on the Subsequent call on behalf of the patient. Care Transitions Subsequent and Final Call    Subsequent and Final Calls  Do you have any ongoing symptoms?:  Yes  Patient-reported symptoms:  Pain  -reports C/O back pain with movement or positioning; rates at 6/10, and describes as hurts  -occasional shortness of breath  Do you have any questions related to your medications?:  No  Do you currently have any active services?:  Yes  Are you currently active with any services?:  Home Health  Do you have any needs or concerns that I can assist you with?:  No  Identified Barriers:  None  Care Transitions Interventions  No Identified Needs     Other Services:  Completed (Comment: [facility] patient advocacy, My Chart help desk)   Other Interventions:          Faiza Samson reports the patient is progressing well.   -patient discharged from PT today  -patient ambulates with walker  -nurse visits continue; one OT visit next week   -denies patient with any C/O chest pain, palpitations, lightheaded, or dizziness   -poor appetite; daughter encourages patient to drink Ensure supplements   -normal elimination patterns     Emotional support provided; will continue to follow.        Follow Up  Future Appointments   Date Time Provider Bryanna Mayorga   2020  2:15 PM Urban Blandon MD [facility] None       Nathan Smith RN

## 2020-11-10 ENCOUNTER — CARE COORDINATION (OUTPATIENT)
Dept: CASE MANAGEMENT | Age: 80
End: 2020-11-10

## 2020-11-10 NOTE — CARE COORDINATION
[facility] Transitions Follow Up Call    11/10/2020    Patient: Clifford Che  Patient : 1940   MRN: [id number]  Reason for Admission: Closed fracture of 5th lumbar vertebra  Discharge Date: 10/19/20 RARS: Readmission Risk Score: 14         Spoke with: Ambar Gibson, patient's daughter on HIPAA      Needs to be reviewed by the provider   Additional needs identified to be addressed with provider No  none       Method of communication with provider : none    Care Transition Nurse (CTN) contacted the family by telephone to follow up after admission on 10/12/20. Verified name and  with family as identifiers. Addressed changes since last contact: symptom management-Patient's daughter reports patient went back to her own apartment. Patient is taking Tylenol only for pain when needed and using walker prn. Per Ambar Gibson, patient doesn't really have any pain at this time. Ambar Gibson denies numbness, tingling, incontinence. , follow up appointment-Dr. Quentin Rachel cancelled appt on 20 and Samia is awaiting call to reschedule. and medication management-Discontinued Robaxin, Oxycodone. Taking Colace once daily. Discharged needs reviewed: none  Follow up appointment completed? Yes    CTN reviewed discharge instructions, medical action plan and red flags with family and discussed any barriers to care and/or understanding of plan of care after discharge. Discussed appropriate site of care based on symptoms and resources available to patient including: PCP, Specialist and Home health. The family agrees to contact the PCP office for questions related to their healthcare. Patients top risk factors for readmission: none    Care Transitions Subsequent and Final Call    Subsequent and Final Calls  Do you have any ongoing symptoms?:  No  Have your medications changed?:  Yes  Patient Reports:  Discontinued Robaxin and Oxycodone. Taking Colace once per day.   Do you have any questions related to your medications?: No  Do you currently have any active services?:  Yes  Are you currently active with any services?:  Home Health  Do you have any needs or concerns that I can assist you with?:  No  Identified Barriers:  None  Care Transitions Interventions     Other Services:  Completed (Comment: [employer] patient advocacy, [employer] and [employer] help desk)   Other Interventions:          Spoke with patient's daughter for final care transition call. Patient's daughter denies any further needs, questions, or concerns at this time. Fabian Li declines need for future follow up calls. This CTN to sign off and resolve episode. Follow Up  No future appointments.     Armida Siddiqi, RN

## 2020-11-18 ENCOUNTER — OFFICE VISIT (OUTPATIENT)
Dept: FAMILY MEDICINE CLINIC | Age: 80
End: 2020-11-18
Payer: MEDICARE

## 2020-11-18 VITALS
SYSTOLIC BLOOD PRESSURE: 122 MMHG | TEMPERATURE: 97.5 F | DIASTOLIC BLOOD PRESSURE: 60 MMHG | OXYGEN SATURATION: 93 % | HEART RATE: 64 BPM | RESPIRATION RATE: 20 BRPM | HEIGHT: 59 IN | BODY MASS INDEX: 17.82 KG/M2 | WEIGHT: 88.4 LBS

## 2020-11-18 PROCEDURE — G8400 PT W/DXA NO RESULTS DOC: HCPCS | Performed by: FAMILY MEDICINE

## 2020-11-18 PROCEDURE — 1090F PRES/ABSN URINE INCON ASSESS: CPT | Performed by: FAMILY MEDICINE

## 2020-11-18 PROCEDURE — G8427 DOCREV CUR MEDS BY ELIG CLIN: HCPCS | Performed by: FAMILY MEDICINE

## 2020-11-18 PROCEDURE — G8419 CALC BMI OUT NRM PARAM NOF/U: HCPCS | Performed by: FAMILY MEDICINE

## 2020-11-18 PROCEDURE — 99213 OFFICE O/P EST LOW 20 MIN: CPT | Performed by: FAMILY MEDICINE

## 2020-11-18 PROCEDURE — G8484 FLU IMMUNIZE NO ADMIN: HCPCS | Performed by: FAMILY MEDICINE

## 2020-11-18 PROCEDURE — 1111F DSCHRG MED/CURRENT MED MERGE: CPT | Performed by: FAMILY MEDICINE

## 2020-11-18 PROCEDURE — 4040F PNEUMOC VAC/ADMIN/RCVD: CPT | Performed by: FAMILY MEDICINE

## 2020-11-18 PROCEDURE — 1123F ACP DISCUSS/DSCN MKR DOCD: CPT | Performed by: FAMILY MEDICINE

## 2020-11-18 PROCEDURE — 1036F TOBACCO NON-USER: CPT | Performed by: FAMILY MEDICINE

## 2020-11-18 RX ORDER — NAPROXEN SODIUM 220 MG
220 TABLET ORAL DAILY
Qty: 30 TABLET | Refills: 1 | Status: SHIPPED
Start: 2020-11-18 | End: 2021-07-08 | Stop reason: ALTCHOICE

## 2020-11-18 ASSESSMENT — ENCOUNTER SYMPTOMS
CONSTIPATION: 0
WHEEZING: 0
BLOOD IN STOOL: 0
DIARRHEA: 0
BACK PAIN: 1

## 2020-11-18 NOTE — PROGRESS NOTES
HPI:  Patient comes in today for   Chief Complaint   Patient presents with    Results     iron levels per cardiologist     Cal Emery danyel getting out of bed  Months ago,       Review of Systems  Review of Systems   Constitutional: Negative for chills and diaphoresis. HENT: Negative for ear discharge, ear pain, hearing loss, nosebleeds and tinnitus. Respiratory: Negative for wheezing. Cardiovascular: Negative for chest pain. Gastrointestinal: Negative for blood in stool, constipation and diarrhea. Genitourinary: Negative for dysuria, flank pain and hematuria. Musculoskeletal: Positive for arthralgias and back pain (she points to both SSSSI joints ). Skin: Negative for rash. Neurological: Positive for dizziness (little bit). Negative for headaches. Hematological: Does not bruise/bleed easily. PE[de-identified]  /60   Pulse 64   Temp 97.5 °F (36.4 °C) (Temporal)   Resp 20   Ht 4' 11\" (1.499 m)   Wt 88 lb 6.4 oz (40.1 kg)   LMP  (LMP Unknown)   SpO2 93%   BMI 17.85 kg/m²       Physical Exam  Constitutional:       Appearance: Normal appearance. She is well-developed. HENT:      Head: Normocephalic and atraumatic. Eyes:      General: No scleral icterus. Conjunctiva/sclera: Conjunctivae normal.      Pupils: Pupils are equal, round, and reactive to light. Neck:      Musculoskeletal: Neck supple. Thyroid: No thyromegaly. Vascular: No JVD. Trachea: No tracheal deviation. Cardiovascular:      Rate and Rhythm: Normal rate and regular rhythm. Heart sounds: Normal heart sounds. No murmur. No gallop. Pulmonary:      Effort: Pulmonary effort is normal. No respiratory distress. Breath sounds: Normal breath sounds. No wheezing. Abdominal:      Palpations: Abdomen is soft. Musculoskeletal:         General: No tenderness. Comments: Subjective pain not reproducd by heavy pressure on spine or either SI joints   Skin:     General: Skin is warm.       Findings: No erythema or rash. Comments: Good turgor   Neurological:      General: No focal deficit present. Mental Status: She is alert and oriented to person, place, and time. Deep Tendon Reflexes: Reflexes normal.      Comments:            GFR is > 60 so we can treat, but pt has no Pharm benefit  Coverage. ASSESSMENT/PLAN:    1. Closed fracture of fifth lumbar vertebra, unspecified fracture morphology, sequela  - naproxen sodium (ANAPROX) 220 MG tablet; Take 1 tablet by mouth daily  Dispense: 30 tablet; Refill: [address]

## 2020-11-30 RX ORDER — DOFETILIDE 0.12 MG/1
CAPSULE ORAL
Qty: 60 CAPSULE | Refills: 2 | Status: SHIPPED
Start: 2020-11-30 | End: 2021-02-24

## 2020-11-30 RX ORDER — OMEPRAZOLE 40 MG/1
40 CAPSULE, DELAYED RELEASE ORAL DAILY
Qty: 30 CAPSULE | Refills: 2 | Status: SHIPPED
Start: 2020-11-30 | End: 2021-02-24

## 2020-12-14 RX ORDER — RANOLAZINE 500 MG/1
TABLET, EXTENDED RELEASE ORAL
Qty: 60 TABLET | Refills: 2 | Status: SHIPPED
Start: 2020-12-14 | End: 2021-03-19 | Stop reason: SDUPTHER

## 2020-12-31 ENCOUNTER — APPOINTMENT (OUTPATIENT)
Dept: GENERAL RADIOLOGY | Age: 80
End: 2020-12-31
Payer: MEDICARE

## 2020-12-31 ENCOUNTER — HOSPITAL ENCOUNTER (EMERGENCY)
Age: 80
Discharge: LEFT BEFORE DISCHARGE | End: 2020-12-31
Attending: EMERGENCY MEDICINE
Payer: MEDICARE

## 2020-12-31 VITALS
RESPIRATION RATE: 18 BRPM | DIASTOLIC BLOOD PRESSURE: 68 MMHG | HEART RATE: 63 BPM | OXYGEN SATURATION: 100 % | TEMPERATURE: 97.9 F | SYSTOLIC BLOOD PRESSURE: 135 MMHG

## 2020-12-31 DIAGNOSIS — R07.9 CHEST PAIN, UNSPECIFIED TYPE: Primary | ICD-10-CM

## 2020-12-31 LAB
ANION GAP SERPL CALCULATED.3IONS-SCNC: 10 MMOL/L (ref 7–16)
BASOPHILS ABSOLUTE: 0.04 E9/L (ref 0–0.2)
BASOPHILS RELATIVE PERCENT: 0.7 % (ref 0–2)
BUN BLDV-MCNC: 19 MG/DL (ref 8–23)
CALCIUM SERPL-MCNC: 8.8 MG/DL (ref 8.6–10.2)
CHLORIDE BLD-SCNC: 106 MMOL/L (ref 98–107)
CO2: 23 MMOL/L (ref 22–29)
CREAT SERPL-MCNC: 0.5 MG/DL (ref 0.5–1)
EOSINOPHILS ABSOLUTE: 0.23 E9/L (ref 0.05–0.5)
EOSINOPHILS RELATIVE PERCENT: 3.9 % (ref 0–6)
GFR AFRICAN AMERICAN: >60
GFR NON-AFRICAN AMERICAN: >60 ML/MIN/1.73
GLUCOSE BLD-MCNC: 176 MG/DL (ref 74–99)
HCT VFR BLD CALC: 38.6 % (ref 34–48)
HEMOGLOBIN: 13.1 G/DL (ref 11.5–15.5)
IMMATURE GRANULOCYTES #: 0.01 E9/L
IMMATURE GRANULOCYTES %: 0.2 % (ref 0–5)
LYMPHOCYTES ABSOLUTE: 2.06 E9/L (ref 1.5–4)
LYMPHOCYTES RELATIVE PERCENT: 34.5 % (ref 20–42)
MCH RBC QN AUTO: 33 PG (ref 26–35)
MCHC RBC AUTO-ENTMCNC: 33.9 % (ref 32–34.5)
MCV RBC AUTO: 97.2 FL (ref 80–99.9)
MONOCYTES ABSOLUTE: 0.56 E9/L (ref 0.1–0.95)
MONOCYTES RELATIVE PERCENT: 9.4 % (ref 2–12)
NEUTROPHILS ABSOLUTE: 3.07 E9/L (ref 1.8–7.3)
NEUTROPHILS RELATIVE PERCENT: 51.3 % (ref 43–80)
PDW BLD-RTO: 11.8 FL (ref 11.5–15)
PLATELET # BLD: 182 E9/L (ref 130–450)
PMV BLD AUTO: 11.1 FL (ref 7–12)
POTASSIUM REFLEX MAGNESIUM: 5.1 MMOL/L (ref 3.5–5)
RBC # BLD: 3.97 E12/L (ref 3.5–5.5)
SODIUM BLD-SCNC: 139 MMOL/L (ref 132–146)
TROPONIN: <0.01 NG/ML (ref 0–0.03)
TROPONIN: <0.01 NG/ML (ref 0–0.03)
WBC # BLD: 6 E9/L (ref 4.5–11.5)

## 2020-12-31 PROCEDURE — 85025 COMPLETE CBC W/AUTO DIFF WBC: CPT

## 2020-12-31 PROCEDURE — 93005 ELECTROCARDIOGRAM TRACING: CPT | Performed by: EMERGENCY MEDICINE

## 2020-12-31 PROCEDURE — 84484 ASSAY OF TROPONIN QUANT: CPT

## 2020-12-31 PROCEDURE — 71045 X-RAY EXAM CHEST 1 VIEW: CPT

## 2020-12-31 PROCEDURE — 80048 BASIC METABOLIC PNL TOTAL CA: CPT

## 2020-12-31 PROCEDURE — 99284 EMERGENCY DEPT VISIT MOD MDM: CPT

## 2020-12-31 PROCEDURE — 36415 COLL VENOUS BLD VENIPUNCTURE: CPT

## 2020-12-31 NOTE — ED PROVIDER NOTES
Patient is an 57-year-old female past medical history of CAD, bypass, vertigo, CHF, atrial fibrillation presenting to the emergency department with chest pain. Symptoms moderate in severity and improved since onset. Patient states she had had left-sided chest pain starting approximately 1 to 2 hours prior to arrival.  She describes it as a pressure-like sensation on the left side of her chest.  Thinks it lasted a few minutes and then resolved. She has not had it about 2 times since it started. She denies any active chest pain at this time. She states she did feel somewhat short of breath with the chest pain started but that resolved. Denies any new leg pain or leg swelling. No history of DVT or PE. She is anticoagulated on Eliquis and states she has not missed any doses. The chest pain is localized to left chest wall and does not radiate anywhere. Time has made symptoms better nothing significant was worse. She states she was just sitting there when the chest pain started. Patient did receive 325 of aspirin by EMS in route. She is currently denying any chest pain while sitting in the department. States she has had this sometimes during the past but cannot remember when. She follows with Dr. lAberto Pichardo for cardiology. Chest Pain  Pain location:  L chest  Pain quality: pressure    Pain radiates to:  Does not radiate  Pain severity:  Mild  Onset quality:  Sudden  Timing:  Intermittent  Progression:  Resolved  Chronicity:  Recurrent  Context: at rest    Worsened by:  Nothing  Ineffective treatments:  None tried  Associated symptoms: shortness of breath    Associated symptoms: no abdominal pain, no altered mental status, no back pain, no cough, no fatigue, no fever, no headache, no nausea, no near-syncope, no numbness, no orthopnea, no syncope, no vomiting and no weakness         Review of Systems   Constitutional: Negative for chills, fatigue and fever. HENT: Negative for congestion and sore throat. Eyes: Negative for pain and visual disturbance. Respiratory: Positive for shortness of breath. Negative for cough and wheezing. Cardiovascular: Positive for chest pain and leg swelling. Negative for orthopnea, syncope and near-syncope. Gastrointestinal: Negative for abdominal pain, diarrhea, nausea and vomiting. Genitourinary: Negative for dysuria and frequency. Musculoskeletal: Negative for arthralgias and back pain. Skin: Negative for rash and wound. Neurological: Negative for syncope, weakness, numbness and headaches. All other systems reviewed and are negative. Physical Exam  Vitals signs and nursing note reviewed. Constitutional:       General: She is not in acute distress. Appearance: She is well-developed. She is not ill-appearing. HENT:      Head: Normocephalic and atraumatic. Nose: No congestion. Eyes:      Extraocular Movements: Extraocular movements intact. Pupils: Pupils are equal, round, and reactive to light. Neck:      Musculoskeletal: Normal range of motion and neck supple. No neck rigidity or muscular tenderness. Cardiovascular:      Rate and Rhythm: Normal rate and regular rhythm. Pulses: Normal pulses. Pulmonary:      Effort: Pulmonary effort is normal. No respiratory distress. Breath sounds: Normal breath sounds. No wheezing or rales. Chest:      Chest wall: No tenderness. Abdominal:      Palpations: Abdomen is soft. Tenderness: There is no abdominal tenderness. There is no guarding or rebound. Musculoskeletal: Normal range of motion. Comments: 1-2+ bilateral pitting edema   Skin:     General: Skin is warm and dry. Capillary Refill: Capillary refill takes less than 2 seconds. Findings: No erythema. Neurological:      General: No focal deficit present. Mental Status: She is alert and oriented to person, place, and time. Cranial Nerves: No cranial nerve deficit. Sensory: No sensory deficit. echocardiogram, Hyperlipidemia, Hypertension, MI (myocardial infarction) ([facility] [address] Migraines, New onset atrial flutter ([facility] [address] and Normal cardiac stress test.    Past Surgical History:  has a past surgical history that includes Cholecystectomy; Coronary artery bypass graft; Cardiac catheterization; ECHO Compl W Dop Color Flow (2/27/2012); ECHO Compl W Dop Color Flow (3/25/2013); Colonoscopy (4/16/13); and Spine surgery (N/A, 10/14/2020). Social History:  reports that she quit smoking about 20 years ago. Her smoking use included cigarettes. She has a 10.00 pack-year smoking history. She has never used smokeless tobacco. She reports that she does not drink alcohol or use drugs. Family History: family history is not on file. The patients home medications have been reviewed.     Allergies: Dronedarone    -------------------------------------------------- RESULTS -------------------------------------------------  Labs:  Results for orders placed or performed during the hospital encounter of 12/31/20   CBC Auto Differential   Result Value Ref Range    WBC 6.0 4.5 - 11.5 E9/L    RBC 3.97 3.50 - 5.50 E12/L    Hemoglobin 13.1 11.5 - 15.5 g/dL    Hematocrit 38.6 34.0 - 48.0 %    MCV 97.2 80.0 - 99.9 fL    MCH 33.0 26.0 - 35.0 pg    MCHC 33.9 32.0 - 34.5 %    RDW 11.8 11.5 - 15.0 fL    Platelets 376 039 - 175 E9/L    MPV 11.1 7.0 - 12.0 fL    Neutrophils % 51.3 43.0 - 80.0 %    Immature Granulocytes % 0.2 0.0 - 5.0 %    Lymphocytes % 34.5 20.0 - 42.0 %    Monocytes % 9.4 2.0 - 12.0 %    Eosinophils % 3.9 0.0 - 6.0 %    Basophils % 0.7 0.0 - 2.0 %    Neutrophils Absolute 3.07 1.80 - 7.30 E9/L    Immature Granulocytes # 0.01 E9/L    Lymphocytes Absolute 2.06 1.50 - 4.00 E9/L    Monocytes Absolute 0.56 0.10 - 0.95 E9/L    Eosinophils Absolute 0.23 0.05 - 0.50 E9/L    Basophils Absolute 0.04 0.00 - 0.20 D4/K   Basic Metabolic Panel w/ Reflex to MG   Result Value Ref Range    Sodium 139 132 - 146 mmol/L    Potassium reflex Magnesium 5.1 (H) 3.5 - 5.0 mmol/L    Chloride 106 98 - 107 mmol/L    CO2 23 22 - 29 mmol/L    Anion Gap 10 7 - 16 mmol/L    Glucose 176 (H) 74 - 99 mg/dL    BUN 19 8 - 23 mg/dL    CREATININE 0.5 0.5 - 1.0 mg/dL    GFR Non-African American >60 >=60 mL/min/1.73    GFR African American >60     Calcium 8.8 8.6 - 10.2 mg/dL   Troponin   Result Value Ref Range    Troponin <0.01 0.00 - 0.03 ng/mL   Troponin   Result Value Ref Range    Troponin <0.01 0.00 - 0.03 ng/mL   EKG 12 Lead   Result Value Ref Range    Ventricular Rate 52 BPM    Atrial Rate 52 BPM    P-R Interval 120 ms    QRS Duration 90 ms    Q-T Interval 498 ms    QTc Calculation (Bazett) 463 ms    P Axis 55 degrees    R Axis 92 degrees    T Axis 49 degrees       Radiology:  XR CHEST PORTABLE   Final Result   1. Stable moderate cardiomegaly. 2.  No pneumonia or pleural effusion.          ------------------------- NURSING NOTES AND VITALS REVIEWED ---------------------------  Date / Time Roomed:  12/31/2020  5:47 PM  ED Bed Assignment:  02/02    The nursing notes within the ED encounter and vital signs as below have been reviewed. /68   Pulse 63   Temp 97.9 °F (36.6 °C)   Resp 18   LMP  (LMP Unknown)   SpO2 100%   Oxygen Saturation Interpretation: Normal      ------------------------------------------ PROGRESS NOTES ------------------------------------------  I have spoken with the patient and discussed todays results, in addition to providing specific details for the plan of care and counseling regarding the diagnosis and prognosis.     --------------------------------- ADDITIONAL PROVIDER NOTES ---------------------------------  This patient has chosen to leave against medical advice. I have personally explained to them that choosing to do so may result in permanent bodily harm or death.   I discussed at length that without further evaluation and monitoring there may be unforeseen circumstances and deterioration resulting in permanent bodily harm or death as a result of their choice. They are alert, oriented, and competent at this time. They state that they are aware of the serious risks as explained, but they continue to wish to leave against medical advice. In light of their decision to leave against medical advice, follow-up has been arranged and they are aware of the importance of following up as instructed. They have been advised that they should return to the ED immediately if they change their mind at any time, or if their condition begins to change or worsen. ATTENDING PROVIDER ATTESTATION:     David Cotter presented to the emergency department for evaluation of Chest Pain (5/10 starting 2 hours ago)    I have reviewed and discussed the case, including pertinent history (medical, surgical, family and social) and exam findings with the Midlevel and the Nurse assigned to David Cotter. I have personally performed and/or participated in the history, exam, medical decision making, and procedures and agree with all pertinent clinical information. I have reviewed my findings and recommendations with David Cotter and members of family present at the time of disposition. My findings/plan: The encounter diagnosis was Chest pain, unspecified type. Patient is an 79-year-old female who presents with chief complaint of chest pain, she states that started about 2 hours ago. She describes as being in the left chest and a pressure sensation. Has been since resolved and has no active chest pain on presentation to the emergency department. Patient denies any history of heart attacks in the past.  Patient does have a history of CABG and coronary artery disease. She follows with  for cardiology. Patient was given 3 to 24 mg aspirin prior to arrival.  Again she has no active chest pain on arrival.  On exam she is resting comfortably in bed. Clear breath sounds in all lung fields.   Regular rate and rhythm of the

## 2021-01-01 LAB
EKG ATRIAL RATE: 77 BPM
EKG Q-T INTERVAL: 470 MS
EKG QRS DURATION: 84 MS
EKG QTC CALCULATION (BAZETT): 531 MS
EKG R AXIS: 85 DEGREES
EKG T AXIS: 34 DEGREES
EKG VENTRICULAR RATE: 77 BPM

## 2021-01-01 NOTE — ED NOTES
Patient walking around ED, stating she wants to leave. Dr. Shayy Dempsey notified. Dr. Shayy Dempsey stated patient can sign out AMA. AMA paper explained and signed by patient. Patient's daughter, Asher Blanton called and she will be on her way to pick the patient up.      Yoko Camejo RN  12/31/20 0057

## 2021-01-11 ENCOUNTER — HOSPITAL ENCOUNTER (EMERGENCY)
Age: 81
Discharge: HOME | End: 2021-01-11
Attending: EMERGENCY MEDICINE
Payer: MEDICARE

## 2021-01-11 ENCOUNTER — APPOINTMENT (OUTPATIENT)
Dept: CT IMAGING | Age: 81
End: 2021-01-11
Payer: MEDICARE

## 2021-01-11 ENCOUNTER — APPOINTMENT (OUTPATIENT)
Dept: GENERAL RADIOLOGY | Age: 81
End: 2021-01-11
Payer: MEDICARE

## 2021-01-11 VITALS
BODY MASS INDEX: 16.16 KG/M2 | SYSTOLIC BLOOD PRESSURE: 161 MMHG | TEMPERATURE: 97.9 F | RESPIRATION RATE: 16 BRPM | HEART RATE: 62 BPM | OXYGEN SATURATION: 96 % | DIASTOLIC BLOOD PRESSURE: 70 MMHG | WEIGHT: 80 LBS

## 2021-01-11 DIAGNOSIS — K59.00 CONSTIPATION, UNSPECIFIED CONSTIPATION TYPE: Primary | ICD-10-CM

## 2021-01-11 LAB
ALBUMIN SERPL-MCNC: 4.3 G/DL (ref 3.5–5.2)
ALP BLD-CCNC: 111 U/L (ref 35–104)
ALT SERPL-CCNC: 13 U/L (ref 0–32)
ANION GAP SERPL CALCULATED.3IONS-SCNC: 12 MMOL/L (ref 7–16)
AST SERPL-CCNC: 19 U/L (ref 0–31)
BACTERIA: ABNORMAL /HPF
BASOPHILS ABSOLUTE: 0.04 E9/L (ref 0–0.2)
BASOPHILS RELATIVE PERCENT: 0.5 % (ref 0–2)
BILIRUB SERPL-MCNC: 0.6 MG/DL (ref 0–1.2)
BILIRUBIN URINE: NEGATIVE
BLOOD, URINE: NEGATIVE
BUN BLDV-MCNC: 24 MG/DL (ref 8–23)
CALCIUM SERPL-MCNC: 9 MG/DL (ref 8.6–10.2)
CHLORIDE BLD-SCNC: 102 MMOL/L (ref 98–107)
CLARITY: CLEAR
CO2: 29 MMOL/L (ref 22–29)
COLOR: YELLOW
CREAT SERPL-MCNC: 0.8 MG/DL (ref 0.5–1)
EOSINOPHILS ABSOLUTE: 0.19 E9/L (ref 0.05–0.5)
EOSINOPHILS RELATIVE PERCENT: 2.5 % (ref 0–6)
EPITHELIAL CELLS, UA: ABNORMAL /HPF
GFR AFRICAN AMERICAN: >60
GFR NON-AFRICAN AMERICAN: >60 ML/MIN/1.73
GLUCOSE BLD-MCNC: 130 MG/DL (ref 74–99)
GLUCOSE URINE: NEGATIVE MG/DL
HCT VFR BLD CALC: 43.7 % (ref 34–48)
HEMOGLOBIN: 14.2 G/DL (ref 11.5–15.5)
IMMATURE GRANULOCYTES #: 0.03 E9/L
IMMATURE GRANULOCYTES %: 0.4 % (ref 0–5)
KETONES, URINE: NEGATIVE MG/DL
LACTIC ACID, SEPSIS: 1.7 MMOL/L (ref 0.5–1.9)
LEUKOCYTE ESTERASE, URINE: NEGATIVE
LYMPHOCYTES ABSOLUTE: 1.48 E9/L (ref 1.5–4)
LYMPHOCYTES RELATIVE PERCENT: 19.8 % (ref 20–42)
MCH RBC QN AUTO: 32.4 PG (ref 26–35)
MCHC RBC AUTO-ENTMCNC: 32.5 % (ref 32–34.5)
MCV RBC AUTO: 99.8 FL (ref 80–99.9)
MONOCYTES ABSOLUTE: 0.75 E9/L (ref 0.1–0.95)
MONOCYTES RELATIVE PERCENT: 10 % (ref 2–12)
NEUTROPHILS ABSOLUTE: 4.99 E9/L (ref 1.8–7.3)
NEUTROPHILS RELATIVE PERCENT: 66.8 % (ref 43–80)
NITRITE, URINE: NEGATIVE
PDW BLD-RTO: 11.7 FL (ref 11.5–15)
PH UA: 7 (ref 5–9)
PLATELET # BLD: 191 E9/L (ref 130–450)
PMV BLD AUTO: 10.7 FL (ref 7–12)
POTASSIUM SERPL-SCNC: 3.3 MMOL/L (ref 3.5–5)
PROTEIN UA: NORMAL MG/DL
RBC # BLD: 4.38 E12/L (ref 3.5–5.5)
RBC UA: ABNORMAL /HPF (ref 0–2)
SARS-COV-2, NAAT: NOT DETECTED
SODIUM BLD-SCNC: 143 MMOL/L (ref 132–146)
SPECIFIC GRAVITY UA: 1.02 (ref 1–1.03)
TOTAL PROTEIN: 7.3 G/DL (ref 6.4–8.3)
TROPONIN: <0.01 NG/ML (ref 0–0.03)
UROBILINOGEN, URINE: 0.2 E.U./DL
WBC # BLD: 7.5 E9/L (ref 4.5–11.5)
WBC UA: ABNORMAL /HPF (ref 0–5)

## 2021-01-11 PROCEDURE — 80053 COMPREHEN METABOLIC PANEL: CPT

## 2021-01-11 PROCEDURE — 71046 X-RAY EXAM CHEST 2 VIEWS: CPT

## 2021-01-11 PROCEDURE — 84484 ASSAY OF TROPONIN QUANT: CPT

## 2021-01-11 PROCEDURE — 83605 ASSAY OF LACTIC ACID: CPT

## 2021-01-11 PROCEDURE — 81001 URINALYSIS AUTO W/SCOPE: CPT

## 2021-01-11 PROCEDURE — 99283 EMERGENCY DEPT VISIT LOW MDM: CPT

## 2021-01-11 PROCEDURE — 85025 COMPLETE CBC W/AUTO DIFF WBC: CPT

## 2021-01-11 PROCEDURE — U0002 COVID-19 LAB TEST NON-CDC: HCPCS

## 2021-01-11 PROCEDURE — 93005 ELECTROCARDIOGRAM TRACING: CPT | Performed by: EMERGENCY MEDICINE

## 2021-01-11 PROCEDURE — 74176 CT ABD & PELVIS W/O CONTRAST: CPT

## 2021-01-11 NOTE — ED PROVIDER NOTES
HPI:  1/11/21, Time: 5:01 PM DALLIN Santizo is a 79 y.o. female presenting to the ED for generalized weakness and abdominal pain, beginning 1 week ago. The complaint has been persistent, mild in severity, and worsened by nothing. Patient brought into the emergency department by EMS. She complains of just not feeling well. She says this over and over. She denies any chest pain, difficulty breathing, nausea, vomiting. She does state that she is having some abdominal pain. Diffuse in nature. Describes it as an aching sensation. Did not take any medication prior to arrival.  Denies any diarrhea, constipation, dysuria, hematuria. I spoke with the patient's daughter. She states that the patient lives at home by herself. She is complaining of feeling weak and just not feeling right over the last week. The daughter states that she has not been eating and drinking like normal.  Her mom has not had any other complaints. ROS:   Pertinent positives and negatives are stated within HPI, all other systems reviewed and are negative.  --------------------------------------------- PAST HISTORY ---------------------------------------------  Past Medical History:  has a past medical history of CAD (coronary artery disease), GERD (gastroesophageal reflux disease), History of echocardiogram, History of echocardiogram, Hyperlipidemia, Hypertension, MI (myocardial infarction) ([facility] [address] Migraines, New onset atrial flutter ([facility] [address] and Normal cardiac stress test.    Past Surgical History:  has a past surgical history that includes Cholecystectomy; Coronary artery bypass graft; Cardiac catheterization; ECHO Compl W Dop Color Flow (2/27/2012); ECHO Compl W Dop Color Flow (3/25/2013); Colonoscopy (4/16/13); and Spine surgery (N/A, 10/14/2020). Social History:  reports that she quit smoking about 20 years ago. Her smoking use included cigarettes. She has a 10.00 pack-year smoking history.  She has never used smokeless tobacco. She reports that she does not drink alcohol or use drugs. Family History: family history is not on file. The patients home medications have been reviewed. Allergies: Dronedarone    ---------------------------------------------------PHYSICAL EXAM--------------------------------------    Constitutional/General: Alert and oriented x3, well appearing, non toxic in NAD  Head: Normocephalic and atraumatic  Eyes: PERRL, EOMI  Mouth: Oropharynx clear, handling secretions, no trismus  Neck: Supple, full ROM, non tender to palpation in the midline, no stridor, no crepitus, no meningeal signs  Pulmonary: Lungs clear to auscultation bilaterally, no wheezes, rales, or rhonchi. Not in respiratory distress  Cardiovascular:  Regular rate. Regular rhythm. No murmurs, gallops, or rubs. 2+ distal pulses  Chest: no chest wall tenderness  Abdomen: Soft. Epigastric tenderness to palpation. Non distended. +BS. No rebound, guarding, or rigidity. No pulsatile masses appreciated. Musculoskeletal: Moves all extremities x 4. Warm and well perfused, no clubbing, cyanosis, or edema. Capillary refill <3 seconds  Skin: warm and dry. No rashes. Neurologic: GCS 15, CN 2-12 grossly intact, no focal deficits, symmetric strength 5/5 in the upper and lower extremities bilaterally  Psych: Normal Affect    -------------------------------------------------- RESULTS -------------------------------------------------  I have personally reviewed all laboratory and imaging results for this patient. Results are listed below.      LABS:  Results for orders placed or performed during the hospital encounter of 01/11/21   CBC Auto Differential   Result Value Ref Range    WBC 7.5 4.5 - 11.5 E9/L    RBC 4.38 3.50 - 5.50 E12/L    Hemoglobin 14.2 11.5 - 15.5 g/dL    Hematocrit 43.7 34.0 - 48.0 %    MCV 99.8 80.0 - 99.9 fL    MCH 32.4 26.0 - 35.0 pg    MCHC 32.5 32.0 - 34.5 %    RDW 11.7 11.5 - 15.0 fL    Platelets 722 008 - 981 E9/L    MPV 10.7 7.0 - 12.0 fL    Neutrophils % 66.8 43.0 - 80.0 %    Immature Granulocytes % 0.4 0.0 - 5.0 %    Lymphocytes % 19.8 (L) 20.0 - 42.0 %    Monocytes % 10.0 2.0 - 12.0 %    Eosinophils % 2.5 0.0 - 6.0 %    Basophils % 0.5 0.0 - 2.0 %    Neutrophils Absolute 4.99 1.80 - 7.30 E9/L    Immature Granulocytes # 0.03 E9/L    Lymphocytes Absolute 1.48 (L) 1.50 - 4.00 E9/L    Monocytes Absolute 0.75 0.10 - 0.95 E9/L    Eosinophils Absolute 0.19 0.05 - 0.50 E9/L    Basophils Absolute 0.04 0.00 - 0.20 E9/L   Comprehensive Metabolic Panel   Result Value Ref Range    Sodium 143 132 - 146 mmol/L    Potassium 3.3 (L) 3.5 - 5.0 mmol/L    Chloride 102 98 - 107 mmol/L    CO2 29 22 - 29 mmol/L    Anion Gap 12 7 - 16 mmol/L    Glucose 130 (H) 74 - 99 mg/dL    BUN 24 (H) 8 - 23 mg/dL    CREATININE 0.8 0.5 - 1.0 mg/dL    GFR Non-African American >60 >=60 mL/min/1.73    GFR African American >60     Calcium 9.0 8.6 - 10.2 mg/dL    Total Protein 7.3 6.4 - 8.3 g/dL    Alb 4.3 3.5 - 5.2 g/dL    Total Bilirubin 0.6 0.0 - 1.2 mg/dL    Alkaline Phosphatase 111 (H) 35 - 104 U/L    ALT 13 0 - 32 U/L    AST 19 0 - 31 U/L   Troponin   Result Value Ref Range    Troponin <0.01 0.00 - 0.03 ng/mL   Lactate, Sepsis   Result Value Ref Range    Lactic Acid, Sepsis 1.7 0.5 - 1.9 mmol/L   Urinalysis   Result Value Ref Range    Color, UA Yellow Straw/Yellow    Clarity, UA Clear Clear    Glucose, Ur Negative Negative mg/dL    Bilirubin Urine Negative Negative    Ketones, Urine Negative Negative mg/dL    Specific Gravity, UA 1.020 1.005 - 1.030    Blood, Urine Negative Negative    pH, UA 7.0 5.0 - 9.0    Protein, UA TRACE Negative mg/dL    Urobilinogen, Urine 0.2 <2.0 E.U./dL    Nitrite, Urine Negative Negative    Leukocyte Esterase, Urine Negative Negative   COVID-19   Result Value Ref Range    SARS-CoV-2, NAAT Not Detected Not Detected   Microscopic Urinalysis   Result Value Ref Range    WBC, UA NONE 0 - 5 /HPF    RBC, UA NONE 0 - 2 /HPF    Epithelial Cells, UA bowel movements. She states that she is here for her fatigue and generalized weakness. Patient requesting to be discharged home. I instructed the patient to take over-the-counter medication for symptom relief, to follow-up with her primary care physician for reevaluation this week, and to return for worsening symptoms. Patient is agreeable to plan of care. Re-Evaluations:             Re-evaluation. Patients symptoms are improving        This patient's ED course included: a personal history and physicial examination, re-evaluation prior to disposition, multiple bedside re-evaluations, cardiac monitoring, continuous pulse oximetry and a personal history and physicial eaxmination    This patient has remained hemodynamically stable during their ED course. Counseling: The emergency provider has spoken with the patient and discussed todays results, in addition to providing specific details for the plan of care and counseling regarding the diagnosis and prognosis. Questions are answered at this time and they are agreeable with the plan.       --------------------------------- IMPRESSION AND DISPOSITION ---------------------------------    IMPRESSION  1. Constipation, unspecified constipation type        DISPOSITION  Disposition: Discharge to home  Patient condition is stable        NOTE: This report was transcribed using voice recognition software.  Every effort was made to ensure accuracy; however, inadvertent computerized transcription errors may be present          West Drake MD  01/12/21 0001

## 2021-01-12 LAB
EKG ATRIAL RATE: 58 BPM
EKG P AXIS: -16 DEGREES
EKG P-R INTERVAL: 92 MS
EKG Q-T INTERVAL: 452 MS
EKG QRS DURATION: 84 MS
EKG QTC CALCULATION (BAZETT): 443 MS
EKG R AXIS: 88 DEGREES
EKG T AXIS: 4 DEGREES
EKG VENTRICULAR RATE: 58 BPM

## 2021-01-12 NOTE — ED NOTES
Bed: H1  Expected date:   Expected time:   Means of arrival:   Comments:     Viktor Marks, GLORIA  01/11/21 5948
Discharge instructions reviewed, patient verbalized understanding.       Jamaal Lackey RN  01/11/21 5979
Instructed need for urine, does not want straight cath, will give al ittle time and will approach subject again.       Karen Weiss RN  01/11/21 8831
Ulcerative colitis with complication, unspecified location

## 2021-01-22 ENCOUNTER — CARE COORDINATION (OUTPATIENT)
Dept: CARE COORDINATION | Age: 81
End: 2021-01-22

## 2021-01-22 NOTE — LETTER
1/22/2021    Rogelio Cleary  [address]      Dear Rogelio Cleary,    My name is Lorena Valderrama and I am a registered nurse who partners with Hellen Foote DO to improve patients' health. As a member of your health care team, I would work with other providers involved in your care, offer education for your specific health conditions, and connect you with additional resources as needed. I will collaborate with Hellen Foote DO to support you in following your treatment plan. The additional support I provide is no additional cost to you. My primary focus is to help you achieve specific goals and improve your health. We are committed to walk with you on this journey and look forward to working with you. Please call me to further discuss your healthcare needs. I am available by phone at [phone].     In good health,     Lorena Valderrama, [address]

## 2021-01-29 ENCOUNTER — CARE COORDINATION (OUTPATIENT)
Dept: CARE COORDINATION | Age: 81
End: 2021-01-29

## 2021-01-29 NOTE — CARE COORDINATION
ACM called and spoke with pt's daughter Miki Horn as pt's contact number is her daughter's. Coordination Services explained to patient's daughter as she was currently at her mother's home. They verbalized understanding, but decline at this time. Encouraged to call PCP office with any questions/concerns. ACM gave Miki Horn the contact information for [facility] if she would like to see if pt is eligible for services. Miki Horn reported to [employer] that between her and her significant other they manage her mother's care.

## 2021-02-04 DIAGNOSIS — I10 ESSENTIAL HYPERTENSION: ICD-10-CM

## 2021-02-04 DIAGNOSIS — I50.31 ACUTE DIASTOLIC CHF (CONGESTIVE HEART FAILURE) (HCC): ICD-10-CM

## 2021-02-04 RX ORDER — FOLIC ACID 1 MG/1
TABLET ORAL
Qty: 30 TABLET | Refills: 3 | Status: SHIPPED
Start: 2021-02-04 | End: 2021-06-01

## 2021-02-04 RX ORDER — AMLODIPINE BESYLATE 10 MG/1
TABLET ORAL
Qty: 30 TABLET | Refills: 3 | Status: SHIPPED
Start: 2021-02-04 | End: 2021-06-01

## 2021-02-22 DIAGNOSIS — I49.5 SICK SINUS SYNDROME WITH TACHYCARDIA (HCC): ICD-10-CM

## 2021-02-22 DIAGNOSIS — I48.91 ATRIAL FIBRILLATION WITH RVR (HCC): ICD-10-CM

## 2021-02-24 DIAGNOSIS — K21.9 GASTROESOPHAGEAL REFLUX DISEASE WITHOUT ESOPHAGITIS: ICD-10-CM

## 2021-02-24 DIAGNOSIS — I50.31 ACUTE DIASTOLIC CHF (CONGESTIVE HEART FAILURE) (HCC): ICD-10-CM

## 2021-02-24 RX ORDER — DOFETILIDE 0.12 MG/1
CAPSULE ORAL
Qty: 60 CAPSULE | Refills: 0 | Status: SHIPPED
Start: 2021-02-24 | End: 2021-03-30 | Stop reason: SDUPTHER

## 2021-02-24 RX ORDER — BUMETANIDE 1 MG/1
TABLET ORAL
Qty: 60 TABLET | Refills: 0 | Status: SHIPPED
Start: 2021-02-24 | End: 2021-03-30 | Stop reason: SDUPTHER

## 2021-02-24 RX ORDER — OMEPRAZOLE 40 MG/1
CAPSULE, DELAYED RELEASE ORAL
Qty: 30 CAPSULE | Refills: 0 | Status: SHIPPED
Start: 2021-02-24 | End: 2021-03-30 | Stop reason: SDUPTHER

## 2021-03-02 DIAGNOSIS — I50.31 ACUTE DIASTOLIC CHF (CONGESTIVE HEART FAILURE) (HCC): ICD-10-CM

## 2021-03-02 RX ORDER — SILDENAFIL CITRATE 20 MG/1
TABLET ORAL
Qty: 45 TABLET | Refills: 0 | Status: SHIPPED
Start: 2021-03-02 | End: 2021-05-27 | Stop reason: SDUPTHER

## 2021-03-04 DIAGNOSIS — I10 ESSENTIAL HYPERTENSION: ICD-10-CM

## 2021-03-04 RX ORDER — NEBIVOLOL HYDROCHLORIDE 20 MG/1
20 TABLET ORAL DAILY
Qty: 30 TABLET | Refills: 2 | Status: SHIPPED
Start: 2021-03-04 | End: 2021-06-01

## 2021-03-19 DIAGNOSIS — I25.84 CORONARY ARTERY DISEASE DUE TO CALCIFIED CORONARY LESION: ICD-10-CM

## 2021-03-19 DIAGNOSIS — I25.10 CORONARY ARTERY DISEASE DUE TO CALCIFIED CORONARY LESION: ICD-10-CM

## 2021-03-19 RX ORDER — RANOLAZINE 500 MG/1
TABLET, EXTENDED RELEASE ORAL
Qty: 60 TABLET | Refills: 0 | Status: SHIPPED
Start: 2021-03-19 | End: 2021-04-20 | Stop reason: SDUPTHER

## 2021-03-30 DIAGNOSIS — I50.31 ACUTE DIASTOLIC CHF (CONGESTIVE HEART FAILURE) (HCC): ICD-10-CM

## 2021-03-30 DIAGNOSIS — K21.9 GASTROESOPHAGEAL REFLUX DISEASE WITHOUT ESOPHAGITIS: ICD-10-CM

## 2021-03-30 RX ORDER — OMEPRAZOLE 40 MG/1
40 CAPSULE, DELAYED RELEASE ORAL DAILY
Qty: 30 CAPSULE | Refills: 0 | Status: SHIPPED
Start: 2021-03-30 | End: 2021-05-04 | Stop reason: SDUPTHER

## 2021-03-30 RX ORDER — BUMETANIDE 1 MG/1
TABLET ORAL
Qty: 60 TABLET | Refills: 0 | Status: CANCELLED | OUTPATIENT
Start: 2021-03-30

## 2021-03-30 RX ORDER — DOFETILIDE 0.12 MG/1
CAPSULE ORAL
Qty: 60 CAPSULE | Refills: 0 | Status: SHIPPED
Start: 2021-03-30 | End: 2021-05-13 | Stop reason: SDUPTHER

## 2021-03-30 RX ORDER — BUMETANIDE 1 MG/1
TABLET ORAL
Qty: 60 TABLET | Refills: 0 | Status: SHIPPED
Start: 2021-03-30 | End: 2021-05-04 | Stop reason: SDUPTHER

## 2021-04-20 DIAGNOSIS — I25.10 CORONARY ARTERY DISEASE DUE TO CALCIFIED CORONARY LESION: ICD-10-CM

## 2021-04-20 DIAGNOSIS — I25.84 CORONARY ARTERY DISEASE DUE TO CALCIFIED CORONARY LESION: ICD-10-CM

## 2021-04-20 RX ORDER — RANOLAZINE 500 MG/1
TABLET, EXTENDED RELEASE ORAL
Qty: 60 TABLET | Refills: 0 | Status: SHIPPED
Start: 2021-04-20 | End: 2021-05-27 | Stop reason: SDUPTHER

## 2021-05-04 DIAGNOSIS — K21.9 GASTROESOPHAGEAL REFLUX DISEASE WITHOUT ESOPHAGITIS: ICD-10-CM

## 2021-05-04 DIAGNOSIS — I25.810 ATHEROSCLEROSIS OF CORONARY ARTERY BYPASS GRAFT OF NATIVE HEART WITHOUT ANGINA PECTORIS: ICD-10-CM

## 2021-05-04 DIAGNOSIS — I50.31 ACUTE DIASTOLIC CHF (CONGESTIVE HEART FAILURE) (HCC): ICD-10-CM

## 2021-05-04 RX ORDER — BUMETANIDE 1 MG/1
TABLET ORAL
Qty: 60 TABLET | Refills: 2 | Status: SHIPPED
Start: 2021-05-04 | End: 2021-08-02 | Stop reason: SDUPTHER

## 2021-05-04 RX ORDER — ATORVASTATIN CALCIUM 40 MG/1
TABLET, FILM COATED ORAL
Qty: 90 TABLET | Refills: 1 | Status: SHIPPED
Start: 2021-05-04 | End: 2021-07-24 | Stop reason: SDUPTHER

## 2021-05-04 RX ORDER — OMEPRAZOLE 40 MG/1
40 CAPSULE, DELAYED RELEASE ORAL DAILY
Qty: 30 CAPSULE | Refills: 2 | Status: SHIPPED
Start: 2021-05-04 | End: 2021-06-30 | Stop reason: SDUPTHER

## 2021-05-13 RX ORDER — DOFETILIDE 0.12 MG/1
CAPSULE ORAL
Qty: 60 CAPSULE | Refills: 0 | Status: SHIPPED
Start: 2021-05-13 | End: 2021-06-14 | Stop reason: SDUPTHER

## 2021-05-27 DIAGNOSIS — I25.84 CORONARY ARTERY DISEASE DUE TO CALCIFIED CORONARY LESION: ICD-10-CM

## 2021-05-27 DIAGNOSIS — I50.31 ACUTE DIASTOLIC CHF (CONGESTIVE HEART FAILURE) (HCC): ICD-10-CM

## 2021-05-27 DIAGNOSIS — I25.10 CORONARY ARTERY DISEASE DUE TO CALCIFIED CORONARY LESION: ICD-10-CM

## 2021-05-27 RX ORDER — SILDENAFIL CITRATE 20 MG/1
TABLET ORAL
Qty: 45 TABLET | Refills: 0 | Status: SHIPPED
Start: 2021-05-27 | End: 2021-07-24 | Stop reason: SDUPTHER

## 2021-05-27 RX ORDER — RANOLAZINE 500 MG/1
TABLET, EXTENDED RELEASE ORAL
Qty: 28 TABLET | Refills: 0 | Status: SHIPPED
Start: 2021-05-27 | End: 2021-06-14 | Stop reason: SDUPTHER

## 2021-05-30 DIAGNOSIS — I10 ESSENTIAL HYPERTENSION: ICD-10-CM

## 2021-05-30 DIAGNOSIS — I50.31 ACUTE DIASTOLIC CHF (CONGESTIVE HEART FAILURE) (HCC): ICD-10-CM

## 2021-06-01 RX ORDER — NEBIVOLOL HYDROCHLORIDE 20 MG/1
TABLET ORAL
Qty: 30 TABLET | Refills: 0 | Status: SHIPPED
Start: 2021-06-01 | End: 2021-06-30 | Stop reason: SDUPTHER

## 2021-06-01 RX ORDER — FOLIC ACID 1 MG/1
TABLET ORAL
Qty: 30 TABLET | Refills: 0 | Status: SHIPPED
Start: 2021-06-01 | End: 2021-07-08 | Stop reason: SDUPTHER

## 2021-06-01 RX ORDER — AMLODIPINE BESYLATE 10 MG/1
TABLET ORAL
Qty: 30 TABLET | Refills: 0 | Status: SHIPPED
Start: 2021-06-01 | End: 2021-06-30 | Stop reason: SDUPTHER

## 2021-06-06 DIAGNOSIS — I49.5 SICK SINUS SYNDROME WITH TACHYCARDIA (HCC): ICD-10-CM

## 2021-06-06 DIAGNOSIS — I48.91 ATRIAL FIBRILLATION WITH RVR (HCC): ICD-10-CM

## 2021-06-11 NOTE — PROCEDURES
[address]                                PULMONARY FUNCTION    PATIENT NAME: Peyman Bryd                   :        1940  MED REC NO:   [id number]                            ROOM:  ACCOUNT NO:   [de-identified]                           ADMIT DATE: 2018  PROVIDER:     Leonela Gonzalez MD    DATE OF PROCEDURE:  2018    ATTENDING:  Dr. Erick Tovar.    PULMONOLOGIST:  Dr. Leslie Ojeda. Limited PFT is as ordered with spirometry before and after bronchodilators. In conclusion, this test does reveal evidence of mild obstruction with no  change postbronchodilator. Lack of response to inhaled bronchodilators  does not preclude its use if clinically indicated. MVV is decreased  secondary to the obstruction above. Flow volume loop is consistent with an  obstructive process. If further information is desired, this test should  be ordered as a \"complete PFT\" to obtain results of lung volumes, airway  resistance and diffusion capacity. Thank you kindly.         Shelby Jain MD    D: 2018 14:50:50       T: 2018 15:32:56     [email or web address]  Job#: [id number]     Doc#: [id number]    CC:  <> Alert and oriented to person, place and time

## 2021-06-14 DIAGNOSIS — I25.10 CORONARY ARTERY DISEASE DUE TO CALCIFIED CORONARY LESION: ICD-10-CM

## 2021-06-14 DIAGNOSIS — I25.84 CORONARY ARTERY DISEASE DUE TO CALCIFIED CORONARY LESION: ICD-10-CM

## 2021-06-14 RX ORDER — DOFETILIDE 0.12 MG/1
CAPSULE ORAL
Qty: 60 CAPSULE | Refills: 0 | Status: SHIPPED
Start: 2021-06-14 | End: 2021-07-24 | Stop reason: SDUPTHER

## 2021-06-14 RX ORDER — RANOLAZINE 500 MG/1
TABLET, EXTENDED RELEASE ORAL
Qty: 28 TABLET | Refills: 0 | Status: SHIPPED
Start: 2021-06-14 | End: 2021-06-30 | Stop reason: SDUPTHER

## 2021-06-30 DIAGNOSIS — I25.10 CORONARY ARTERY DISEASE DUE TO CALCIFIED CORONARY LESION: ICD-10-CM

## 2021-06-30 DIAGNOSIS — I10 ESSENTIAL HYPERTENSION: ICD-10-CM

## 2021-06-30 DIAGNOSIS — K21.9 GASTROESOPHAGEAL REFLUX DISEASE WITHOUT ESOPHAGITIS: ICD-10-CM

## 2021-06-30 DIAGNOSIS — I25.84 CORONARY ARTERY DISEASE DUE TO CALCIFIED CORONARY LESION: ICD-10-CM

## 2021-06-30 RX ORDER — AMLODIPINE BESYLATE 10 MG/1
TABLET ORAL
Qty: 7 TABLET | Refills: 0 | Status: SHIPPED
Start: 2021-06-30 | End: 2021-07-08 | Stop reason: SDUPTHER

## 2021-06-30 RX ORDER — NEBIVOLOL HYDROCHLORIDE 20 MG/1
20 TABLET ORAL DAILY
Qty: 7 TABLET | Refills: 0 | Status: SHIPPED
Start: 2021-06-30 | End: 2021-07-08 | Stop reason: SDUPTHER

## 2021-06-30 RX ORDER — RANOLAZINE 500 MG/1
TABLET, EXTENDED RELEASE ORAL
Qty: 14 TABLET | Refills: 0 | Status: SHIPPED
Start: 2021-06-30 | End: 2021-07-08 | Stop reason: SDUPTHER

## 2021-06-30 RX ORDER — OMEPRAZOLE 40 MG/1
40 CAPSULE, DELAYED RELEASE ORAL DAILY
Qty: 14 CAPSULE | Refills: 0 | Status: SHIPPED
Start: 2021-06-30 | End: 2021-08-02 | Stop reason: SDUPTHER

## 2021-07-08 ENCOUNTER — OFFICE VISIT (OUTPATIENT)
Dept: FAMILY MEDICINE CLINIC | Age: 81
End: 2021-07-08
Payer: MEDICARE

## 2021-07-08 VITALS
WEIGHT: 93.6 LBS | DIASTOLIC BLOOD PRESSURE: 60 MMHG | HEIGHT: 58 IN | OXYGEN SATURATION: 96 % | BODY MASS INDEX: 19.65 KG/M2 | RESPIRATION RATE: 16 BRPM | SYSTOLIC BLOOD PRESSURE: 118 MMHG | HEART RATE: 77 BPM | TEMPERATURE: 97.8 F

## 2021-07-08 DIAGNOSIS — I49.5 SICK SINUS SYNDROME WITH TACHYCARDIA (HCC): ICD-10-CM

## 2021-07-08 DIAGNOSIS — R20.0 NUMBNESS AND TINGLING: ICD-10-CM

## 2021-07-08 DIAGNOSIS — I25.10 CORONARY ARTERY DISEASE DUE TO CALCIFIED CORONARY LESION: ICD-10-CM

## 2021-07-08 DIAGNOSIS — R20.2 NUMBNESS AND TINGLING: ICD-10-CM

## 2021-07-08 DIAGNOSIS — R53.83 FATIGUE, UNSPECIFIED TYPE: ICD-10-CM

## 2021-07-08 DIAGNOSIS — H81.10 BENIGN PAROXYSMAL POSITIONAL VERTIGO, UNSPECIFIED LATERALITY: Primary | ICD-10-CM

## 2021-07-08 DIAGNOSIS — I50.31 ACUTE DIASTOLIC CHF (CONGESTIVE HEART FAILURE) (HCC): ICD-10-CM

## 2021-07-08 DIAGNOSIS — R35.0 URINARY FREQUENCY: ICD-10-CM

## 2021-07-08 DIAGNOSIS — I10 ESSENTIAL HYPERTENSION: ICD-10-CM

## 2021-07-08 DIAGNOSIS — E44.1 MILD PROTEIN-CALORIE MALNUTRITION (HCC): ICD-10-CM

## 2021-07-08 DIAGNOSIS — F99 ABNORMAL MINI-MENTAL STATUS EXAM: ICD-10-CM

## 2021-07-08 DIAGNOSIS — I25.84 CORONARY ARTERY DISEASE DUE TO CALCIFIED CORONARY LESION: ICD-10-CM

## 2021-07-08 DIAGNOSIS — I20.9 ANGINA PECTORIS (HCC): ICD-10-CM

## 2021-07-08 DIAGNOSIS — R73.09 ELEVATED GLUCOSE: ICD-10-CM

## 2021-07-08 LAB
ALBUMIN SERPL-MCNC: 4.9 G/DL (ref 3.5–5.2)
ALP BLD-CCNC: 99 U/L (ref 35–104)
ALT SERPL-CCNC: 16 U/L (ref 0–32)
ANION GAP SERPL CALCULATED.3IONS-SCNC: 14 MMOL/L (ref 7–16)
AST SERPL-CCNC: 25 U/L (ref 0–31)
BASOPHILS ABSOLUTE: 0.05 E9/L (ref 0–0.2)
BASOPHILS RELATIVE PERCENT: 0.8 % (ref 0–2)
BILIRUB SERPL-MCNC: 1 MG/DL (ref 0–1.2)
BILIRUBIN, POC: NORMAL
BLOOD URINE, POC: NORMAL
BUN BLDV-MCNC: 17 MG/DL (ref 6–23)
CALCIUM SERPL-MCNC: 9.6 MG/DL (ref 8.6–10.2)
CHLORIDE BLD-SCNC: 101 MMOL/L (ref 98–107)
CLARITY, POC: CLEAR
CO2: 32 MMOL/L (ref 22–29)
COLOR, POC: NORMAL
CREAT SERPL-MCNC: 0.8 MG/DL (ref 0.5–1)
EOSINOPHILS ABSOLUTE: 0.17 E9/L (ref 0.05–0.5)
EOSINOPHILS RELATIVE PERCENT: 2.6 % (ref 0–6)
FOLATE: >20 NG/ML (ref 4.8–24.2)
GFR AFRICAN AMERICAN: >60
GFR NON-AFRICAN AMERICAN: >60 ML/MIN/1.73
GLUCOSE BLD-MCNC: 116 MG/DL (ref 74–99)
GLUCOSE URINE, POC: NORMAL
HCT VFR BLD CALC: 44.5 % (ref 34–48)
HEMOGLOBIN: 14.5 G/DL (ref 11.5–15.5)
IMMATURE GRANULOCYTES #: 0.02 E9/L
IMMATURE GRANULOCYTES %: 0.3 % (ref 0–5)
KETONES, POC: NORMAL
LEUKOCYTE EST, POC: NORMAL
LYMPHOCYTES ABSOLUTE: 1.83 E9/L (ref 1.5–4)
LYMPHOCYTES RELATIVE PERCENT: 28.4 % (ref 20–42)
MCH RBC QN AUTO: 31.5 PG (ref 26–35)
MCHC RBC AUTO-ENTMCNC: 32.6 % (ref 32–34.5)
MCV RBC AUTO: 96.7 FL (ref 80–99.9)
MONOCYTES ABSOLUTE: 0.74 E9/L (ref 0.1–0.95)
MONOCYTES RELATIVE PERCENT: 11.5 % (ref 2–12)
NEUTROPHILS ABSOLUTE: 3.63 E9/L (ref 1.8–7.3)
NEUTROPHILS RELATIVE PERCENT: 56.4 % (ref 43–80)
NITRITE, POC: NORMAL
PDW BLD-RTO: 12.6 FL (ref 11.5–15)
PH, POC: 6.5
PLATELET # BLD: 215 E9/L (ref 130–450)
PMV BLD AUTO: 11.8 FL (ref 7–12)
POTASSIUM SERPL-SCNC: 3.8 MMOL/L (ref 3.5–5)
PROTEIN, POC: NORMAL
RBC # BLD: 4.6 E12/L (ref 3.5–5.5)
SODIUM BLD-SCNC: 147 MMOL/L (ref 132–146)
SPECIFIC GRAVITY, POC: 1.02
TOTAL PROTEIN: 7.9 G/DL (ref 6.4–8.3)
TSH SERPL DL<=0.05 MIU/L-ACNC: 2.07 UIU/ML (ref 0.27–4.2)
UROBILINOGEN, POC: 0.2
VITAMIN B-12: 456 PG/ML (ref 211–946)
WBC # BLD: 6.4 E9/L (ref 4.5–11.5)

## 2021-07-08 PROCEDURE — 1036F TOBACCO NON-USER: CPT | Performed by: NURSE PRACTITIONER

## 2021-07-08 PROCEDURE — G8420 CALC BMI NORM PARAMETERS: HCPCS | Performed by: NURSE PRACTITIONER

## 2021-07-08 PROCEDURE — 81002 URINALYSIS NONAUTO W/O SCOPE: CPT | Performed by: NURSE PRACTITIONER

## 2021-07-08 PROCEDURE — G8427 DOCREV CUR MEDS BY ELIG CLIN: HCPCS | Performed by: NURSE PRACTITIONER

## 2021-07-08 PROCEDURE — 1123F ACP DISCUSS/DSCN MKR DOCD: CPT | Performed by: NURSE PRACTITIONER

## 2021-07-08 PROCEDURE — 3288F FALL RISK ASSESSMENT DOCD: CPT | Performed by: NURSE PRACTITIONER

## 2021-07-08 PROCEDURE — 99214 OFFICE O/P EST MOD 30 MIN: CPT | Performed by: NURSE PRACTITIONER

## 2021-07-08 PROCEDURE — G8400 PT W/DXA NO RESULTS DOC: HCPCS | Performed by: NURSE PRACTITIONER

## 2021-07-08 PROCEDURE — 1090F PRES/ABSN URINE INCON ASSESS: CPT | Performed by: NURSE PRACTITIONER

## 2021-07-08 PROCEDURE — 4040F PNEUMOC VAC/ADMIN/RCVD: CPT | Performed by: NURSE PRACTITIONER

## 2021-07-08 RX ORDER — FOLIC ACID 1 MG/1
TABLET ORAL
Qty: 30 TABLET | Refills: 0 | Status: SHIPPED
Start: 2021-07-08 | End: 2021-08-02 | Stop reason: SDUPTHER

## 2021-07-08 RX ORDER — MECLIZINE HYDROCHLORIDE 25 MG/1
25 TABLET ORAL 3 TIMES DAILY PRN
Qty: 30 TABLET | Refills: 0 | Status: SHIPPED | OUTPATIENT
Start: 2021-07-08 | End: 2021-07-18

## 2021-07-08 RX ORDER — NEBIVOLOL HYDROCHLORIDE 20 MG/1
20 TABLET ORAL DAILY
Qty: 7 TABLET | Refills: 0 | Status: SHIPPED
Start: 2021-07-08 | End: 2021-07-15 | Stop reason: SDUPTHER

## 2021-07-08 RX ORDER — AMLODIPINE BESYLATE 10 MG/1
TABLET ORAL
Qty: 7 TABLET | Refills: 0 | Status: SHIPPED
Start: 2021-07-08 | End: 2021-07-15 | Stop reason: SDUPTHER

## 2021-07-08 RX ORDER — RANOLAZINE 500 MG/1
TABLET, EXTENDED RELEASE ORAL
Qty: 14 TABLET | Refills: 0 | Status: SHIPPED
Start: 2021-07-08 | End: 2021-07-15 | Stop reason: SDUPTHER

## 2021-07-08 SDOH — ECONOMIC STABILITY: FOOD INSECURITY: WITHIN THE PAST 12 MONTHS, THE FOOD YOU BOUGHT JUST DIDN'T LAST AND YOU DIDN'T HAVE MONEY TO GET MORE.: NEVER TRUE

## 2021-07-08 SDOH — ECONOMIC STABILITY: FOOD INSECURITY: WITHIN THE PAST 12 MONTHS, YOU WORRIED THAT YOUR FOOD WOULD RUN OUT BEFORE YOU GOT MONEY TO BUY MORE.: NEVER TRUE

## 2021-07-08 ASSESSMENT — ENCOUNTER SYMPTOMS
WHEEZING: 1
CONSTIPATION: 0
SHORTNESS OF BREATH: 0
VOMITING: 0
COUGH: 0
NAUSEA: 0
DIARRHEA: 0

## 2021-07-08 ASSESSMENT — PATIENT HEALTH QUESTIONNAIRE - PHQ9
SUM OF ALL RESPONSES TO PHQ QUESTIONS 1-9: 0
SUM OF ALL RESPONSES TO PHQ9 QUESTIONS 1 & 2: 0
2. FEELING DOWN, DEPRESSED OR HOPELESS: 0
1. LITTLE INTEREST OR PLEASURE IN DOING THINGS: 0

## 2021-07-08 ASSESSMENT — SOCIAL DETERMINANTS OF HEALTH (SDOH): HOW HARD IS IT FOR YOU TO PAY FOR THE VERY BASICS LIKE FOOD, HOUSING, MEDICAL CARE, AND HEATING?: NOT HARD AT ALL

## 2021-07-08 NOTE — PROGRESS NOTES
Alexa Serrano (:  1940) is a 79 y.o. female,Established patient, here for evaluation of the following chief complaint(s):  Hypertension and Memory Loss (increased )         ASSESSMENT/PLAN:  1. Benign paroxysmal positional vertigo, unspecified laterality  -     meclizine (ANTIVERT) 25 MG tablet; Take 1 tablet by mouth 3 times daily as needed for Dizziness, Disp-30 tablet, R-0Normal  - Reviewed side effects of medication and patient verbalizes understanding.   - Advised to call back directly if there are further questions, or if these symptoms fail to improve as anticipated or worsen. 2. Acute diastolic CHF (congestive heart failure) (HCC)  -     folic acid (FOLVITE) 1 MG tablet; TAKE ONE TABLET BY MOUTH EVERY DAY, Disp-30 tablet, R-0Normal  - The current medical regimen is effective;  continue present plan and medications. 3. Sick sinus syndrome with tachycardia (HCC)  -     magnesium oxide (MAG-OX) 400 (241.3 Mg) MG TABS tablet; TAKE ONE TABLET BY MOUTH DAILY, Disp-30 tablet, R-0Normal  - The current medical regimen is effective;  continue present plan and medications. 4. Essential hypertension  -     nebivolol (BYSTOLIC) 20 MG TABS tablet; Take 1 tablet by mouth daily, Disp-7 tablet, R-0Normal  -     amLODIPine (NORVASC) 10 MG tablet; TAKE ONE TABLET BY MOUTH EVERY DAY, Disp-7 tablet, R-0Normal    5. Coronary artery disease due to calcified coronary lesion  -     ranolazine (RANEXA) 500 MG extended release tablet; TAKE ONE TABLET BY MOUTH TWO TIMES A DAY, Disp-14 tablet, R-0Normal  - The current medical regimen is effective;  continue present plan and medications. 6. Mild protein-calorie malnutrition ([address]  -  Drinks protein supplements     7. Numbness and tingling  -     Vitamin B12 & Folate; Future    8. Fatigue, unspecified type  -     TSH without Reflex; Future  -     CBC Auto Differential; Future    9. Elevated glucose  -     Comprehensive Metabolic Panel; Future    10.  Urinary frequency  -     POCT Urinalysis no Micro    11. Abnormal mini-mental status exam  -     Juanita Christine MD, Geriatric Assessment, [place]    12. Angina pectoris ([address]  -  Stable       Return if symptoms worsen or fail to improve. Subjective   SUBJECTIVE/OBJECTIVE:  HPI   Patient indicates she has been eating a little more to help with malnutrition concerns, patient indicates she does not have an appetiteshe has been taking shakes to compliment meals. She walks around a few times a week. She is compliant with taking blood pressure medication. Her short term memory loss has progressively gotten worse, she forgets to take medication if not reminded. She currently is living by herself, but daughter is moving her to live with her. Review of Systems   Constitutional: Positive for appetite change and unexpected weight change (Intended weight change + shakes to diet). Negative for activity change. HENT: Positive for congestion. Respiratory: Positive for wheezing (with activity). Negative for cough and shortness of breath. Cardiovascular: Positive for chest pain. Negative for palpitations. Gastrointestinal: Negative for constipation, diarrhea, nausea and vomiting. Genitourinary: Positive for difficulty urinating. Neurological: Positive for dizziness (Dehydration) and headaches (Intermittent). Negative for weakness. Psychiatric/Behavioral: Positive for sleep disturbance (Has Rx for sleep). Negative for dysphoric mood. The patient is not nervous/anxious. Objective   Physical Exam  Constitutional:       Appearance: She is underweight. HENT:      Head: Normocephalic and atraumatic. Neck:      Thyroid: No thyromegaly. Trachea: No tracheal deviation. Cardiovascular:      Rate and Rhythm: Normal rate and regular rhythm. Heart sounds: No murmur heard. Pulmonary:      Effort: Pulmonary effort is normal.      Breath sounds: Normal breath sounds.    Abdominal: General: Bowel sounds are normal.      Palpations: Abdomen is soft. Tenderness: There is no abdominal tenderness. Musculoskeletal:         General: Normal range of motion. Lymphadenopathy:      Cervical: No cervical adenopathy. Skin:     General: Skin is warm and dry. Neurological:      Mental Status: She is alert and oriented to person, place, and time. Psychiatric:         Behavior: Behavior normal.            On this date 7/8/2021 I have spent 39 minutes reviewing previous notes, test results and face to face with the patient discussing the diagnosis and importance of compliance with the treatment plan as well as documenting on the day of the visit. An electronic signature was used to authenticate this note.     --Sherice Hutchinson, APRN - CNP

## 2021-07-15 ENCOUNTER — TELEPHONE (OUTPATIENT)
Dept: FAMILY MEDICINE CLINIC | Age: 81
End: 2021-07-15

## 2021-07-15 DIAGNOSIS — I25.10 CORONARY ARTERY DISEASE DUE TO CALCIFIED CORONARY LESION: ICD-10-CM

## 2021-07-15 DIAGNOSIS — I25.84 CORONARY ARTERY DISEASE DUE TO CALCIFIED CORONARY LESION: ICD-10-CM

## 2021-07-15 DIAGNOSIS — I10 ESSENTIAL HYPERTENSION: ICD-10-CM

## 2021-07-15 DIAGNOSIS — I48.91 ATRIAL FIBRILLATION WITH RVR (HCC): ICD-10-CM

## 2021-07-15 RX ORDER — NEBIVOLOL HYDROCHLORIDE 20 MG/1
20 TABLET ORAL DAILY
Qty: 30 TABLET | Refills: 3 | Status: SHIPPED
Start: 2021-07-15 | End: 2021-10-25 | Stop reason: SDUPTHER

## 2021-07-15 RX ORDER — AMLODIPINE BESYLATE 10 MG/1
TABLET ORAL
Qty: 30 TABLET | Refills: 3 | Status: SHIPPED
Start: 2021-07-15 | End: 2021-10-25 | Stop reason: SDUPTHER

## 2021-07-15 RX ORDER — RANOLAZINE 500 MG/1
TABLET, EXTENDED RELEASE ORAL
Qty: 30 TABLET | Refills: 3 | Status: SHIPPED
Start: 2021-07-15 | End: 2021-09-13

## 2021-07-24 DIAGNOSIS — I25.810 ATHEROSCLEROSIS OF CORONARY ARTERY BYPASS GRAFT OF NATIVE HEART WITHOUT ANGINA PECTORIS: ICD-10-CM

## 2021-07-24 DIAGNOSIS — I50.31 ACUTE DIASTOLIC CHF (CONGESTIVE HEART FAILURE) (HCC): ICD-10-CM

## 2021-07-26 RX ORDER — SILDENAFIL CITRATE 20 MG/1
TABLET ORAL
Qty: 45 TABLET | Refills: 0 | Status: SHIPPED
Start: 2021-07-26 | End: 2021-09-26 | Stop reason: SDUPTHER

## 2021-07-26 RX ORDER — ATORVASTATIN CALCIUM 40 MG/1
TABLET, FILM COATED ORAL
Qty: 90 TABLET | Refills: 0 | Status: SHIPPED
Start: 2021-07-26 | End: 2022-02-03 | Stop reason: SDUPTHER

## 2021-07-26 RX ORDER — DOFETILIDE 0.12 MG/1
CAPSULE ORAL
Qty: 60 CAPSULE | Refills: 0 | Status: SHIPPED
Start: 2021-07-26 | End: 2021-09-10 | Stop reason: SDUPTHER

## 2021-08-02 ENCOUNTER — CARE COORDINATION (OUTPATIENT)
Dept: CARE COORDINATION | Age: 81
End: 2021-08-02

## 2021-08-02 DIAGNOSIS — K21.9 GASTROESOPHAGEAL REFLUX DISEASE WITHOUT ESOPHAGITIS: ICD-10-CM

## 2021-08-02 DIAGNOSIS — I49.5 SICK SINUS SYNDROME WITH TACHYCARDIA (HCC): ICD-10-CM

## 2021-08-02 DIAGNOSIS — I50.31 ACUTE DIASTOLIC CHF (CONGESTIVE HEART FAILURE) (HCC): ICD-10-CM

## 2021-08-02 RX ORDER — OMEPRAZOLE 40 MG/1
40 CAPSULE, DELAYED RELEASE ORAL DAILY
Qty: 30 CAPSULE | Refills: 3 | Status: SHIPPED
Start: 2021-08-02 | End: 2021-12-01 | Stop reason: SDUPTHER

## 2021-08-02 RX ORDER — BUMETANIDE 1 MG/1
TABLET ORAL
Qty: 60 TABLET | Refills: 3 | Status: SHIPPED
Start: 2021-08-02 | End: 2021-12-01 | Stop reason: SDUPTHER

## 2021-08-02 RX ORDER — FOLIC ACID 1 MG/1
TABLET ORAL
Qty: 30 TABLET | Refills: 3 | Status: SHIPPED
Start: 2021-08-02 | End: 2021-12-01 | Stop reason: SDUPTHER

## 2021-08-02 NOTE — CARE COORDINATION
-Attempted to reach pt to offer enrollment into care coordination program, however no answer.  -Detailed HIPAA compliant VM left introducing self, reason for call, and brief explanation of program.  -Left ACM's contact information, requesting call back.   -Mailed Intro Letter via [address]

## 2021-08-11 ENCOUNTER — TELEPHONE (OUTPATIENT)
Dept: FAMILY MEDICINE CLINIC | Age: 81
End: 2021-08-11

## 2021-08-11 NOTE — TELEPHONE ENCOUNTER
Ashley Malcolm with [employer] [facility] [phone] called to notify of abnormal mini mental during visit today (we also have one from July and keren is referred to dr Frank Pate)    She also reports an abnormal PAD screening.  Results will be faxed to us

## 2021-09-09 ENCOUNTER — TELEPHONE (OUTPATIENT)
Dept: AUDIOLOGY | Age: 81
End: 2021-09-09

## 2021-09-09 NOTE — TELEPHONE ENCOUNTER
Patient's daughter called regarding hearing aid benefits. [facility]. Explained [facility] procedure for hearing aids and gave daughter number to activate services through [facility].

## 2021-09-10 ENCOUNTER — OFFICE VISIT (OUTPATIENT)
Dept: FAMILY MEDICINE CLINIC | Age: 81
End: 2021-09-10
Payer: MEDICARE

## 2021-09-10 VITALS
SYSTOLIC BLOOD PRESSURE: 110 MMHG | HEIGHT: 58 IN | DIASTOLIC BLOOD PRESSURE: 60 MMHG | BODY MASS INDEX: 19.77 KG/M2 | WEIGHT: 94.2 LBS | OXYGEN SATURATION: 96 % | HEART RATE: 60 BPM | TEMPERATURE: 98.7 F | RESPIRATION RATE: 16 BRPM

## 2021-09-10 DIAGNOSIS — R05.9 COUGH: Primary | ICD-10-CM

## 2021-09-10 DIAGNOSIS — R09.89 RALES: ICD-10-CM

## 2021-09-10 DIAGNOSIS — J32.9 SINOBRONCHITIS: ICD-10-CM

## 2021-09-10 DIAGNOSIS — R06.2 WHEEZING: ICD-10-CM

## 2021-09-10 DIAGNOSIS — J40 SINOBRONCHITIS: ICD-10-CM

## 2021-09-10 DIAGNOSIS — R06.02 SOB (SHORTNESS OF BREATH): ICD-10-CM

## 2021-09-10 PROCEDURE — 1090F PRES/ABSN URINE INCON ASSESS: CPT | Performed by: NURSE PRACTITIONER

## 2021-09-10 PROCEDURE — 4040F PNEUMOC VAC/ADMIN/RCVD: CPT | Performed by: NURSE PRACTITIONER

## 2021-09-10 PROCEDURE — S8101 SPACER WITH MASK: HCPCS | Performed by: NURSE PRACTITIONER

## 2021-09-10 PROCEDURE — G8427 DOCREV CUR MEDS BY ELIG CLIN: HCPCS | Performed by: NURSE PRACTITIONER

## 2021-09-10 PROCEDURE — 1036F TOBACCO NON-USER: CPT | Performed by: NURSE PRACTITIONER

## 2021-09-10 PROCEDURE — G8420 CALC BMI NORM PARAMETERS: HCPCS | Performed by: NURSE PRACTITIONER

## 2021-09-10 PROCEDURE — 1123F ACP DISCUSS/DSCN MKR DOCD: CPT | Performed by: NURSE PRACTITIONER

## 2021-09-10 PROCEDURE — 99213 OFFICE O/P EST LOW 20 MIN: CPT | Performed by: NURSE PRACTITIONER

## 2021-09-10 PROCEDURE — G8400 PT W/DXA NO RESULTS DOC: HCPCS | Performed by: NURSE PRACTITIONER

## 2021-09-10 RX ORDER — AZITHROMYCIN 250 MG/1
TABLET, FILM COATED ORAL
Qty: 1 PACKET | Refills: 0 | Status: SHIPPED
Start: 2021-09-10 | End: 2021-09-27

## 2021-09-10 RX ORDER — BENZONATATE 100 MG/1
100 CAPSULE ORAL 3 TIMES DAILY PRN
Qty: 30 CAPSULE | Refills: 0 | Status: SHIPPED | OUTPATIENT
Start: 2021-09-10 | End: 2021-09-17

## 2021-09-10 RX ORDER — ALBUTEROL SULFATE 90 UG/1
AEROSOL, METERED RESPIRATORY (INHALATION)
Qty: 18 G | Refills: 2 | Status: SHIPPED | OUTPATIENT
Start: 2021-09-10

## 2021-09-10 RX ORDER — DOXYCYCLINE HYCLATE 100 MG
100 TABLET ORAL 2 TIMES DAILY
Qty: 10 TABLET | Refills: 0 | Status: SHIPPED | OUTPATIENT
Start: 2021-09-10 | End: 2021-09-15

## 2021-09-10 RX ORDER — DEXAMETHASONE 6 MG/1
6 TABLET ORAL
Qty: 10 TABLET | Refills: 0 | Status: SHIPPED | OUTPATIENT
Start: 2021-09-10 | End: 2021-09-20

## 2021-09-10 ASSESSMENT — ENCOUNTER SYMPTOMS
SORE THROAT: 0
DIARRHEA: 0
CHEST TIGHTNESS: 0
WHEEZING: 1
SHORTNESS OF BREATH: 1
NAUSEA: 0
COUGH: 1
SINUS PRESSURE: 0
VOMITING: 0

## 2021-09-10 NOTE — PROGRESS NOTES
Lucio Lopez (:  1940) is a 79 y.o. female,Established patient, here for evaluation of the following chief complaint(s):  Nasal Congestion (wheezing ) and Cough (for 5 days)         ASSESSMENT/PLAN:  1. Cough  -     Covid-19 Ambulatory  -     MI SPACER WITH MASK  -     albuterol sulfate HFA (VENTOLIN HFA) 108 (90 Base) MCG/ACT inhaler; INHALE TWO PUFFS BY MOUTH EVERY 6 HOURS AS NEEDED FOR WHEEZING, Disp-18 g, R-2Normal  -     benzonatate (TESSALON PERLES) 100 MG capsule; Take 1 capsule by mouth 3 times daily as needed for Cough, Disp-30 capsule, R-0Normal  - Reviewed side effects of medication and patient verbalizes understanding. Advised to call back directly if there are further questions, or if these symptoms fail to improve as anticipated or worsen. -     XR CHEST (2 VW); Future  2. Sinobronchitis  -     azithromycin (ZITHROMAX) 250 MG tablet; Take 2 tabs (500 mg) on Day 1, and take 1 tab (250 mg) on days 2 through 5., Disp-1 packet, R-0Normal  - Reviewed side effects of medication and patient verbalizes understanding. 3. Wheezing  -     dexamethasone (DECADRON) 6 MG tablet; Take 1 tablet by mouth daily (with breakfast) for 10 days, Disp-10 tablet, R-0Normal  - Reviewed side effects of medication and patient verbalizes understanding. 4. SOB (shortness of breath)  -     dexamethasone (DECADRON) 6 MG tablet; Take 1 tablet by mouth daily (with breakfast) for 10 days, Disp-10 tablet, R-0Normal  5. Rales  -     XR CHEST (2 VW);  Future  -  Conservative methods, OTCs for symptom relief provided  -  Start Vitamin C 1000 mg, Vitamin D 2000 IU, Zinc 50 mg  -  Ibuprofen, tylenol for fever and body aches  -  Red flag symptoms discussed  -  Rest and hydrate  -  Sleep on stomach  -  Deep breathing exercises  -  Return to clinic/UC/ED for worsening symptoms, especially developing or worsening of respiratory symptoms  -  Symptoms of COVID can wax and wane  -  Patient verbalizes understanding      Return if symptoms worsen or fail to improve. Subjective   SUBJECTIVE/OBJECTIVE:  HPI  What are symptoms: first few days it was just a cough. She was taking OTC cough medication that was somewhat effective. Date symptoms started: 5 days ago    Ill/COVID positive contacts: no    COVID vaccination: yes Uzma Alfaro Feb and March     Where do you work/go to school: retired    Who lives with you: lives with daughter and family    LMP: post menopausal     Review of Systems   Constitutional: Negative for activity change, appetite change, chills, diaphoresis and fever. No change in taste or smell   HENT: Positive for congestion and ear pain. Negative for sinus pressure and sore throat. Respiratory: Positive for cough (productive), shortness of breath and wheezing. Negative for chest tightness. Cardiovascular: Negative for chest pain. Gastrointestinal: Negative for diarrhea, nausea and vomiting. Genitourinary: Negative for difficulty urinating. Musculoskeletal: Positive for myalgias. Neurological: Negative for dizziness, weakness and headaches. Objective   Physical Exam  Constitutional:       Appearance: She is well-developed. HENT:      Head: Normocephalic and atraumatic. Nose:      Right Turbinates: Enlarged and swollen. Left Turbinates: Enlarged and swollen. Mouth/Throat:      Pharynx: Posterior oropharyngeal erythema present. No oropharyngeal exudate. Neck:      Thyroid: No thyromegaly. Trachea: No tracheal deviation. Cardiovascular:      Rate and Rhythm: Normal rate and regular rhythm. Heart sounds: No murmur heard. Pulmonary:      Effort: Pulmonary effort is normal.      Breath sounds: Examination of the right-lower field reveals rales. Examination of the left-lower field reveals rales. Rales present. Abdominal:      General: Bowel sounds are normal.      Palpations: Abdomen is soft. Tenderness: There is no abdominal tenderness.    Musculoskeletal: General: Normal range of motion. Lymphadenopathy:      Cervical: No cervical adenopathy. Skin:     General: Skin is warm and dry. Neurological:      Mental Status: She is alert and oriented to person, place, and time. Psychiatric:         Behavior: Behavior normal.            On this date 9/10/2021 I have spent 21 minutes reviewing previous notes, test results and face to face with the patient discussing the diagnosis and importance of compliance with the treatment plan as well as documenting on the day of the visit. An electronic signature was used to authenticate this note.     --MAYCOL Sanches - CNP

## 2021-09-11 DIAGNOSIS — I25.10 CORONARY ARTERY DISEASE DUE TO CALCIFIED CORONARY LESION: ICD-10-CM

## 2021-09-11 DIAGNOSIS — I25.84 CORONARY ARTERY DISEASE DUE TO CALCIFIED CORONARY LESION: ICD-10-CM

## 2021-09-12 LAB
SARS-COV-2: NOT DETECTED
SOURCE: NORMAL

## 2021-09-13 DIAGNOSIS — R05.9 COUGH: ICD-10-CM

## 2021-09-13 DIAGNOSIS — R09.89 RALES: ICD-10-CM

## 2021-09-13 RX ORDER — DOFETILIDE 0.12 MG/1
CAPSULE ORAL
Qty: 60 CAPSULE | Refills: 0 | Status: SHIPPED
Start: 2021-09-13 | End: 2021-10-15 | Stop reason: SDUPTHER

## 2021-09-13 RX ORDER — RANOLAZINE 500 MG/1
TABLET, EXTENDED RELEASE ORAL
Qty: 30 TABLET | Refills: 0 | Status: SHIPPED
Start: 2021-09-13 | End: 2021-10-15 | Stop reason: SDUPTHER

## 2021-09-26 DIAGNOSIS — I50.31 ACUTE DIASTOLIC CHF (CONGESTIVE HEART FAILURE) (HCC): ICD-10-CM

## 2021-09-27 ENCOUNTER — INITIAL CONSULT (OUTPATIENT)
Dept: GERIATRIC MEDICINE | Age: 81
End: 2021-09-27
Payer: MEDICARE

## 2021-09-27 VITALS
DIASTOLIC BLOOD PRESSURE: 56 MMHG | SYSTOLIC BLOOD PRESSURE: 102 MMHG | BODY MASS INDEX: 18.97 KG/M2 | RESPIRATION RATE: 12 BRPM | HEART RATE: 64 BPM | HEIGHT: 58 IN | WEIGHT: 90.4 LBS | TEMPERATURE: 97.4 F

## 2021-09-27 DIAGNOSIS — G30.9 ALZHEIMER DISEASE (HCC): Primary | ICD-10-CM

## 2021-09-27 DIAGNOSIS — F02.80 ALZHEIMER DISEASE (HCC): Primary | ICD-10-CM

## 2021-09-27 PROCEDURE — 1123F ACP DISCUSS/DSCN MKR DOCD: CPT | Performed by: INTERNAL MEDICINE

## 2021-09-27 PROCEDURE — 1036F TOBACCO NON-USER: CPT | Performed by: INTERNAL MEDICINE

## 2021-09-27 PROCEDURE — 4040F PNEUMOC VAC/ADMIN/RCVD: CPT | Performed by: INTERNAL MEDICINE

## 2021-09-27 PROCEDURE — 99212 OFFICE O/P EST SF 10 MIN: CPT | Performed by: INTERNAL MEDICINE

## 2021-09-27 PROCEDURE — 1090F PRES/ABSN URINE INCON ASSESS: CPT | Performed by: INTERNAL MEDICINE

## 2021-09-27 PROCEDURE — G8427 DOCREV CUR MEDS BY ELIG CLIN: HCPCS | Performed by: INTERNAL MEDICINE

## 2021-09-27 PROCEDURE — G8400 PT W/DXA NO RESULTS DOC: HCPCS | Performed by: INTERNAL MEDICINE

## 2021-09-27 PROCEDURE — G8420 CALC BMI NORM PARAMETERS: HCPCS | Performed by: INTERNAL MEDICINE

## 2021-09-27 RX ORDER — ASPIRIN 81 MG/1
81 TABLET ORAL DAILY
COMMUNITY

## 2021-09-27 RX ORDER — DONEPEZIL HYDROCHLORIDE 5 MG/1
5 TABLET, FILM COATED ORAL
Qty: 30 TABLET | Refills: 5 | Status: SHIPPED
Start: 2021-09-27 | End: 2022-02-21

## 2021-09-27 RX ORDER — CETIRIZINE HYDROCHLORIDE 10 MG/1
10 TABLET ORAL DAILY PRN
COMMUNITY

## 2021-09-27 RX ORDER — SILDENAFIL CITRATE 20 MG/1
TABLET ORAL
Qty: 45 TABLET | Refills: 0 | Status: SHIPPED
Start: 2021-09-27 | End: 2021-11-16 | Stop reason: SDUPTHER

## 2021-09-27 RX ORDER — MECLIZINE HYDROCHLORIDE 25 MG/1
25 TABLET ORAL EVERY 8 HOURS PRN
COMMUNITY

## 2021-09-27 ASSESSMENT — PATIENT HEALTH QUESTIONNAIRE - PHQ9
SUM OF ALL RESPONSES TO PHQ QUESTIONS 1-9: 0
SUM OF ALL RESPONSES TO PHQ QUESTIONS 1-9: 0
SUM OF ALL RESPONSES TO PHQ9 QUESTIONS 1 & 2: 0
1. LITTLE INTEREST OR PLEASURE IN DOING THINGS: 0
SUM OF ALL RESPONSES TO PHQ QUESTIONS 1-9: 0
2. FEELING DOWN, DEPRESSED OR HOPELESS: 0

## 2021-09-27 ASSESSMENT — LIFESTYLE VARIABLES: HOW OFTEN DO YOU HAVE A DRINK CONTAINING ALCOHOL: NEVER

## 2021-09-27 NOTE — PROGRESS NOTES
Chief Complaint   Patient presents with    Consultation     Referral from PCP's office -- by MAYCOL Juarez, re: abnormal MMSE. Pt thinks her memory is \"not too bad\". Here with daughter, Asaf Reynolds.

## 2021-09-27 NOTE — PROGRESS NOTES
CC Evaluate and treat dementia     Here with daughter from [facility]   And 3 other kids , one sister here in area  [place] to daughter[de-identified] home and grandson and granddaughter with IADL issues   HYM ?  \" Fair\"  Sleeps well at night in own room   Eating better and now gaining weight   Knows daughter she lives with and knows 4 other children in family  ADL's independent   BB OK   No falls  Recently since fall out of bed  No driving   IADL's per daugher  No hallucinations   Changes for about 2 years , no surgery , no head injuries   Insidious changes  Knows grandchildren's names    Surgery 1 year  ago for back and general anesthesia   L5 Fx   Impression: Early SDAT                        Low weight usually about 90 lb  Plan; Aricept 2.5 mg with fadi            And follow [facility] prn

## 2021-10-15 DIAGNOSIS — I25.84 CORONARY ARTERY DISEASE DUE TO CALCIFIED CORONARY LESION: ICD-10-CM

## 2021-10-15 DIAGNOSIS — I25.10 CORONARY ARTERY DISEASE DUE TO CALCIFIED CORONARY LESION: ICD-10-CM

## 2021-10-15 RX ORDER — DOFETILIDE 0.12 MG/1
CAPSULE ORAL
Qty: 60 CAPSULE | Refills: 0 | Status: SHIPPED
Start: 2021-10-15 | End: 2021-11-21 | Stop reason: SDUPTHER

## 2021-10-15 RX ORDER — RANOLAZINE 500 MG/1
500 TABLET, EXTENDED RELEASE ORAL 2 TIMES DAILY
Qty: 30 TABLET | Refills: 0 | Status: SHIPPED
Start: 2021-10-15 | End: 2021-10-20 | Stop reason: SDUPTHER

## 2021-10-20 ENCOUNTER — OFFICE VISIT (OUTPATIENT)
Dept: FAMILY MEDICINE CLINIC | Age: 81
End: 2021-10-20
Payer: MEDICARE

## 2021-10-20 VITALS
TEMPERATURE: 97 F | OXYGEN SATURATION: 97 % | WEIGHT: 90.8 LBS | HEART RATE: 60 BPM | BODY MASS INDEX: 19.06 KG/M2 | SYSTOLIC BLOOD PRESSURE: 110 MMHG | DIASTOLIC BLOOD PRESSURE: 62 MMHG | HEIGHT: 58 IN | RESPIRATION RATE: 14 BRPM

## 2021-10-20 DIAGNOSIS — I50.31 ACUTE DIASTOLIC CHF (CONGESTIVE HEART FAILURE) (HCC): ICD-10-CM

## 2021-10-20 DIAGNOSIS — Z23 FLU VACCINE NEED: Primary | ICD-10-CM

## 2021-10-20 DIAGNOSIS — I25.10 CORONARY ARTERY DISEASE DUE TO CALCIFIED CORONARY LESION: ICD-10-CM

## 2021-10-20 DIAGNOSIS — I25.84 CORONARY ARTERY DISEASE DUE TO CALCIFIED CORONARY LESION: ICD-10-CM

## 2021-10-20 PROCEDURE — 4040F PNEUMOC VAC/ADMIN/RCVD: CPT | Performed by: FAMILY MEDICINE

## 2021-10-20 PROCEDURE — G8400 PT W/DXA NO RESULTS DOC: HCPCS | Performed by: FAMILY MEDICINE

## 2021-10-20 PROCEDURE — G8484 FLU IMMUNIZE NO ADMIN: HCPCS | Performed by: FAMILY MEDICINE

## 2021-10-20 PROCEDURE — 1036F TOBACCO NON-USER: CPT | Performed by: FAMILY MEDICINE

## 2021-10-20 PROCEDURE — G8420 CALC BMI NORM PARAMETERS: HCPCS | Performed by: FAMILY MEDICINE

## 2021-10-20 PROCEDURE — 1090F PRES/ABSN URINE INCON ASSESS: CPT | Performed by: FAMILY MEDICINE

## 2021-10-20 PROCEDURE — 90694 VACC AIIV4 NO PRSRV 0.5ML IM: CPT | Performed by: FAMILY MEDICINE

## 2021-10-20 PROCEDURE — 99214 OFFICE O/P EST MOD 30 MIN: CPT | Performed by: FAMILY MEDICINE

## 2021-10-20 PROCEDURE — G0008 ADMIN INFLUENZA VIRUS VAC: HCPCS | Performed by: FAMILY MEDICINE

## 2021-10-20 PROCEDURE — 1123F ACP DISCUSS/DSCN MKR DOCD: CPT | Performed by: FAMILY MEDICINE

## 2021-10-20 PROCEDURE — G8427 DOCREV CUR MEDS BY ELIG CLIN: HCPCS | Performed by: FAMILY MEDICINE

## 2021-10-20 RX ORDER — RANOLAZINE 500 MG/1
500 TABLET, EXTENDED RELEASE ORAL 2 TIMES DAILY
Qty: 60 TABLET | Refills: 2 | Status: SHIPPED
Start: 2021-10-20 | End: 2022-01-24 | Stop reason: SDUPTHER

## 2021-10-20 ASSESSMENT — ENCOUNTER SYMPTOMS
COUGH: 0
NAUSEA: 0
VOMITING: 0
APNEA: 0
DIARRHEA: 0
SHORTNESS OF BREATH: 0
CONSTIPATION: 0
BLOOD IN STOOL: 0
ABDOMINAL PAIN: 0
WHEEZING: 0

## 2021-10-20 NOTE — PROGRESS NOTES
Thomas Bautista (:  1940) is a 79 y.o. female,Established patient, here for evaluation of the following chief complaint(s):  Hemorrhoids (with intermittent bleeding using wipes . Concerned that maybe she is eating to many figs that caused irritation)         ASSESSMENT/PLAN:  1. Acute diastolic CHF (congestive heart failure) ([address]  2. Coronary artery disease due to calcified coronary lesion  -     ranolazine (RANEXA) 500 MG extended release tablet; Take 1 tablet by mouth 2 times daily TAKE ONE TABLET BY MOUTH TWO TIMES A DAY, Disp-60 tablet, R-2Normal               Subjective   SUBJECTIVE/OBJECTIVE:  Hemorrhoids (with intermittent bleeding using wipes . Concerned that maybe she is eating to many figs that caused irritation)  Bright red painless. Review of Systems   Constitutional: Negative for chills, diaphoresis and fever. HENT: Negative for ear discharge, ear pain, hearing loss, nosebleeds and tinnitus. Respiratory: Negative for apnea, cough, shortness of breath and wheezing. Cardiovascular: Negative for chest pain. Gastrointestinal: Negative for abdominal pain, blood in stool, constipation, diarrhea, nausea and vomiting. Genitourinary: Negative for dysuria, flank pain and hematuria. Musculoskeletal: Negative for myalgias. Skin: Negative for rash. Neurological: Negative for headaches. Hematological: Does not bruise/bleed easily. Psychiatric/Behavioral: Negative for hallucinations and suicidal ideas. Objective   /62   Pulse 60   Temp 97 °F (36.1 °C)   Resp 14   Ht 4' 10\" (1.473 m)   Wt 90 lb 12.8 oz (41.2 kg)   LMP  (LMP Unknown)   SpO2 97%   BMI 18.98 kg/m²   Lab Results   Component Value Date    LABA1C 5.1 2019    LABA1C 5.3 2017     Physical Exam  Constitutional:       General: She is not in acute distress. Appearance: She is well-developed. HENT:      Nose: Nose normal.   Eyes:      General: No scleral icterus.   Neck:      Thyroid: No thyromegaly. Vascular: No JVD. Trachea: No tracheal deviation. Cardiovascular:      Heart sounds: No gallop. Pulmonary:      Effort: No respiratory distress. Breath sounds: No wheezing. Abdominal:      General: Abdomen is flat. Musculoskeletal:         General: No tenderness. Skin:     Findings: No erythema. Neurological:      Deep Tendon Reflexes: Reflexes normal.               On this date 10/20/2021 I have spent 23 minutes reviewing previous notes, test results and face to face with the patient discussing the diagnosis and importance of compliance with the treatment plan as well as documenting on the day of the visit. An electronic signature was used to authenticate this note.     --Maksim Bermudez DO

## 2021-10-25 DIAGNOSIS — I48.91 ATRIAL FIBRILLATION WITH RVR (HCC): ICD-10-CM

## 2021-10-25 DIAGNOSIS — I10 ESSENTIAL HYPERTENSION: ICD-10-CM

## 2021-10-25 RX ORDER — AMLODIPINE BESYLATE 10 MG/1
TABLET ORAL
Qty: 30 TABLET | Refills: 2 | Status: SHIPPED
Start: 2021-10-25 | End: 2022-01-19 | Stop reason: SDUPTHER

## 2021-10-25 RX ORDER — NEBIVOLOL 20 MG/1
20 TABLET ORAL DAILY
Qty: 30 TABLET | Refills: 2 | Status: SHIPPED
Start: 2021-10-25 | End: 2022-01-19 | Stop reason: SDUPTHER

## 2021-11-09 ENCOUNTER — TELEPHONE (OUTPATIENT)
Dept: FAMILY MEDICINE CLINIC | Age: 81
End: 2021-11-09

## 2021-11-09 ENCOUNTER — TELEPHONE (OUTPATIENT)
Dept: GERIATRIC MEDICINE | Age: 81
End: 2021-11-09

## 2021-11-09 RX ORDER — MEMANTINE HYDROCHLORIDE 5 MG/1
TABLET ORAL
Qty: 30 TABLET | Refills: 3 | Status: SHIPPED
Start: 2021-11-09 | End: 2022-03-18 | Stop reason: SDUPTHER

## 2021-11-09 NOTE — TELEPHONE ENCOUNTER
10/20/2021  Visit date not found    Pt daughter called. Pt has been dx with dementia by Dr Jordan Daniels. PT is very angry. Not acting herself. Pt daughter was instructed to call Dr Jordan Daniels office for further advice.

## 2021-11-09 NOTE — TELEPHONE ENCOUNTER
Spoke with daughter and will try Namenda 5 mg a day and will start 1/2 tablet a day for a week then one a day

## 2021-11-09 NOTE — TELEPHONE ENCOUNTER
Pt was seen on consult 9/27/21. Aricept 2.5 mg was started. Per daughter, med was stopped per DR's instruction after 2 days because pt developed diarrhea. Daughter states pt now is very castellanos & seems angry most of the day. Unsure how to handle this. Would like to speak with DR. Please call daughter to discuss & advise.

## 2021-11-16 DIAGNOSIS — I50.31 ACUTE DIASTOLIC CHF (CONGESTIVE HEART FAILURE) (HCC): ICD-10-CM

## 2021-11-16 RX ORDER — SILDENAFIL CITRATE 20 MG/1
TABLET ORAL
Qty: 45 TABLET | Refills: 0 | Status: SHIPPED
Start: 2021-11-16 | End: 2021-12-17 | Stop reason: SDUPTHER

## 2021-11-22 RX ORDER — DOFETILIDE 0.12 MG/1
CAPSULE ORAL
Qty: 60 CAPSULE | Refills: 0 | Status: SHIPPED
Start: 2021-11-22 | End: 2021-12-17 | Stop reason: SDUPTHER

## 2021-12-01 DIAGNOSIS — I50.31 ACUTE DIASTOLIC CHF (CONGESTIVE HEART FAILURE) (HCC): ICD-10-CM

## 2021-12-01 DIAGNOSIS — I49.5 SICK SINUS SYNDROME WITH TACHYCARDIA (HCC): ICD-10-CM

## 2021-12-01 DIAGNOSIS — K21.9 GASTROESOPHAGEAL REFLUX DISEASE WITHOUT ESOPHAGITIS: ICD-10-CM

## 2021-12-01 RX ORDER — BUMETANIDE 1 MG/1
TABLET ORAL
Qty: 60 TABLET | Refills: 0 | Status: SHIPPED
Start: 2021-12-01 | End: 2021-12-17 | Stop reason: SDUPTHER

## 2021-12-01 RX ORDER — OMEPRAZOLE 40 MG/1
40 CAPSULE, DELAYED RELEASE ORAL DAILY
Qty: 30 CAPSULE | Refills: 0 | Status: SHIPPED
Start: 2021-12-01 | End: 2021-12-17 | Stop reason: SDUPTHER

## 2021-12-01 RX ORDER — FOLIC ACID 1 MG/1
TABLET ORAL
Qty: 30 TABLET | Refills: 0 | Status: SHIPPED
Start: 2021-12-01 | End: 2021-12-17 | Stop reason: SDUPTHER

## 2021-12-17 DIAGNOSIS — I50.31 ACUTE DIASTOLIC CHF (CONGESTIVE HEART FAILURE) (HCC): ICD-10-CM

## 2021-12-17 DIAGNOSIS — K21.9 GASTROESOPHAGEAL REFLUX DISEASE WITHOUT ESOPHAGITIS: ICD-10-CM

## 2021-12-17 RX ORDER — OMEPRAZOLE 40 MG/1
40 CAPSULE, DELAYED RELEASE ORAL DAILY
Qty: 30 CAPSULE | Refills: 0 | Status: SHIPPED
Start: 2021-12-17 | End: 2022-02-18 | Stop reason: SDUPTHER

## 2021-12-17 RX ORDER — SILDENAFIL CITRATE 20 MG/1
TABLET ORAL
Qty: 45 TABLET | Refills: 0 | Status: SHIPPED
Start: 2021-12-17 | End: 2022-02-05 | Stop reason: SDUPTHER

## 2021-12-17 RX ORDER — FOLIC ACID 1 MG/1
TABLET ORAL
Qty: 30 TABLET | Refills: 0 | Status: SHIPPED
Start: 2021-12-17 | End: 2022-02-03 | Stop reason: SDUPTHER

## 2021-12-17 RX ORDER — DOFETILIDE 0.12 MG/1
CAPSULE ORAL
Qty: 60 CAPSULE | Refills: 0 | Status: SHIPPED
Start: 2021-12-17 | End: 2022-01-19 | Stop reason: SDUPTHER

## 2021-12-17 RX ORDER — BUMETANIDE 1 MG/1
TABLET ORAL
Qty: 60 TABLET | Refills: 0 | Status: SHIPPED
Start: 2021-12-17 | End: 2022-02-03 | Stop reason: SDUPTHER

## 2021-12-22 ENCOUNTER — OFFICE VISIT (OUTPATIENT)
Dept: FAMILY MEDICINE CLINIC | Age: 81
End: 2021-12-22
Payer: MEDICARE

## 2021-12-22 VITALS
DIASTOLIC BLOOD PRESSURE: 60 MMHG | WEIGHT: 90.6 LBS | TEMPERATURE: 96.8 F | RESPIRATION RATE: 16 BRPM | BODY MASS INDEX: 19.02 KG/M2 | HEIGHT: 58 IN | SYSTOLIC BLOOD PRESSURE: 110 MMHG

## 2021-12-22 DIAGNOSIS — G30.1 LATE ONSET ALZHEIMER'S DEMENTIA WITHOUT BEHAVIORAL DISTURBANCE (HCC): ICD-10-CM

## 2021-12-22 DIAGNOSIS — Z00.00 ROUTINE GENERAL MEDICAL EXAMINATION AT A HEALTH CARE FACILITY: Primary | ICD-10-CM

## 2021-12-22 DIAGNOSIS — F02.80 LATE ONSET ALZHEIMER'S DEMENTIA WITHOUT BEHAVIORAL DISTURBANCE (HCC): ICD-10-CM

## 2021-12-22 PROCEDURE — 1123F ACP DISCUSS/DSCN MKR DOCD: CPT | Performed by: FAMILY MEDICINE

## 2021-12-22 PROCEDURE — G8484 FLU IMMUNIZE NO ADMIN: HCPCS | Performed by: FAMILY MEDICINE

## 2021-12-22 PROCEDURE — G0439 PPPS, SUBSEQ VISIT: HCPCS | Performed by: FAMILY MEDICINE

## 2021-12-22 PROCEDURE — 4040F PNEUMOC VAC/ADMIN/RCVD: CPT | Performed by: FAMILY MEDICINE

## 2021-12-22 ASSESSMENT — LIFESTYLE VARIABLES
AUDIT-C TOTAL SCORE: INCOMPLETE
AUDIT TOTAL SCORE: INCOMPLETE
HOW OFTEN DO YOU HAVE A DRINK CONTAINING ALCOHOL: NEVER
HOW OFTEN DO YOU HAVE A DRINK CONTAINING ALCOHOL: 0

## 2021-12-22 ASSESSMENT — PATIENT HEALTH QUESTIONNAIRE - PHQ9
1. LITTLE INTEREST OR PLEASURE IN DOING THINGS: 0
SUM OF ALL RESPONSES TO PHQ QUESTIONS 1-9: 0
SUM OF ALL RESPONSES TO PHQ QUESTIONS 1-9: 0
SUM OF ALL RESPONSES TO PHQ9 QUESTIONS 1 & 2: 0
2. FEELING DOWN, DEPRESSED OR HOPELESS: 0
SUM OF ALL RESPONSES TO PHQ QUESTIONS 1-9: 0

## 2021-12-22 NOTE — PROGRESS NOTES
Medicare Annual Wellness Visit  Name: Carissa Mendez Date: 2021   MRN: <[id number]> Sex: Female   Age: 79 y.o. Ethnicity: Unavailable / Unknown   : 1940 Race:       Zoë Mosley is here for Medicare AWV (AWV)    Screenings for behavioral, psychosocial and functional/safety risks, and cognitive dysfunction are all negative except as indicated below. These results, as well as other patient data from the [address] [facility] [address] form, are documented in Flowsheets linked to this Encounter. Allergies   Allergen Reactions    Dronedarone Hives       Prior to Visit Medications    Medication Sig Taking?  Authorizing Provider   sildenafil (REVATIO) 20 MG tablet take 1/2 tablet by mouth three times daily - Yes MAYCOL Steel - CNP   dofetilide (TIKOSYN) 125 MCG capsule TAKE ONE CAPSULE BY MOUTH TWO TIMES A DAY Yes MAYCOL Steel - CNP   bumetanide (BUMEX) 1 MG tablet TAKE TWO TABLETS BY MOUTH DAILY Yes MAYCOL Steel - CNP   omeprazole (PRILOSEC) 40 MG delayed release capsule Take 1 capsule by mouth daily Yes MAYCOL Steel - CNP   folic acid (FOLVITE) 1 MG tablet TAKE ONE TABLET BY MOUTH EVERY DAY Yes MAYCOL Steel CNP   magnesium oxide (MAG-OX) 400 (241.3 Mg) MG TABS tablet TAKE ONE TABLET BY MOUTH DAILY Yes Jo Lew,    memantine (NAMENDA) 5 MG tablet Take 1/2 tablet a day for a week then one a day Yes Live Drummond MD   nebivolol (BYSTOLIC) 20 MG TABS tablet Take 1 tablet by mouth daily Yes Jo Lew DO   amLODIPine (NORVASC) 10 MG tablet TAKE ONE TABLET BY MOUTH EVERY DAY Yes Jo Lew,    apixaban (ELIQUIS) 2.5 MG TABS tablet TAKE ONE TABLET BY MOUTH TWO TIMES A DAY Yes Jo Lew,    ranolazine (RANEXA) 500 MG extended release tablet Take 1 tablet by mouth 2 times daily TAKE ONE TABLET BY MOUTH TWO TIMES A DAY Yes Jo Lew DO   aspirin 81 MG EC tablet Take 81 mg by mouth daily Yes Historical Provider, MD   cetirizine (ZYRTEC) 10 MG tablet Take 10 mg by mouth daily as needed for Rhinitis Yes Historical Provider, MD   meclizine (ANTIVERT) 25 MG tablet Take 25 mg by mouth every 8 hours as needed for Dizziness Yes Historical Provider, MD   albuterol sulfate HFA (VENTOLIN HFA) 108 (90 Base) MCG/ACT inhaler INHALE TWO PUFFS BY MOUTH EVERY 6 HOURS AS NEEDED FOR WHEEZING Yes Ines PARISH Rajinder, APRN - CNP   atorvastatin (LIPITOR) 40 MG tablet TAKE ONE TABLET BY MOUTH EVERY DAY Yes Canda Ring, APRN - CNP   potassium chloride (KLOR-CON M) 20 MEQ extended release tablet TAKE ONE TABLET BY MOUTH EVERY DAY  Patient taking differently: 20 mEq 2 times daily TAKE ONE TABLET BY MOUTH EVERY DAY Yes Torito Miranda DO   donepezil (ARICEPT) 5 MG tablet Take 1 tablet by mouth Daily with lunch  Patient not taking: Reported on 10/20/2021  Shona Duane, MD       Past Medical History:   Diagnosis Date    CAD (coronary artery disease)     GERD (gastroesophageal reflux disease)     History of echocardiogram 03/14/2017    EF 62%    History of echocardiogram 05/01/2018    EF 62%    Hyperlipidemia     Hypertension     [facility]     Migraines     New onset atrial flutter ([facility] 3/13/2017    Normal cardiac stress test 5/2010       Past Surgical History:   Procedure Laterality Date    CARDIAC CATHETERIZATION      2002    CHOLECYSTECTOMY      COLONOSCOPY  4/16/13    DR CAMERON     CORONARY ARTERY BYPASS GRAFT      CABG x 4 in 2000    ECHO COMPL W DOP COLOR FLOW  2/27/2012         ECHO COMPL W DOP COLOR FLOW  3/25/2013         SPINE SURGERY N/A 10/14/2020    T12, L5 KYPHOPLASTY, EPIDURAL INJECTION performed by Carmen Witt MD at [facility]       No family history on file.     CareTeam (Including outside providers/suppliers regularly involved in providing care):   Patient Care Team:  Torito Miranda DO as PCP - . Zac Ulrich, DO as PCP - [facility] Provider    Wt Readings from Last 3 Encounters:   12/22/21 90 lb 9.6 oz (41.1 kg)   10/20/21 90 lb 12.8 oz (41.2 kg)   09/27/21 90 lb 6.4 oz (41 kg)     Vitals:    12/22/21 1521   BP: 110/60   Resp: 16   Temp: 96.8 °F (36 °C)   TempSrc: Temporal   Weight: 90 lb 9.6 oz (41.1 kg)   Height: 4' 10\" (1.473 m)     Body mass index is 18.94 kg/m². Based upon direct observation of the patient, evaluation of cognition reveals remote memory intact, recent memory impaired. General Appearance: alert and oriented to person, place and time, well developed and well- nourished, in no acute distress  Skin: warm and dry, no rash or erythema  Head: normocephalic and atraumatic  Eyes: pupils equal, round, and reactive to light, extraocular eye movements intact, conjunctivae normal  ENT: tympanic membrane, external ear and ear canal normal bilaterally, nose without deformity, nasal mucosa and turbinates normal without polyps  Neck: supple and non-tender without mass, no thyromegaly or thyroid nodules, no cervical lymphadenopathy  Pulmonary/Chest: clear to auscultation bilaterally- no wheezes, rales or rhonchi, normal air movement, no respiratory distress  Cardiovascular: normal rate, regular rhythm, normal S1 and S2, no murmurs, rubs, clicks, or gallops, distal pulses intact, no carotid bruits  Abdomen: soft, non-tender, non-distended, normal bowel sounds, no masses or organomegaly  Extremities: no cyanosis, clubbing or edema  Musculoskeletal: normal range of motion, no joint swelling, deformity or tenderness  Neurologic: reflexes normal and symmetric, no cranial nerve deficit, gait, coordination and speech normal  PAcemaker [place]    Patient's complete Health Risk Assessment and screening values have been reviewed and are found in Flowsheets. The following problems were reviewed today and where indicated follow up appointments were made and/or referrals ordered.       Positive Risk Factor Screenings with Interventions: Health Habits/Nutrition:  Health Habits/Nutrition  Do you exercise for at least 20 minutes 2-3 times per week?: (!) No  Have you lost any weight without trying in the past 3 months?: No  Do you eat only one meal per day?: No  Have you seen the dentist within the past year?: (!) No  Body mass index: 18.93  Health Habits/Nutrition Interventions:  · Inadequate physical activity:  none     Safety:  Safety  Do you have working smoke detectors?: Yes  Have all throw rugs been removed or fastened?: (!) No  Do you have non-slip mats or surfaces in all bathtubs/showers?: Yes  Do all of your stairways have a railing or banister?: Yes  Are your doorways, halls and stairs free of clutter?: Yes  Do you always fasten your seatbelt when you are in a car?: Yes  Safety Interventions:  · none    ADL:  ADLs  In the past 7 days, did you need help from others to perform any of the following everyday activities? Eating, dressing, grooming, bathing, toileting, or walking/balance?: None  In the past 7 days, did you need help from others to take care of any of the following?  Laundry, housekeeping, banking/finances, shopping, telephone use, food preparation, transportation, or taking medications?: (!) Laundry,Housekeeping,Banking/Finances,Shopping,Telephone Use,Food Preparation,Transportation,Taking Medications  ADL Interventions:  · none      Personalized Preventive Plan   Current Health Maintenance Status  Immunization History   Administered Date(s) Administered    COVID-19Sabine Ano, Primary or Immunocompromised, PF, 100mcg/0.5mL 02/17/2021, 03/17/2021    Influenza A ([id number]) Vaccine PF IM 11/30/2009    Influenza, High Dose (Fluzone 65 yrs and older) 10/12/2015, 12/18/2017    Influenza, Quadv, IM, PF (6 mo and older Fluzone, Flulaval, Fluarix, and 3 yrs and older Afluria) 03/15/2017    Influenza, Quadv, adjuvanted, 65 yrs +, IM, PF (Fluad) 10/20/2021    Influenza, Triv, inactivated, subunit, adjuvanted, IM (Fluad 65 yrs and older) 09/12/2018    Pneumococcal Conjugate 13-valent ([id number]) 03/15/2017    Pneumococcal Polysaccharide ([id number]) 01/06/2016, 09/21/2019    Zoster Recombinant (Shingrix) 02/17/2019, 04/17/2019        Health Maintenance   Topic Date Due    DTaP/Tdap/Td vaccine (1 - Tdap) Never done    DEXA (modify frequency per FRAX score)  Never done    Lipid screen  09/16/2020    Annual Wellness Visit (AWV)  03/06/2021    COVID-19 Vaccine (3 - Booster for Moderna series) 09/17/2021    Potassium monitoring  07/08/2022    Creatinine monitoring  07/08/2022    Flu vaccine  Completed    Shingles Vaccine  Completed    Pneumococcal 65+ years Vaccine  Completed    Hepatitis A vaccine  Aged Out    Hepatitis B vaccine  Aged Out    Hib vaccine  Aged Out    Meningococcal (ACWY) vaccine  Aged Out     Recommendations for [facility] Due: see orders and patient instructions/AVS.  . Recommended screening schedule for the next 5-10 years is provided to the patient in written form: see Patient Instructions/AVS.    There are no diagnoses linked to this encounter.

## 2021-12-22 NOTE — PATIENT INSTRUCTIONS
Personalized Preventive Plan for Mindi Del Rio - 12/22/2021  Medicare offers a range of preventive health benefits. Some of the tests and screenings are paid in full while other may be subject to a deductible, co-insurance, and/or copay. Some of these benefits include a comprehensive review of your medical history including lifestyle, illnesses that may run in your family, and various assessments and screenings as appropriate. After reviewing your medical record and screening and assessments performed today your provider may have ordered immunizations, labs, imaging, and/or referrals for you. A list of these orders (if applicable) as well as your Preventive Care list are included within your After Visit Summary for your review. Other Preventive Recommendations:    · A preventive eye exam performed by an eye specialist is recommended every 1-2 years to screen for glaucoma; cataracts, macular degeneration, and other eye disorders. · A preventive dental visit is recommended every 6 months. · Try to get at least 150 minutes of exercise per week or 10,000 steps per day on a pedometer . · Order or download the FREE \"Exercise & Physical Activity: Your Everyday Guide\" from [employer]. Call 7-586.706.3453 or search [employer] online. · You need 5925-2494 mg of calcium and 7502-6097 IU of vitamin D per day. It is possible to meet your calcium requirement with diet alone, but a vitamin D supplement is usually necessary to meet this goal.  · When exposed to the sun, use a sunscreen that protects against both UVA and UVB radiation with an SPF of 30 or greater. Reapply every 2 to 3 hours or after sweating, drying off with a towel, or swimming. · Always wear a seat belt when traveling in a car. Always wear a helmet when riding a bicycle or motorcycle.

## 2022-01-19 DIAGNOSIS — I49.5 SICK SINUS SYNDROME WITH TACHYCARDIA (HCC): ICD-10-CM

## 2022-01-19 DIAGNOSIS — I10 ESSENTIAL HYPERTENSION: ICD-10-CM

## 2022-01-19 RX ORDER — AMLODIPINE BESYLATE 10 MG/1
TABLET ORAL
Qty: 30 TABLET | Refills: 2 | Status: SHIPPED
Start: 2022-01-19 | End: 2022-02-05 | Stop reason: SDUPTHER

## 2022-01-19 RX ORDER — NEBIVOLOL 20 MG/1
20 TABLET ORAL DAILY
Qty: 30 TABLET | Refills: 2 | Status: SHIPPED
Start: 2022-01-19 | End: 2022-04-18

## 2022-01-19 RX ORDER — DOFETILIDE 0.12 MG/1
CAPSULE ORAL
Qty: 60 CAPSULE | Refills: 2 | Status: SHIPPED
Start: 2022-01-19 | End: 2022-04-18

## 2022-01-22 DIAGNOSIS — I25.84 CORONARY ARTERY DISEASE DUE TO CALCIFIED CORONARY LESION: ICD-10-CM

## 2022-01-22 DIAGNOSIS — I48.91 ATRIAL FIBRILLATION WITH RVR (HCC): ICD-10-CM

## 2022-01-22 DIAGNOSIS — I25.10 CORONARY ARTERY DISEASE DUE TO CALCIFIED CORONARY LESION: ICD-10-CM

## 2022-01-24 DIAGNOSIS — I48.91 ATRIAL FIBRILLATION WITH RVR (HCC): ICD-10-CM

## 2022-01-24 DIAGNOSIS — I25.84 CORONARY ARTERY DISEASE DUE TO CALCIFIED CORONARY LESION: ICD-10-CM

## 2022-01-24 DIAGNOSIS — I25.10 CORONARY ARTERY DISEASE DUE TO CALCIFIED CORONARY LESION: ICD-10-CM

## 2022-01-24 RX ORDER — RANOLAZINE 500 MG/1
500 TABLET, EXTENDED RELEASE ORAL 2 TIMES DAILY
Qty: 60 TABLET | Refills: 2 | Status: SHIPPED
Start: 2022-01-24 | End: 2022-02-21

## 2022-01-24 RX ORDER — RANOLAZINE 500 MG/1
TABLET, EXTENDED RELEASE ORAL
Qty: 60 TABLET | Refills: 2 | Status: SHIPPED
Start: 2022-01-24 | End: 2022-07-20

## 2022-02-03 DIAGNOSIS — I25.810 ATHEROSCLEROSIS OF CORONARY ARTERY BYPASS GRAFT OF NATIVE HEART WITHOUT ANGINA PECTORIS: ICD-10-CM

## 2022-02-03 DIAGNOSIS — I50.31 ACUTE DIASTOLIC CHF (CONGESTIVE HEART FAILURE) (HCC): ICD-10-CM

## 2022-02-04 RX ORDER — BUMETANIDE 1 MG/1
TABLET ORAL
Qty: 60 TABLET | Refills: 2 | Status: SHIPPED
Start: 2022-02-04 | End: 2022-05-09

## 2022-02-04 RX ORDER — FOLIC ACID 1 MG/1
TABLET ORAL
Qty: 30 TABLET | Refills: 2 | Status: SHIPPED
Start: 2022-02-04 | End: 2022-05-09

## 2022-02-04 RX ORDER — ATORVASTATIN CALCIUM 40 MG/1
TABLET, FILM COATED ORAL
Qty: 90 TABLET | Refills: 0 | Status: SHIPPED
Start: 2022-02-04 | End: 2022-05-11

## 2022-02-05 DIAGNOSIS — I50.31 ACUTE DIASTOLIC CHF (CONGESTIVE HEART FAILURE) (HCC): ICD-10-CM

## 2022-02-05 DIAGNOSIS — I10 ESSENTIAL HYPERTENSION: ICD-10-CM

## 2022-02-07 RX ORDER — SILDENAFIL CITRATE 20 MG/1
TABLET ORAL
Qty: 45 TABLET | Refills: 0 | Status: SHIPPED
Start: 2022-02-07 | End: 2022-03-28 | Stop reason: SDUPTHER

## 2022-02-07 RX ORDER — AMLODIPINE BESYLATE 10 MG/1
TABLET ORAL
Qty: 30 TABLET | Refills: 2 | Status: SHIPPED
Start: 2022-02-07 | End: 2022-07-20

## 2022-02-18 DIAGNOSIS — I50.31 ACUTE DIASTOLIC CHF (CONGESTIVE HEART FAILURE) (HCC): ICD-10-CM

## 2022-02-18 DIAGNOSIS — K21.9 GASTROESOPHAGEAL REFLUX DISEASE WITHOUT ESOPHAGITIS: ICD-10-CM

## 2022-02-21 ENCOUNTER — OFFICE VISIT (OUTPATIENT)
Dept: GERIATRIC MEDICINE | Age: 82
End: 2022-02-21
Payer: MEDICARE

## 2022-02-21 VITALS
DIASTOLIC BLOOD PRESSURE: 70 MMHG | RESPIRATION RATE: 16 BRPM | WEIGHT: 90.6 LBS | HEIGHT: 58 IN | SYSTOLIC BLOOD PRESSURE: 118 MMHG | HEART RATE: 64 BPM | BODY MASS INDEX: 19.02 KG/M2 | TEMPERATURE: 97 F

## 2022-02-21 DIAGNOSIS — F02.80 ALZHEIMER DISEASE (HCC): Primary | ICD-10-CM

## 2022-02-21 DIAGNOSIS — I50.31 ACUTE DIASTOLIC CHF (CONGESTIVE HEART FAILURE) (HCC): ICD-10-CM

## 2022-02-21 DIAGNOSIS — I49.5 SICK SINUS SYNDROME WITH TACHYCARDIA (HCC): ICD-10-CM

## 2022-02-21 DIAGNOSIS — K21.9 GASTROESOPHAGEAL REFLUX DISEASE WITHOUT ESOPHAGITIS: ICD-10-CM

## 2022-02-21 DIAGNOSIS — G30.9 ALZHEIMER DISEASE (HCC): Primary | ICD-10-CM

## 2022-02-21 DIAGNOSIS — E44.1 MILD PROTEIN-CALORIE MALNUTRITION (HCC): ICD-10-CM

## 2022-02-21 DIAGNOSIS — I20.9 ANGINA PECTORIS (HCC): ICD-10-CM

## 2022-02-21 PROCEDURE — 1036F TOBACCO NON-USER: CPT | Performed by: INTERNAL MEDICINE

## 2022-02-21 PROCEDURE — 4040F PNEUMOC VAC/ADMIN/RCVD: CPT | Performed by: INTERNAL MEDICINE

## 2022-02-21 PROCEDURE — G8427 DOCREV CUR MEDS BY ELIG CLIN: HCPCS | Performed by: INTERNAL MEDICINE

## 2022-02-21 PROCEDURE — 1123F ACP DISCUSS/DSCN MKR DOCD: CPT | Performed by: INTERNAL MEDICINE

## 2022-02-21 PROCEDURE — 99212 OFFICE O/P EST SF 10 MIN: CPT | Performed by: INTERNAL MEDICINE

## 2022-02-21 PROCEDURE — G8420 CALC BMI NORM PARAMETERS: HCPCS | Performed by: INTERNAL MEDICINE

## 2022-02-21 PROCEDURE — 1090F PRES/ABSN URINE INCON ASSESS: CPT | Performed by: INTERNAL MEDICINE

## 2022-02-21 PROCEDURE — G8400 PT W/DXA NO RESULTS DOC: HCPCS | Performed by: INTERNAL MEDICINE

## 2022-02-21 PROCEDURE — G8484 FLU IMMUNIZE NO ADMIN: HCPCS | Performed by: INTERNAL MEDICINE

## 2022-02-21 RX ORDER — POTASSIUM CHLORIDE 20 MEQ/1
TABLET, EXTENDED RELEASE ORAL
Qty: 30 TABLET | Refills: 2 | Status: SHIPPED
Start: 2022-02-21 | End: 2022-05-02 | Stop reason: SDUPTHER

## 2022-02-21 RX ORDER — OMEPRAZOLE 40 MG/1
40 CAPSULE, DELAYED RELEASE ORAL DAILY
Qty: 30 CAPSULE | Refills: 2 | Status: SHIPPED
Start: 2022-02-21 | End: 2022-02-21 | Stop reason: SDUPTHER

## 2022-02-21 NOTE — PROGRESS NOTES
Chief Complaint   Patient presents with    Follow-up     2nd visit. Last seen in September re: early SDAT. Here with daughter, Elyse Person. Daughter states Aricept gave pt diarrhea so it has been discontinued. Pt is on Namenda.

## 2022-02-21 NOTE — PROGRESS NOTES
CC Recheck dementia    Here with daughter Rashaad Young  And she knows she has another daughter  And 7 grandchildren but she is unaware of their names   Decline  in mental status for at least 2 years   Lots of laughter   Minicog 3//6   0/3 5 minute memory   4 animals   Clock   Weight 90 and plateau  Adverse effect to Aricept   Namenda 5 mg ad ay and doing well  More docile on med   ADL's OK and coached with shower   Impression: SDAT moderate severe                       Sedation from Namenda ?                       Adverse effect from Aricept   Plan;Continue same and try Namenda 5 mg hs

## 2022-02-22 RX ORDER — OMEPRAZOLE 40 MG/1
40 CAPSULE, DELAYED RELEASE ORAL DAILY
Qty: 30 CAPSULE | Refills: 2 | Status: SHIPPED
Start: 2022-02-22 | End: 2022-05-20 | Stop reason: SDUPTHER

## 2022-03-18 RX ORDER — MEMANTINE HYDROCHLORIDE 5 MG/1
5 TABLET ORAL NIGHTLY
Qty: 90 TABLET | Refills: 3 | Status: SHIPPED | OUTPATIENT
Start: 2022-03-18

## 2022-03-28 DIAGNOSIS — I50.31 ACUTE DIASTOLIC CHF (CONGESTIVE HEART FAILURE) (HCC): ICD-10-CM

## 2022-03-28 RX ORDER — SILDENAFIL CITRATE 20 MG/1
TABLET ORAL
Qty: 45 TABLET | Refills: 0 | Status: SHIPPED
Start: 2022-03-28 | End: 2022-05-02 | Stop reason: SDUPTHER

## 2022-04-18 DIAGNOSIS — I49.5 SICK SINUS SYNDROME WITH TACHYCARDIA (HCC): ICD-10-CM

## 2022-04-18 DIAGNOSIS — I10 ESSENTIAL HYPERTENSION: ICD-10-CM

## 2022-04-18 RX ORDER — NEBIVOLOL HYDROCHLORIDE 20 MG/1
TABLET ORAL
Qty: 30 TABLET | Refills: 2 | Status: SHIPPED
Start: 2022-04-18 | End: 2022-07-11

## 2022-04-18 RX ORDER — MAGNESIUM OXIDE 400 MG/1
TABLET ORAL
Qty: 30 TABLET | Refills: 2 | Status: SHIPPED
Start: 2022-04-18 | End: 2022-07-11

## 2022-04-18 RX ORDER — DOFETILIDE 0.12 MG/1
CAPSULE ORAL
Qty: 60 CAPSULE | Refills: 2 | Status: SHIPPED
Start: 2022-04-18 | End: 2022-04-25 | Stop reason: SDUPTHER

## 2022-04-25 RX ORDER — DOFETILIDE 0.12 MG/1
CAPSULE ORAL
Qty: 60 CAPSULE | Refills: 2 | Status: SHIPPED
Start: 2022-04-25 | End: 2022-07-27

## 2022-05-02 DIAGNOSIS — I50.31 ACUTE DIASTOLIC CHF (CONGESTIVE HEART FAILURE) (HCC): ICD-10-CM

## 2022-05-02 RX ORDER — POTASSIUM CHLORIDE 20 MEQ/1
TABLET, EXTENDED RELEASE ORAL
Qty: 30 TABLET | Refills: 2 | Status: SHIPPED
Start: 2022-05-02 | End: 2022-05-26 | Stop reason: SDUPTHER

## 2022-05-02 RX ORDER — SILDENAFIL CITRATE 20 MG/1
TABLET ORAL
Qty: 45 TABLET | Refills: 2 | Status: SHIPPED
Start: 2022-05-02 | End: 2022-08-29 | Stop reason: SDUPTHER

## 2022-05-08 DIAGNOSIS — I50.31 ACUTE DIASTOLIC CHF (CONGESTIVE HEART FAILURE) (HCC): ICD-10-CM

## 2022-05-09 RX ORDER — BUMETANIDE 1 MG/1
TABLET ORAL
Qty: 60 TABLET | Refills: 2 | Status: SHIPPED
Start: 2022-05-09 | End: 2022-08-04

## 2022-05-09 RX ORDER — FOLIC ACID 1 MG/1
TABLET ORAL
Qty: 30 TABLET | Refills: 2 | Status: SHIPPED
Start: 2022-05-09 | End: 2022-08-18 | Stop reason: SDUPTHER

## 2022-05-11 DIAGNOSIS — I25.810 ATHEROSCLEROSIS OF CORONARY ARTERY BYPASS GRAFT OF NATIVE HEART WITHOUT ANGINA PECTORIS: ICD-10-CM

## 2022-05-11 RX ORDER — ATORVASTATIN CALCIUM 40 MG/1
TABLET, FILM COATED ORAL
Qty: 90 TABLET | Refills: 0 | Status: SHIPPED
Start: 2022-05-11 | End: 2022-08-08 | Stop reason: SDUPTHER

## 2022-05-20 DIAGNOSIS — K21.9 GASTROESOPHAGEAL REFLUX DISEASE WITHOUT ESOPHAGITIS: ICD-10-CM

## 2022-05-20 RX ORDER — OMEPRAZOLE 40 MG/1
40 CAPSULE, DELAYED RELEASE ORAL DAILY
Qty: 30 CAPSULE | Refills: 2 | Status: SHIPPED
Start: 2022-05-20 | End: 2022-08-17

## 2022-05-26 DIAGNOSIS — I50.31 ACUTE DIASTOLIC CHF (CONGESTIVE HEART FAILURE) (HCC): ICD-10-CM

## 2022-05-26 RX ORDER — POTASSIUM CHLORIDE 20 MEQ/1
TABLET, EXTENDED RELEASE ORAL
Qty: 30 TABLET | Refills: 2 | Status: SHIPPED | OUTPATIENT
Start: 2022-05-26

## 2022-05-26 NOTE — TELEPHONE ENCOUNTER
Requested Prescriptions     Pending Prescriptions Disp Refills    potassium chloride (KLOR-CON M) 20 MEQ extended release tablet 30 tablet 2     Sig: TAKE ONE TABLET BY MOUTH EVERY DAY       Next appt is Visit date not found  Last appt was 12/22/2021

## 2022-07-11 DIAGNOSIS — I49.5 SICK SINUS SYNDROME WITH TACHYCARDIA (HCC): ICD-10-CM

## 2022-07-11 DIAGNOSIS — I10 ESSENTIAL HYPERTENSION: ICD-10-CM

## 2022-07-11 RX ORDER — NEBIVOLOL HYDROCHLORIDE 20 MG/1
TABLET ORAL
Qty: 30 TABLET | Refills: 2 | Status: SHIPPED
Start: 2022-07-11 | End: 2022-10-10

## 2022-07-11 RX ORDER — LANOLIN ALCOHOL/MO/W.PET/CERES
CREAM (GRAM) TOPICAL
Qty: 30 TABLET | Refills: 2 | Status: SHIPPED
Start: 2022-07-11 | End: 2022-10-10

## 2022-07-20 DIAGNOSIS — I48.91 ATRIAL FIBRILLATION WITH RVR (HCC): ICD-10-CM

## 2022-07-20 DIAGNOSIS — I10 ESSENTIAL HYPERTENSION: ICD-10-CM

## 2022-07-20 DIAGNOSIS — I25.10 CORONARY ARTERY DISEASE DUE TO CALCIFIED CORONARY LESION: ICD-10-CM

## 2022-07-20 DIAGNOSIS — I25.84 CORONARY ARTERY DISEASE DUE TO CALCIFIED CORONARY LESION: ICD-10-CM

## 2022-07-20 RX ORDER — RANOLAZINE 500 MG/1
TABLET, EXTENDED RELEASE ORAL
Qty: 60 TABLET | Refills: 0 | Status: SHIPPED
Start: 2022-07-20 | End: 2022-08-29 | Stop reason: SDUPTHER

## 2022-07-20 RX ORDER — AMLODIPINE BESYLATE 10 MG/1
TABLET ORAL
Qty: 30 TABLET | Refills: 0 | Status: SHIPPED
Start: 2022-07-20 | End: 2022-08-29 | Stop reason: SDUPTHER

## 2022-07-25 ENCOUNTER — OFFICE VISIT (OUTPATIENT)
Dept: GERIATRIC MEDICINE | Age: 82
End: 2022-07-25
Payer: MEDICARE

## 2022-07-25 VITALS
RESPIRATION RATE: 16 BRPM | TEMPERATURE: 97.1 F | HEART RATE: 64 BPM | BODY MASS INDEX: 19.56 KG/M2 | WEIGHT: 93.2 LBS | DIASTOLIC BLOOD PRESSURE: 56 MMHG | HEIGHT: 58 IN | SYSTOLIC BLOOD PRESSURE: 120 MMHG

## 2022-07-25 DIAGNOSIS — G30.9 ALZHEIMER DISEASE (HCC): Primary | ICD-10-CM

## 2022-07-25 DIAGNOSIS — F02.80 ALZHEIMER DISEASE (HCC): Primary | ICD-10-CM

## 2022-07-25 PROCEDURE — G8427 DOCREV CUR MEDS BY ELIG CLIN: HCPCS | Performed by: INTERNAL MEDICINE

## 2022-07-25 PROCEDURE — 1036F TOBACCO NON-USER: CPT | Performed by: INTERNAL MEDICINE

## 2022-07-25 PROCEDURE — 1123F ACP DISCUSS/DSCN MKR DOCD: CPT | Performed by: INTERNAL MEDICINE

## 2022-07-25 PROCEDURE — G8420 CALC BMI NORM PARAMETERS: HCPCS | Performed by: INTERNAL MEDICINE

## 2022-07-25 PROCEDURE — G8400 PT W/DXA NO RESULTS DOC: HCPCS | Performed by: INTERNAL MEDICINE

## 2022-07-25 PROCEDURE — 1090F PRES/ABSN URINE INCON ASSESS: CPT | Performed by: INTERNAL MEDICINE

## 2022-07-25 PROCEDURE — 99212 OFFICE O/P EST SF 10 MIN: CPT | Performed by: INTERNAL MEDICINE

## 2022-07-25 ASSESSMENT — LIFESTYLE VARIABLES: HOW OFTEN DO YOU HAVE A DRINK CONTAINING ALCOHOL: NEVER

## 2022-07-25 NOTE — PROGRESS NOTES
Chief Complaint   Patient presents with    Altered Mental Status     Same day visit. This morning, per daughter, took pt to [place] to visit family. Once there her mood changed -- didn't want to communicate with family, wanted to go home. Has been back home since Tuesday but pt's attitude has not improved. Ignores daughter or snaps at her. Not wanting to eat. Daughter states that this afternoon, pt is \"back to normal\". Above issue discussed with Dr Romi Vega prior to his seeing the pt.

## 2022-07-25 NOTE — PROGRESS NOTES
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

## 2022-07-27 RX ORDER — DOFETILIDE 0.12 MG/1
CAPSULE ORAL
Qty: 60 CAPSULE | Refills: 0 | Status: SHIPPED
Start: 2022-07-27 | End: 2022-08-29 | Stop reason: SDUPTHER

## 2022-08-04 DIAGNOSIS — I50.31 ACUTE DIASTOLIC CHF (CONGESTIVE HEART FAILURE) (HCC): ICD-10-CM

## 2022-08-04 RX ORDER — BUMETANIDE 1 MG/1
TABLET ORAL
Qty: 60 TABLET | Refills: 0 | Status: SHIPPED
Start: 2022-08-04 | End: 2022-09-19 | Stop reason: SDUPTHER

## 2022-08-08 DIAGNOSIS — I25.810 ATHEROSCLEROSIS OF CORONARY ARTERY BYPASS GRAFT OF NATIVE HEART WITHOUT ANGINA PECTORIS: ICD-10-CM

## 2022-08-08 RX ORDER — ATORVASTATIN CALCIUM 40 MG/1
TABLET, FILM COATED ORAL
Qty: 90 TABLET | Refills: 0 | Status: SHIPPED | OUTPATIENT
Start: 2022-08-08

## 2022-08-17 DIAGNOSIS — K21.9 GASTROESOPHAGEAL REFLUX DISEASE WITHOUT ESOPHAGITIS: ICD-10-CM

## 2022-08-17 RX ORDER — OMEPRAZOLE 40 MG/1
CAPSULE, DELAYED RELEASE ORAL
Qty: 30 CAPSULE | Refills: 0 | Status: SHIPPED
Start: 2022-08-17 | End: 2022-10-04 | Stop reason: SDUPTHER

## 2022-08-18 DIAGNOSIS — I50.31 ACUTE DIASTOLIC CHF (CONGESTIVE HEART FAILURE) (HCC): ICD-10-CM

## 2022-08-18 RX ORDER — FOLIC ACID 1 MG/1
TABLET ORAL
Qty: 30 TABLET | Refills: 2 | Status: SHIPPED | OUTPATIENT
Start: 2022-08-18

## 2022-08-29 DIAGNOSIS — I48.91 ATRIAL FIBRILLATION WITH RVR (HCC): ICD-10-CM

## 2022-08-29 DIAGNOSIS — I25.84 CORONARY ARTERY DISEASE DUE TO CALCIFIED CORONARY LESION: ICD-10-CM

## 2022-08-29 DIAGNOSIS — I10 ESSENTIAL HYPERTENSION: ICD-10-CM

## 2022-08-29 DIAGNOSIS — I25.10 CORONARY ARTERY DISEASE DUE TO CALCIFIED CORONARY LESION: ICD-10-CM

## 2022-08-29 DIAGNOSIS — I50.31 ACUTE DIASTOLIC CHF (CONGESTIVE HEART FAILURE) (HCC): ICD-10-CM

## 2022-08-30 RX ORDER — AMLODIPINE BESYLATE 10 MG/1
TABLET ORAL
Qty: 30 TABLET | Refills: 2 | Status: SHIPPED | OUTPATIENT
Start: 2022-08-30

## 2022-08-30 RX ORDER — DOFETILIDE 0.12 MG/1
CAPSULE ORAL
Qty: 60 CAPSULE | Refills: 2 | Status: SHIPPED | OUTPATIENT
Start: 2022-08-30

## 2022-08-30 RX ORDER — SILDENAFIL CITRATE 20 MG/1
TABLET ORAL
Qty: 45 TABLET | Refills: 2 | Status: SHIPPED | OUTPATIENT
Start: 2022-08-30

## 2022-08-30 RX ORDER — RANOLAZINE 500 MG/1
500 TABLET, EXTENDED RELEASE ORAL 2 TIMES DAILY
Qty: 60 TABLET | Refills: 2 | Status: SHIPPED | OUTPATIENT
Start: 2022-08-30

## 2022-09-19 DIAGNOSIS — I50.31 ACUTE DIASTOLIC CHF (CONGESTIVE HEART FAILURE) (HCC): ICD-10-CM

## 2022-09-20 RX ORDER — BUMETANIDE 1 MG/1
1 TABLET ORAL 2 TIMES DAILY
Qty: 60 TABLET | Refills: 0 | Status: SHIPPED
Start: 2022-09-20 | End: 2022-10-25 | Stop reason: SDUPTHER

## 2022-09-20 NOTE — TELEPHONE ENCOUNTER
Requested Prescriptions     Pending Prescriptions Disp Refills    bumetanide (BUMEX) 1 MG tablet 60 tablet 0     Sig: Take 1 tablet by mouth 2 times daily       Next appt is Visit date not found  Last appt was 12/22/2021      Needs appointment to be seen

## 2022-10-04 DIAGNOSIS — K21.9 GASTROESOPHAGEAL REFLUX DISEASE WITHOUT ESOPHAGITIS: ICD-10-CM

## 2022-10-04 RX ORDER — OMEPRAZOLE 40 MG/1
40 CAPSULE, DELAYED RELEASE ORAL DAILY
Qty: 30 CAPSULE | Refills: 2 | Status: SHIPPED | OUTPATIENT
Start: 2022-10-04

## 2022-10-10 DIAGNOSIS — I10 ESSENTIAL HYPERTENSION: ICD-10-CM

## 2022-10-10 DIAGNOSIS — I49.5 SICK SINUS SYNDROME WITH TACHYCARDIA (HCC): ICD-10-CM

## 2022-10-10 RX ORDER — LANOLIN ALCOHOL/MO/W.PET/CERES
CREAM (GRAM) TOPICAL
Qty: 30 TABLET | Refills: 0 | Status: SHIPPED | OUTPATIENT
Start: 2022-10-10

## 2022-10-10 RX ORDER — NEBIVOLOL 20 MG/1
TABLET ORAL
Qty: 30 TABLET | Refills: 0 | Status: SHIPPED
Start: 2022-10-10 | End: 2022-10-25 | Stop reason: SDUPTHER

## 2022-10-25 DIAGNOSIS — I50.31 ACUTE DIASTOLIC CHF (CONGESTIVE HEART FAILURE) (HCC): ICD-10-CM

## 2022-10-25 DIAGNOSIS — I10 ESSENTIAL HYPERTENSION: ICD-10-CM

## 2022-10-25 RX ORDER — BUMETANIDE 1 MG/1
1 TABLET ORAL 2 TIMES DAILY
Qty: 60 TABLET | Refills: 2 | Status: SHIPPED | OUTPATIENT
Start: 2022-10-25

## 2022-10-25 RX ORDER — NEBIVOLOL 20 MG/1
20 TABLET ORAL DAILY
Qty: 30 TABLET | Refills: 2 | Status: SHIPPED | OUTPATIENT
Start: 2022-10-25

## 2022-11-09 DIAGNOSIS — I25.810 ATHEROSCLEROSIS OF CORONARY ARTERY BYPASS GRAFT OF NATIVE HEART WITHOUT ANGINA PECTORIS: ICD-10-CM

## 2022-11-09 RX ORDER — ATORVASTATIN CALCIUM 40 MG/1
TABLET, FILM COATED ORAL
Qty: 90 TABLET | Refills: 0 | Status: SHIPPED
Start: 2022-11-09 | End: 2022-12-01 | Stop reason: SDUPTHER

## 2022-11-30 DIAGNOSIS — I25.10 CORONARY ARTERY DISEASE DUE TO CALCIFIED CORONARY LESION: ICD-10-CM

## 2022-11-30 DIAGNOSIS — I25.84 CORONARY ARTERY DISEASE DUE TO CALCIFIED CORONARY LESION: ICD-10-CM

## 2022-11-30 RX ORDER — RANOLAZINE 500 MG/1
TABLET, EXTENDED RELEASE ORAL
Qty: 60 TABLET | Refills: 0 | Status: SHIPPED | OUTPATIENT
Start: 2022-11-30

## 2022-11-30 NOTE — TELEPHONE ENCOUNTER
Requested Prescriptions     Pending Prescriptions Disp Refills    ranolazine (RANEXA) 500 MG extended release tablet [Pharmacy Med Name: Ranolazine ER Oral Tablet Extended Release 12 Hour 500 MG] 60 tablet 0     Sig: TAKE ONE TABLET BY MOUTH TWO TIMES DAILY       Next appt is 12/1/2022  Last appt was 12/22/2021

## 2022-12-01 ENCOUNTER — OFFICE VISIT (OUTPATIENT)
Dept: FAMILY MEDICINE CLINIC | Age: 82
End: 2022-12-01
Payer: MEDICARE

## 2022-12-01 VITALS
HEART RATE: 64 BPM | RESPIRATION RATE: 16 BRPM | WEIGHT: 95.8 LBS | HEIGHT: 60 IN | TEMPERATURE: 97.7 F | SYSTOLIC BLOOD PRESSURE: 118 MMHG | BODY MASS INDEX: 18.81 KG/M2 | DIASTOLIC BLOOD PRESSURE: 52 MMHG | OXYGEN SATURATION: 97 %

## 2022-12-01 DIAGNOSIS — Z23 FLU VACCINE NEED: Primary | ICD-10-CM

## 2022-12-01 DIAGNOSIS — I25.810 ATHEROSCLEROSIS OF CORONARY ARTERY BYPASS GRAFT OF NATIVE HEART WITHOUT ANGINA PECTORIS: ICD-10-CM

## 2022-12-01 DIAGNOSIS — I49.5 SICK SINUS SYNDROME WITH TACHYCARDIA (HCC): ICD-10-CM

## 2022-12-01 DIAGNOSIS — I25.10 CORONARY ARTERY DISEASE DUE TO CALCIFIED CORONARY LESION: ICD-10-CM

## 2022-12-01 DIAGNOSIS — I10 ESSENTIAL HYPERTENSION: ICD-10-CM

## 2022-12-01 DIAGNOSIS — I50.31 ACUTE DIASTOLIC CHF (CONGESTIVE HEART FAILURE) (HCC): ICD-10-CM

## 2022-12-01 DIAGNOSIS — I48.91 ATRIAL FIBRILLATION WITH RVR (HCC): ICD-10-CM

## 2022-12-01 DIAGNOSIS — I25.84 CORONARY ARTERY DISEASE DUE TO CALCIFIED CORONARY LESION: ICD-10-CM

## 2022-12-01 PROCEDURE — 1090F PRES/ABSN URINE INCON ASSESS: CPT | Performed by: FAMILY MEDICINE

## 2022-12-01 PROCEDURE — G8420 CALC BMI NORM PARAMETERS: HCPCS | Performed by: FAMILY MEDICINE

## 2022-12-01 PROCEDURE — G8400 PT W/DXA NO RESULTS DOC: HCPCS | Performed by: FAMILY MEDICINE

## 2022-12-01 PROCEDURE — G8484 FLU IMMUNIZE NO ADMIN: HCPCS | Performed by: FAMILY MEDICINE

## 2022-12-01 PROCEDURE — 3074F SYST BP LT 130 MM HG: CPT | Performed by: FAMILY MEDICINE

## 2022-12-01 PROCEDURE — G0008 ADMIN INFLUENZA VIRUS VAC: HCPCS | Performed by: FAMILY MEDICINE

## 2022-12-01 PROCEDURE — 1036F TOBACCO NON-USER: CPT | Performed by: FAMILY MEDICINE

## 2022-12-01 PROCEDURE — 3078F DIAST BP <80 MM HG: CPT | Performed by: FAMILY MEDICINE

## 2022-12-01 PROCEDURE — G8427 DOCREV CUR MEDS BY ELIG CLIN: HCPCS | Performed by: FAMILY MEDICINE

## 2022-12-01 PROCEDURE — 99214 OFFICE O/P EST MOD 30 MIN: CPT | Performed by: FAMILY MEDICINE

## 2022-12-01 PROCEDURE — 1123F ACP DISCUSS/DSCN MKR DOCD: CPT | Performed by: FAMILY MEDICINE

## 2022-12-01 PROCEDURE — 90694 VACC AIIV4 NO PRSRV 0.5ML IM: CPT | Performed by: FAMILY MEDICINE

## 2022-12-01 RX ORDER — AMLODIPINE BESYLATE 10 MG/1
TABLET ORAL
Qty: 30 TABLET | Refills: 2 | Status: SHIPPED | OUTPATIENT
Start: 2022-12-01

## 2022-12-01 RX ORDER — ATORVASTATIN CALCIUM 40 MG/1
TABLET, FILM COATED ORAL
Qty: 90 TABLET | Refills: 0 | Status: SHIPPED | OUTPATIENT
Start: 2022-12-01

## 2022-12-01 RX ORDER — LANOLIN ALCOHOL/MO/W.PET/CERES
CREAM (GRAM) TOPICAL
Qty: 30 TABLET | Refills: 2 | Status: SHIPPED | OUTPATIENT
Start: 2022-12-01

## 2022-12-01 RX ORDER — DOFETILIDE 0.12 MG/1
CAPSULE ORAL
Qty: 60 CAPSULE | Refills: 2 | Status: SHIPPED | OUTPATIENT
Start: 2022-12-01

## 2022-12-01 RX ORDER — FOLIC ACID 1 MG/1
TABLET ORAL
Qty: 90 TABLET | Refills: 1 | Status: SHIPPED | OUTPATIENT
Start: 2022-12-01

## 2022-12-01 SDOH — ECONOMIC STABILITY: FOOD INSECURITY: WITHIN THE PAST 12 MONTHS, THE FOOD YOU BOUGHT JUST DIDN'T LAST AND YOU DIDN'T HAVE MONEY TO GET MORE.: NEVER TRUE

## 2022-12-01 SDOH — ECONOMIC STABILITY: FOOD INSECURITY: WITHIN THE PAST 12 MONTHS, YOU WORRIED THAT YOUR FOOD WOULD RUN OUT BEFORE YOU GOT MONEY TO BUY MORE.: NEVER TRUE

## 2022-12-01 ASSESSMENT — PATIENT HEALTH QUESTIONNAIRE - PHQ9
SUM OF ALL RESPONSES TO PHQ QUESTIONS 1-9: 0
1. LITTLE INTEREST OR PLEASURE IN DOING THINGS: 0
SUM OF ALL RESPONSES TO PHQ9 QUESTIONS 1 & 2: 0
SUM OF ALL RESPONSES TO PHQ QUESTIONS 1-9: 0
SUM OF ALL RESPONSES TO PHQ QUESTIONS 1-9: 0
2. FEELING DOWN, DEPRESSED OR HOPELESS: 0
SUM OF ALL RESPONSES TO PHQ QUESTIONS 1-9: 0

## 2022-12-01 ASSESSMENT — ENCOUNTER SYMPTOMS
COUGH: 0
PHOTOPHOBIA: 0

## 2022-12-01 ASSESSMENT — SOCIAL DETERMINANTS OF HEALTH (SDOH): HOW HARD IS IT FOR YOU TO PAY FOR THE VERY BASICS LIKE FOOD, HOUSING, MEDICAL CARE, AND HEATING?: NOT HARD AT ALL

## 2022-12-01 NOTE — PROGRESS NOTES
Marty Chaudhry (:  1940) is a 79 y.o. female,Established patient, here for evaluation of the following chief complaint(s):  Medication Refill         ASSESSMENT/PLAN:  1. Essential hypertension  -     amLODIPine (NORVASC) 10 MG tablet; TAKE ONE TABLET BY MOUTH EVERY DAY, Disp-30 tablet, R-2Normal  2. Atrial fibrillation with RVR (HCC)  -     apixaban (ELIQUIS) 2.5 MG TABS tablet; TAKE ONE TABLET BY MOUTH TWO TIMES A DAY, Disp-60 tablet, R-2Normal  3. Atherosclerosis of coronary artery bypass graft of native heart without angina pectoris  -     atorvastatin (LIPITOR) 40 MG tablet; Take one tablet by mouth every day., Disp-90 tablet, R-0Normal  4. Acute diastolic CHF (congestive heart failure) (HCC)  -     folic acid (FOLVITE) 1 MG tablet; refill, Disp-90 tablet, R-1Normal  5. Sick sinus syndrome with tachycardia (HCC)  -     magnesium oxide (MAG-OX) 400 (240 Mg) MG tablet; Take one tablet by mouth daily, Disp-30 tablet, R-2Normal  6. Coronary artery disease due to calcified coronary lesion      No follow-ups on file. Subjective   SUBJECTIVE/OBJECTIVE:  HPI    Review of Systems   Constitutional:  Negative for diaphoresis. HENT:  Negative for hearing loss and tinnitus. Eyes:  Negative for photophobia. Respiratory:  Negative for cough. Cardiovascular:  Negative for chest pain and leg swelling. Genitourinary:  Negative for urgency. Musculoskeletal:  Negative for arthralgias and myalgias. Skin:  Negative for rash. Neurological:  Negative for dizziness, weakness and headaches. Hematological:  Does not bruise/bleed easily. Objective   BP (!) 118/52   Pulse 64   Temp 97.7 °F (36.5 °C)   Resp 16   Ht 4' 11.7\" (1.516 m)   Wt 95 lb 12.8 oz (43.5 kg)   LMP  (LMP Unknown)   SpO2 97%   BMI 18.90 kg/m²   Lab Results   Component Value Date    LABA1C 5.1 2019    LABA1C 5.3 2017     Physical Exam  Constitutional:       General: She is not in acute distress. Appearance: She is well-developed. Eyes:      General: No scleral icterus. Neck:      Thyroid: No thyromegaly. Vascular: No JVD. Trachea: No tracheal deviation. Cardiovascular:      Heart sounds:     No gallop. Pulmonary:      Effort: No respiratory distress. Breath sounds: No wheezing. Musculoskeletal:         General: No tenderness. Skin:     Findings: No erythema. Neurological:      General: No focal deficit present. Deep Tendon Reflexes: Reflexes normal.          On this date 12/1/2022 I have spent 25 minutes reviewing previous notes, test results and face to face with the patient discussing the diagnosis and importance of compliance with the treatment plan as well as documenting on the day of the visit. An electronic signature was used to authenticate this note.     --Eleno Soliz, DO

## 2023-01-04 ENCOUNTER — OFFICE VISIT (OUTPATIENT)
Dept: FAMILY MEDICINE CLINIC | Age: 83
End: 2023-01-04
Payer: MEDICARE

## 2023-01-04 VITALS
DIASTOLIC BLOOD PRESSURE: 60 MMHG | HEIGHT: 59 IN | WEIGHT: 95 LBS | BODY MASS INDEX: 19.15 KG/M2 | OXYGEN SATURATION: 95 % | RESPIRATION RATE: 16 BRPM | TEMPERATURE: 98.4 F | SYSTOLIC BLOOD PRESSURE: 108 MMHG | HEART RATE: 74 BPM

## 2023-01-04 DIAGNOSIS — F02.80 ALZHEIMER DISEASE (HCC): ICD-10-CM

## 2023-01-04 DIAGNOSIS — I49.5 SICK SINUS SYNDROME WITH TACHYCARDIA (HCC): ICD-10-CM

## 2023-01-04 DIAGNOSIS — R05.1 ACUTE COUGH: ICD-10-CM

## 2023-01-04 DIAGNOSIS — I50.31 ACUTE DIASTOLIC CHF (CONGESTIVE HEART FAILURE) (HCC): ICD-10-CM

## 2023-01-04 DIAGNOSIS — J10.1 INFLUENZA A: Primary | ICD-10-CM

## 2023-01-04 DIAGNOSIS — G30.9 ALZHEIMER DISEASE (HCC): ICD-10-CM

## 2023-01-04 DIAGNOSIS — I20.9 ANGINA PECTORIS (HCC): ICD-10-CM

## 2023-01-04 DIAGNOSIS — E44.1 MILD PROTEIN-CALORIE MALNUTRITION (HCC): ICD-10-CM

## 2023-01-04 LAB
INFLUENZA A ANTIBODY: POSITIVE
INFLUENZA B ANTIBODY: NEGATIVE
Lab: NORMAL
PERFORMING INSTRUMENT: NORMAL
QC PASS/FAIL: NORMAL
SARS-COV-2, POC: NORMAL

## 2023-01-04 PROCEDURE — 99213 OFFICE O/P EST LOW 20 MIN: CPT | Performed by: NURSE PRACTITIONER

## 2023-01-04 PROCEDURE — 1123F ACP DISCUSS/DSCN MKR DOCD: CPT | Performed by: NURSE PRACTITIONER

## 2023-01-04 PROCEDURE — G8427 DOCREV CUR MEDS BY ELIG CLIN: HCPCS | Performed by: NURSE PRACTITIONER

## 2023-01-04 PROCEDURE — G8420 CALC BMI NORM PARAMETERS: HCPCS | Performed by: NURSE PRACTITIONER

## 2023-01-04 PROCEDURE — 1090F PRES/ABSN URINE INCON ASSESS: CPT | Performed by: NURSE PRACTITIONER

## 2023-01-04 PROCEDURE — 3074F SYST BP LT 130 MM HG: CPT | Performed by: NURSE PRACTITIONER

## 2023-01-04 PROCEDURE — 87426 SARSCOV CORONAVIRUS AG IA: CPT | Performed by: NURSE PRACTITIONER

## 2023-01-04 PROCEDURE — G8400 PT W/DXA NO RESULTS DOC: HCPCS | Performed by: NURSE PRACTITIONER

## 2023-01-04 PROCEDURE — 3078F DIAST BP <80 MM HG: CPT | Performed by: NURSE PRACTITIONER

## 2023-01-04 PROCEDURE — G8484 FLU IMMUNIZE NO ADMIN: HCPCS | Performed by: NURSE PRACTITIONER

## 2023-01-04 PROCEDURE — 87804 INFLUENZA ASSAY W/OPTIC: CPT | Performed by: NURSE PRACTITIONER

## 2023-01-04 PROCEDURE — 1036F TOBACCO NON-USER: CPT | Performed by: NURSE PRACTITIONER

## 2023-01-04 RX ORDER — METHYLPREDNISOLONE 4 MG/1
TABLET ORAL
Qty: 1 KIT | Refills: 0 | Status: SHIPPED | OUTPATIENT
Start: 2023-01-04

## 2023-01-04 RX ORDER — OSELTAMIVIR PHOSPHATE 75 MG/1
75 CAPSULE ORAL 2 TIMES DAILY
Qty: 10 CAPSULE | Refills: 0 | Status: SHIPPED | OUTPATIENT
Start: 2023-01-04 | End: 2023-01-09

## 2023-01-04 RX ORDER — BENZONATATE 100 MG/1
100 CAPSULE ORAL 3 TIMES DAILY PRN
Qty: 30 CAPSULE | Refills: 0 | Status: SHIPPED | OUTPATIENT
Start: 2023-01-04 | End: 2023-01-11

## 2023-01-04 ASSESSMENT — ENCOUNTER SYMPTOMS
CHEST TIGHTNESS: 0
CONSTIPATION: 0
NAUSEA: 0
VOMITING: 0
SHORTNESS OF BREATH: 0
WHEEZING: 1
DIARRHEA: 0
SORE THROAT: 0
COUGH: 1

## 2023-01-04 ASSESSMENT — PATIENT HEALTH QUESTIONNAIRE - PHQ9
SUM OF ALL RESPONSES TO PHQ QUESTIONS 1-9: 0
1. LITTLE INTEREST OR PLEASURE IN DOING THINGS: 0
SUM OF ALL RESPONSES TO PHQ QUESTIONS 1-9: 0
2. FEELING DOWN, DEPRESSED OR HOPELESS: 0
SUM OF ALL RESPONSES TO PHQ QUESTIONS 1-9: 0
SUM OF ALL RESPONSES TO PHQ QUESTIONS 1-9: 0
SUM OF ALL RESPONSES TO PHQ9 QUESTIONS 1 & 2: 0

## 2023-01-04 NOTE — PROGRESS NOTES
Akshat Camarillo (:  1940) is a 79 y.o. female,Established patient, here for evaluation of the following chief complaint(s):  Head Congestion (Cough, runny nose, wheezing; no GI symptoms; afebrile;  started 3 days ago)         ASSESSMENT/PLAN:  1. Influenza A  -     methylPREDNISolone (MEDROL, IGOR,) 4 MG tablet; Take by mouth as directed, Disp-1 kit, R-0Normal  -     oseltamivir (TAMIFLU) 75 MG capsule; Take 1 capsule by mouth 2 times daily for 5 days, Disp-10 capsule, R-0Normal  -     benzonatate (TESSALON PERLES) 100 MG capsule; Take 1 capsule by mouth 3 times daily as needed for Cough, Disp-30 capsule, R-0Normal  Contact office if symptoms do not improve as expected or worsen. 2. Acute cough  -     POCT COVID-19, Antigen  -     POCT Influenza A/B    3. Alzheimer disease ([facility]  Stable, Being evaluated/managed by Dr Rolf Pimentel. No acute findings today meriting change in management plan. Recheck in one year    4. Mild protein-calorie malnutrition ([facility]  Stable, The current medical regimen is effective;  continue present plan and medications. Recheck in one year    5. Acute diastolic CHF (congestive heart failure) ([facility])  Stable, Being evaluated/managed by Dr Hank Verdin. No acute findings today meriting change in management plan. Recheck in one year    6. Sick sinus syndrome with tachycardia ([facility])  Stable, Being evaluated/managed by Dr Hank Verdin. No acute findings today meriting change in management plan. Recheck in one year    7. Angina pectoris ([facility])  Stable, Being evaluated/managed by Dr Hank Verdin. No acute findings today meriting change in management plan. Recheck in one year    Return if symptoms worsen or fail to improve. Subjective   SUBJECTIVE/OBJECTIVE:  Complains of congestion, cough non productive, wheezing. Onset 3 days ago and getting worse. Had flu vaccine this year. Robitussin not effective. Review of Systems   Constitutional:  Negative for chills, diaphoresis and fever.    HENT: Positive for congestion. Negative for ear pain and sore throat. Respiratory:  Positive for cough and wheezing. Negative for chest tightness and shortness of breath. Cardiovascular:  Negative for chest pain and palpitations. Gastrointestinal:  Negative for constipation, diarrhea, nausea and vomiting. Musculoskeletal:  Negative for myalgias. Skin:  Negative for rash. Neurological:  Negative for dizziness, weakness and headaches. Objective   /60   Pulse 74   Temp 98.4 °F (36.9 °C) (Temporal)   Resp 16   Ht 4' 11\" (1.499 m)   Wt 95 lb (43.1 kg)   LMP  (LMP Unknown)   SpO2 95%   BMI 19.19 kg/m²    Physical Exam  Constitutional:       General: She is not in acute distress. Appearance: Normal appearance. She is well-developed. HENT:      Head: Normocephalic and atraumatic. Right Ear: Tympanic membrane, ear canal and external ear normal.      Left Ear: Tympanic membrane, ear canal and external ear normal.      Nose: Congestion present. Mouth/Throat:      Mouth: Mucous membranes are moist.      Pharynx: Oropharynx is clear. Neck:      Thyroid: No thyromegaly. Trachea: No tracheal deviation. Cardiovascular:      Rate and Rhythm: Normal rate and regular rhythm. Pulses: Normal pulses. Heart sounds: Normal heart sounds. No murmur heard. Pulmonary:      Effort: Pulmonary effort is normal.      Breath sounds: Wheezing present. No rales. Chest:      Chest wall: No tenderness. Abdominal:      General: Bowel sounds are normal.      Palpations: Abdomen is soft. Tenderness: There is no abdominal tenderness. Lymphadenopathy:      Cervical: No cervical adenopathy. Skin:     General: Skin is warm and dry. Neurological:      Mental Status: She is alert and oriented to person, place, and time.    Psychiatric:         Mood and Affect: Mood normal.         Behavior: Behavior normal.          MDM Low      An electronic signature was used to authenticate this note.    --Reba Irizarry, MAYCOL - CNP

## 2023-01-19 DIAGNOSIS — I25.10 CORONARY ARTERY DISEASE DUE TO CALCIFIED CORONARY LESION: ICD-10-CM

## 2023-01-19 DIAGNOSIS — I25.84 CORONARY ARTERY DISEASE DUE TO CALCIFIED CORONARY LESION: ICD-10-CM

## 2023-01-19 DIAGNOSIS — I50.31 ACUTE DIASTOLIC CHF (CONGESTIVE HEART FAILURE) (HCC): ICD-10-CM

## 2023-01-19 DIAGNOSIS — K21.9 GASTROESOPHAGEAL REFLUX DISEASE WITHOUT ESOPHAGITIS: ICD-10-CM

## 2023-01-20 RX ORDER — SILDENAFIL CITRATE 20 MG/1
TABLET ORAL
Qty: 45 TABLET | Refills: 2 | Status: SHIPPED | OUTPATIENT
Start: 2023-01-20

## 2023-01-20 RX ORDER — RANOLAZINE 500 MG/1
500 TABLET, EXTENDED RELEASE ORAL 2 TIMES DAILY
Qty: 60 TABLET | Refills: 2 | Status: SHIPPED | OUTPATIENT
Start: 2023-01-20

## 2023-01-20 RX ORDER — OMEPRAZOLE 40 MG/1
40 CAPSULE, DELAYED RELEASE ORAL DAILY
Qty: 30 CAPSULE | Refills: 2 | Status: SHIPPED | OUTPATIENT
Start: 2023-01-20

## 2023-01-24 ENCOUNTER — OFFICE VISIT (OUTPATIENT)
Dept: GERIATRIC MEDICINE | Age: 83
End: 2023-01-24
Payer: MEDICARE

## 2023-01-24 VITALS
SYSTOLIC BLOOD PRESSURE: 120 MMHG | HEART RATE: 64 BPM | DIASTOLIC BLOOD PRESSURE: 70 MMHG | TEMPERATURE: 97.6 F | WEIGHT: 88.5 LBS | RESPIRATION RATE: 16 BRPM | BODY MASS INDEX: 17.84 KG/M2 | HEIGHT: 59 IN

## 2023-01-24 DIAGNOSIS — G30.9 ALZHEIMER DISEASE (HCC): Primary | ICD-10-CM

## 2023-01-24 DIAGNOSIS — F02.80 ALZHEIMER DISEASE (HCC): Primary | ICD-10-CM

## 2023-01-24 PROCEDURE — G8400 PT W/DXA NO RESULTS DOC: HCPCS | Performed by: INTERNAL MEDICINE

## 2023-01-24 PROCEDURE — 3078F DIAST BP <80 MM HG: CPT | Performed by: INTERNAL MEDICINE

## 2023-01-24 PROCEDURE — 1090F PRES/ABSN URINE INCON ASSESS: CPT | Performed by: INTERNAL MEDICINE

## 2023-01-24 PROCEDURE — 1123F ACP DISCUSS/DSCN MKR DOCD: CPT | Performed by: INTERNAL MEDICINE

## 2023-01-24 PROCEDURE — G8419 CALC BMI OUT NRM PARAM NOF/U: HCPCS | Performed by: INTERNAL MEDICINE

## 2023-01-24 PROCEDURE — G8484 FLU IMMUNIZE NO ADMIN: HCPCS | Performed by: INTERNAL MEDICINE

## 2023-01-24 PROCEDURE — 1036F TOBACCO NON-USER: CPT | Performed by: INTERNAL MEDICINE

## 2023-01-24 PROCEDURE — G8427 DOCREV CUR MEDS BY ELIG CLIN: HCPCS | Performed by: INTERNAL MEDICINE

## 2023-01-24 PROCEDURE — 99212 OFFICE O/P EST SF 10 MIN: CPT | Performed by: INTERNAL MEDICINE

## 2023-01-24 PROCEDURE — 3074F SYST BP LT 130 MM HG: CPT | Performed by: INTERNAL MEDICINE

## 2023-01-24 NOTE — PROGRESS NOTES
CC reevaluate Dementia    Here with daughter Janeth More  Will listen to music, rock and roll  Day night reversal  Lost 5 pounds and now 79  NO COVID and had Flu  \"Not hungry\"  Will eat fruit as and [employer] food  Wiil eat vanilla and strawberry  Knows daughter  ADL's with minimal coaching   IADL's per daughter  Talking to people and laughing for awhile a few months   Laughter   Impression; SDAT and slow progression                      ADL independent with coaching                      Day Night reversal                      Increasing benign visual hallucinations  Plan: Namenda to 5 mg  bid

## 2023-01-27 DIAGNOSIS — I50.31 ACUTE DIASTOLIC CHF (CONGESTIVE HEART FAILURE) (HCC): ICD-10-CM

## 2023-01-27 DIAGNOSIS — I10 ESSENTIAL HYPERTENSION: ICD-10-CM

## 2023-01-27 RX ORDER — NEBIVOLOL 20 MG/1
TABLET ORAL
Qty: 30 TABLET | Refills: 0 | Status: SHIPPED | OUTPATIENT
Start: 2023-01-27

## 2023-01-27 RX ORDER — BUMETANIDE 1 MG/1
TABLET ORAL
Qty: 60 TABLET | Refills: 0 | Status: SHIPPED | OUTPATIENT
Start: 2023-01-27

## 2023-02-20 DIAGNOSIS — I25.810 ATHEROSCLEROSIS OF CORONARY ARTERY BYPASS GRAFT OF NATIVE HEART WITHOUT ANGINA PECTORIS: ICD-10-CM

## 2023-02-20 RX ORDER — ATORVASTATIN CALCIUM 40 MG/1
TABLET, FILM COATED ORAL
Qty: 90 TABLET | Refills: 0 | Status: SHIPPED | OUTPATIENT
Start: 2023-02-20

## 2023-02-20 NOTE — TELEPHONE ENCOUNTER
Requested Prescriptions     Pending Prescriptions Disp Refills    atorvastatin (LIPITOR) 40 MG tablet 90 tablet 0     Sig: Take one tablet by mouth every day.        Next appt is Visit date not found  Last appt was 1/4/2023

## 2023-02-25 DIAGNOSIS — I49.5 SICK SINUS SYNDROME WITH TACHYCARDIA (HCC): ICD-10-CM

## 2023-02-25 DIAGNOSIS — I10 ESSENTIAL HYPERTENSION: ICD-10-CM

## 2023-02-25 DIAGNOSIS — I48.91 ATRIAL FIBRILLATION WITH RVR (HCC): ICD-10-CM

## 2023-02-25 DIAGNOSIS — I50.31 ACUTE DIASTOLIC CHF (CONGESTIVE HEART FAILURE) (HCC): ICD-10-CM

## 2023-02-27 RX ORDER — LANOLIN ALCOHOL/MO/W.PET/CERES
CREAM (GRAM) TOPICAL
Qty: 30 TABLET | Refills: 0 | Status: SHIPPED | OUTPATIENT
Start: 2023-02-27

## 2023-02-27 RX ORDER — BUMETANIDE 1 MG/1
TABLET ORAL
Qty: 60 TABLET | Refills: 0 | Status: SHIPPED | OUTPATIENT
Start: 2023-02-27

## 2023-02-27 RX ORDER — DOFETILIDE 0.12 MG/1
CAPSULE ORAL
Qty: 60 CAPSULE | Refills: 0 | Status: SHIPPED | OUTPATIENT
Start: 2023-02-27

## 2023-02-27 RX ORDER — AMLODIPINE BESYLATE 10 MG/1
TABLET ORAL
Qty: 30 TABLET | Refills: 0 | Status: SHIPPED | OUTPATIENT
Start: 2023-02-27

## 2023-03-20 RX ORDER — MEMANTINE HYDROCHLORIDE 5 MG/1
5 TABLET ORAL 2 TIMES DAILY
Qty: 180 TABLET | Refills: 3 | Status: SHIPPED | OUTPATIENT
Start: 2023-03-20

## 2023-04-20 DIAGNOSIS — I25.84 CORONARY ARTERY DISEASE DUE TO CALCIFIED CORONARY LESION: ICD-10-CM

## 2023-04-20 DIAGNOSIS — I50.31 ACUTE DIASTOLIC CHF (CONGESTIVE HEART FAILURE) (HCC): ICD-10-CM

## 2023-04-20 DIAGNOSIS — I25.10 CORONARY ARTERY DISEASE DUE TO CALCIFIED CORONARY LESION: ICD-10-CM

## 2023-04-20 DIAGNOSIS — K21.9 GASTROESOPHAGEAL REFLUX DISEASE WITHOUT ESOPHAGITIS: ICD-10-CM

## 2023-04-20 RX ORDER — OMEPRAZOLE 40 MG/1
CAPSULE, DELAYED RELEASE ORAL
Qty: 30 CAPSULE | Refills: 0 | Status: SHIPPED | OUTPATIENT
Start: 2023-04-20

## 2023-04-20 RX ORDER — SILDENAFIL CITRATE 20 MG/1
TABLET ORAL
Qty: 45 TABLET | Refills: 0 | Status: SHIPPED | OUTPATIENT
Start: 2023-04-20

## 2023-04-20 RX ORDER — RANOLAZINE 500 MG/1
TABLET, EXTENDED RELEASE ORAL
Qty: 60 TABLET | Refills: 0 | Status: SHIPPED | OUTPATIENT
Start: 2023-04-20

## 2023-04-20 NOTE — TELEPHONE ENCOUNTER
Requested Prescriptions     Pending Prescriptions Disp Refills    sildenafil (REVATIO) 20 MG tablet [Pharmacy Med Name: Sildenafil Citrate Oral Tablet 20 MG] 45 tablet 0     Sig: TAKE ONE-HALF TABLET BY MOUTH THREE TIMES DAILY- Needs appt for further refills on any medications    ranolazine (RANEXA) 500 MG extended release tablet [Pharmacy Med Name: Ranolazine ER Oral Tablet Extended Release 12 Hour 500 MG] 60 tablet 0     Sig: TAKE ONE TABLET BY MOUTH TWO TIMES A DAY    omeprazole (PRILOSEC) 40 MG delayed release capsule [Pharmacy Med Name: Omeprazole Oral Capsule Delayed Release 40 MG] 30 capsule 0     Sig: TAKE ONE CAPSULE BY MOUTH DAILY       Next appt is Visit date not found  Last appt was 1/4/2023

## 2023-04-24 RX ORDER — DOFETILIDE 0.12 MG/1
125 CAPSULE ORAL 2 TIMES DAILY
Qty: 60 CAPSULE | Refills: 1 | Status: SHIPPED | OUTPATIENT
Start: 2023-04-24

## 2023-05-09 DIAGNOSIS — I10 ESSENTIAL HYPERTENSION: ICD-10-CM

## 2023-05-09 DIAGNOSIS — I50.31 ACUTE DIASTOLIC CHF (CONGESTIVE HEART FAILURE) (HCC): ICD-10-CM

## 2023-05-09 DIAGNOSIS — I49.5 SICK SINUS SYNDROME WITH TACHYCARDIA (HCC): ICD-10-CM

## 2023-05-09 DIAGNOSIS — K21.9 GASTROESOPHAGEAL REFLUX DISEASE WITHOUT ESOPHAGITIS: ICD-10-CM

## 2023-05-09 RX ORDER — NEBIVOLOL 20 MG/1
1 TABLET ORAL DAILY
Qty: 30 TABLET | Refills: 0 | Status: SHIPPED | OUTPATIENT
Start: 2023-05-09

## 2023-05-09 RX ORDER — LANOLIN ALCOHOL/MO/W.PET/CERES
400 CREAM (GRAM) TOPICAL DAILY
Qty: 30 TABLET | Refills: 0 | Status: SHIPPED | OUTPATIENT
Start: 2023-05-09

## 2023-05-09 RX ORDER — BUMETANIDE 1 MG/1
1 TABLET ORAL 2 TIMES DAILY
Qty: 60 TABLET | Refills: 0 | Status: SHIPPED | OUTPATIENT
Start: 2023-05-09

## 2023-05-09 RX ORDER — OMEPRAZOLE 40 MG/1
40 CAPSULE, DELAYED RELEASE ORAL DAILY
Qty: 30 CAPSULE | Refills: 0 | Status: SHIPPED | OUTPATIENT
Start: 2023-05-09

## 2023-05-09 RX ORDER — AMLODIPINE BESYLATE 10 MG/1
10 TABLET ORAL DAILY
Qty: 30 TABLET | Refills: 0 | Status: SHIPPED | OUTPATIENT
Start: 2023-05-09

## 2023-05-11 DIAGNOSIS — I49.5 SICK SINUS SYNDROME WITH TACHYCARDIA (HCC): ICD-10-CM

## 2023-05-11 RX ORDER — LANOLIN ALCOHOL/MO/W.PET/CERES
CREAM (GRAM) TOPICAL
Qty: 30 TABLET | Refills: 0 | OUTPATIENT
Start: 2023-05-11

## 2023-05-11 NOTE — TELEPHONE ENCOUNTER
Requested Prescriptions     Pending Prescriptions Disp Refills    magnesium oxide (MAG-OX) 400 (240 Mg) MG tablet [Pharmacy Med Name: Magnesium Oxide Oral Tablet 400 (240 Mg) MG] 30 tablet 0     Sig: TAKE ONE TABLET BY MOUTH DAILY       Next appt is Visit date not found  Last appt was 1/4/2023

## 2023-05-16 DIAGNOSIS — I48.91 ATRIAL FIBRILLATION WITH RVR (HCC): ICD-10-CM

## 2023-05-26 DIAGNOSIS — I50.31 ACUTE DIASTOLIC CHF (CONGESTIVE HEART FAILURE) (HCC): ICD-10-CM

## 2023-05-26 RX ORDER — FOLIC ACID 1 MG/1
TABLET ORAL
Qty: 90 TABLET | Refills: 0 | Status: SHIPPED | OUTPATIENT
Start: 2023-05-26

## 2023-06-08 DIAGNOSIS — I50.31 ACUTE DIASTOLIC CHF (CONGESTIVE HEART FAILURE) (HCC): ICD-10-CM

## 2023-06-08 DIAGNOSIS — I49.5 SICK SINUS SYNDROME WITH TACHYCARDIA (HCC): ICD-10-CM

## 2023-06-08 DIAGNOSIS — I10 ESSENTIAL HYPERTENSION: ICD-10-CM

## 2023-06-08 RX ORDER — BUMETANIDE 1 MG/1
TABLET ORAL
Qty: 60 TABLET | Refills: 0 | Status: SHIPPED | OUTPATIENT
Start: 2023-06-08

## 2023-06-08 RX ORDER — AMLODIPINE BESYLATE 10 MG/1
TABLET ORAL
Qty: 30 TABLET | Refills: 0 | Status: SHIPPED | OUTPATIENT
Start: 2023-06-08

## 2023-06-08 RX ORDER — NEBIVOLOL 20 MG/1
TABLET ORAL
Qty: 30 TABLET | Refills: 0 | Status: SHIPPED | OUTPATIENT
Start: 2023-06-08

## 2023-06-08 RX ORDER — LANOLIN ALCOHOL/MO/W.PET/CERES
CREAM (GRAM) TOPICAL
Qty: 30 TABLET | Refills: 0 | Status: SHIPPED | OUTPATIENT
Start: 2023-06-08

## 2023-06-08 NOTE — TELEPHONE ENCOUNTER
Requested Prescriptions     Pending Prescriptions Disp Refills    bumetanide (BUMEX) 1 MG tablet [Pharmacy Med Name: Bumetanide Oral Tablet 1 MG] 60 tablet 0     Sig: TAKE ONE TABLET BY MOUTH TWO TIMES A DAY    nebivolol (BYSTOLIC) 20 MG TABS tablet [Pharmacy Med Name: Nebivolol HCl Oral Tablet 20 MG] 30 tablet 0     Sig: TAKE ONE TABLET BY MOUTH DAILY    amLODIPine (NORVASC) 10 MG tablet [Pharmacy Med Name: amLODIPine Besylate Oral Tablet 10 MG] 30 tablet 0     Sig: TAKE ONE TABLET BY MOUTH DAILY    magnesium oxide (MAG-OX) 400 (240 Mg) MG tablet [Pharmacy Med Name: Magnesium Oxide Oral Tablet 400 (240 Mg) MG] 30 tablet 0     Sig: TAKE ONE TABLET BY MOUTH DAILY       Next appt is Visit date not found  Last appt was 1/4/2023

## 2023-06-16 DIAGNOSIS — I48.91 ATRIAL FIBRILLATION WITH RVR (HCC): ICD-10-CM

## 2023-07-10 RX ORDER — DOFETILIDE 0.12 MG/1
125 CAPSULE ORAL 2 TIMES DAILY
Qty: 60 CAPSULE | Refills: 1 | Status: SHIPPED | OUTPATIENT
Start: 2023-07-10

## 2023-07-15 DIAGNOSIS — I48.91 ATRIAL FIBRILLATION WITH RVR (HCC): ICD-10-CM

## 2023-07-19 DIAGNOSIS — I25.10 CORONARY ARTERY DISEASE DUE TO CALCIFIED CORONARY LESION: ICD-10-CM

## 2023-07-19 DIAGNOSIS — I49.5 SICK SINUS SYNDROME WITH TACHYCARDIA (HCC): ICD-10-CM

## 2023-07-19 DIAGNOSIS — I10 ESSENTIAL HYPERTENSION: ICD-10-CM

## 2023-07-19 DIAGNOSIS — I25.84 CORONARY ARTERY DISEASE DUE TO CALCIFIED CORONARY LESION: ICD-10-CM

## 2023-07-19 DIAGNOSIS — I50.31 ACUTE DIASTOLIC CHF (CONGESTIVE HEART FAILURE) (HCC): ICD-10-CM

## 2023-07-19 DIAGNOSIS — K21.9 GASTROESOPHAGEAL REFLUX DISEASE WITHOUT ESOPHAGITIS: ICD-10-CM

## 2023-07-20 RX ORDER — AMLODIPINE BESYLATE 10 MG/1
10 TABLET ORAL DAILY
Qty: 30 TABLET | Refills: 0 | Status: SHIPPED | OUTPATIENT
Start: 2023-07-20

## 2023-07-20 RX ORDER — LANOLIN ALCOHOL/MO/W.PET/CERES
400 CREAM (GRAM) TOPICAL DAILY
Qty: 30 TABLET | Refills: 0 | Status: SHIPPED | OUTPATIENT
Start: 2023-07-20

## 2023-07-20 RX ORDER — OMEPRAZOLE 40 MG/1
40 CAPSULE, DELAYED RELEASE ORAL DAILY
Qty: 30 CAPSULE | Refills: 0 | Status: SHIPPED | OUTPATIENT
Start: 2023-07-20

## 2023-07-20 RX ORDER — RANOLAZINE 500 MG/1
500 TABLET, EXTENDED RELEASE ORAL 2 TIMES DAILY
Qty: 30 TABLET | Refills: 0 | Status: SHIPPED | OUTPATIENT
Start: 2023-07-20

## 2023-07-20 RX ORDER — BUMETANIDE 1 MG/1
1 TABLET ORAL 2 TIMES DAILY
Qty: 60 TABLET | Refills: 0 | Status: SHIPPED | OUTPATIENT
Start: 2023-07-20

## 2023-07-20 RX ORDER — NEBIVOLOL 20 MG/1
1 TABLET ORAL DAILY
Qty: 30 TABLET | Refills: 0 | Status: SHIPPED | OUTPATIENT
Start: 2023-07-20

## 2023-07-20 NOTE — TELEPHONE ENCOUNTER
Requested Prescriptions     Pending Prescriptions Disp Refills    bumetanide (BUMEX) 1 MG tablet 60 tablet 0     Sig: Take 1 tablet by mouth 2 times daily    nebivolol (BYSTOLIC) 20 MG TABS tablet 30 tablet 0     Sig: Take 1 tablet by mouth daily    amLODIPine (NORVASC) 10 MG tablet 30 tablet 0     Sig: Take 1 tablet by mouth daily    magnesium oxide (MAG-OX) 400 (240 Mg) MG tablet 30 tablet 0     Sig: Take 1 tablet by mouth daily    omeprazole (PRILOSEC) 40 MG delayed release capsule 30 capsule 0     Sig: Take 1 capsule by mouth daily       Next appt is 7/19/2023  Last appt was 1/4/2023

## 2023-07-20 NOTE — TELEPHONE ENCOUNTER
Requested Prescriptions     Pending Prescriptions Disp Refills    ranolazine (RANEXA) 500 MG extended release tablet 30 tablet 0     Sig: Take 1 tablet by mouth 2 times daily NEEDS APPOINTMENT FOR FURTHER REFILLS       Next appt is Visit date not found  Last appt was 1/4/2023

## 2023-07-24 ENCOUNTER — HOSPITAL ENCOUNTER (EMERGENCY)
Age: 83
Discharge: HOME | End: 2023-07-24
Payer: MEDICARE

## 2023-07-24 VITALS
DIASTOLIC BLOOD PRESSURE: 58 MMHG | SYSTOLIC BLOOD PRESSURE: 121 MMHG | HEART RATE: 80 BPM | TEMPERATURE: 97.6 F | RESPIRATION RATE: 18 BRPM | OXYGEN SATURATION: 100 %

## 2023-07-24 DIAGNOSIS — K64.9 HEMORRHOIDS, UNSPECIFIED HEMORRHOID TYPE: Primary | ICD-10-CM

## 2023-07-24 LAB
ALBUMIN SERPL-MCNC: 4.2 G/DL (ref 3.5–5.2)
ALP SERPL-CCNC: 80 U/L (ref 35–104)
ALT SERPL-CCNC: 19 U/L (ref 0–32)
ANION GAP SERPL CALCULATED.3IONS-SCNC: 12 MMOL/L (ref 7–16)
AST SERPL-CCNC: 34 U/L (ref 0–31)
BASOPHILS # BLD: 0.02 K/UL (ref 0–0.2)
BASOPHILS NFR BLD: 0 % (ref 0–2)
BILIRUB SERPL-MCNC: 0.7 MG/DL (ref 0–1.2)
BUN SERPL-MCNC: 14 MG/DL (ref 6–23)
CALCIUM SERPL-MCNC: 8.9 MG/DL (ref 8.6–10.2)
CHLORIDE SERPL-SCNC: 102 MMOL/L (ref 98–107)
CO2 SERPL-SCNC: 26 MMOL/L (ref 22–29)
CREAT SERPL-MCNC: 0.7 MG/DL (ref 0.5–1)
EOSINOPHIL # BLD: 0.1 K/UL (ref 0.05–0.5)
EOSINOPHILS RELATIVE PERCENT: 2 % (ref 0–6)
ERYTHROCYTE [DISTWIDTH] IN BLOOD BY AUTOMATED COUNT: 11.8 % (ref 11.5–15)
GFR SERPL CREATININE-BSD FRML MDRD: >60 ML/MIN/1.73M2
GLUCOSE SERPL-MCNC: 123 MG/DL (ref 74–107)
HCT VFR BLD AUTO: 32.9 % (ref 34–48)
HGB BLD-MCNC: 11.2 G/DL (ref 11.5–15.5)
IMM GRANULOCYTES # BLD AUTO: <0.03 K/UL (ref 0–0.58)
IMM GRANULOCYTES NFR BLD: 0 % (ref 0–5)
LYMPHOCYTES NFR BLD: 1.14 K/UL (ref 1.5–4)
LYMPHOCYTES RELATIVE PERCENT: 25 % (ref 20–42)
MCH RBC QN AUTO: 32.3 PG (ref 26–35)
MCHC RBC AUTO-ENTMCNC: 34 G/DL (ref 32–34.5)
MCV RBC AUTO: 94.8 FL (ref 80–99.9)
MONOCYTES NFR BLD: 0.38 K/UL (ref 0.1–0.95)
MONOCYTES NFR BLD: 8 % (ref 2–12)
NEUTROPHILS NFR BLD: 63 % (ref 43–80)
NEUTS SEG NFR BLD: 2.86 K/UL (ref 1.8–7.3)
PLATELET # BLD AUTO: 183 K/UL (ref 130–450)
PMV BLD AUTO: 11.6 FL (ref 7–12)
POTASSIUM SERPL-SCNC: 3.8 MMOL/L (ref 3.5–5)
PROT SERPL-MCNC: 6.9 G/DL (ref 6.4–8.3)
RBC # BLD AUTO: 3.47 M/UL (ref 3.5–5.5)
SODIUM SERPL-SCNC: 140 MMOL/L (ref 132–146)
WBC OTHER # BLD: 4.5 K/UL (ref 4.5–11.5)

## 2023-07-24 PROCEDURE — 85027 COMPLETE CBC AUTOMATED: CPT

## 2023-07-24 PROCEDURE — 99283 EMERGENCY DEPT VISIT LOW MDM: CPT

## 2023-07-24 PROCEDURE — 80053 COMPREHEN METABOLIC PANEL: CPT

## 2023-07-24 SDOH — ECONOMIC STABILITY: FOOD INSECURITY: WITHIN THE PAST 12 MONTHS, YOU WORRIED THAT YOUR FOOD WOULD RUN OUT BEFORE YOU GOT MONEY TO BUY MORE.: NEVER TRUE

## 2023-07-24 SDOH — ECONOMIC STABILITY: INCOME INSECURITY: HOW HARD IS IT FOR YOU TO PAY FOR THE VERY BASICS LIKE FOOD, HOUSING, MEDICAL CARE, AND HEATING?: NOT HARD AT ALL

## 2023-07-24 SDOH — ECONOMIC STABILITY: FOOD INSECURITY: WITHIN THE PAST 12 MONTHS, THE FOOD YOU BOUGHT JUST DIDN'T LAST AND YOU DIDN'T HAVE MONEY TO GET MORE.: NEVER TRUE

## 2023-07-24 SDOH — ECONOMIC STABILITY: HOUSING INSECURITY
IN THE LAST 12 MONTHS, WAS THERE A TIME WHEN YOU DID NOT HAVE A STEADY PLACE TO SLEEP OR SLEPT IN A SHELTER (INCLUDING NOW)?: NO

## 2023-07-24 ASSESSMENT — PAIN - FUNCTIONAL ASSESSMENT: PAIN_FUNCTIONAL_ASSESSMENT: NONE - DENIES PAIN

## 2023-07-24 NOTE — ED PROVIDER NOTES
Independent ALEJANDRINA Visit. HPI:  7/24/23, Time: 8:29 AM EDT         Michael Roach is a 79 y.o. female presenting to the ED for hemorrhoid, rectal bleeding, beginning 4 to 5 days ago. The complaint has been persistent, mild in severity, and worsened by nothing. Patient is brought in by daughter with complaint of hemorrhoids. Patient has history of dementia history is limited. Patient's has history of hemorrhoids that the daughter states has been bleeding over the last 3 to 5 days. She states it has improved today. Patient denies any abdominal pain no chest pain palpitations shortness of breath. She denies feeling lightheaded dizzy. Her daughter states she has been giving her sitz bath's and using hemorrhoid suppository which seems to have helped. Patient is on Eliquis for history of A-fib. Daughter states she had a loose bowel movement that appeared dark green-colored. Review of Systems:   A complete review of systems was performed and pertinent positives and negatives are stated within HPI, all other systems reviewed and are negative.          --------------------------------------------- PAST HISTORY ---------------------------------------------  Past Medical History:  has a past medical history of CAD (coronary artery disease), GERD (gastroesophageal reflux disease), History of echocardiogram, History of echocardiogram, Hyperlipidemia, Hypertension, MI (myocardial infarction) ([address]), Migraines, New onset atrial flutter ([address]), and Normal cardiac stress test.    Past Surgical History:  has a past surgical history that includes Cholecystectomy; Coronary artery bypass graft; Cardiac catheterization; ECHO Compl W Dop Color Flow (2/27/2012); ECHO Compl W Dop Color Flow (3/25/2013); Colonoscopy (4/16/13); and Spine surgery (N/A, 10/14/2020). Social History:  reports that she quit smoking about 26 years ago. Her smoking use included cigarettes. She started smoking about 56 years ago.  She has a 30.00

## 2023-07-26 ENCOUNTER — OFFICE VISIT (OUTPATIENT)
Dept: GERIATRIC MEDICINE | Age: 83
End: 2023-07-26
Payer: MEDICARE

## 2023-07-26 VITALS
HEART RATE: 56 BPM | RESPIRATION RATE: 16 BRPM | WEIGHT: 88 LBS | DIASTOLIC BLOOD PRESSURE: 60 MMHG | BODY MASS INDEX: 18.08 KG/M2 | SYSTOLIC BLOOD PRESSURE: 108 MMHG | TEMPERATURE: 98.5 F

## 2023-07-26 DIAGNOSIS — F02.80 ALZHEIMER DISEASE (HCC): Primary | ICD-10-CM

## 2023-07-26 DIAGNOSIS — G30.9 ALZHEIMER DISEASE (HCC): Primary | ICD-10-CM

## 2023-07-26 PROCEDURE — 99212 OFFICE O/P EST SF 10 MIN: CPT | Performed by: INTERNAL MEDICINE

## 2023-07-26 PROCEDURE — G8400 PT W/DXA NO RESULTS DOC: HCPCS | Performed by: INTERNAL MEDICINE

## 2023-07-26 PROCEDURE — 1090F PRES/ABSN URINE INCON ASSESS: CPT | Performed by: INTERNAL MEDICINE

## 2023-07-26 PROCEDURE — 3078F DIAST BP <80 MM HG: CPT | Performed by: INTERNAL MEDICINE

## 2023-07-26 PROCEDURE — 1036F TOBACCO NON-USER: CPT | Performed by: INTERNAL MEDICINE

## 2023-07-26 PROCEDURE — G8427 DOCREV CUR MEDS BY ELIG CLIN: HCPCS | Performed by: INTERNAL MEDICINE

## 2023-07-26 PROCEDURE — 1123F ACP DISCUSS/DSCN MKR DOCD: CPT | Performed by: INTERNAL MEDICINE

## 2023-07-26 PROCEDURE — G8419 CALC BMI OUT NRM PARAM NOF/U: HCPCS | Performed by: INTERNAL MEDICINE

## 2023-07-26 PROCEDURE — 3074F SYST BP LT 130 MM HG: CPT | Performed by: INTERNAL MEDICINE

## 2023-07-26 NOTE — PROGRESS NOTES
CC Memory evaluation    Here with daughter  Sleeps 22 hours a day IADL's per daughter  Will not leave the  house and has alarm device  May eat with coaxing   Not expressed  ADL's help clothes and bathing with help    Speaks  Uzbek  Visual hallucinations less and talks to someone   And may speak to dolls   Daughter doing well   Still taking memantine and increase to 15 mg hs   Impression: SDAT  Plan: Memantine 15 mg hs         .

## 2023-07-26 NOTE — PROGRESS NOTES
Chief Complaint   Patient presents with    6 Month Follow-Up     January follow up. Here with daughter, [place].

## 2023-07-31 ENCOUNTER — INITIAL CONSULT (OUTPATIENT)
Dept: SURGERY | Age: 83
End: 2023-07-31
Payer: MEDICARE

## 2023-07-31 VITALS
WEIGHT: 88 LBS | HEART RATE: 65 BPM | OXYGEN SATURATION: 95 % | BODY MASS INDEX: 18.47 KG/M2 | SYSTOLIC BLOOD PRESSURE: 107 MMHG | DIASTOLIC BLOOD PRESSURE: 58 MMHG | TEMPERATURE: 97 F | HEIGHT: 58 IN

## 2023-07-31 DIAGNOSIS — K64.5 PERIANAL VENOUS THROMBOSIS: Primary | ICD-10-CM

## 2023-07-31 PROCEDURE — G8400 PT W/DXA NO RESULTS DOC: HCPCS | Performed by: SURGERY

## 2023-07-31 PROCEDURE — 1090F PRES/ABSN URINE INCON ASSESS: CPT | Performed by: SURGERY

## 2023-07-31 PROCEDURE — 3078F DIAST BP <80 MM HG: CPT | Performed by: SURGERY

## 2023-07-31 PROCEDURE — 1036F TOBACCO NON-USER: CPT | Performed by: SURGERY

## 2023-07-31 PROCEDURE — 99203 OFFICE O/P NEW LOW 30 MIN: CPT | Performed by: SURGERY

## 2023-07-31 PROCEDURE — G8427 DOCREV CUR MEDS BY ELIG CLIN: HCPCS | Performed by: SURGERY

## 2023-07-31 PROCEDURE — 3074F SYST BP LT 130 MM HG: CPT | Performed by: SURGERY

## 2023-07-31 PROCEDURE — G8419 CALC BMI OUT NRM PARAM NOF/U: HCPCS | Performed by: SURGERY

## 2023-07-31 PROCEDURE — 1123F ACP DISCUSS/DSCN MKR DOCD: CPT | Performed by: SURGERY

## 2023-07-31 NOTE — PROGRESS NOTES
reactions to anesthetic medications. The patient understands the risks and alternatives and the possibility of converting to an open procedure. All questions were answered to the patient's satisfaction and they freely signed the consent.              Karolina Perez MD  8:58 AM  7/31/2023

## 2023-08-05 DIAGNOSIS — I25.84 CORONARY ARTERY DISEASE DUE TO CALCIFIED CORONARY LESION: ICD-10-CM

## 2023-08-05 DIAGNOSIS — I25.10 CORONARY ARTERY DISEASE DUE TO CALCIFIED CORONARY LESION: ICD-10-CM

## 2023-08-07 RX ORDER — RANOLAZINE 500 MG/1
TABLET, EXTENDED RELEASE ORAL
Qty: 30 TABLET | Refills: 0 | Status: SHIPPED | OUTPATIENT
Start: 2023-08-07

## 2023-08-14 DIAGNOSIS — I48.91 ATRIAL FIBRILLATION WITH RVR (HCC): ICD-10-CM

## 2023-08-19 DIAGNOSIS — I49.5 SICK SINUS SYNDROME WITH TACHYCARDIA (HCC): ICD-10-CM

## 2023-08-21 RX ORDER — LANOLIN ALCOHOL/MO/W.PET/CERES
CREAM (GRAM) TOPICAL
Qty: 30 TABLET | Refills: 0 | Status: SHIPPED | OUTPATIENT
Start: 2023-08-21

## 2023-08-21 NOTE — TELEPHONE ENCOUNTER
Requested Prescriptions     Pending Prescriptions Disp Refills    magnesium oxide (MAG-OX) 400 (240 Mg) MG tablet [Pharmacy Med Name: Magnesium Oxide -Mg Supplement Oral Tablet 400 (240 Mg) MG] 30 tablet 0     Sig: TAKE ONE TABLET BY MOUTH DAILY       Next appt is 8/30/2023  Last appt was 1/4/2023

## 2023-08-25 DIAGNOSIS — I50.31 ACUTE DIASTOLIC CHF (CONGESTIVE HEART FAILURE) (HCC): ICD-10-CM

## 2023-08-25 RX ORDER — FOLIC ACID 1 MG/1
TABLET ORAL
Qty: 90 TABLET | Refills: 0 | Status: SHIPPED | OUTPATIENT
Start: 2023-08-25

## 2023-08-28 DIAGNOSIS — I50.31 ACUTE DIASTOLIC CHF (CONGESTIVE HEART FAILURE) (HCC): ICD-10-CM

## 2023-08-28 DIAGNOSIS — I10 ESSENTIAL HYPERTENSION: ICD-10-CM

## 2023-08-28 DIAGNOSIS — I25.810 ATHEROSCLEROSIS OF CORONARY ARTERY BYPASS GRAFT OF NATIVE HEART WITHOUT ANGINA PECTORIS: ICD-10-CM

## 2023-08-28 DIAGNOSIS — I25.10 CORONARY ARTERY DISEASE DUE TO CALCIFIED CORONARY LESION: ICD-10-CM

## 2023-08-28 DIAGNOSIS — I25.84 CORONARY ARTERY DISEASE DUE TO CALCIFIED CORONARY LESION: ICD-10-CM

## 2023-08-28 RX ORDER — ATORVASTATIN CALCIUM 40 MG/1
TABLET, FILM COATED ORAL
Qty: 90 TABLET | Refills: 0 | Status: SHIPPED | OUTPATIENT
Start: 2023-08-28

## 2023-08-28 RX ORDER — NEBIVOLOL 20 MG/1
1 TABLET ORAL DAILY
Qty: 30 TABLET | Refills: 0 | Status: SHIPPED | OUTPATIENT
Start: 2023-08-28

## 2023-08-28 RX ORDER — BUMETANIDE 1 MG/1
1 TABLET ORAL 2 TIMES DAILY
Qty: 60 TABLET | Refills: 0 | Status: SHIPPED | OUTPATIENT
Start: 2023-08-28

## 2023-08-28 RX ORDER — RANOLAZINE 500 MG/1
500 TABLET, EXTENDED RELEASE ORAL 2 TIMES DAILY
Qty: 30 TABLET | Refills: 0 | Status: SHIPPED
Start: 2023-08-28 | End: 2023-08-30 | Stop reason: SDUPTHER

## 2023-08-28 RX ORDER — AMLODIPINE BESYLATE 10 MG/1
10 TABLET ORAL DAILY
Qty: 30 TABLET | Refills: 0 | Status: SHIPPED | OUTPATIENT
Start: 2023-08-28

## 2023-08-28 RX ORDER — SILDENAFIL CITRATE 20 MG/1
TABLET ORAL
Qty: 45 TABLET | Refills: 0 | Status: SHIPPED | OUTPATIENT
Start: 2023-08-28

## 2023-08-30 ENCOUNTER — OFFICE VISIT (OUTPATIENT)
Dept: FAMILY MEDICINE CLINIC | Age: 83
End: 2023-08-30
Payer: MEDICARE

## 2023-08-30 VITALS
WEIGHT: 87.6 LBS | RESPIRATION RATE: 16 BRPM | SYSTOLIC BLOOD PRESSURE: 108 MMHG | BODY MASS INDEX: 18.39 KG/M2 | OXYGEN SATURATION: 97 % | DIASTOLIC BLOOD PRESSURE: 58 MMHG | HEART RATE: 72 BPM | TEMPERATURE: 97 F | HEIGHT: 58 IN

## 2023-08-30 DIAGNOSIS — Z00.00 MEDICARE ANNUAL WELLNESS VISIT, SUBSEQUENT: Primary | ICD-10-CM

## 2023-08-30 DIAGNOSIS — I25.10 CORONARY ARTERY DISEASE DUE TO CALCIFIED CORONARY LESION: ICD-10-CM

## 2023-08-30 DIAGNOSIS — I25.84 CORONARY ARTERY DISEASE DUE TO CALCIFIED CORONARY LESION: ICD-10-CM

## 2023-08-30 DIAGNOSIS — K21.9 GASTROESOPHAGEAL REFLUX DISEASE WITHOUT ESOPHAGITIS: ICD-10-CM

## 2023-08-30 PROCEDURE — 3074F SYST BP LT 130 MM HG: CPT | Performed by: FAMILY MEDICINE

## 2023-08-30 PROCEDURE — 1123F ACP DISCUSS/DSCN MKR DOCD: CPT | Performed by: FAMILY MEDICINE

## 2023-08-30 PROCEDURE — G0439 PPPS, SUBSEQ VISIT: HCPCS | Performed by: FAMILY MEDICINE

## 2023-08-30 PROCEDURE — 3078F DIAST BP <80 MM HG: CPT | Performed by: FAMILY MEDICINE

## 2023-08-30 RX ORDER — MEMANTINE HYDROCHLORIDE 5 MG/1
5 TABLET ORAL 3 TIMES DAILY
Qty: 180 TABLET | Refills: 3 | Status: SHIPPED | OUTPATIENT
Start: 2023-08-30

## 2023-08-30 RX ORDER — RANOLAZINE 500 MG/1
500 TABLET, EXTENDED RELEASE ORAL 2 TIMES DAILY
Qty: 180 TABLET | Refills: 1 | Status: SHIPPED | OUTPATIENT
Start: 2023-08-30

## 2023-08-30 RX ORDER — OMEPRAZOLE 40 MG/1
40 CAPSULE, DELAYED RELEASE ORAL DAILY
Qty: 90 CAPSULE | Refills: 1 | Status: SHIPPED | OUTPATIENT
Start: 2023-08-30

## 2023-08-30 ASSESSMENT — PATIENT HEALTH QUESTIONNAIRE - PHQ9
SUM OF ALL RESPONSES TO PHQ QUESTIONS 1-9: 0
2. FEELING DOWN, DEPRESSED OR HOPELESS: 0
1. LITTLE INTEREST OR PLEASURE IN DOING THINGS: 0
SUM OF ALL RESPONSES TO PHQ QUESTIONS 1-9: 0
SUM OF ALL RESPONSES TO PHQ9 QUESTIONS 1 & 2: 0

## 2023-08-30 ASSESSMENT — LIFESTYLE VARIABLES
HOW OFTEN DO YOU HAVE A DRINK CONTAINING ALCOHOL: NEVER
HOW MANY STANDARD DRINKS CONTAINING ALCOHOL DO YOU HAVE ON A TYPICAL DAY: PATIENT DOES NOT DRINK

## 2023-09-05 ENCOUNTER — TELEPHONE (OUTPATIENT)
Dept: FAMILY MEDICINE CLINIC | Age: 83
End: 2023-09-05

## 2023-09-13 DIAGNOSIS — I48.91 ATRIAL FIBRILLATION WITH RVR (HCC): ICD-10-CM

## 2023-09-13 DIAGNOSIS — I25.10 CORONARY ARTERY DISEASE DUE TO CALCIFIED CORONARY LESION: ICD-10-CM

## 2023-09-13 DIAGNOSIS — I25.84 CORONARY ARTERY DISEASE DUE TO CALCIFIED CORONARY LESION: ICD-10-CM

## 2023-09-13 RX ORDER — DOFETILIDE 0.12 MG/1
125 CAPSULE ORAL 2 TIMES DAILY
Qty: 60 CAPSULE | Refills: 1 | Status: SHIPPED | OUTPATIENT
Start: 2023-09-13

## 2023-09-13 RX ORDER — RANOLAZINE 500 MG/1
500 TABLET, EXTENDED RELEASE ORAL 2 TIMES DAILY
Qty: 180 TABLET | Refills: 1 | Status: SHIPPED | OUTPATIENT
Start: 2023-09-13

## 2023-09-25 DIAGNOSIS — I49.5 SICK SINUS SYNDROME WITH TACHYCARDIA (HCC): ICD-10-CM

## 2023-09-25 DIAGNOSIS — I50.31 ACUTE DIASTOLIC CHF (CONGESTIVE HEART FAILURE) (HCC): ICD-10-CM

## 2023-09-25 RX ORDER — LANOLIN ALCOHOL/MO/W.PET/CERES
400 CREAM (GRAM) TOPICAL DAILY
Qty: 30 TABLET | Refills: 2 | Status: SHIPPED | OUTPATIENT
Start: 2023-09-25

## 2023-09-25 RX ORDER — POTASSIUM CHLORIDE 20 MEQ/1
TABLET, EXTENDED RELEASE ORAL
Qty: 30 TABLET | Refills: 2 | Status: SHIPPED | OUTPATIENT
Start: 2023-09-25

## 2023-10-06 DIAGNOSIS — I10 ESSENTIAL HYPERTENSION: ICD-10-CM

## 2023-10-06 DIAGNOSIS — I25.810 ATHEROSCLEROSIS OF CORONARY ARTERY BYPASS GRAFT OF NATIVE HEART WITHOUT ANGINA PECTORIS: ICD-10-CM

## 2023-10-06 DIAGNOSIS — I50.31 ACUTE DIASTOLIC CHF (CONGESTIVE HEART FAILURE) (HCC): ICD-10-CM

## 2023-10-09 RX ORDER — SILDENAFIL CITRATE 20 MG/1
TABLET ORAL
Qty: 45 TABLET | Refills: 2 | Status: SHIPPED | OUTPATIENT
Start: 2023-10-09

## 2023-10-09 RX ORDER — AMLODIPINE BESYLATE 10 MG/1
10 TABLET ORAL DAILY
Qty: 30 TABLET | Refills: 2 | Status: SHIPPED | OUTPATIENT
Start: 2023-10-09

## 2023-10-09 RX ORDER — BUMETANIDE 1 MG/1
1 TABLET ORAL 2 TIMES DAILY
Qty: 60 TABLET | Refills: 2 | Status: SHIPPED | OUTPATIENT
Start: 2023-10-09

## 2023-10-09 RX ORDER — ATORVASTATIN CALCIUM 40 MG/1
TABLET, FILM COATED ORAL
Qty: 90 TABLET | Refills: 0 | Status: SHIPPED | OUTPATIENT
Start: 2023-10-09

## 2023-10-09 RX ORDER — NEBIVOLOL 20 MG/1
1 TABLET ORAL DAILY
Qty: 30 TABLET | Refills: 2 | Status: SHIPPED | OUTPATIENT
Start: 2023-10-09

## 2023-10-19 ENCOUNTER — PATIENT MESSAGE (OUTPATIENT)
Dept: FAMILY MEDICINE CLINIC | Age: 83
End: 2023-10-19

## 2023-10-19 DIAGNOSIS — I50.31 ACUTE DIASTOLIC CHF (CONGESTIVE HEART FAILURE) (HCC): ICD-10-CM

## 2023-10-20 RX ORDER — POTASSIUM CHLORIDE 20 MEQ/1
TABLET, EXTENDED RELEASE ORAL
Qty: 30 TABLET | Refills: 2 | Status: SHIPPED
Start: 2023-10-20 | End: 2023-10-20 | Stop reason: SDUPTHER

## 2023-10-20 RX ORDER — POTASSIUM CHLORIDE 20 MEQ/1
TABLET, EXTENDED RELEASE ORAL
Qty: 60 TABLET | Refills: 4 | Status: SHIPPED | OUTPATIENT
Start: 2023-10-20

## 2023-10-26 ENCOUNTER — HOSPITAL ENCOUNTER (EMERGENCY)
Age: 83
Discharge: HOME | End: 2023-10-26
Payer: MEDICARE

## 2023-10-26 VITALS
RESPIRATION RATE: 16 BRPM | DIASTOLIC BLOOD PRESSURE: 75 MMHG | HEART RATE: 89 BPM | WEIGHT: 85.7 LBS | TEMPERATURE: 98 F | BODY MASS INDEX: 17.91 KG/M2 | SYSTOLIC BLOOD PRESSURE: 157 MMHG | OXYGEN SATURATION: 98 %

## 2023-10-26 VITALS
BODY MASS INDEX: 20.15 KG/M2 | TEMPERATURE: 98.1 F | OXYGEN SATURATION: 100 % | DIASTOLIC BLOOD PRESSURE: 91 MMHG | HEIGHT: 58 IN | HEART RATE: 81 BPM | WEIGHT: 96 LBS | RESPIRATION RATE: 18 BRPM | SYSTOLIC BLOOD PRESSURE: 136 MMHG

## 2023-10-26 DIAGNOSIS — R10.9 LEFT FLANK PAIN: ICD-10-CM

## 2023-10-26 DIAGNOSIS — E87.6 HYPOKALEMIA: ICD-10-CM

## 2023-10-26 DIAGNOSIS — N30.01 ACUTE CYSTITIS WITH HEMATURIA: ICD-10-CM

## 2023-10-26 DIAGNOSIS — N30.00 ACUTE CYSTITIS WITHOUT HEMATURIA: Primary | ICD-10-CM

## 2023-10-26 DIAGNOSIS — R10.9 FLANK PAIN: Primary | ICD-10-CM

## 2023-10-26 DIAGNOSIS — Z53.29 LEFT AGAINST MEDICAL ADVICE: ICD-10-CM

## 2023-10-26 LAB
BACTERIA URNS QL MICRO: ABNORMAL
BILIRUB UR QL STRIP: ABNORMAL
BUN BLD-MCNC: 14 MG/DL (ref 6–23)
CHLORIDE BLD-SCNC: 99 MMOL/L (ref 100–108)
CLARITY UR: ABNORMAL
CO2 BLD CALC-SCNC: 31 MMOL/L (ref 22–29)
COLOR UR: ABNORMAL
CREAT BLD-MCNC: 0.5 MG/DL (ref 0.5–1)
EGFR, POC: >60 ML/MIN/1.73M2
EPI CELLS #/AREA URNS HPF: ABNORMAL /HPF
ERYTHROCYTE [DISTWIDTH] IN BLOOD BY AUTOMATED COUNT: 16 % (ref 11.5–15)
GLUCOSE BLD-MCNC: 159 MG/DL (ref 74–99)
GLUCOSE UR STRIP-MCNC: NEGATIVE MG/DL
HCT VFR BLD AUTO: 43 % (ref 34–48)
HGB BLD-MCNC: 13.7 G/DL (ref 11.5–15.5)
HGB UR QL STRIP.AUTO: NEGATIVE
KETONES UR STRIP-MCNC: NEGATIVE MG/DL
LEUKOCYTE ESTERASE UR QL STRIP: ABNORMAL
MCH RBC QN AUTO: 27.7 PG (ref 26–35)
MCHC RBC AUTO-ENTMCNC: 31.9 G/DL (ref 32–34.5)
MCV RBC AUTO: 86.9 FL (ref 80–99.9)
NITRITE UR QL STRIP: NEGATIVE
PH UR STRIP: 6 [PH] (ref 5–9)
PLATELET # BLD AUTO: 211 K/UL (ref 130–450)
PMV BLD AUTO: 11.4 FL (ref 7–12)
POC ANION GAP: 12 MMOL/L (ref 7–16)
POTASSIUM BLD-SCNC: 2.8 MMOL/L (ref 3.5–5)
PROT UR STRIP-MCNC: 100 MG/DL
RBC # BLD AUTO: 4.95 M/UL (ref 3.5–5.5)
RBC #/AREA URNS HPF: ABNORMAL /HPF
SODIUM BLD-SCNC: 142 MMOL/L (ref 132–146)
SP GR UR STRIP: >1.03 (ref 1–1.03)
UROBILINOGEN UR STRIP-ACNC: 0.2 EU/DL (ref 0–1)
WBC #/AREA URNS HPF: ABNORMAL /HPF
WBC OTHER # BLD: 6.5 K/UL (ref 4.5–11.5)

## 2023-10-26 PROCEDURE — 87086 URINE CULTURE/COLONY COUNT: CPT

## 2023-10-26 PROCEDURE — 82947 ASSAY GLUCOSE BLOOD QUANT: CPT

## 2023-10-26 PROCEDURE — 99211 OFF/OP EST MAY X REQ PHY/QHP: CPT

## 2023-10-26 PROCEDURE — 80051 ELECTROLYTE PANEL: CPT

## 2023-10-26 PROCEDURE — 6370000000 HC RX 637 (ALT 250 FOR IP): Performed by: NURSE PRACTITIONER

## 2023-10-26 PROCEDURE — 36415 COLL VENOUS BLD VENIPUNCTURE: CPT

## 2023-10-26 PROCEDURE — 81001 URINALYSIS AUTO W/SCOPE: CPT

## 2023-10-26 PROCEDURE — 85027 COMPLETE CBC AUTOMATED: CPT

## 2023-10-26 PROCEDURE — 82565 ASSAY OF CREATININE: CPT

## 2023-10-26 PROCEDURE — 93005 ELECTROCARDIOGRAM TRACING: CPT | Performed by: NURSE PRACTITIONER

## 2023-10-26 PROCEDURE — 84520 ASSAY OF UREA NITROGEN: CPT

## 2023-10-26 RX ORDER — CEPHALEXIN 250 MG/1
500 CAPSULE ORAL ONCE
Status: COMPLETED | OUTPATIENT
Start: 2023-10-26 | End: 2023-10-26

## 2023-10-26 RX ORDER — CEPHALEXIN 500 MG/1
500 CAPSULE ORAL 3 TIMES DAILY
Qty: 21 CAPSULE | Refills: 0 | Status: SHIPPED | OUTPATIENT
Start: 2023-10-26 | End: 2023-11-02

## 2023-10-26 RX ORDER — POTASSIUM CHLORIDE 20 MEQ/1
40 TABLET, EXTENDED RELEASE ORAL ONCE
Status: COMPLETED | OUTPATIENT
Start: 2023-10-26 | End: 2023-10-26

## 2023-10-26 RX ADMIN — CEPHALEXIN 500 MG: 250 CAPSULE ORAL at 19:27

## 2023-10-26 RX ADMIN — POTASSIUM CHLORIDE 40 MEQ: 1500 TABLET, EXTENDED RELEASE ORAL at 17:51

## 2023-10-26 ASSESSMENT — PAIN DESCRIPTION - PAIN TYPE: TYPE: ACUTE PAIN

## 2023-10-26 ASSESSMENT — PAIN - FUNCTIONAL ASSESSMENT
PAIN_FUNCTIONAL_ASSESSMENT: NONE - DENIES PAIN
PAIN_FUNCTIONAL_ASSESSMENT: WONG-BAKER FACES

## 2023-10-26 ASSESSMENT — PAIN DESCRIPTION - DESCRIPTORS: DESCRIPTORS: ACHING

## 2023-10-26 ASSESSMENT — PAIN SCALES - WONG BAKER: WONGBAKER_NUMERICALRESPONSE: 2

## 2023-10-26 ASSESSMENT — PAIN DESCRIPTION - ORIENTATION: ORIENTATION: LEFT

## 2023-10-26 NOTE — DISCHARGE INSTRUCTIONS
Please go immediately to [facility] for further evaluation of your low potassium (2.8)

## 2023-10-27 LAB
EKG ATRIAL RATE: 326 BPM
EKG Q-T INTERVAL: 402 MS
EKG QRS DURATION: 84 MS
EKG QTC CALCULATION (BAZETT): 466 MS
EKG R AXIS: 91 DEGREES
EKG T AXIS: -35 DEGREES
EKG VENTRICULAR RATE: 81 BPM

## 2023-10-27 PROCEDURE — 93010 ELECTROCARDIOGRAM REPORT: CPT | Performed by: INTERNAL MEDICINE

## 2023-10-29 LAB
MICROORGANISM SPEC CULT: ABNORMAL
MICROORGANISM SPEC CULT: ABNORMAL
SPECIMEN DESCRIPTION: ABNORMAL

## 2023-10-30 LAB
MICROORGANISM SPEC CULT: ABNORMAL
MICROORGANISM SPEC CULT: ABNORMAL
SPECIMEN DESCRIPTION: ABNORMAL

## 2023-11-01 ENCOUNTER — OFFICE VISIT (OUTPATIENT)
Dept: FAMILY MEDICINE CLINIC | Age: 83
End: 2023-11-01
Payer: MEDICARE

## 2023-11-01 VITALS
HEART RATE: 95 BPM | TEMPERATURE: 97.8 F | DIASTOLIC BLOOD PRESSURE: 58 MMHG | SYSTOLIC BLOOD PRESSURE: 104 MMHG | HEIGHT: 58 IN | RESPIRATION RATE: 16 BRPM | BODY MASS INDEX: 18.22 KG/M2 | WEIGHT: 86.8 LBS | OXYGEN SATURATION: 47 %

## 2023-11-01 DIAGNOSIS — E87.6 HYPOKALEMIA: ICD-10-CM

## 2023-11-01 DIAGNOSIS — N30.01 ACUTE CYSTITIS WITH HEMATURIA: Primary | ICD-10-CM

## 2023-11-01 DIAGNOSIS — E87.1 HYPONATREMIA: ICD-10-CM

## 2023-11-01 PROCEDURE — 99213 OFFICE O/P EST LOW 20 MIN: CPT | Performed by: FAMILY MEDICINE

## 2023-11-01 PROCEDURE — G8419 CALC BMI OUT NRM PARAM NOF/U: HCPCS | Performed by: FAMILY MEDICINE

## 2023-11-01 PROCEDURE — 3074F SYST BP LT 130 MM HG: CPT | Performed by: FAMILY MEDICINE

## 2023-11-01 PROCEDURE — 1123F ACP DISCUSS/DSCN MKR DOCD: CPT | Performed by: FAMILY MEDICINE

## 2023-11-01 PROCEDURE — G8400 PT W/DXA NO RESULTS DOC: HCPCS | Performed by: FAMILY MEDICINE

## 2023-11-01 PROCEDURE — 3078F DIAST BP <80 MM HG: CPT | Performed by: FAMILY MEDICINE

## 2023-11-01 PROCEDURE — G8427 DOCREV CUR MEDS BY ELIG CLIN: HCPCS | Performed by: FAMILY MEDICINE

## 2023-11-01 PROCEDURE — G8484 FLU IMMUNIZE NO ADMIN: HCPCS | Performed by: FAMILY MEDICINE

## 2023-11-01 PROCEDURE — 1036F TOBACCO NON-USER: CPT | Performed by: FAMILY MEDICINE

## 2023-11-01 PROCEDURE — 1090F PRES/ABSN URINE INCON ASSESS: CPT | Performed by: FAMILY MEDICINE

## 2023-11-01 RX ORDER — SULFAMETHOXAZOLE AND TRIMETHOPRIM 800; 160 MG/1; MG/1
TABLET ORAL
Qty: 21 TABLET | Refills: 0 | Status: SHIPPED
Start: 2023-11-01 | End: 2023-11-01 | Stop reason: SINTOL

## 2023-11-01 RX ORDER — POTASSIUM CHLORIDE 750 MG/1
10 TABLET, EXTENDED RELEASE ORAL DAILY
Qty: 30 TABLET | Refills: 0 | Status: SHIPPED | OUTPATIENT
Start: 2023-11-01

## 2023-11-01 RX ORDER — CEPHALEXIN 500 MG/1
500 CAPSULE ORAL 3 TIMES DAILY
Qty: 21 CAPSULE | Refills: 0 | Status: SHIPPED | OUTPATIENT
Start: 2023-11-01 | End: 2023-11-08

## 2023-11-01 ASSESSMENT — ENCOUNTER SYMPTOMS
CONSTIPATION: 0
BLOOD IN STOOL: 0
DIARRHEA: 0
SHORTNESS OF BREATH: 0
ABDOMINAL PAIN: 0
WHEEZING: 0
NAUSEA: 0
VOMITING: 0
COUGH: 0

## 2023-11-02 ENCOUNTER — TELEPHONE (OUTPATIENT)
Dept: FAMILY MEDICINE CLINIC | Age: 83
End: 2023-11-02

## 2023-11-02 NOTE — TELEPHONE ENCOUNTER
Bobbi's daughter thought her prescription for potassium was supposed to Potassium Chloride 10 mg 2 tabs twice a day and it was sent in for 10 mg one tablet daily. Please clarify.

## 2023-11-03 ENCOUNTER — TELEPHONE (OUTPATIENT)
Dept: FAMILY MEDICINE CLINIC | Age: 83
End: 2023-11-03

## 2023-11-03 DIAGNOSIS — E87.6 HYPOKALEMIA: Primary | ICD-10-CM

## 2023-11-03 NOTE — TELEPHONE ENCOUNTER
Patient daughter called this afternoon confused why her potassium was decreased to 10 when her normal dosage was 40. Patient was just in ER for low potassium, she is worried and doesn't want anything to happen to her mother. I explained to the daughter that dr Catherine Mccabe is out of office of Fridays and we will call with an answer on Monday. I advised that she takes the potassium as instructed.

## 2023-11-06 RX ORDER — POTASSIUM CHLORIDE 20 MEQ/1
20 TABLET, EXTENDED RELEASE ORAL 2 TIMES DAILY
Qty: 60 TABLET | Refills: 5 | Status: SHIPPED | OUTPATIENT
Start: 2023-11-06

## 2023-11-06 NOTE — TELEPHONE ENCOUNTER
ASSESSMENT/PLAN:    1. Hypokalemia  - potassium chloride (KLOR-CON M) 20 MEQ extended release tablet; Take 1 tablet by mouth 2 times daily  Dispense: 60 tablet;  Refill: 5        FOLLOW-UP:  Rx sent to [facility] at old dose of 40 until we decide reasons,though is most likely from the [employer]

## 2023-11-15 ENCOUNTER — TELEPHONE (OUTPATIENT)
Dept: FAMILY MEDICINE CLINIC | Age: 83
End: 2023-11-15

## 2023-11-15 DIAGNOSIS — N30.01 ACUTE CYSTITIS WITH HEMATURIA: ICD-10-CM

## 2023-11-15 RX ORDER — CEPHALEXIN 500 MG/1
500 CAPSULE ORAL 3 TIMES DAILY
Qty: 21 CAPSULE | Refills: 0 | Status: SHIPPED | OUTPATIENT
Start: 2023-11-15 | End: 2023-11-22

## 2023-11-15 NOTE — TELEPHONE ENCOUNTER
Recheck urine in 2 weeks take another week of antibiotic tx      ASSESSMENT/PLAN:    1. Acute cystitis with hematuria  - cephALEXin (KEFLEX) 500 MG capsule; Take 1 capsule by mouth 3 times daily for 7 days  Dispense: 21 capsule;  Refill: 0        FOLLOW-UP:  leodan

## 2023-11-15 NOTE — TELEPHONE ENCOUNTER
Patient just finished medication for UTI this past Monday and is now complaining of back pain again. Patients daughter would like to know if there is anything else we can prescribe to help.

## 2023-12-11 DIAGNOSIS — I25.810 ATHEROSCLEROSIS OF CORONARY ARTERY BYPASS GRAFT OF NATIVE HEART WITHOUT ANGINA PECTORIS: ICD-10-CM

## 2023-12-11 DIAGNOSIS — I50.31 ACUTE DIASTOLIC CHF (CONGESTIVE HEART FAILURE) (HCC): ICD-10-CM

## 2023-12-12 ENCOUNTER — APPOINTMENT (OUTPATIENT)
Dept: CT IMAGING | Age: 83
End: 2023-12-12
Payer: MEDICARE

## 2023-12-12 ENCOUNTER — HOSPITAL ENCOUNTER (EMERGENCY)
Age: 83
Discharge: HOME | End: 2023-12-13
Attending: EMERGENCY MEDICINE
Payer: MEDICARE

## 2023-12-12 ENCOUNTER — APPOINTMENT (OUTPATIENT)
Dept: GENERAL RADIOLOGY | Age: 83
End: 2023-12-12
Payer: MEDICARE

## 2023-12-12 DIAGNOSIS — S00.03XA HEMATOMA OF SCALP, INITIAL ENCOUNTER: Primary | ICD-10-CM

## 2023-12-12 DIAGNOSIS — R10.84 GENERALIZED ABDOMINAL PAIN: ICD-10-CM

## 2023-12-12 LAB
ALBUMIN SERPL-MCNC: 4.5 G/DL (ref 3.5–5.2)
ALP SERPL-CCNC: 94 U/L (ref 35–104)
ALT SERPL-CCNC: 15 U/L (ref 0–32)
ANION GAP SERPL CALCULATED.3IONS-SCNC: 13 MMOL/L (ref 7–16)
AST SERPL-CCNC: 24 U/L (ref 0–31)
BACTERIA URNS QL MICRO: ABNORMAL
BASOPHILS # BLD: 0.03 K/UL (ref 0–0.2)
BASOPHILS NFR BLD: 0 % (ref 0–2)
BILIRUB SERPL-MCNC: 0.6 MG/DL (ref 0–1.2)
BILIRUB UR QL STRIP: NEGATIVE
BUN SERPL-MCNC: 18 MG/DL (ref 6–23)
CALCIUM SERPL-MCNC: 9.5 MG/DL (ref 8.6–10.2)
CHLORIDE SERPL-SCNC: 99 MMOL/L (ref 98–107)
CLARITY UR: ABNORMAL
CO2 SERPL-SCNC: 28 MMOL/L (ref 22–29)
COLOR UR: YELLOW
CREAT SERPL-MCNC: 0.9 MG/DL (ref 0.5–1)
EOSINOPHIL # BLD: 0.2 K/UL (ref 0.05–0.5)
EOSINOPHILS RELATIVE PERCENT: 3 % (ref 0–6)
EPI CELLS #/AREA URNS HPF: ABNORMAL /HPF
ERYTHROCYTE [DISTWIDTH] IN BLOOD BY AUTOMATED COUNT: 15.5 % (ref 11.5–15)
GFR SERPL CREATININE-BSD FRML MDRD: >60 ML/MIN/1.73M2
GLUCOSE SERPL-MCNC: 157 MG/DL (ref 74–99)
GLUCOSE UR STRIP-MCNC: NEGATIVE MG/DL
HCT VFR BLD AUTO: 41.1 % (ref 34–48)
HGB BLD-MCNC: 13.6 G/DL (ref 11.5–15.5)
HGB UR QL STRIP.AUTO: NEGATIVE
IMM GRANULOCYTES # BLD AUTO: 0.03 K/UL (ref 0–0.58)
IMM GRANULOCYTES NFR BLD: 0 % (ref 0–5)
KETONES UR STRIP-MCNC: NEGATIVE MG/DL
LACTATE BLDV-SCNC: 2.5 MMOL/L (ref 0.5–2.2)
LEUKOCYTE ESTERASE UR QL STRIP: NEGATIVE
LIPASE SERPL-CCNC: 44 U/L (ref 13–60)
LYMPHOCYTES NFR BLD: 1.79 K/UL (ref 1.5–4)
LYMPHOCYTES RELATIVE PERCENT: 26 % (ref 20–42)
MCH RBC QN AUTO: 28.9 PG (ref 26–35)
MCHC RBC AUTO-ENTMCNC: 33.1 G/DL (ref 32–34.5)
MCV RBC AUTO: 87.4 FL (ref 80–99.9)
MONOCYTES NFR BLD: 0.59 K/UL (ref 0.1–0.95)
MONOCYTES NFR BLD: 9 % (ref 2–12)
NEUTROPHILS NFR BLD: 62 % (ref 43–80)
NEUTS SEG NFR BLD: 4.26 K/UL (ref 1.8–7.3)
NITRITE UR QL STRIP: POSITIVE
PH UR STRIP: 7 [PH] (ref 5–9)
PLATELET # BLD AUTO: 215 K/UL (ref 130–450)
PMV BLD AUTO: 10.9 FL (ref 7–12)
POTASSIUM SERPL-SCNC: 3.8 MMOL/L (ref 3.5–5)
PROT SERPL-MCNC: 7.3 G/DL (ref 6.4–8.3)
PROT UR STRIP-MCNC: ABNORMAL MG/DL
RBC # BLD AUTO: 4.7 M/UL (ref 3.5–5.5)
RBC #/AREA URNS HPF: ABNORMAL /HPF
SODIUM SERPL-SCNC: 140 MMOL/L (ref 132–146)
SP GR UR STRIP: 1.01 (ref 1–1.03)
TROPONIN I SERPL HS-MCNC: 11 NG/L (ref 0–9)
UROBILINOGEN UR STRIP-ACNC: 0.2 EU/DL (ref 0–1)
WBC #/AREA URNS HPF: ABNORMAL /HPF
WBC OTHER # BLD: 6.9 K/UL (ref 4.5–11.5)

## 2023-12-12 PROCEDURE — 80053 COMPREHEN METABOLIC PANEL: CPT

## 2023-12-12 PROCEDURE — 85025 COMPLETE CBC W/AUTO DIFF WBC: CPT

## 2023-12-12 PROCEDURE — 72170 X-RAY EXAM OF PELVIS: CPT

## 2023-12-12 PROCEDURE — 6360000004 HC RX CONTRAST MEDICATION: Performed by: RADIOLOGY

## 2023-12-12 PROCEDURE — 71045 X-RAY EXAM CHEST 1 VIEW: CPT

## 2023-12-12 PROCEDURE — 84484 ASSAY OF TROPONIN QUANT: CPT

## 2023-12-12 PROCEDURE — 73590 X-RAY EXAM OF LOWER LEG: CPT

## 2023-12-12 PROCEDURE — 74177 CT ABD & PELVIS W/CONTRAST: CPT

## 2023-12-12 PROCEDURE — 2580000003 HC RX 258: Performed by: EMERGENCY MEDICINE

## 2023-12-12 PROCEDURE — 83690 ASSAY OF LIPASE: CPT

## 2023-12-12 PROCEDURE — 99285 EMERGENCY DEPT VISIT HI MDM: CPT

## 2023-12-12 PROCEDURE — 93005 ELECTROCARDIOGRAM TRACING: CPT | Performed by: EMERGENCY MEDICINE

## 2023-12-12 PROCEDURE — 83605 ASSAY OF LACTIC ACID: CPT

## 2023-12-12 PROCEDURE — 70450 CT HEAD/BRAIN W/O DYE: CPT

## 2023-12-12 PROCEDURE — 72125 CT NECK SPINE W/O DYE: CPT

## 2023-12-12 PROCEDURE — 81001 URINALYSIS AUTO W/SCOPE: CPT

## 2023-12-12 RX ORDER — BUMETANIDE 1 MG/1
1 TABLET ORAL 2 TIMES DAILY
Qty: 60 TABLET | Refills: 2 | Status: SHIPPED | OUTPATIENT
Start: 2023-12-12

## 2023-12-12 RX ORDER — FOLIC ACID 1 MG/1
1000 TABLET ORAL DAILY
Qty: 90 TABLET | Refills: 0 | Status: SHIPPED | OUTPATIENT
Start: 2023-12-12

## 2023-12-12 RX ORDER — 0.9 % SODIUM CHLORIDE 0.9 %
1000 INTRAVENOUS SOLUTION INTRAVENOUS ONCE
Status: COMPLETED | OUTPATIENT
Start: 2023-12-12 | End: 2023-12-13

## 2023-12-12 RX ORDER — ATORVASTATIN CALCIUM 40 MG/1
TABLET, FILM COATED ORAL
Qty: 90 TABLET | Refills: 0 | Status: SHIPPED | OUTPATIENT
Start: 2023-12-12

## 2023-12-12 RX ORDER — DOFETILIDE 0.12 MG/1
125 CAPSULE ORAL 2 TIMES DAILY
Qty: 60 CAPSULE | Refills: 2 | Status: SHIPPED | OUTPATIENT
Start: 2023-12-12

## 2023-12-12 RX ADMIN — IOPAMIDOL 70 ML: 755 INJECTION, SOLUTION INTRAVENOUS at 23:21

## 2023-12-12 RX ADMIN — SODIUM CHLORIDE 1000 ML: 9 INJECTION, SOLUTION INTRAVENOUS at 23:29

## 2023-12-12 ASSESSMENT — LIFESTYLE VARIABLES: HOW MANY STANDARD DRINKS CONTAINING ALCOHOL DO YOU HAVE ON A TYPICAL DAY: PATIENT DOES NOT DRINK

## 2023-12-13 VITALS
OXYGEN SATURATION: 96 % | DIASTOLIC BLOOD PRESSURE: 69 MMHG | BODY MASS INDEX: 19.44 KG/M2 | HEART RATE: 72 BPM | TEMPERATURE: 97.8 F | RESPIRATION RATE: 16 BRPM | SYSTOLIC BLOOD PRESSURE: 138 MMHG | WEIGHT: 93 LBS

## 2023-12-13 LAB
EKG ATRIAL RATE: 38 BPM
EKG Q-T INTERVAL: 374 MS
EKG QRS DURATION: 84 MS
EKG QTC CALCULATION (BAZETT): 420 MS
EKG R AXIS: 97 DEGREES
EKG T AXIS: -55 DEGREES
EKG VENTRICULAR RATE: 76 BPM
LACTATE BLDV-SCNC: 1.9 MMOL/L (ref 0.5–2.2)
TROPONIN I SERPL HS-MCNC: 9 NG/L (ref 0–9)

## 2023-12-13 PROCEDURE — 93010 ELECTROCARDIOGRAM REPORT: CPT | Performed by: INTERNAL MEDICINE

## 2023-12-13 PROCEDURE — 84484 ASSAY OF TROPONIN QUANT: CPT

## 2023-12-13 PROCEDURE — 83605 ASSAY OF LACTIC ACID: CPT

## 2023-12-13 ASSESSMENT — PAIN - FUNCTIONAL ASSESSMENT: PAIN_FUNCTIONAL_ASSESSMENT: NONE - DENIES PAIN

## 2023-12-13 NOTE — ED PROVIDER NOTES
Patient is an 80 y/o female who presents to the ED after a fall. Patient's daughter states she fell out of bed tonight. Daughter states that she heard a thud and went to find her back in bed. She did hit her head on what she believes was the night stand. There was no loss of consciousness. Since the fall she has complained of left lower leg pain and diffuse abdominal pain. Review of Systems   Unable to perform ROS: Dementia        Physical Exam  Vitals and nursing note reviewed. Constitutional:       General: She is not in acute distress. HENT:      Head: Normocephalic. Comments: Hematoma right parietal scalp     Right Ear: External ear normal.      Left Ear: External ear normal.      Nose: Nose normal.      Mouth/Throat:      Mouth: Mucous membranes are moist.   Eyes:      Conjunctiva/sclera: Conjunctivae normal.      Pupils: Pupils are equal, round, and reactive to light. Neck:      Comments: No midline cervical tenderness to palpation. Cardiovascular:      Rate and Rhythm: Normal rate and regular rhythm. Heart sounds: No murmur heard. Pulmonary:      Effort: Pulmonary effort is normal. No respiratory distress. Breath sounds: Normal breath sounds. No stridor. No wheezing, rhonchi or rales. Abdominal:      General: Bowel sounds are normal. There is no distension. Palpations: Abdomen is soft. Tenderness: There is abdominal tenderness (Epigastric, RLQ). There is no guarding. Musculoskeletal:         General: Normal range of motion. Skin:     General: Skin is warm and dry. Neurological:      Mental Status: She is alert. Mental status is at baseline. She is disoriented.           Procedures     MDM    History from : Patient and Family daughter    Limitations to history : Dementia    Chronic Conditions: Hypertension, hyperlipidemia, coronary artery disease, GERD    CONSULTS: (Who and What was discussed)  None    Discussion with Other Profesionals : None    Social

## 2024-01-04 DIAGNOSIS — I49.5 SICK SINUS SYNDROME WITH TACHYCARDIA (HCC): ICD-10-CM

## 2024-01-05 RX ORDER — LANOLIN ALCOHOL/MO/W.PET/CERES
400 CREAM (GRAM) TOPICAL DAILY
Qty: 30 TABLET | Refills: 2 | Status: SHIPPED | OUTPATIENT
Start: 2024-01-05

## 2024-01-10 DIAGNOSIS — I50.31 ACUTE DIASTOLIC CHF (CONGESTIVE HEART FAILURE) (HCC): ICD-10-CM

## 2024-01-10 RX ORDER — SILDENAFIL CITRATE 20 MG/1
TABLET ORAL
Qty: 45 TABLET | Refills: 0 | Status: SHIPPED | OUTPATIENT
Start: 2024-01-10
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
## 2024-01-24 DIAGNOSIS — I25.84 CORONARY ARTERY DISEASE DUE TO CALCIFIED CORONARY LESION: ICD-10-CM

## 2024-01-24 DIAGNOSIS — I25.10 CORONARY ARTERY DISEASE DUE TO CALCIFIED CORONARY LESION: ICD-10-CM

## 2024-01-24 RX ORDER — RANOLAZINE 500 MG/1
500 TABLET, EXTENDED RELEASE ORAL 2 TIMES DAILY
Qty: 180 TABLET | Refills: 1 | Status: SHIPPED | OUTPATIENT
Start: 2024-01-24

## 2024-01-26 DIAGNOSIS — I10 ESSENTIAL HYPERTENSION: ICD-10-CM

## 2024-01-26 RX ORDER — NEBIVOLOL 20 MG/1
1 TABLET ORAL DAILY
Qty: 30 TABLET | Refills: 2 | Status: SHIPPED | OUTPATIENT
Start: 2024-01-26

## 2024-01-26 RX ORDER — AMLODIPINE BESYLATE 10 MG/1
10 TABLET ORAL DAILY
Qty: 30 TABLET | Refills: 2 | Status: SHIPPED | OUTPATIENT
Start: 2024-01-26

## 2024-02-06 ENCOUNTER — OFFICE VISIT (OUTPATIENT)
Dept: GERIATRIC MEDICINE | Age: 84
End: 2024-02-06
Payer: MEDICARE

## 2024-02-06 VITALS
WEIGHT: 91 LBS | RESPIRATION RATE: 16 BRPM | SYSTOLIC BLOOD PRESSURE: 116 MMHG | BODY MASS INDEX: 19.02 KG/M2 | DIASTOLIC BLOOD PRESSURE: 68 MMHG | TEMPERATURE: 98.5 F | HEART RATE: 80 BPM

## 2024-02-06 DIAGNOSIS — G30.9 ALZHEIMER DISEASE (HCC): Primary | ICD-10-CM

## 2024-02-06 DIAGNOSIS — E44.1 MILD PROTEIN-CALORIE MALNUTRITION (HCC): ICD-10-CM

## 2024-02-06 DIAGNOSIS — I50.31 ACUTE DIASTOLIC CHF (CONGESTIVE HEART FAILURE) (HCC): ICD-10-CM

## 2024-02-06 DIAGNOSIS — I11.0 HYPERTENSIVE HEART DISEASE WITH HEART FAILURE (HCC): ICD-10-CM

## 2024-02-06 DIAGNOSIS — I49.5 SICK SINUS SYNDROME WITH TACHYCARDIA (HCC): ICD-10-CM

## 2024-02-06 DIAGNOSIS — I20.9 ANGINA PECTORIS (HCC): ICD-10-CM

## 2024-02-06 DIAGNOSIS — F02.80 ALZHEIMER DISEASE (HCC): Primary | ICD-10-CM

## 2024-02-06 PROCEDURE — 3078F DIAST BP <80 MM HG: CPT | Performed by: INTERNAL MEDICINE

## 2024-02-06 PROCEDURE — 1090F PRES/ABSN URINE INCON ASSESS: CPT | Performed by: INTERNAL MEDICINE

## 2024-02-06 PROCEDURE — G8427 DOCREV CUR MEDS BY ELIG CLIN: HCPCS | Performed by: INTERNAL MEDICINE

## 2024-02-06 PROCEDURE — G8420 CALC BMI NORM PARAMETERS: HCPCS | Performed by: INTERNAL MEDICINE

## 2024-02-06 PROCEDURE — G8400 PT W/DXA NO RESULTS DOC: HCPCS | Performed by: INTERNAL MEDICINE

## 2024-02-06 PROCEDURE — G8484 FLU IMMUNIZE NO ADMIN: HCPCS | Performed by: INTERNAL MEDICINE

## 2024-02-06 PROCEDURE — 1123F ACP DISCUSS/DSCN MKR DOCD: CPT | Performed by: INTERNAL MEDICINE

## 2024-02-06 PROCEDURE — 3074F SYST BP LT 130 MM HG: CPT | Performed by: INTERNAL MEDICINE

## 2024-02-06 PROCEDURE — 1036F TOBACCO NON-USER: CPT | Performed by: INTERNAL MEDICINE

## 2024-02-06 PROCEDURE — 99213 OFFICE O/P EST LOW 20 MIN: CPT | Performed by: INTERNAL MEDICINE

## 2024-02-06 NOTE — PROGRESS NOTES
CC Recheck dementia    Here with daughter   Speaking more Tuvaluan, daughter unable to speak   May know daughter at times  Has mesha in bed, she talks to   And bear   And will not drink liquids  Hypersomnolence  Recent UTI and 6 weeks to recover  Daughter does not know Tuvaluan   Impression: Advanced Dementia with hypersomnolence                     Meds reviewed and not likely as cause  Plan: Namenda 5 mg tid

## 2024-02-06 NOTE — PROGRESS NOTES
Chief Complaint   Patient presents with    6 Month Follow-Up     July follow up.  Here with daughter. Daughter reports that pt sleeps most of the day.       Dr Santos notified of above.

## 2024-02-11 DIAGNOSIS — I48.91 ATRIAL FIBRILLATION WITH RVR (HCC): ICD-10-CM

## 2024-02-16 PROBLEM — I11.0 HYPERTENSIVE HEART DISEASE WITH HEART FAILURE (HCC): Status: ACTIVE | Noted: 2024-02-16

## 2024-03-24 DIAGNOSIS — I25.810 ATHEROSCLEROSIS OF CORONARY ARTERY BYPASS GRAFT OF NATIVE HEART WITHOUT ANGINA PECTORIS: ICD-10-CM

## 2024-03-24 DIAGNOSIS — K21.9 GASTROESOPHAGEAL REFLUX DISEASE WITHOUT ESOPHAGITIS: ICD-10-CM

## 2024-03-25 RX ORDER — OMEPRAZOLE 40 MG/1
40 CAPSULE, DELAYED RELEASE ORAL DAILY
Qty: 90 CAPSULE | Refills: 0 | Status: SHIPPED | OUTPATIENT
Start: 2024-03-25

## 2024-03-25 RX ORDER — ATORVASTATIN CALCIUM 40 MG/1
TABLET, FILM COATED ORAL
Qty: 90 TABLET | Refills: 0 | Status: SHIPPED | OUTPATIENT
Start: 2024-03-25

## 2024-04-06 DIAGNOSIS — I50.31 ACUTE DIASTOLIC CHF (CONGESTIVE HEART FAILURE) (HCC): ICD-10-CM

## 2024-04-07 DIAGNOSIS — I49.5 SICK SINUS SYNDROME WITH TACHYCARDIA (HCC): ICD-10-CM

## 2024-04-07 DIAGNOSIS — I50.31 ACUTE DIASTOLIC CHF (CONGESTIVE HEART FAILURE) (HCC): ICD-10-CM

## 2024-04-07 DIAGNOSIS — I10 ESSENTIAL HYPERTENSION: ICD-10-CM

## 2024-04-08 RX ORDER — AMLODIPINE BESYLATE 10 MG/1
10 TABLET ORAL DAILY
Qty: 30 TABLET | Refills: 2 | Status: SHIPPED | OUTPATIENT
Start: 2024-04-08

## 2024-04-08 RX ORDER — LANOLIN ALCOHOL/MO/W.PET/CERES
400 CREAM (GRAM) TOPICAL DAILY
Qty: 30 TABLET | Refills: 2 | Status: SHIPPED | OUTPATIENT
Start: 2024-04-08

## 2024-04-08 RX ORDER — MEMANTINE HYDROCHLORIDE 5 MG/1
5 TABLET ORAL 3 TIMES DAILY
Qty: 180 TABLET | Refills: 0 | Status: SHIPPED | OUTPATIENT
Start: 2024-04-08

## 2024-04-08 RX ORDER — BUMETANIDE 1 MG/1
TABLET ORAL
Qty: 60 TABLET | Refills: 0 | Status: SHIPPED | OUTPATIENT
Start: 2024-04-08

## 2024-04-08 RX ORDER — FOLIC ACID 1 MG/1
1000 TABLET ORAL DAILY
Qty: 90 TABLET | Refills: 0 | Status: SHIPPED | OUTPATIENT
Start: 2024-04-08

## 2024-04-24 ENCOUNTER — TELEPHONE (OUTPATIENT)
Dept: FAMILY MEDICINE CLINIC | Age: 84
End: 2024-04-24

## 2024-04-24 NOTE — TELEPHONE ENCOUNTER
Hi Doctor! Question?? My mom is having problems taking medication. There’s just too many. Is there any of them that she doesn’t have to take any more like stomach, cholesterol or any other ones that don’t have to do with her heart or blood pressure. Also she doesn’t drink very much and I believe she is dehydrated by the texture of her skin. She doesn’t eat or drink very much. She’s sleeping all the time. Not sure what else to do when it comes to her appetite or to keep her hydrated. Please help!   Thank you, Albertina Blanco

## 2024-04-24 NOTE — TELEPHONE ENCOUNTER
I reviewed the med list. There are 4-5 that are negotiable. Bring in all meds, as well as her med list that you have and we can discuss each one. When you call tell office long visit and like a Physical and come with Mo as you usually do, marisol Eng,,,,,,,,,,,djf

## 2024-04-24 NOTE — TELEPHONE ENCOUNTER
----- Message from Bobbi Marquis sent at 4/23/2024  4:14 PM EDT -----  Regarding: Bobbi marquis  Contact: [phone]  Hi Doctor! Question?? My mom is having problems taking medication. There’s just too many. Is there any of them that she doesn’t have to take any more like stomach, cholesterol or any other ones that don’t have to do with her heart or blood pressure. Also she doesn’t drink very much and I believe she is dehydrated by the texture of her skin. She doesn’t eat or drink very much. She’s sleeping all the time. Not sure what else to do when it comes to her appetite or to keep her hydrated. Please help!   Thank you, Albertina Blanco

## 2024-04-29 RX ORDER — DOFETILIDE 0.12 MG/1
125 CAPSULE ORAL 2 TIMES DAILY
Qty: 30 CAPSULE | Refills: 0 | Status: SHIPPED | OUTPATIENT
Start: 2024-04-29

## 2024-04-29 NOTE — TELEPHONE ENCOUNTER
Last seen 11/1/2023  Next appt Visit date not found    Rx pended. Please review and sign.    Electronically signed by DENISE CROUCH MA on 4/29/24 at 8:20 AM EDT

## 2024-05-09 ENCOUNTER — OFFICE VISIT (OUTPATIENT)
Dept: FAMILY MEDICINE CLINIC | Age: 84
End: 2024-05-09
Payer: MEDICARE

## 2024-05-09 VITALS
WEIGHT: 86 LBS | DIASTOLIC BLOOD PRESSURE: 58 MMHG | HEART RATE: 50 BPM | RESPIRATION RATE: 19 BRPM | OXYGEN SATURATION: 98 % | HEIGHT: 58 IN | SYSTOLIC BLOOD PRESSURE: 104 MMHG | BODY MASS INDEX: 18.05 KG/M2 | TEMPERATURE: 97.9 F

## 2024-05-09 DIAGNOSIS — D68.69 SECONDARY HYPERCOAGULABLE STATE (HCC): Primary | ICD-10-CM

## 2024-05-09 DIAGNOSIS — I48.91 ATRIAL FIBRILLATION WITH RVR (HCC): ICD-10-CM

## 2024-05-09 DIAGNOSIS — K21.9 GASTROESOPHAGEAL REFLUX DISEASE WITHOUT ESOPHAGITIS: ICD-10-CM

## 2024-05-09 DIAGNOSIS — I25.810 ATHEROSCLEROSIS OF CORONARY ARTERY BYPASS GRAFT OF NATIVE HEART WITHOUT ANGINA PECTORIS: ICD-10-CM

## 2024-05-09 DIAGNOSIS — I10 ESSENTIAL HYPERTENSION: ICD-10-CM

## 2024-05-09 DIAGNOSIS — I50.31 ACUTE DIASTOLIC CHF (CONGESTIVE HEART FAILURE) (HCC): ICD-10-CM

## 2024-05-09 PROCEDURE — 1090F PRES/ABSN URINE INCON ASSESS: CPT | Performed by: FAMILY MEDICINE

## 2024-05-09 PROCEDURE — 99214 OFFICE O/P EST MOD 30 MIN: CPT | Performed by: FAMILY MEDICINE

## 2024-05-09 PROCEDURE — G8400 PT W/DXA NO RESULTS DOC: HCPCS | Performed by: FAMILY MEDICINE

## 2024-05-09 PROCEDURE — 3074F SYST BP LT 130 MM HG: CPT | Performed by: FAMILY MEDICINE

## 2024-05-09 PROCEDURE — 3078F DIAST BP <80 MM HG: CPT | Performed by: FAMILY MEDICINE

## 2024-05-09 PROCEDURE — G8419 CALC BMI OUT NRM PARAM NOF/U: HCPCS | Performed by: FAMILY MEDICINE

## 2024-05-09 PROCEDURE — 1123F ACP DISCUSS/DSCN MKR DOCD: CPT | Performed by: FAMILY MEDICINE

## 2024-05-09 PROCEDURE — 1036F TOBACCO NON-USER: CPT | Performed by: FAMILY MEDICINE

## 2024-05-09 PROCEDURE — G8427 DOCREV CUR MEDS BY ELIG CLIN: HCPCS | Performed by: FAMILY MEDICINE

## 2024-05-09 RX ORDER — ASPIRIN 81 MG/1
81 TABLET ORAL DAILY
Qty: 90 TABLET | Refills: 1 | Status: SHIPPED | OUTPATIENT
Start: 2024-05-09

## 2024-05-09 RX ORDER — ATORVASTATIN CALCIUM 40 MG/1
TABLET, FILM COATED ORAL
Qty: 90 TABLET | Refills: 0 | Status: SHIPPED | OUTPATIENT
Start: 2024-05-09

## 2024-05-09 RX ORDER — NEBIVOLOL 20 MG/1
1 TABLET ORAL DAILY
Qty: 90 TABLET | Refills: 2 | Status: SHIPPED | OUTPATIENT
Start: 2024-05-09

## 2024-05-09 RX ORDER — SILDENAFIL CITRATE 20 MG/1
TABLET ORAL
Qty: 45 TABLET | Refills: 0 | Status: SHIPPED | OUTPATIENT
Start: 2024-05-09

## 2024-05-09 RX ORDER — BUMETANIDE 1 MG/1
1 TABLET ORAL DAILY
Qty: 90 TABLET | Refills: 0 | Status: SHIPPED | OUTPATIENT
Start: 2024-05-09

## 2024-05-09 RX ORDER — AMLODIPINE BESYLATE 10 MG/1
10 TABLET ORAL DAILY
Qty: 30 TABLET | Refills: 2 | Status: SHIPPED | OUTPATIENT
Start: 2024-05-09

## 2024-05-09 RX ORDER — OMEPRAZOLE 40 MG/1
40 CAPSULE, DELAYED RELEASE ORAL DAILY
Qty: 90 CAPSULE | Refills: 0 | Status: SHIPPED | OUTPATIENT
Start: 2024-05-09

## 2024-05-09 RX ORDER — DOFETILIDE 0.12 MG/1
125 CAPSULE ORAL 2 TIMES DAILY
Qty: 90 CAPSULE | Refills: 1 | Status: SHIPPED | OUTPATIENT
Start: 2024-05-09

## 2024-05-09 ASSESSMENT — PATIENT HEALTH QUESTIONNAIRE - PHQ9
2. FEELING DOWN, DEPRESSED OR HOPELESS: NOT AT ALL
SUM OF ALL RESPONSES TO PHQ QUESTIONS 1-9: 0
SUM OF ALL RESPONSES TO PHQ9 QUESTIONS 1 & 2: 0
SUM OF ALL RESPONSES TO PHQ QUESTIONS 1-9: 0
1. LITTLE INTEREST OR PLEASURE IN DOING THINGS: NOT AT ALL

## 2024-05-09 ASSESSMENT — ENCOUNTER SYMPTOMS
COUGH: 0
PHOTOPHOBIA: 0

## 2024-05-09 NOTE — PROGRESS NOTES
Bobbi Marquis (:  1940) is a 84 y.o. female,Established patient, here for evaluation of the following chief complaint(s):  Medication Adjustment (Go over medication ) and Skin Problem (Right elbow skin stefano also on nose )      Assessment & Plan   ASSESSMENT/PLAN:  1. Secondary hypercoagulable state (HCC)  2. Acute diastolic CHF (congestive heart failure) ([facility])  -     bumetanide (BUMEX) 1 MG tablet; Take 1 tablet by mouth daily, Disp-90 tablet, R-0Normal  -     sildenafil (REVATIO) 20 MG tablet; TAKE ONE-HALF TABLET BY MOUTH THREE TIMES DAILY, Disp-45 tablet, R-0Normal  3. Essential hypertension  -     amLODIPine (NORVASC) 10 MG tablet; Take 1 tablet by mouth daily, Disp-30 tablet, R-2Normal  -     nebivolol (BYSTOLIC) 20 MG TABS tablet; Take 1 tablet by mouth daily, Disp-90 tablet, R-2Normal  4. Atrial fibrillation with RVR ([facility])  -     apixaban (ELIQUIS) 2.5 MG TABS tablet; TAKE ONE TABLET BY MOUTH TWO TIMES A DAY, Disp-60 tablet, R-2PATIENT NEEDS APPOINTMENTNormal  -     dofetilide (TIKOSYN) 125 MCG capsule; Take 1 capsule by mouth 2 times daily, Disp-90 capsule, R-1PT NEEDS AN APPOINTMENTNormal  5. Atherosclerosis of coronary artery bypass graft of native heart without angina pectoris  -     atorvastatin (LIPITOR) 40 MG tablet; Take one tablet by mouth every day., Disp-90 tablet, R-0Normal  6. Gastroesophageal reflux disease without esophagitis  -     omeprazole (PRILOSEC) 40 MG delayed release capsule; Take 1 capsule by mouth daily Needs appointment for further refills, Disp-90 capsule, R-0Normal      No follow-ups on file.         Subjective   SUBJECTIVE/OBJECTIVE:  Medication Adjustment (Go over medication ) and Skin Problem (Right elbow skin stefano also on nose )          Review of Systems   Constitutional:  Negative for diaphoresis.   HENT:  Negative for hearing loss and tinnitus.    Eyes:  Negative for photophobia.   Respiratory:  Negative for cough.    Cardiovascular:  Negative for chest pain

## 2024-05-26 DIAGNOSIS — E87.6 HYPOKALEMIA: ICD-10-CM

## 2024-05-28 RX ORDER — POTASSIUM CHLORIDE 20 MEQ/1
TABLET, EXTENDED RELEASE ORAL
Qty: 60 TABLET | Refills: 0 | Status: SHIPPED | OUTPATIENT
Start: 2024-05-28

## 2024-06-29 ENCOUNTER — APPOINTMENT (OUTPATIENT)
Dept: GENERAL RADIOLOGY | Age: 84
End: 2024-06-29
Payer: MEDICARE

## 2024-06-29 ENCOUNTER — HOSPITAL ENCOUNTER (EMERGENCY)
Age: 84
Discharge: HOME | End: 2024-06-29
Payer: MEDICARE

## 2024-06-29 VITALS
DIASTOLIC BLOOD PRESSURE: 63 MMHG | RESPIRATION RATE: 16 BRPM | SYSTOLIC BLOOD PRESSURE: 139 MMHG | TEMPERATURE: 97.4 F | BODY MASS INDEX: 18.82 KG/M2 | HEART RATE: 81 BPM | OXYGEN SATURATION: 95 % | WEIGHT: 90 LBS

## 2024-06-29 DIAGNOSIS — S93.401A SPRAIN OF RIGHT ANKLE, UNSPECIFIED LIGAMENT, INITIAL ENCOUNTER: Primary | ICD-10-CM

## 2024-06-29 DIAGNOSIS — M85.80 OSTEOPENIA, UNSPECIFIED LOCATION: ICD-10-CM

## 2024-06-29 DIAGNOSIS — I25.810 ATHEROSCLEROSIS OF CORONARY ARTERY BYPASS GRAFT OF NATIVE HEART WITHOUT ANGINA PECTORIS: ICD-10-CM

## 2024-06-29 PROCEDURE — 73610 X-RAY EXAM OF ANKLE: CPT

## 2024-06-29 PROCEDURE — 6370000000 HC RX 637 (ALT 250 FOR IP): Performed by: PHYSICIAN ASSISTANT

## 2024-06-29 PROCEDURE — 99283 EMERGENCY DEPT VISIT LOW MDM: CPT

## 2024-06-29 PROCEDURE — 73630 X-RAY EXAM OF FOOT: CPT

## 2024-06-29 RX ORDER — ACETAMINOPHEN 500 MG
1000 TABLET ORAL EVERY 8 HOURS PRN
Qty: 30 TABLET | Refills: 0 | Status: SHIPPED | OUTPATIENT
Start: 2024-06-29 | End: 2024-07-04

## 2024-06-29 RX ORDER — ACETAMINOPHEN 500 MG
1000 TABLET ORAL ONCE
Status: COMPLETED | OUTPATIENT
Start: 2024-06-29 | End: 2024-06-29

## 2024-06-29 RX ADMIN — ACETAMINOPHEN 1000 MG: 500 TABLET ORAL at 11:15

## 2024-06-29 ASSESSMENT — PAIN SCALES - GENERAL: PAINLEVEL_OUTOF10: 0

## 2024-06-29 NOTE — ED PROVIDER NOTES
Independent ALEJANDRINA Visit.       [facility]  Department of Emergency Medicine   ED  Encounter Note  Admit Date/RoomTime: 2024 10:55 AM  ED Room:     NAME: Bobbi Marquis  : 1940  MRN: [id number]     Chief Complaint:  Foot Injury (Pt c/o right foot/ankle pain that started yesterday while walking down the steps.)    History of Present Illness   Patient's daughter is present and acts as supportive historian as she was at the house with her when the injury occurred.        Bobbi Marquis is a 84 y.o. old female presenting to the emergency department by private vehicle, for non-traumatic Right foot and ankle pain which occured 1 day(s) prior to arrival.  The complaint is due to possibly injuring the ankle while ambulating down the steps.  Patient reports that she was walking on the steps at her house when she got near the bottom she stepped down and experienced some sharp pain.  She denies falling to the ground hitting her head or losing consciousness.  She reports that when she woke today the pain was much worse and therefore they are here for evaluation.  Patient has no prior history of pain/injury with regards to today's visit.  Since onset the symptoms have been gradually worsening with ability to bear weight, but with some pain.  Her pain is aggraveated by certain movements, pressure on or palpation of painful area, or weight bearing and relieved by rest of injured area.  She denies any head injury, headache, loss of consciousness, neck pain, chest pain, back pain, numbness, weakness, blurred vision, nausea, vomiting, fever, chills, wounds, or rash.  Tetanus Status: unknown.      ROS   Pertinent positives and negatives are stated within HPI, all other systems reviewed and are negative.    Past Medical History:  has a past medical history of CAD (coronary artery disease), GERD (gastroesophageal reflux disease), Hemorrhoids, History of echocardiogram, History of echocardiogram,  Deformity: no deformity observed/palpated.             ROM: full range with pain.              Skin:  no wounds, erythema, or swelling.  Calf: Soft and nontender.  No edema or signs of DVT.  Gait:  ambulates with need of a cane.  Lymphatics: No lymphangitis or adenopathy noted.  Neurological:  Oriented.  Motor functions intact.    Lab / Imaging Results   (All laboratory and radiology results have been personally reviewed by myself)  Labs:  No results found for this visit on 06/29/24.  Imaging:  All Radiology results interpreted by Radiologist unless otherwise noted.  XR ANKLE RIGHT (MIN 3 VIEWS)   Final Result   1. No acute fracture or dislocation.   2. Diffuse osteopenia.         XR FOOT RIGHT (MIN 3 VIEWS)   Final Result   1. No acute fracture or dislocation.   2. Mild hallux valgus deformity.   3. Diffuse osteopenia.           ED Course / Medical Decision Making     Medications   acetaminophen (TYLENOL) tablet 1,000 mg (1,000 mg Oral Given 6/29/24 1115)     ED Course as of 06/29/24 1245   Sat Jun 29, 2024   1231 Patient reevaluated this time.  Daughter is present.  All results discussed.  Patient in Ace wrap neurovascularly intact.  Appropriate for discharge [SA]      ED Course User Index  [SA] Yvonne Mejia PA     Consult(s):   none.    Procedure(s):  None    Medical Decision Making  Patient presents to the ER for right ankle and foot pain x 1 day due to ambulating down steps with possible injury.  Patient acts as her own historian.  Patient acts as supportive historian.  Social Determinants include   Social Connections: Moderately Isolated (1/2/2020)    Social Connection and Isolation Panel [NHANES]     Frequency of Communication with Friends and Family: Twice a week     Frequency of Social Gatherings with Friends and Family: Twice a week     Attends [employer] Services: Never     Active Member of Clubs or Organizations: No     Attends Club or Organization Meetings: Never     Marital Status:

## 2024-07-01 RX ORDER — ATORVASTATIN CALCIUM 40 MG/1
TABLET, FILM COATED ORAL
Qty: 90 TABLET | Refills: 0 | Status: SHIPPED | OUTPATIENT
Start: 2024-07-01

## 2024-07-03 ENCOUNTER — APPOINTMENT (OUTPATIENT)
Dept: CT IMAGING | Age: 84
End: 2024-07-03
Payer: MEDICARE

## 2024-07-03 ENCOUNTER — HOSPITAL ENCOUNTER (EMERGENCY)
Age: 84
Discharge: HOME | End: 2024-07-03
Attending: STUDENT IN AN ORGANIZED HEALTH CARE EDUCATION/TRAINING PROGRAM
Payer: MEDICARE

## 2024-07-03 VITALS
HEIGHT: 57 IN | RESPIRATION RATE: 20 BRPM | OXYGEN SATURATION: 93 % | DIASTOLIC BLOOD PRESSURE: 75 MMHG | WEIGHT: 90 LBS | SYSTOLIC BLOOD PRESSURE: 133 MMHG | BODY MASS INDEX: 19.41 KG/M2 | HEART RATE: 76 BPM | TEMPERATURE: 97.8 F

## 2024-07-03 DIAGNOSIS — K59.00 CONSTIPATION, UNSPECIFIED CONSTIPATION TYPE: ICD-10-CM

## 2024-07-03 DIAGNOSIS — N30.00 ACUTE CYSTITIS WITHOUT HEMATURIA: Primary | ICD-10-CM

## 2024-07-03 LAB
ALBUMIN SERPL-MCNC: 4.3 G/DL (ref 3.5–5.2)
ALP SERPL-CCNC: 58 U/L (ref 35–104)
ALT SERPL-CCNC: 17 U/L (ref 0–32)
ANION GAP SERPL CALCULATED.3IONS-SCNC: 15 MMOL/L (ref 7–16)
AST SERPL-CCNC: 43 U/L (ref 0–31)
BACTERIA URNS QL MICRO: ABNORMAL
BASOPHILS # BLD: 0.03 K/UL (ref 0–0.2)
BASOPHILS NFR BLD: 0 % (ref 0–2)
BILIRUB SERPL-MCNC: 1.1 MG/DL (ref 0–1.2)
BILIRUB UR QL STRIP: NEGATIVE
BUN SERPL-MCNC: 21 MG/DL (ref 6–23)
CALCIUM SERPL-MCNC: 9.4 MG/DL (ref 8.6–10.2)
CHLORIDE SERPL-SCNC: 102 MMOL/L (ref 98–107)
CLARITY UR: CLEAR
CO2 SERPL-SCNC: 24 MMOL/L (ref 22–29)
COLOR UR: YELLOW
CREAT SERPL-MCNC: 0.8 MG/DL (ref 0.5–1)
EOSINOPHIL # BLD: 0.07 K/UL (ref 0.05–0.5)
EOSINOPHILS RELATIVE PERCENT: 1 % (ref 0–6)
EPI CELLS #/AREA URNS HPF: ABNORMAL /HPF
ERYTHROCYTE [DISTWIDTH] IN BLOOD BY AUTOMATED COUNT: 12.1 % (ref 11.5–15)
GFR, ESTIMATED: 77 ML/MIN/1.73M2
GLUCOSE SERPL-MCNC: 200 MG/DL (ref 74–99)
GLUCOSE UR STRIP-MCNC: NEGATIVE MG/DL
HCT VFR BLD AUTO: 42.7 % (ref 34–48)
HGB BLD-MCNC: 14.5 G/DL (ref 11.5–15.5)
HGB UR QL STRIP.AUTO: NEGATIVE
IMM GRANULOCYTES # BLD AUTO: 0.06 K/UL (ref 0–0.58)
IMM GRANULOCYTES NFR BLD: 1 % (ref 0–5)
KETONES UR STRIP-MCNC: NEGATIVE MG/DL
LACTATE BLDV-SCNC: 1.9 MMOL/L (ref 0.5–2.2)
LACTATE BLDV-SCNC: 3.6 MMOL/L (ref 0.5–2.2)
LEUKOCYTE ESTERASE UR QL STRIP: ABNORMAL
LIPASE SERPL-CCNC: 37 U/L (ref 13–60)
LYMPHOCYTES NFR BLD: 1.62 K/UL (ref 1.5–4)
LYMPHOCYTES RELATIVE PERCENT: 14 % (ref 20–42)
MCH RBC QN AUTO: 32.4 PG (ref 26–35)
MCHC RBC AUTO-ENTMCNC: 34 G/DL (ref 32–34.5)
MCV RBC AUTO: 95.5 FL (ref 80–99.9)
MONOCYTES NFR BLD: 0.83 K/UL (ref 0.1–0.95)
MONOCYTES NFR BLD: 7 % (ref 2–12)
NEUTROPHILS NFR BLD: 77 % (ref 43–80)
NEUTS SEG NFR BLD: 8.61 K/UL (ref 1.8–7.3)
NITRITE UR QL STRIP: NEGATIVE
PH UR STRIP: 7.5 [PH] (ref 5–9)
PLATELET # BLD AUTO: 204 K/UL (ref 130–450)
PMV BLD AUTO: 11.2 FL (ref 7–12)
POTASSIUM SERPL-SCNC: 5.2 MMOL/L (ref 3.5–5)
PROT SERPL-MCNC: 7.6 G/DL (ref 6.4–8.3)
PROT UR STRIP-MCNC: NEGATIVE MG/DL
RBC # BLD AUTO: 4.47 M/UL (ref 3.5–5.5)
RBC #/AREA URNS HPF: ABNORMAL /HPF
SODIUM SERPL-SCNC: 141 MMOL/L (ref 132–146)
SP GR UR STRIP: 1.01 (ref 1–1.03)
UROBILINOGEN UR STRIP-ACNC: 0.2 EU/DL (ref 0–1)
WBC #/AREA URNS HPF: ABNORMAL /HPF
WBC OTHER # BLD: 11.2 K/UL (ref 4.5–11.5)

## 2024-07-03 PROCEDURE — 81001 URINALYSIS AUTO W/SCOPE: CPT

## 2024-07-03 PROCEDURE — 87086 URINE CULTURE/COLONY COUNT: CPT

## 2024-07-03 PROCEDURE — 2580000003 HC RX 258: Performed by: STUDENT IN AN ORGANIZED HEALTH CARE EDUCATION/TRAINING PROGRAM

## 2024-07-03 PROCEDURE — 96374 THER/PROPH/DIAG INJ IV PUSH: CPT

## 2024-07-03 PROCEDURE — 6360000002 HC RX W HCPCS: Performed by: STUDENT IN AN ORGANIZED HEALTH CARE EDUCATION/TRAINING PROGRAM

## 2024-07-03 PROCEDURE — 99285 EMERGENCY DEPT VISIT HI MDM: CPT

## 2024-07-03 PROCEDURE — 6360000004 HC RX CONTRAST MEDICATION: Performed by: RADIOLOGY

## 2024-07-03 PROCEDURE — 83605 ASSAY OF LACTIC ACID: CPT

## 2024-07-03 PROCEDURE — 83690 ASSAY OF LIPASE: CPT

## 2024-07-03 PROCEDURE — 85025 COMPLETE CBC W/AUTO DIFF WBC: CPT

## 2024-07-03 PROCEDURE — 74177 CT ABD & PELVIS W/CONTRAST: CPT

## 2024-07-03 PROCEDURE — 80053 COMPREHEN METABOLIC PANEL: CPT

## 2024-07-03 RX ORDER — CEFDINIR 300 MG/1
300 CAPSULE ORAL 2 TIMES DAILY
Qty: 14 CAPSULE | Refills: 0 | Status: SHIPPED | OUTPATIENT
Start: 2024-07-03 | End: 2024-07-03

## 2024-07-03 RX ORDER — DOCUSATE SODIUM 100 MG/1
100 CAPSULE, LIQUID FILLED ORAL 2 TIMES DAILY PRN
Qty: 20 CAPSULE | Refills: 0 | Status: SHIPPED | OUTPATIENT
Start: 2024-07-03 | End: 2024-07-03

## 2024-07-03 RX ORDER — 0.9 % SODIUM CHLORIDE 0.9 %
1000 INTRAVENOUS SOLUTION INTRAVENOUS ONCE
Status: COMPLETED | OUTPATIENT
Start: 2024-07-03 | End: 2024-07-03

## 2024-07-03 RX ORDER — DOCUSATE SODIUM 100 MG/1
100 CAPSULE, LIQUID FILLED ORAL 2 TIMES DAILY PRN
Qty: 20 CAPSULE | Refills: 0 | Status: SHIPPED | OUTPATIENT
Start: 2024-07-03 | End: 2024-07-13

## 2024-07-03 RX ORDER — CEFDINIR 300 MG/1
300 CAPSULE ORAL 2 TIMES DAILY
Qty: 14 CAPSULE | Refills: 0 | Status: SHIPPED | OUTPATIENT
Start: 2024-07-03 | End: 2024-07-10

## 2024-07-03 RX ADMIN — IOPAMIDOL 80 ML: 755 INJECTION, SOLUTION INTRAVENOUS at 06:14

## 2024-07-03 RX ADMIN — WATER 1000 MG: 1 INJECTION INTRAMUSCULAR; INTRAVENOUS; SUBCUTANEOUS at 08:11

## 2024-07-03 RX ADMIN — SODIUM CHLORIDE 1000 ML: 9 INJECTION, SOLUTION INTRAVENOUS at 06:03

## 2024-07-03 ASSESSMENT — ENCOUNTER SYMPTOMS
ABDOMINAL PAIN: 1
VOMITING: 0
DIARRHEA: 0
NAUSEA: 0
COLOR CHANGE: 0
SHORTNESS OF BREATH: 0
BACK PAIN: 0
COUGH: 0

## 2024-07-03 ASSESSMENT — PAIN DESCRIPTION - ORIENTATION: ORIENTATION: RIGHT

## 2024-07-03 ASSESSMENT — PAIN DESCRIPTION - PAIN TYPE: TYPE: ACUTE PAIN

## 2024-07-03 ASSESSMENT — PAIN - FUNCTIONAL ASSESSMENT: PAIN_FUNCTIONAL_ASSESSMENT: 0-10

## 2024-07-03 ASSESSMENT — PAIN SCALES - GENERAL: PAINLEVEL_OUTOF10: 6

## 2024-07-03 ASSESSMENT — PAIN DESCRIPTION - DESCRIPTORS: DESCRIPTORS: ACHING

## 2024-07-03 ASSESSMENT — PAIN DESCRIPTION - ONSET: ONSET: SUDDEN

## 2024-07-03 ASSESSMENT — PAIN DESCRIPTION - FREQUENCY: FREQUENCY: CONTINUOUS

## 2024-07-03 NOTE — ED PROVIDER NOTES
that she will continue to monitor her mother and have her urine rechecked in a week. [JS]      ED Course User Index  [JS] Laura Wu DO       --------------------------------------------- PAST HISTORY ---------------------------------------------  Past Medical History:  has a past medical history of CAD (coronary artery disease), GERD (gastroesophageal reflux disease), Hemorrhoids, History of echocardiogram, History of echocardiogram, Hyperlipidemia, Hypertension, MI (myocardial infarction) (HCC), Migraines, New onset atrial flutter (HCC), and Normal cardiac stress test.    Past Surgical History:  has a past surgical history that includes Cholecystectomy; Coronary artery bypass graft; Cardiac catheterization; ECHO Compl W Dop Color Flow (2/27/2012); ECHO Compl W Dop Color Flow (3/25/2013); Colonoscopy (4/16/13); and Spine surgery (N/A, 10/14/2020).    Social History:  reports that she quit smoking about 27 years ago. Her smoking use included cigarettes. She started smoking about 57 years ago. She has a 30.0 pack-year smoking history. She has never used smokeless tobacco. She reports that she does not drink alcohol and does not use drugs.    Family History: family history is not on file.     The patient’s home medications have been reviewed.    Allergies: Dronedarone    -------------------------------------------------- RESULTS -------------------------------------------------  Labs:  Results for orders placed or performed during the hospital encounter of 07/03/24   Culture, Urine    Specimen: Urine, clean catch   Result Value Ref Range    Specimen Description .CLEAN CATCH URINE     Culture (A)      Mixed steve isolated. Further workup and sensitivity testing not routinely indicated and will not be perfomed. Mixed steve isolated includes:    Culture MIXED GRAM POSITIVE ORGANISMS (A)     Culture GRAM NEGATIVE RODS (A)    CBC with Auto Differential   Result Value Ref Range    WBC 11.2 4.5 - 11.5 k/uL

## 2024-07-04 LAB
MICROORGANISM SPEC CULT: ABNORMAL
SPECIMEN DESCRIPTION: ABNORMAL

## 2024-07-08 ENCOUNTER — OFFICE VISIT (OUTPATIENT)
Dept: FAMILY MEDICINE CLINIC | Age: 84
End: 2024-07-08
Payer: MEDICARE

## 2024-07-08 ENCOUNTER — APPOINTMENT (OUTPATIENT)
Dept: CT IMAGING | Age: 84
DRG: 391 | End: 2024-07-08
Payer: MEDICARE

## 2024-07-08 ENCOUNTER — HOSPITAL ENCOUNTER (INPATIENT)
Age: 84
LOS: 3 days | Discharge: HOME | DRG: 391 | End: 2024-07-11
Attending: STUDENT IN AN ORGANIZED HEALTH CARE EDUCATION/TRAINING PROGRAM | Admitting: INTERNAL MEDICINE
Payer: MEDICARE

## 2024-07-08 VITALS
TEMPERATURE: 97.4 F | WEIGHT: 80.8 LBS | OXYGEN SATURATION: 98 % | HEIGHT: 57 IN | RESPIRATION RATE: 16 BRPM | SYSTOLIC BLOOD PRESSURE: 120 MMHG | HEART RATE: 110 BPM | DIASTOLIC BLOOD PRESSURE: 70 MMHG | BODY MASS INDEX: 17.43 KG/M2

## 2024-07-08 DIAGNOSIS — E86.0 DEHYDRATION: ICD-10-CM

## 2024-07-08 DIAGNOSIS — M54.50 ACUTE BILATERAL LOW BACK PAIN, UNSPECIFIED WHETHER SCIATICA PRESENT: ICD-10-CM

## 2024-07-08 DIAGNOSIS — R10.9 ACUTE ABDOMINAL PAIN: Primary | ICD-10-CM

## 2024-07-08 DIAGNOSIS — R10.9 ABDOMINAL PAIN, UNSPECIFIED ABDOMINAL LOCATION: Primary | ICD-10-CM

## 2024-07-08 LAB
ALBUMIN SERPL-MCNC: 4.6 G/DL (ref 3.5–5.2)
ALP SERPL-CCNC: 82 U/L (ref 35–104)
ALT SERPL-CCNC: 11 U/L (ref 0–32)
ANION GAP SERPL CALCULATED.3IONS-SCNC: 18 MMOL/L (ref 7–16)
AST SERPL-CCNC: 22 U/L (ref 0–31)
BASOPHILS # BLD: 0.06 K/UL (ref 0–0.2)
BASOPHILS NFR BLD: 1 % (ref 0–2)
BILIRUB SERPL-MCNC: 1.1 MG/DL (ref 0–1.2)
BILIRUB UR QL STRIP: NEGATIVE
BUN SERPL-MCNC: 10 MG/DL (ref 6–23)
CALCIUM SERPL-MCNC: 9.5 MG/DL (ref 8.6–10.2)
CASTS #/AREA URNS LPF: ABNORMAL /LPF
CHLORIDE SERPL-SCNC: 98 MMOL/L (ref 98–107)
CLARITY UR: CLEAR
CO2 SERPL-SCNC: 26 MMOL/L (ref 22–29)
COLOR UR: YELLOW
CREAT SERPL-MCNC: 0.6 MG/DL (ref 0.5–1)
EOSINOPHIL # BLD: 0.22 K/UL (ref 0.05–0.5)
EOSINOPHILS RELATIVE PERCENT: 3 % (ref 0–6)
ERYTHROCYTE [DISTWIDTH] IN BLOOD BY AUTOMATED COUNT: 12.3 % (ref 11.5–15)
GFR, ESTIMATED: 87 ML/MIN/1.73M2
GLUCOSE SERPL-MCNC: 102 MG/DL (ref 74–99)
GLUCOSE UR STRIP-MCNC: NEGATIVE MG/DL
HCT VFR BLD AUTO: 46.7 % (ref 34–48)
HGB BLD-MCNC: 16.3 G/DL (ref 11.5–15.5)
HGB UR QL STRIP.AUTO: NEGATIVE
IMM GRANULOCYTES # BLD AUTO: 0.04 K/UL (ref 0–0.58)
IMM GRANULOCYTES NFR BLD: 1 % (ref 0–5)
KETONES UR STRIP-MCNC: 40 MG/DL
LACTATE BLDV-SCNC: 1.1 MMOL/L (ref 0.5–2.2)
LEUKOCYTE ESTERASE UR QL STRIP: NEGATIVE
LYMPHOCYTES NFR BLD: 1.13 K/UL (ref 1.5–4)
LYMPHOCYTES RELATIVE PERCENT: 16 % (ref 20–42)
MAGNESIUM SERPL-MCNC: 2.1 MG/DL (ref 1.6–2.6)
MCH RBC QN AUTO: 32.9 PG (ref 26–35)
MCHC RBC AUTO-ENTMCNC: 34.9 G/DL (ref 32–34.5)
MCV RBC AUTO: 94.3 FL (ref 80–99.9)
MONOCYTES NFR BLD: 0.76 K/UL (ref 0.1–0.95)
MONOCYTES NFR BLD: 11 % (ref 2–12)
NEUTROPHILS NFR BLD: 68 % (ref 43–80)
NEUTS SEG NFR BLD: 4.77 K/UL (ref 1.8–7.3)
NITRITE UR QL STRIP: NEGATIVE
PH UR STRIP: 6 [PH] (ref 5–9)
PLATELET # BLD AUTO: 252 K/UL (ref 130–450)
PMV BLD AUTO: 11.8 FL (ref 7–12)
POTASSIUM SERPL-SCNC: 3.2 MMOL/L (ref 3.5–5)
PROT SERPL-MCNC: 8.2 G/DL (ref 6.4–8.3)
PROT UR STRIP-MCNC: ABNORMAL MG/DL
RBC # BLD AUTO: 4.95 M/UL (ref 3.5–5.5)
RBC #/AREA URNS HPF: ABNORMAL /HPF
SODIUM SERPL-SCNC: 142 MMOL/L (ref 132–146)
SP GR UR STRIP: 1.01 (ref 1–1.03)
UROBILINOGEN UR STRIP-ACNC: 0.2 EU/DL (ref 0–1)
WBC #/AREA URNS HPF: ABNORMAL /HPF
WBC OTHER # BLD: 7 K/UL (ref 4.5–11.5)

## 2024-07-08 PROCEDURE — 85025 COMPLETE CBC W/AUTO DIFF WBC: CPT

## 2024-07-08 PROCEDURE — 99285 EMERGENCY DEPT VISIT HI MDM: CPT

## 2024-07-08 PROCEDURE — 80053 COMPREHEN METABOLIC PANEL: CPT

## 2024-07-08 PROCEDURE — 2580000003 HC RX 258: Performed by: INTERNAL MEDICINE

## 2024-07-08 PROCEDURE — 2580000003 HC RX 258: Performed by: STUDENT IN AN ORGANIZED HEALTH CARE EDUCATION/TRAINING PROGRAM

## 2024-07-08 PROCEDURE — 83735 ASSAY OF MAGNESIUM: CPT

## 2024-07-08 PROCEDURE — 3078F DIAST BP <80 MM HG: CPT | Performed by: NURSE PRACTITIONER

## 2024-07-08 PROCEDURE — 99214 OFFICE O/P EST MOD 30 MIN: CPT | Performed by: NURSE PRACTITIONER

## 2024-07-08 PROCEDURE — G0378 HOSPITAL OBSERVATION PER HR: HCPCS

## 2024-07-08 PROCEDURE — 96374 THER/PROPH/DIAG INJ IV PUSH: CPT

## 2024-07-08 PROCEDURE — 6370000000 HC RX 637 (ALT 250 FOR IP): Performed by: STUDENT IN AN ORGANIZED HEALTH CARE EDUCATION/TRAINING PROGRAM

## 2024-07-08 PROCEDURE — 96361 HYDRATE IV INFUSION ADD-ON: CPT

## 2024-07-08 PROCEDURE — 1200000000 HC SEMI PRIVATE

## 2024-07-08 PROCEDURE — 83605 ASSAY OF LACTIC ACID: CPT

## 2024-07-08 PROCEDURE — 6370000000 HC RX 637 (ALT 250 FOR IP): Performed by: INTERNAL MEDICINE

## 2024-07-08 PROCEDURE — G8419 CALC BMI OUT NRM PARAM NOF/U: HCPCS | Performed by: NURSE PRACTITIONER

## 2024-07-08 PROCEDURE — 6360000002 HC RX W HCPCS: Performed by: STUDENT IN AN ORGANIZED HEALTH CARE EDUCATION/TRAINING PROGRAM

## 2024-07-08 PROCEDURE — 96375 TX/PRO/DX INJ NEW DRUG ADDON: CPT

## 2024-07-08 PROCEDURE — 1090F PRES/ABSN URINE INCON ASSESS: CPT | Performed by: NURSE PRACTITIONER

## 2024-07-08 PROCEDURE — 3074F SYST BP LT 130 MM HG: CPT | Performed by: NURSE PRACTITIONER

## 2024-07-08 PROCEDURE — G8400 PT W/DXA NO RESULTS DOC: HCPCS | Performed by: NURSE PRACTITIONER

## 2024-07-08 PROCEDURE — 6360000002 HC RX W HCPCS: Performed by: INTERNAL MEDICINE

## 2024-07-08 PROCEDURE — G8427 DOCREV CUR MEDS BY ELIG CLIN: HCPCS | Performed by: NURSE PRACTITIONER

## 2024-07-08 PROCEDURE — 1123F ACP DISCUSS/DSCN MKR DOCD: CPT | Performed by: NURSE PRACTITIONER

## 2024-07-08 PROCEDURE — 81001 URINALYSIS AUTO W/SCOPE: CPT

## 2024-07-08 PROCEDURE — 1036F TOBACCO NON-USER: CPT | Performed by: NURSE PRACTITIONER

## 2024-07-08 PROCEDURE — G2211 COMPLEX E/M VISIT ADD ON: HCPCS | Performed by: NURSE PRACTITIONER

## 2024-07-08 PROCEDURE — 74176 CT ABD & PELVIS W/O CONTRAST: CPT

## 2024-07-08 PROCEDURE — 93005 ELECTROCARDIOGRAM TRACING: CPT | Performed by: STUDENT IN AN ORGANIZED HEALTH CARE EDUCATION/TRAINING PROGRAM

## 2024-07-08 RX ORDER — MAGNESIUM OXIDE 400 MG/1
400 TABLET ORAL DAILY
Status: DISCONTINUED | OUTPATIENT
Start: 2024-07-09 | End: 2024-07-11 | Stop reason: HOSPADM

## 2024-07-08 RX ORDER — SODIUM CHLORIDE 0.9 % (FLUSH) 0.9 %
5-40 SYRINGE (ML) INJECTION PRN
Status: DISCONTINUED | OUTPATIENT
Start: 2024-07-08 | End: 2024-07-11 | Stop reason: HOSPADM

## 2024-07-08 RX ORDER — POTASSIUM CHLORIDE 20 MEQ/1
40 TABLET, EXTENDED RELEASE ORAL PRN
Status: DISCONTINUED | OUTPATIENT
Start: 2024-07-08 | End: 2024-07-11

## 2024-07-08 RX ORDER — ENOXAPARIN SODIUM 100 MG/ML
30 INJECTION SUBCUTANEOUS DAILY
Status: DISCONTINUED | OUTPATIENT
Start: 2024-07-09 | End: 2024-07-09

## 2024-07-08 RX ORDER — POLYETHYLENE GLYCOL 3350 17 G/17G
17 POWDER, FOR SOLUTION ORAL DAILY PRN
Status: DISCONTINUED | OUTPATIENT
Start: 2024-07-08 | End: 2024-07-11 | Stop reason: HOSPADM

## 2024-07-08 RX ORDER — DOFETILIDE 0.12 MG/1
125 CAPSULE ORAL 2 TIMES DAILY
Status: DISCONTINUED | OUTPATIENT
Start: 2024-07-08 | End: 2024-07-11 | Stop reason: HOSPADM

## 2024-07-08 RX ORDER — GLUCAGON 1 MG/ML
1 KIT INJECTION PRN
Status: DISCONTINUED | OUTPATIENT
Start: 2024-07-08 | End: 2024-07-11 | Stop reason: HOSPADM

## 2024-07-08 RX ORDER — ONDANSETRON 4 MG/1
4 TABLET, ORALLY DISINTEGRATING ORAL EVERY 8 HOURS PRN
Status: DISCONTINUED | OUTPATIENT
Start: 2024-07-08 | End: 2024-07-08

## 2024-07-08 RX ORDER — MEMANTINE HYDROCHLORIDE 5 MG/1
5 TABLET ORAL 3 TIMES DAILY
Status: DISCONTINUED | OUTPATIENT
Start: 2024-07-08 | End: 2024-07-11 | Stop reason: HOSPADM

## 2024-07-08 RX ORDER — DEXTROSE MONOHYDRATE 100 MG/ML
INJECTION, SOLUTION INTRAVENOUS CONTINUOUS PRN
Status: DISCONTINUED | OUTPATIENT
Start: 2024-07-08 | End: 2024-07-11 | Stop reason: HOSPADM

## 2024-07-08 RX ORDER — AMLODIPINE BESYLATE 5 MG/1
10 TABLET ORAL DAILY
Status: DISCONTINUED | OUTPATIENT
Start: 2024-07-09 | End: 2024-07-11 | Stop reason: HOSPADM

## 2024-07-08 RX ORDER — POTASSIUM CHLORIDE 7.45 MG/ML
10 INJECTION INTRAVENOUS PRN
Status: DISCONTINUED | OUTPATIENT
Start: 2024-07-08 | End: 2024-07-11

## 2024-07-08 RX ORDER — SODIUM CHLORIDE 9 MG/ML
INJECTION, SOLUTION INTRAVENOUS PRN
Status: DISCONTINUED | OUTPATIENT
Start: 2024-07-08 | End: 2024-07-11 | Stop reason: HOSPADM

## 2024-07-08 RX ORDER — ACETAMINOPHEN 650 MG/1
650 SUPPOSITORY RECTAL EVERY 6 HOURS PRN
Status: DISCONTINUED | OUTPATIENT
Start: 2024-07-08 | End: 2024-07-11 | Stop reason: HOSPADM

## 2024-07-08 RX ORDER — BUMETANIDE 1 MG/1
1 TABLET ORAL DAILY
Status: DISCONTINUED | OUTPATIENT
Start: 2024-07-09 | End: 2024-07-11 | Stop reason: HOSPADM

## 2024-07-08 RX ORDER — SODIUM CHLORIDE 0.9 % (FLUSH) 0.9 %
5-40 SYRINGE (ML) INJECTION EVERY 12 HOURS SCHEDULED
Status: DISCONTINUED | OUTPATIENT
Start: 2024-07-08 | End: 2024-07-11 | Stop reason: HOSPADM

## 2024-07-08 RX ORDER — FENTANYL CITRATE 0.05 MG/ML
25 INJECTION, SOLUTION INTRAMUSCULAR; INTRAVENOUS EVERY 6 HOURS PRN
Status: DISCONTINUED | OUTPATIENT
Start: 2024-07-08 | End: 2024-07-09

## 2024-07-08 RX ORDER — ATORVASTATIN CALCIUM 40 MG/1
40 TABLET, FILM COATED ORAL DAILY
Status: DISCONTINUED | OUTPATIENT
Start: 2024-07-09 | End: 2024-07-11 | Stop reason: HOSPADM

## 2024-07-08 RX ORDER — RANOLAZINE 500 MG/1
500 TABLET, EXTENDED RELEASE ORAL 2 TIMES DAILY
Status: DISCONTINUED | OUTPATIENT
Start: 2024-07-08 | End: 2024-07-11 | Stop reason: HOSPADM

## 2024-07-08 RX ORDER — PROCHLORPERAZINE EDISYLATE 5 MG/ML
10 INJECTION INTRAMUSCULAR; INTRAVENOUS ONCE
Status: COMPLETED | OUTPATIENT
Start: 2024-07-08 | End: 2024-07-08

## 2024-07-08 RX ORDER — CEFDINIR 300 MG/1
300 CAPSULE ORAL 2 TIMES DAILY
Status: COMPLETED | OUTPATIENT
Start: 2024-07-08 | End: 2024-07-11

## 2024-07-08 RX ORDER — ENOXAPARIN SODIUM 100 MG/ML
40 INJECTION SUBCUTANEOUS DAILY
Status: DISCONTINUED | OUTPATIENT
Start: 2024-07-09 | End: 2024-07-08 | Stop reason: DRUGHIGH

## 2024-07-08 RX ORDER — DICYCLOMINE HYDROCHLORIDE 10 MG/ML
10 INJECTION INTRAMUSCULAR ONCE
Status: DISCONTINUED | OUTPATIENT
Start: 2024-07-08 | End: 2024-07-11 | Stop reason: HOSPADM

## 2024-07-08 RX ORDER — ONDANSETRON 2 MG/ML
4 INJECTION INTRAMUSCULAR; INTRAVENOUS EVERY 6 HOURS PRN
Status: DISCONTINUED | OUTPATIENT
Start: 2024-07-08 | End: 2024-07-08

## 2024-07-08 RX ORDER — ACETAMINOPHEN 325 MG/1
650 TABLET ORAL EVERY 6 HOURS PRN
Status: DISCONTINUED | OUTPATIENT
Start: 2024-07-08 | End: 2024-07-11 | Stop reason: HOSPADM

## 2024-07-08 RX ORDER — ASPIRIN 81 MG/1
81 TABLET ORAL DAILY
Status: DISCONTINUED | OUTPATIENT
Start: 2024-07-09 | End: 2024-07-11 | Stop reason: HOSPADM

## 2024-07-08 RX ORDER — MAGNESIUM SULFATE IN WATER 40 MG/ML
2000 INJECTION, SOLUTION INTRAVENOUS PRN
Status: DISCONTINUED | OUTPATIENT
Start: 2024-07-08 | End: 2024-07-11 | Stop reason: HOSPADM

## 2024-07-08 RX ORDER — 0.9 % SODIUM CHLORIDE 0.9 %
1000 INTRAVENOUS SOLUTION INTRAVENOUS ONCE
Status: COMPLETED | OUTPATIENT
Start: 2024-07-08 | End: 2024-07-09

## 2024-07-08 RX ADMIN — PROCHLORPERAZINE EDISYLATE 10 MG: 5 INJECTION INTRAMUSCULAR; INTRAVENOUS at 23:31

## 2024-07-08 RX ADMIN — SODIUM CHLORIDE, PRESERVATIVE FREE 10 ML: 5 INJECTION INTRAVENOUS at 23:37

## 2024-07-08 RX ADMIN — SODIUM CHLORIDE 1000 ML: 9 INJECTION, SOLUTION INTRAVENOUS at 23:37

## 2024-07-08 RX ADMIN — CEFDINIR 300 MG: 300 CAPSULE ORAL at 23:31

## 2024-07-08 RX ADMIN — MEMANTINE HYDROCHLORIDE 5 MG: 5 TABLET ORAL at 23:31

## 2024-07-08 RX ADMIN — POTASSIUM BICARBONATE 50 MEQ: 978 TABLET, EFFERVESCENT ORAL at 21:17

## 2024-07-08 RX ADMIN — RANOLAZINE 500 MG: 500 TABLET, EXTENDED RELEASE ORAL at 23:31

## 2024-07-08 RX ADMIN — DOFETILIDE 125 MCG: 0.12 CAPSULE ORAL at 23:41

## 2024-07-08 RX ADMIN — FENTANYL CITRATE 25 MCG: 50 INJECTION INTRAMUSCULAR; INTRAVENOUS at 23:31

## 2024-07-08 ASSESSMENT — PATIENT HEALTH QUESTIONNAIRE - PHQ9
SUM OF ALL RESPONSES TO PHQ QUESTIONS 1-9: 0
2. FEELING DOWN, DEPRESSED OR HOPELESS: NOT AT ALL
1. LITTLE INTEREST OR PLEASURE IN DOING THINGS: NOT AT ALL
SUM OF ALL RESPONSES TO PHQ QUESTIONS 1-9: 0
SUM OF ALL RESPONSES TO PHQ9 QUESTIONS 1 & 2: 0
SUM OF ALL RESPONSES TO PHQ QUESTIONS 1-9: 0
SUM OF ALL RESPONSES TO PHQ QUESTIONS 1-9: 0

## 2024-07-08 ASSESSMENT — PAIN SCALES - GENERAL
PAINLEVEL_OUTOF10: 8
PAINLEVEL_OUTOF10: 7

## 2024-07-08 ASSESSMENT — PAIN DESCRIPTION - DESCRIPTORS: DESCRIPTORS: ACHING

## 2024-07-08 ASSESSMENT — PAIN - FUNCTIONAL ASSESSMENT: PAIN_FUNCTIONAL_ASSESSMENT: 0-10

## 2024-07-08 ASSESSMENT — PAIN DESCRIPTION - LOCATION: LOCATION: ABDOMEN

## 2024-07-08 ASSESSMENT — PAIN DESCRIPTION - PAIN TYPE: TYPE: ACUTE PAIN

## 2024-07-08 ASSESSMENT — PAIN DESCRIPTION - ORIENTATION: ORIENTATION: RIGHT;LEFT

## 2024-07-08 NOTE — PROGRESS NOTES
Bobbi Marquis (:  1940) is a 84 y.o. female,Established patient, here for evaluation of the following chief complaint(s):  Abdominal Pain (Was very constipated, and had a bowel movement on . Stomach, hips and lower back hurt. Pt is currently being treated for a UTI, she is currently on omnicef, which she started 7/3)      Assessment & Plan   ASSESSMENT/PLAN:  1. Acute abdominal pain  2. Acute bilateral low back pain, unspecified whether sciatica present    Pain worsening since recent ER visit.   Sent to ER via squad for further evaluation    Return if symptoms worsen or fail to improve.         Subjective   SUBJECTIVE/OBJECTIVE:  Complains of abdominal pain.  Patient was constipated and took colace, miralax and daughter used suppository.  Last BM was .  She was seen in ER on 7/3 and diagnosed with UTI.  Daughter said patient also points to hips and back.  Poor appetite. Daughter states she is urinating normally.         Review of Systems   Unable to perform ROS: Other   Patient moaning in pain with eyes closed.         Objective   /70   Pulse (!) 110   Temp 97.4 °F (36.3 °C)   Resp 16   Ht 1.448 m (4' 9.01\")   Wt 36.7 kg (80 lb 12.8 oz)   LMP  (LMP Unknown)   SpO2 98%   BMI 17.48 kg/m²    Physical Exam  Constitutional:       General: She is in acute distress.      Comments: Eyes closed moaning in pain. Does open eyes and responds appropriately when asked questions   HENT:      Head: Normocephalic and atraumatic.   Cardiovascular:      Rate and Rhythm: Tachycardia present.      Heart sounds: Normal heart sounds. No murmur heard.  Pulmonary:      Effort: Pulmonary effort is normal. No respiratory distress.      Breath sounds: Normal breath sounds. No wheezing or rales.   Abdominal:      General: Abdomen is flat.      Palpations: Abdomen is soft.      Tenderness: There is no abdominal tenderness. There is right CVA tenderness and left CVA tenderness.   Musculoskeletal:

## 2024-07-09 LAB
ALBUMIN SERPL-MCNC: 3.8 G/DL (ref 3.5–5.2)
ALP SERPL-CCNC: 67 U/L (ref 35–104)
ALT SERPL-CCNC: 9 U/L (ref 0–32)
ANION GAP SERPL CALCULATED.3IONS-SCNC: 15 MMOL/L (ref 7–16)
AST SERPL-CCNC: 17 U/L (ref 0–31)
BASOPHILS # BLD: 0.05 K/UL (ref 0–0.2)
BASOPHILS NFR BLD: 1 % (ref 0–2)
BILIRUB SERPL-MCNC: 1 MG/DL (ref 0–1.2)
BUN SERPL-MCNC: 9 MG/DL (ref 6–23)
CALCIUM SERPL-MCNC: 8.3 MG/DL (ref 8.6–10.2)
CHLORIDE SERPL-SCNC: 105 MMOL/L (ref 98–107)
CO2 SERPL-SCNC: 23 MMOL/L (ref 22–29)
CREAT SERPL-MCNC: 0.6 MG/DL (ref 0.5–1)
EKG ATRIAL RATE: 92 BPM
EKG Q-T INTERVAL: 380 MS
EKG QRS DURATION: 78 MS
EKG QTC CALCULATION (BAZETT): 485 MS
EKG R AXIS: 102 DEGREES
EKG T AXIS: -80 DEGREES
EKG VENTRICULAR RATE: 98 BPM
EOSINOPHIL # BLD: 0.27 K/UL (ref 0.05–0.5)
EOSINOPHILS RELATIVE PERCENT: 4 % (ref 0–6)
ERYTHROCYTE [DISTWIDTH] IN BLOOD BY AUTOMATED COUNT: 12.2 % (ref 11.5–15)
GFR, ESTIMATED: 90 ML/MIN/1.73M2
GLUCOSE BLD-MCNC: 76 MG/DL (ref 74–99)
GLUCOSE SERPL-MCNC: 99 MG/DL (ref 74–99)
HCT VFR BLD AUTO: 39.2 % (ref 34–48)
HGB BLD-MCNC: 13.6 G/DL (ref 11.5–15.5)
IMM GRANULOCYTES # BLD AUTO: 0.03 K/UL (ref 0–0.58)
IMM GRANULOCYTES NFR BLD: 1 % (ref 0–5)
INR PPP: 1.2
LYMPHOCYTES NFR BLD: 1.25 K/UL (ref 1.5–4)
LYMPHOCYTES RELATIVE PERCENT: 19 % (ref 20–42)
MAGNESIUM SERPL-MCNC: 1.9 MG/DL (ref 1.6–2.6)
MCH RBC QN AUTO: 33.3 PG (ref 26–35)
MCHC RBC AUTO-ENTMCNC: 34.7 G/DL (ref 32–34.5)
MCV RBC AUTO: 96.1 FL (ref 80–99.9)
MONOCYTES NFR BLD: 0.88 K/UL (ref 0.1–0.95)
MONOCYTES NFR BLD: 13 % (ref 2–12)
NEUTROPHILS NFR BLD: 63 % (ref 43–80)
NEUTS SEG NFR BLD: 4.16 K/UL (ref 1.8–7.3)
PHOSPHATE SERPL-MCNC: 2.6 MG/DL (ref 2.5–4.5)
PLATELET # BLD AUTO: 183 K/UL (ref 130–450)
PMV BLD AUTO: 10.2 FL (ref 7–12)
POTASSIUM SERPL-SCNC: 3.5 MMOL/L (ref 3.5–5)
PROCALCITONIN SERPL-MCNC: 0.03 NG/ML (ref 0–0.08)
PROT SERPL-MCNC: 6.4 G/DL (ref 6.4–8.3)
PROTHROMBIN TIME: 13.5 SEC (ref 9.3–12.4)
RBC # BLD AUTO: 4.08 M/UL (ref 3.5–5.5)
SODIUM SERPL-SCNC: 143 MMOL/L (ref 132–146)
T4 FREE SERPL-MCNC: 1.5 NG/DL (ref 0.9–1.7)
TROPONIN I SERPL HS-MCNC: 17 NG/L (ref 0–9)
TSH SERPL DL<=0.05 MIU/L-ACNC: 2.91 UIU/ML (ref 0.27–4.2)
WBC OTHER # BLD: 6.6 K/UL (ref 4.5–11.5)

## 2024-07-09 PROCEDURE — G0378 HOSPITAL OBSERVATION PER HR: HCPCS

## 2024-07-09 PROCEDURE — 84439 ASSAY OF FREE THYROXINE: CPT

## 2024-07-09 PROCEDURE — 97530 THERAPEUTIC ACTIVITIES: CPT | Performed by: PHYSICAL THERAPIST

## 2024-07-09 PROCEDURE — 85610 PROTHROMBIN TIME: CPT

## 2024-07-09 PROCEDURE — 83735 ASSAY OF MAGNESIUM: CPT

## 2024-07-09 PROCEDURE — 84484 ASSAY OF TROPONIN QUANT: CPT

## 2024-07-09 PROCEDURE — 96375 TX/PRO/DX INJ NEW DRUG ADDON: CPT

## 2024-07-09 PROCEDURE — 6370000000 HC RX 637 (ALT 250 FOR IP): Performed by: INTERNAL MEDICINE

## 2024-07-09 PROCEDURE — 96361 HYDRATE IV INFUSION ADD-ON: CPT

## 2024-07-09 PROCEDURE — 84145 PROCALCITONIN (PCT): CPT

## 2024-07-09 PROCEDURE — 84100 ASSAY OF PHOSPHORUS: CPT

## 2024-07-09 PROCEDURE — 6360000002 HC RX W HCPCS: Performed by: INTERNAL MEDICINE

## 2024-07-09 PROCEDURE — 80053 COMPREHEN METABOLIC PANEL: CPT

## 2024-07-09 PROCEDURE — 93010 ELECTROCARDIOGRAM REPORT: CPT | Performed by: INTERNAL MEDICINE

## 2024-07-09 PROCEDURE — 97161 PT EVAL LOW COMPLEX 20 MIN: CPT | Performed by: PHYSICAL THERAPIST

## 2024-07-09 PROCEDURE — 2580000003 HC RX 258

## 2024-07-09 PROCEDURE — 82962 GLUCOSE BLOOD TEST: CPT

## 2024-07-09 PROCEDURE — 1200000000 HC SEMI PRIVATE

## 2024-07-09 PROCEDURE — 84443 ASSAY THYROID STIM HORMONE: CPT

## 2024-07-09 PROCEDURE — 85025 COMPLETE CBC W/AUTO DIFF WBC: CPT

## 2024-07-09 PROCEDURE — 97165 OT EVAL LOW COMPLEX 30 MIN: CPT

## 2024-07-09 PROCEDURE — 97535 SELF CARE MNGMENT TRAINING: CPT

## 2024-07-09 PROCEDURE — 36415 COLL VENOUS BLD VENIPUNCTURE: CPT

## 2024-07-09 PROCEDURE — 2580000003 HC RX 258: Performed by: INTERNAL MEDICINE

## 2024-07-09 RX ORDER — POLYETHYLENE GLYCOL 3350 17 G/17G
17 POWDER, FOR SOLUTION ORAL DAILY
Status: DISCONTINUED | OUTPATIENT
Start: 2024-07-09 | End: 2024-07-11 | Stop reason: HOSPADM

## 2024-07-09 RX ORDER — METOPROLOL SUCCINATE 100 MG/1
200 TABLET, EXTENDED RELEASE ORAL DAILY
Status: DISCONTINUED | OUTPATIENT
Start: 2024-07-09 | End: 2024-07-11 | Stop reason: HOSPADM

## 2024-07-09 RX ORDER — 0.9 % SODIUM CHLORIDE 0.9 %
1000 INTRAVENOUS SOLUTION INTRAVENOUS ONCE
Status: COMPLETED | OUTPATIENT
Start: 2024-07-09 | End: 2024-07-09

## 2024-07-09 RX ORDER — SILDENAFIL CITRATE 20 MG/1
10 TABLET ORAL 3 TIMES DAILY
Status: DISCONTINUED | OUTPATIENT
Start: 2024-07-09 | End: 2024-07-11 | Stop reason: HOSPADM

## 2024-07-09 RX ORDER — SODIUM CHLORIDE 9 MG/ML
INJECTION, SOLUTION INTRAVENOUS CONTINUOUS
Status: DISCONTINUED | OUTPATIENT
Start: 2024-07-09 | End: 2024-07-10

## 2024-07-09 RX ADMIN — SILDENAFIL CITRATE 10 MG: 20 TABLET ORAL at 13:42

## 2024-07-09 RX ADMIN — MEMANTINE HYDROCHLORIDE 5 MG: 5 TABLET ORAL at 13:43

## 2024-07-09 RX ADMIN — SILDENAFIL CITRATE 10 MG: 20 TABLET ORAL at 21:26

## 2024-07-09 RX ADMIN — MEMANTINE HYDROCHLORIDE 5 MG: 5 TABLET ORAL at 08:18

## 2024-07-09 RX ADMIN — SODIUM CHLORIDE: 9 INJECTION, SOLUTION INTRAVENOUS at 11:24

## 2024-07-09 RX ADMIN — APIXABAN 2.5 MG: 2.5 TABLET, FILM COATED ORAL at 08:22

## 2024-07-09 RX ADMIN — APIXABAN 2.5 MG: 2.5 TABLET, FILM COATED ORAL at 21:23

## 2024-07-09 RX ADMIN — MAGNESIUM OXIDE 400 MG: 400 TABLET ORAL at 08:18

## 2024-07-09 RX ADMIN — DOFETILIDE 125 MCG: 0.12 CAPSULE ORAL at 08:26

## 2024-07-09 RX ADMIN — SILDENAFIL CITRATE 10 MG: 20 TABLET ORAL at 08:26

## 2024-07-09 RX ADMIN — CEFDINIR 300 MG: 300 CAPSULE ORAL at 08:18

## 2024-07-09 RX ADMIN — BUMETANIDE 1 MG: 1 TABLET ORAL at 08:19

## 2024-07-09 RX ADMIN — RANOLAZINE 500 MG: 500 TABLET, EXTENDED RELEASE ORAL at 08:18

## 2024-07-09 RX ADMIN — ASPIRIN 81 MG: 81 TABLET, COATED ORAL at 08:18

## 2024-07-09 RX ADMIN — RANOLAZINE 500 MG: 500 TABLET, EXTENDED RELEASE ORAL at 21:23

## 2024-07-09 RX ADMIN — HYDROMORPHONE HYDROCHLORIDE 0.5 MG: 1 INJECTION, SOLUTION INTRAMUSCULAR; INTRAVENOUS; SUBCUTANEOUS at 01:27

## 2024-07-09 RX ADMIN — DOFETILIDE 125 MCG: 0.12 CAPSULE ORAL at 21:29

## 2024-07-09 RX ADMIN — CEFDINIR 300 MG: 300 CAPSULE ORAL at 21:23

## 2024-07-09 RX ADMIN — MEMANTINE HYDROCHLORIDE 5 MG: 5 TABLET ORAL at 21:23

## 2024-07-09 RX ADMIN — SODIUM CHLORIDE: 9 INJECTION, SOLUTION INTRAVENOUS at 21:47

## 2024-07-09 RX ADMIN — ATORVASTATIN CALCIUM 40 MG: 40 TABLET, FILM COATED ORAL at 08:18

## 2024-07-09 RX ADMIN — SODIUM CHLORIDE 1000 ML: 9 INJECTION, SOLUTION INTRAVENOUS at 05:01

## 2024-07-09 RX ADMIN — METOPROLOL SUCCINATE 200 MG: 100 TABLET, EXTENDED RELEASE ORAL at 08:18

## 2024-07-09 ASSESSMENT — PAIN SCALES - GENERAL
PAINLEVEL_OUTOF10: 6
PAINLEVEL_OUTOF10: 10
PAINLEVEL_OUTOF10: 5

## 2024-07-09 ASSESSMENT — PAIN SCALES - WONG BAKER
WONGBAKER_NUMERICALRESPONSE: NO HURT
WONGBAKER_NUMERICALRESPONSE: NO HURT

## 2024-07-09 ASSESSMENT — PAIN DESCRIPTION - LOCATION: LOCATION: ABDOMEN

## 2024-07-09 NOTE — PROGRESS NOTES
Pharmacist Review and Automatic Dose Adjustment of Prophylactic Enoxaparin    Reviewed reason(s) for admission/hospital problem list    The reviewing pharmacist has made an adjustment to the ordered enoxaparin dose or converted to UFH per the approved [facility] protocol and table as identified below.        Bobbi Marquis is a 84 y.o. female.     Recent Labs     07/08/24  1849   CREATININE 0.6       Estimated Creatinine Clearance: 40 mL/min (based on SCr of 0.6 mg/dL).    Recent Labs     07/08/24  1849   HGB 16.3*   HCT 46.7        No results for input(s): \"INR\" in the last 72 hours.    Height:   Ht Readings from Last 1 Encounters:   07/08/24 1.448 m (4' 9.01\")     Weight:  Wt Readings from Last 1 Encounters:   07/08/24 36.7 kg (80 lb 12.8 oz)               Plan: Based upon the patient's weight and renal function    Ordered: Enoxaparin 40mg SUBQ Daily    Changed/converted to    New Order: Enoxaparin 30mg SUBQ Daily      Thank you,  Rafael Knight RP  7/8/2024, 10:18 PM

## 2024-07-09 NOTE — CONSULTS
Comprehensive Nutrition Assessment    Type and Reason for Visit:  Initial, Consult (Poor PO Intake)    Nutrition Recommendations/Plan:   Continue NPO  Monitor nutrition progression and provide recommendations as indicated/medically appropriate   Consult RD as needed     Malnutrition Assessment:  Malnutrition Status:  Severe malnutrition (07/09/24 1201)    Context:  Chronic Illness     Findings of the 6 clinical characteristics of malnutrition:  Energy Intake:  Unable to assess  Weight Loss:  Greater than 10% over 6 months (-11.1% in five months)     Body Fat Loss:  Severe body fat loss Orbital, Triceps, Fat Overlying Ribs   Muscle Mass Loss:  Severe muscle mass loss Temples (temporalis), Clavicles (pectoralis & deltoids)  Fluid Accumulation:  No significant fluid accumulation     Strength:  Not Performed    Nutrition Assessment:    Pt admit for intractable abdominal pain, ambulatory dysfunction, diminished appetite, dehydration, multiple hepatic cysts, L1 vertebrae fracture, possible hemorrhagic cysts w/in right renal cortex. Pt is currently being treated for UTI. Pt presented to ED and was discharged 3 days ago w/ antibiotics. PMHx: Dementia, CAD, GERD, Hemorrhoids, HLD/HTN, MI, A-fib, CHF. CARLENE po intake as pt is A/O x2. Pt meets criteria for severe malnutrition. PT NPO, will add ONS once diet advances, continue inpatient monitoring, f/up per policy.    Nutrition Related Findings:    A/O x2, I/O not recorded, abd wdl, bowel sounds active x4, e'lytes wnl, Ca+ 8.3, Troponin 17 Wound Type: None       Current Nutrition Intake & Therapies:    Average Meal Intake: NPO  Average Supplements Intake: NPO  Diet NPO Exceptions are: Sips of Water with Meds    Anthropometric Measures:  Height: 144.8 cm (4' 9.01\")  Ideal Body Weight (IBW): 85 lbs (39 kg)    Admission Body Weight: 36.7 kg (80 lb 14.5 oz)  Current Body Weight: 36.7 kg (80 lb 14.5 oz), 95.2 % IBW. Weight Source: Stated (07/08/24)  Current BMI (kg/m2):

## 2024-07-09 NOTE — H&P
Department of Internal Medicine  History and Physical    PCP: Donte Miller DO  Admitting Physician: Dr. Garcia/Black  Consultants:   Date of Service: 7/8/2024    CHIEF COMPLAINT: Abdominal pain    HISTORY OF PRESENT ILLNESS:    Patient is 84-year-old female presented to the ED due to abdominal pain.  States abdominal pain is chronic.  States that it had improved however came back recently.  She admits to diminished appetite.  However denies any increased pain with oral intake.  She denies any fever or chills.  She denies any nausea or emesis.  Her last bowel movement was on Sunday.  She also has associated hip and lower back pain.    PAST MEDICAL Hx:  Past Medical History:   Diagnosis Date    CAD (coronary artery disease)     GERD (gastroesophageal reflux disease)     Hemorrhoids     History of echocardiogram 03/14/2017    EF 62%    History of echocardiogram 05/01/2018    EF 62%    Hyperlipidemia     Hypertension     MI (myocardial infarction) (HCC)     Migraines     New onset atrial flutter (HCC) 03/13/2017    Normal cardiac stress test 05/2010       PAST SURGICAL Hx:   Past Surgical History:   Procedure Laterality Date    CARDIAC CATHETERIZATION      2002    CHOLECYSTECTOMY      COLONOSCOPY  4/16/13    DR CAMERON     CORONARY ARTERY BYPASS GRAFT      CABG x 4 in 2000    ECHO COMPL W DOP COLOR FLOW  2/27/2012         ECHO COMPL W DOP COLOR FLOW  3/25/2013         SPINE SURGERY N/A 10/14/2020    T12, L5 KYPHOPLASTY, EPIDURAL INJECTION [place] [facility] [place]       FAMILY Hx:  History reviewed. No pertinent family history.    HOME MEDICATIONS:  Prior to Admission medications    Medication Sig Start Date End Date Taking? Authorizing Provider   cefdinir (OMNICEF) 300 MG capsule Take 1 capsule by mouth 2 times daily for 7 days 7/3/24 7/10/24  Laura Wu DO   docusate sodium (COLACE) 100 MG capsule Take 1 capsule by mouth 2 times daily as needed for Constipation 7/3/24 7/13/24   07/08/2024 06:49 PM    GFRAA >60 07/08/2021 12:00 PM    LABGLOM 87 07/08/2024 06:49 PM    LABGLOM >60 12/12/2023 09:15 PM    GLUCOSE 102 07/08/2024 06:49 PM    GLUCOSE 95 05/07/2012 04:45 AM    CALCIUM 9.5 07/08/2024 06:49 PM    BILITOT 1.1 07/08/2024 06:49 PM    ALKPHOS 82 07/08/2024 06:49 PM    AST 22 07/08/2024 06:49 PM    ALT 11 07/08/2024 06:49 PM       ASSESSMENT/PLAN:  Intractable and persistent abdominal pain.  Ambulatory dysfunction in the setting of lower back pain and hip pain  Diminished appetite and dehydration  Constipation  Right lung base nodule  Subacute fracture of L1  Multiple hepatic cysts  Possible hemorrhagic cysts within right renal cortex  Dementia  Coronary artery disease with history of CABG  Chronic diastolic congestive heart failure  Severe tricuspid regurgitation  Atrial flutter  GERD  Hyperlipidemia  Hypertension  History of migraine headache  History of tobacco abuse        Patient presented with intractable abdominal pain.  . She also has hip and lower back pain.  Patient initially presented a few days ago to the ED.  At that time she was diagnosed with UTI.  She was sent home on antibiotics.  However she continued to have abdominal pain.  Abdominal pain has led to diminished appetite although she does not have pain when she eats..  Additionally lower back and hip pain has affected her ability to ambulate.  Patient will be placed on pain medication.  Patient will be placed on IV fluids.  PT OT will be consulted.          Mt Lowry DO  3:26 AM  7/9/2024    Electronically signed by Mt Lowry DO on 7/9/24 at 3:26 AM EDT

## 2024-07-09 NOTE — PROGRESS NOTES
Internal Medicine Consult Note    [facility]    Luke Garcia D.O., KASSII.                    Paresh Castro D.O., OLEG Pal D.O.     Allie Merchant, MSN, APRN, NP-C  Cristian Burr, MSN, APRN-CNP  Abelardo Morejon, MSN, APRN-CNP  Albertina Ortiz, MSN APRN-CNP  Tara Choudhary, MSN. APRN-NP-C     Primary Care Physician: Donte Miller DO   Admitting Physician:  Luke Garcia DO  Admission date and time: 7/8/2024  4:41 PM    Room:  [address] [place], [place] [address]  Admitting diagnosis: Intractable abdominal pain [R10.9]    Patient Name: Bobbi Marquis  MRN: [id number]    Date of Service: 7/9/2024     Subjective:  Bobbi is a 84 y.o. female who was seen and examined today,7/9/2024, at the bedside.  Patient confused and poor historian.  Patient does have some vague abdominal pain with no significant tenderness noted with the exception of some mild discomfort right upper quadrant.  Appear to be resting comfortably.    No family present during my examination.    Review of System: Patient poor historian  Constitutional:   Denies fever or chills, weight loss or gain, fatigue or malaise.  HEENT:   Denies ear pain, sore throat, sinus or eye problems.  Cardiovascular:   Denies any chest pain, irregular heartbeats, or palpitations.   Respiratory:   Denies shortness of breath, coughing, sputum production, hemoptysis, or wheezing.  Gastrointestinal:   Denies nausea, vomiting, diarrhea, or constipation.  Denies any abdominal pain.  Genitourinary:    Denies any urgency, frequency, hematuria. Voiding  without difficulty.  Extremities:   Denies lower extremity swelling, edema or cyanosis.   Neurology:    Denies any headache or focal neurological deficits, Denies generalized weakness or memory difficulty.   Psch:   Denies being anxious or depressed.  Musculoskeletal:    Denies  myalgias, joint complaints or back pain.   Integumentary:   Denies any rashes, ulcers, or

## 2024-07-09 NOTE — ED PROVIDER NOTES
Bobbi Marquis is a 84-year-old female present emergency department concern for abdominal pain, flank pain and decreased p.o. patient has had recurrent episodes of abdominal pain was seen emergency department about 6 days ago diagnosed urinary tract infection patient continues to have abdominal pain since that time.  Patient also had constipation that was treated.  She continues to have worsening abdominal pain and has been declining to take much p.o. aside from sips of water.  Patient does have a history of dementia and review of systems and history is challenging.  Patient has been not ambulating due to abdominal pain when she begins to stand or walk.  Patient was sent in from primary care physician's office for further evaluation    The history is provided by medical records and a relative.        Review of Systems   Unable to perform ROS: Dementia        Physical Exam  Vitals and nursing note reviewed.   Constitutional:       General: She is not in acute distress.     Appearance: Normal appearance. She is not ill-appearing.   HENT:      Head: Normocephalic and atraumatic.   Eyes:      General: No scleral icterus.     Conjunctiva/sclera: Conjunctivae normal.      Pupils: Pupils are equal, round, and reactive to light.   Cardiovascular:      Rate and Rhythm: Normal rate and regular rhythm.   Pulmonary:      Effort: Pulmonary effort is normal.      Breath sounds: Normal breath sounds.   Abdominal:      General: Bowel sounds are normal. There is no distension.      Palpations: Abdomen is soft.      Tenderness: There is abdominal tenderness. There is no guarding or rebound.      Comments: Patient does not have pain out of proportion, no rebound no guarding abdomen is soft  Patient does have mild tenderness periumbilical suprapubic area   Musculoskeletal:      Cervical back: Normal range of motion and neck supple. No rigidity. No muscular tenderness.      Right lower leg: No edema.      Left lower leg: No edema.  Result   1. No evidence of obstructive uropathy.   2. Small to moderate amount of fluid seen within the colon, which can be seen   in diarrheal disease.   3. Multiple hepatic cysts again noted, when compared to the previous study   performed 07/03/2024.   4. Tiny rounded hyperdensities are seen within the right renal cortex,   presumably representing hemorrhagic cysts. They are too small to definitely   characterize on this study.   5. Other findings, as described above.             EKG:  This EKG is signed and interpreted by me.    Rate: 98  Rhythm: Sinus  Interpretation: non-specific EKG  Comparison: changes compared to previous EKG      ------------------------- NURSING NOTES AND VITALS REVIEWED ---------------------------  Date / Time Roomed:  7/8/2024  4:41 PM  ED Bed Assignment:  0314/0314-01    The nursing notes within the ED encounter and vital signs as below have been reviewed.     Patient Vitals for the past 24 hrs:   BP Temp Temp src Pulse Resp SpO2 Height Weight   07/09/24 0001 -- -- -- -- 17 -- -- --   07/08/24 2303 131/78 98 °F (36.7 °C) Oral 59 18 92 % 1.448 m (4' 9\") 36.7 kg (80 lb 14 oz)   07/08/24 2115 -- -- -- 98 -- 96 % -- --   07/08/24 1920 138/78 98.2 °F (36.8 °C) Oral 91 18 96 % -- --   07/08/24 1707 127/60 -- -- -- 16 -- -- --   07/08/24 1706 -- 97.8 °F (36.6 °C) -- -- -- -- -- --   07/08/24 1705 -- -- -- 96 -- 95 % -- --       Oxygen Saturation Interpretation: Normal    ------------------------------------------ PROGRESS NOTES ------------------------------------------  Re-evaluation(s):  Time: 7p  Patient’s symptoms show no change  Repeat physical examination is not changed    Counseling:  I have spoken with the  daughter  and discussed today’s results, in addition to providing specific details for the plan of care and counseling regarding the diagnosis and prognosis.  Their questions are answered at this time and they are agreeable with the plan of

## 2024-07-09 NOTE — PROGRESS NOTES
4 Eyes Skin Assessment     NAME:  Bobbi Marquis  YOB: 1940  MEDICAL RECORD NUMBER:  [id number]    The patient is being assessed for  Admission    I agree that at least one RN has performed a thorough Head to Toe Skin Assessment on the patient. ALL assessment sites listed below have been assessed.      Areas assessed by both nurses:    Head, Face, Ears, Shoulders, Back, Chest, Arms, Elbows, Hands, Sacrum. Buttock, Coccyx, Ischium, Legs. Feet and Heels, and Under Medical Devices         Does the Patient have a Wound? No noted wound(s)       Jarvis Prevention initiated by RN: No  Wound Care Orders initiated by RN: No    Pressure Injury (Stage 3,4, Unstageable, DTI, NWPT, and Complex wounds) if present, place Wound referral order by RN under : No    New Ostomies, if present place, Ostomy referral order under : No     Nurse 1 eSignature: Electronically signed by Tomorrow GLORIA Vasquez on 7/9/24 at 12:36 AM EDT    **SHARE this note so that the co-signing nurse can place an eSignature**    Nurse 2 eSignature: {Esignature:[id number]}

## 2024-07-09 NOTE — PROGRESS NOTES
OCCUPATIONAL THERAPY INITIAL EVALUATION    [facility]  [address] [place]        Date:2024                                                  Patient Name: Bobbi Marquis    MRN: [id number]    : 1940    Room: [address] [place] [address]      Evaluating OT: Gayla Marshall OTR/L; [id number]     Referring Provider and Specific Provider Orders/Date:      24  OT eval and treat  Start:  24,   End:  24,   ONE TIME,   Standing Count:  1 Occurrences,   R         Essence, Ismail U, DO      Placement Recommendation: Subacute        Diagnosis:   1. Abdominal pain, unspecified abdominal location    2. Dehydration         Surgery: none       Pertinent Medical History:       Past Medical History:   Diagnosis Date    CAD (coronary artery disease)     GERD (gastroesophageal reflux disease)     Hemorrhoids     History of echocardiogram 2017    EF 62%    History of echocardiogram 2018    EF 62%    Hyperlipidemia     Hypertension     MI (myocardial infarction) ([facility])     Migraines     New onset atrial flutter ([facility]) 2017    Normal cardiac stress test 2010         Past Surgical History:   Procedure Laterality Date    CARDIAC CATHETERIZATION          CHOLECYSTECTOMY      COLONOSCOPY  13    DR CAMERON     CORONARY ARTERY BYPASS GRAFT      CABG x 4 in     ECHO COMPL W DOP COLOR FLOW  2012         ECHO COMPL W DOP COLOR FLOW  3/25/2013         SPINE SURGERY N/A 10/14/2020    T12, L5 KYPHOPLASTY, EPIDURAL INJECTION performed by Víctor Josue MD at [facility] [place]      Precautions:  Fall Risk, AMS, poor historian      Assessment of current deficits:     [x] Functional mobility  [x]ADLs  [x] Strength               [x]Cognition    [x] Functional transfers   [x] IADLs         [x] Safety Awareness   [x]Endurance    [] Fine Coordination              [x] Balance      [] Vision/perception   []Sensation     []Gross Motor Coordination  with IV pole to improve balance to and from bathroom, verbal cues for walker sequence and safety.   Modified [place]    Balance Sitting:     Static: good     Dynamic: fair   Standing: fair  with IV pole and or hand held assist   Sitting:     Static: good     Dynamic: good   Standing: good     Activity Tolerance Fair   good    Visual/  Perceptual Glasses: yes                 Hand Dominance: right      AROM (PROM) Strength Additional Info:  Goal:   RUE  WFL 4/5 good  and wfl FMC/dexterity noted during ADL tasks   Improve overall RUE strength  for participation in functional tasks   LUE WFL 4/5 good  and wfl FMC/dexterity noted during ADL tasks   Improve overall LUE strength  for participation in functional tasks     Hearing: WFL   Sensation:  No c/o numbness or tingling  Tone: WFL   Edema: no    Comments: Upon arrival the patient was supine.  At end of session, patient was returned to supine with call light and phone within reach, all lines and tubes intact.  Overall patient demonstrated decreased independence and safety during completion of ADL/functional transfer/mobility tasks.  Pt would benefit from continued skilled OT to increase safety and independence with completion of ADL/IADL tasks for functional independence and quality of life.    Treatment: OT treatment provided this date includes:   Instruction/training on safety and adapted techniques for completion of ADLs   Instruction/training on safe functional mobility/transfer techniques   Instruction/training on energy conservation/work simplification for completion of ADLs   Instruction/training on proper positioning/alignment to prevent contractures     Rehab Potential: Good for established goals.      Patient / Family Goal: return home       Patient and/or family were instructed on functional diagnosis, prognosis/goals and OT plan of care. Demonstrated good understanding.     Eval Complexity: Low    Time In: 9:05am   Time Out: 9:30am    Total

## 2024-07-09 NOTE — PROGRESS NOTES
Physical Therapy  Physical Therapy Initial Evaluation/Plan of Care    Room #:  0314/0314-01  Patient Name: Bobbi Marquis  YOB: 1940  MRN: [id number]    Date of Service: 7/9/2024     Tentative placement recommendation: Home with Home Health Physical Therapy and 24/7 supervision/assist versus subacute   Equipment recommendation: Patient has needed equipment       Evaluating Physical Therapist: Alejandro Shelton, PT, DPT #[id number]      Specific Provider Orders/Date/Referring Provider :     07/09/24 0430    PT eval and treat  Start:  07/09/24 0430,   End:  07/09/24 0430,   ONE TIME,   Standing Count:  1 Occurrences,   R       Essence, Ismail U, DO Acknowledge New    Admitting Diagnosis:   Intractable abdominal pain [R10.9]      Surgery: none  Visit Diagnoses         Codes    Abdominal pain, unspecified abdominal location    -  Primary R10.9    Dehydration     E86.0            Patient Active Problem List   Diagnosis    Chest pain radiating to jaw    CAD (coronary artery disease), native coronary artery    Atherosclerosis of coronary artery bypass graft    Postsurgical aortocoronary bypass status    Other and unspecified angina pectoris    Essential hypertension    Paresthesia of both hands    Benign paroxysmal positional vertigo of left ear    Acute diastolic CHF (congestive heart failure) (HCC)    Mild protein-calorie malnutrition (HCC)    Atrial fibrillation with RVR (HCC)    Sick sinus syndrome with tachycardia (HCC)    Closed compression fracture of L4 lumbar vertebra, initial encounter (HCC)    Lumbar compression fracture, closed, initial encounter (HCC)    Closed fracture of fifth lumbar vertebra (HCC)    Alzheimer disease (HCC)    Hypertensive heart disease with heart failure (HCC)    Secondary hypercoagulable state (HCC)    Intractable abdominal pain        ASSESSMENT of Current Deficits Patient exhibits decreased strength, balance, and endurance impairing functional mobility, transfers, gait , gait  education to maximize respiratory function  -Stairs: Stair training with instruction on proper technique and hand placement on rail    PT long term treatment goals are located in below grid    Patient and or family understand(s) diagnosis, prognosis, and plan of care.    Frequency of treatments: Patient will be seen  daily.         Prior Level of Function: Patient ambulated independently   Rehab Potential: fair + for baseline    Past medical history:   Past Medical History:   Diagnosis Date    CAD (coronary artery disease)     GERD (gastroesophageal reflux disease)     Hemorrhoids     History of echocardiogram 03/14/2017    EF 62%    History of echocardiogram 05/01/2018    EF 62%    Hyperlipidemia     Hypertension     MI (myocardial infarction) (HCC)     Migraines     New onset atrial flutter (HCC) 03/13/2017    Normal cardiac stress test 05/2010     Past Surgical History:   Procedure Laterality Date    CARDIAC CATHETERIZATION      2002    CHOLECYSTECTOMY      COLONOSCOPY  4/16/13    DR CAMERON     CORONARY ARTERY BYPASS GRAFT      CABG x 4 in 2000    ECHO COMPL W DOP COLOR FLOW  2/27/2012         ECHO COMPL W DOP COLOR FLOW  3/25/2013         SPINE SURGERY N/A 10/14/2020    T12, L5 KYPHOPLASTY, EPIDURAL INJECTION performed by Víctor Josue MD at [facility] [place]       SUBJECTIVE:    Precautions: Up with assistance, falls, alarm, confusion, dementia, and UTI      Social history: Patient live with daughter in a [facility] with pt bed and bath upstairs with full flight and 1 HR with 3 steps with 1 HR with tub shower.     Equipment owned: WW, cane, shower chair    AM-PAC Basic Mobility       AM-PAC Basic Mobility - Inpatient   How much help is needed turning from your back to your side while in a flat bed without using bedrails?: A Little  How much help is needed moving from lying on your back to sitting on the side of a flat bed without using bedrails?: A Little  How much help is needed moving to and from a bed to a chair?: A

## 2024-07-09 NOTE — CARE COORDINATION
Case Management Assessment  Initial Evaluation    Date/Time of Evaluation: 7/9/2024 2:07 PM  Assessment Completed by: Jaylyn Alvarado RN    If patient is discharged prior to next notation, then this note serves as note for discharge by case management.    Patient Name: Bobbi Marquis                   YOB: 1940  Diagnosis: Intractable abdominal pain [R10.9]                   Date / Time: 7/8/2024  4:41 PM    Patient Admission Status: Inpatient   Readmission Risk (Low < 19, Mod (19-27), High > 27): Readmission Risk Score: 13.6    Current PCP: Donte Miller, DO  PCP verified by CM? Yes    Chart Reviewed: Yes      History Provided by: Patient  Patient Orientation: Alert and Oriented    Patient Cognition: Alert    Hospitalization in the last 30 days (Readmission):  No    If yes, Readmission Assessment in CM Navigator will be completed.    Advance Directives:      Code Status: Full Code   Patient's Primary Decision Maker is: Legal Next of Kin    Primary Decision Maker: Albertina Blanco - Child - [phone]    Discharge Planning:    Patient lives with: Alone Type of Home: House  Primary Care Giver: Self  Patient Support Systems include: Children, Family Members   Current Financial resources:    Current community resources:    Current services prior to admission: None            Current DME:              Type of Home Care services:  None    ADLS  Prior functional level: Independent in ADLs/IADLs  Current functional level: Independent in ADLs/IADLs    PT AM-PAC: 17 /24  OT AM-PAC: 13 /24    Family can provide assistance at DC: Yes  Would you like Case Management to discuss the discharge plan with any other family members/significant others, and if so, who? No  Plans to Return to Present Housing: Yes    Potential Assistance needed at discharge: N/A            Potential DME:    Patient expects to discharge to: House  Plan for transportation at discharge:  family    Financial    Payor: [facility] MEDICARE /

## 2024-07-09 NOTE — PLAN OF CARE
Problem: Discharge Planning  Goal: Discharge to home or other facility with appropriate resources  Outcome: Progressing     Problem: Pain  Goal: Verbalizes/displays adequate comfort level or baseline comfort level  Outcome: Progressing     Problem: ABCDS Injury Assessment  Goal: Absence of physical injury  Outcome: Progressing     Problem: Gastrointestinal - Adult  Goal: Minimal or absence of nausea and vomiting  Outcome: Progressing     Problem: Gastrointestinal - Adult  Goal: Maintains or returns to baseline bowel function  Outcome: Progressing     Problem: Gastrointestinal - Adult  Goal: Maintains adequate nutritional intake  Outcome: Progressing

## 2024-07-09 NOTE — PLAN OF CARE
Problem: Discharge Planning  Goal: Discharge to home or other facility with appropriate resources  Outcome: Progressing     Problem: Pain  Goal: Verbalizes/displays adequate comfort level or baseline comfort level  Outcome: Progressing     Problem: ABCDS Injury Assessment  Goal: Absence of physical injury  Outcome: Progressing     Problem: Gastrointestinal - Adult  Goal: Minimal or absence of nausea and vomiting  Outcome: Progressing  Goal: Maintains or returns to baseline bowel function  Outcome: Progressing  Goal: Maintains adequate nutritional intake  Outcome: Progressing     Problem: Safety - Adult  Goal: Free from fall injury  Outcome: Progressing     Problem: Chronic Conditions and Co-morbidities  Goal: Patient's chronic conditions and co-morbidity symptoms are monitored and maintained or improved  Outcome: Progressing

## 2024-07-10 LAB
ALBUMIN SERPL-MCNC: 3.7 G/DL (ref 3.5–5.2)
ALP SERPL-CCNC: 64 U/L (ref 35–104)
ALT SERPL-CCNC: 8 U/L (ref 0–32)
ANION GAP SERPL CALCULATED.3IONS-SCNC: 14 MMOL/L (ref 7–16)
AST SERPL-CCNC: 19 U/L (ref 0–31)
BASOPHILS # BLD: 0.06 K/UL (ref 0–0.2)
BASOPHILS NFR BLD: 1 % (ref 0–2)
BILIRUB SERPL-MCNC: 1 MG/DL (ref 0–1.2)
BUN SERPL-MCNC: 8 MG/DL (ref 6–23)
CALCIUM SERPL-MCNC: 8.4 MG/DL (ref 8.6–10.2)
CHLORIDE SERPL-SCNC: 108 MMOL/L (ref 98–107)
CO2 SERPL-SCNC: 21 MMOL/L (ref 22–29)
CREAT SERPL-MCNC: 0.5 MG/DL (ref 0.5–1)
EOSINOPHIL # BLD: 0.37 K/UL (ref 0.05–0.5)
EOSINOPHILS RELATIVE PERCENT: 6 % (ref 0–6)
ERYTHROCYTE [DISTWIDTH] IN BLOOD BY AUTOMATED COUNT: 12.3 % (ref 11.5–15)
GFR, ESTIMATED: >90 ML/MIN/1.73M2
GLUCOSE SERPL-MCNC: 80 MG/DL (ref 74–99)
HCT VFR BLD AUTO: 38.8 % (ref 34–48)
HGB BLD-MCNC: 13 G/DL (ref 11.5–15.5)
IMM GRANULOCYTES # BLD AUTO: <0.03 K/UL (ref 0–0.58)
IMM GRANULOCYTES NFR BLD: 0 % (ref 0–5)
LYMPHOCYTES NFR BLD: 0.99 K/UL (ref 1.5–4)
LYMPHOCYTES RELATIVE PERCENT: 17 % (ref 20–42)
MCH RBC QN AUTO: 32.3 PG (ref 26–35)
MCHC RBC AUTO-ENTMCNC: 33.5 G/DL (ref 32–34.5)
MCV RBC AUTO: 96.5 FL (ref 80–99.9)
MONOCYTES NFR BLD: 0.78 K/UL (ref 0.1–0.95)
MONOCYTES NFR BLD: 13 % (ref 2–12)
NEUTROPHILS NFR BLD: 63 % (ref 43–80)
NEUTS SEG NFR BLD: 3.73 K/UL (ref 1.8–7.3)
PLATELET # BLD AUTO: 198 K/UL (ref 130–450)
PMV BLD AUTO: 10.8 FL (ref 7–12)
POTASSIUM SERPL-SCNC: 3.5 MMOL/L (ref 3.5–5)
PROT SERPL-MCNC: 6.3 G/DL (ref 6.4–8.3)
RBC # BLD AUTO: 4.02 M/UL (ref 3.5–5.5)
SODIUM SERPL-SCNC: 143 MMOL/L (ref 132–146)
WBC OTHER # BLD: 6 K/UL (ref 4.5–11.5)

## 2024-07-10 PROCEDURE — 96376 TX/PRO/DX INJ SAME DRUG ADON: CPT

## 2024-07-10 PROCEDURE — 6370000000 HC RX 637 (ALT 250 FOR IP): Performed by: INTERNAL MEDICINE

## 2024-07-10 PROCEDURE — 1200000000 HC SEMI PRIVATE

## 2024-07-10 PROCEDURE — G0378 HOSPITAL OBSERVATION PER HR: HCPCS

## 2024-07-10 PROCEDURE — 6360000002 HC RX W HCPCS: Performed by: INTERNAL MEDICINE

## 2024-07-10 PROCEDURE — 2580000003 HC RX 258: Performed by: INTERNAL MEDICINE

## 2024-07-10 PROCEDURE — 6370000000 HC RX 637 (ALT 250 FOR IP): Performed by: HOSPITALIST

## 2024-07-10 PROCEDURE — 85025 COMPLETE CBC W/AUTO DIFF WBC: CPT

## 2024-07-10 PROCEDURE — 36415 COLL VENOUS BLD VENIPUNCTURE: CPT

## 2024-07-10 PROCEDURE — 80053 COMPREHEN METABOLIC PANEL: CPT

## 2024-07-10 RX ORDER — SENNOSIDES A AND B 8.6 MG/1
1 TABLET, FILM COATED ORAL NIGHTLY
Status: DISCONTINUED | OUTPATIENT
Start: 2024-07-10 | End: 2024-07-11 | Stop reason: HOSPADM

## 2024-07-10 RX ADMIN — MEMANTINE HYDROCHLORIDE 5 MG: 5 TABLET ORAL at 13:11

## 2024-07-10 RX ADMIN — ASPIRIN 81 MG: 81 TABLET, COATED ORAL at 08:29

## 2024-07-10 RX ADMIN — APIXABAN 2.5 MG: 2.5 TABLET, FILM COATED ORAL at 20:36

## 2024-07-10 RX ADMIN — METOPROLOL SUCCINATE 200 MG: 100 TABLET, EXTENDED RELEASE ORAL at 08:30

## 2024-07-10 RX ADMIN — DOFETILIDE 125 MCG: 0.12 CAPSULE ORAL at 08:34

## 2024-07-10 RX ADMIN — HYDROMORPHONE HYDROCHLORIDE 0.5 MG: 1 INJECTION, SOLUTION INTRAMUSCULAR; INTRAVENOUS; SUBCUTANEOUS at 01:04

## 2024-07-10 RX ADMIN — AMLODIPINE BESYLATE 10 MG: 5 TABLET ORAL at 08:30

## 2024-07-10 RX ADMIN — BUMETANIDE 1 MG: 1 TABLET ORAL at 08:30

## 2024-07-10 RX ADMIN — CEFDINIR 300 MG: 300 CAPSULE ORAL at 20:35

## 2024-07-10 RX ADMIN — MAGNESIUM OXIDE 400 MG: 400 TABLET ORAL at 08:29

## 2024-07-10 RX ADMIN — SODIUM CHLORIDE, PRESERVATIVE FREE 10 ML: 5 INJECTION INTRAVENOUS at 20:40

## 2024-07-10 RX ADMIN — MEMANTINE HYDROCHLORIDE 5 MG: 5 TABLET ORAL at 08:30

## 2024-07-10 RX ADMIN — APIXABAN 2.5 MG: 2.5 TABLET, FILM COATED ORAL at 08:30

## 2024-07-10 RX ADMIN — DOFETILIDE 125 MCG: 0.12 CAPSULE ORAL at 20:36

## 2024-07-10 RX ADMIN — SILDENAFIL CITRATE 10 MG: 20 TABLET ORAL at 20:44

## 2024-07-10 RX ADMIN — MEMANTINE HYDROCHLORIDE 5 MG: 5 TABLET ORAL at 20:35

## 2024-07-10 RX ADMIN — SILDENAFIL CITRATE 10 MG: 20 TABLET ORAL at 13:09

## 2024-07-10 RX ADMIN — SENNOSIDES 8.6 MG: 8.6 TABLET, FILM COATED ORAL at 20:35

## 2024-07-10 RX ADMIN — SILDENAFIL CITRATE 10 MG: 20 TABLET ORAL at 08:33

## 2024-07-10 RX ADMIN — RANOLAZINE 500 MG: 500 TABLET, EXTENDED RELEASE ORAL at 20:35

## 2024-07-10 RX ADMIN — CEFDINIR 300 MG: 300 CAPSULE ORAL at 08:30

## 2024-07-10 RX ADMIN — ATORVASTATIN CALCIUM 40 MG: 40 TABLET, FILM COATED ORAL at 08:30

## 2024-07-10 RX ADMIN — RANOLAZINE 500 MG: 500 TABLET, EXTENDED RELEASE ORAL at 08:30

## 2024-07-10 RX ADMIN — HYDROMORPHONE HYDROCHLORIDE 0.5 MG: 1 INJECTION, SOLUTION INTRAMUSCULAR; INTRAVENOUS; SUBCUTANEOUS at 21:52

## 2024-07-10 ASSESSMENT — PAIN DESCRIPTION - ORIENTATION
ORIENTATION: OTHER (COMMENT)
ORIENTATION: OTHER (COMMENT)

## 2024-07-10 ASSESSMENT — PAIN SCALES - WONG BAKER
WONGBAKER_NUMERICALRESPONSE: NO HURT
WONGBAKER_NUMERICALRESPONSE: NO HURT

## 2024-07-10 ASSESSMENT — PAIN SCALES - GENERAL
PAINLEVEL_OUTOF10: 0
PAINLEVEL_OUTOF10: 5
PAINLEVEL_OUTOF10: 7

## 2024-07-10 ASSESSMENT — PAIN DESCRIPTION - LOCATION
LOCATION: BACK
LOCATION: OTHER (COMMENT)

## 2024-07-10 ASSESSMENT — PAIN DESCRIPTION - DESCRIPTORS
DESCRIPTORS: ACHING
DESCRIPTORS: DISCOMFORT;SORE;ACHING

## 2024-07-10 NOTE — CARE COORDINATION
CM note: Met with patient's daughter at bedside.  She confirms that she and her son have been managing ok at home and do not feel they need home health care at this time.  Plan remains for patient to return home with daughter when medically ready for discharge.  Consulting urology and GI today.

## 2024-07-10 NOTE — PLAN OF CARE
Problem: Discharge Planning  Goal: Discharge to home or other facility with appropriate resources  7/10/2024 1158 by Dinora Godoy RN  Outcome: Progressing  7/9/2024 2359 by Carolyn Romero RN  Outcome: Progressing     Problem: Pain  Goal: Verbalizes/displays adequate comfort level or baseline comfort level  7/10/2024 1158 by Dinora Godoy RN  Outcome: Progressing  7/9/2024 2359 by Carolyn Romero RN  Outcome: Progressing     Problem: ABCDS Injury Assessment  Goal: Absence of physical injury  7/10/2024 1158 by Dinora Godoy RN  Outcome: Progressing  7/9/2024 2359 by Carolyn Romero RN  Outcome: Progressing     Problem: Gastrointestinal - Adult  Goal: Minimal or absence of nausea and vomiting  7/10/2024 1158 by Dinora Godoy RN  Outcome: Progressing  7/9/2024 2359 by Carolyn Romero RN  Outcome: Progressing  Goal: Maintains or returns to baseline bowel function  7/10/2024 1158 by Dinora Godoy RN  Outcome: Progressing  7/9/2024 2359 by Caorlyn Romero RN  Outcome: Progressing  Goal: Maintains adequate nutritional intake  7/10/2024 1158 by Dinora Godoy RN  Outcome: Progressing  7/9/2024 2359 by Carolyn Romero RN  Outcome: Progressing     Problem: Safety - Adult  Goal: Free from fall injury  7/10/2024 1158 by Dinora Godoy RN  Outcome: Progressing  7/9/2024 2359 by Carolyn Romero RN  Outcome: Progressing     Problem: Chronic Conditions and Co-morbidities  Goal: Patient's chronic conditions and co-morbidity symptoms are monitored and maintained or improved  7/10/2024 1158 by Dinora Godoy RN  Outcome: Progressing  7/9/2024 2359 by Carolyn Romero RN  Outcome: Progressing

## 2024-07-10 NOTE — PROGRESS NOTES
Internal Medicine Consult Note    [place]    Luke Garcia D.O., RASHIOBossmanI.                    Paresh Castro D.O., RASHIOBossmanI.                             Juaquin Pal D.O.     Allie Merchant, MSN, APRN, NP-C  Cristian Burr, MSN, APRN-CNP  Abelardo Morejon, MSN, APRN-CNP  Albertina Ortiz, MSN APRN-CNP  Tara Choudhary, MSN. APRN-NP-C     Primary Care Physician: Donte Miller DO   Admitting Physician:  Luke Garcia DO  Admission date and time: 7/8/2024  4:41 PM    Room:  [address] [place], [place] [address]  Admitting diagnosis: Intractable abdominal pain [R10.9]    Patient Name: Bobbi Marquis  MRN: [id number]    Date of Service: 7/10/2024     Subjective:  Bobbi is a 84 y.o. female who was seen and examined today,7/10/2024, at the bedside.  Patient patient is resting comfortably with no signs of pain.  The patient is confused and answers questions inappropriately.  Her vital signs remained stable.  Will consult gastroenterology and urology regarding hepatic cyst and a right hemorrhagic cyst on the renal cortex.   Spoke with the patient's daughter Albertina Blanco. but explained to her daughter that her mother shows no signs or symptoms of any pain until she is present in the room.  Her laboratory studies remain unremarkable.  Radiology studies does show hepatic cyst and a cyst on the right renal cortex.  Which could be possibly the source of her right flank pain.  Explained to the daughter that specialist were consulted to investigate this further.  If there are no new findings that her mother would be discharged soon.      Review of System: Patient poor historian  Constitutional:   Denies fever or chills, weight loss or gain, fatigue or malaise.  HEENT:   Denies ear pain, sore throat, sinus or eye problems.  Cardiovascular:   Denies any chest pain, irregular heartbeats, or palpitations.   Respiratory:   Denies shortness of breath, coughing, sputum production, hemoptysis, or  wheezing.  Gastrointestinal:   Denies nausea, vomiting, diarrhea, or constipation.  Denies any abdominal pain.  Genitourinary:    Denies any urgency, frequency, hematuria. Voiding  without difficulty.  Extremities:   Denies lower extremity swelling, edema or cyanosis.   Neurology:    Denies any headache or focal neurological deficits, Denies generalized weakness or memory difficulty.   Psch:   Denies being anxious or depressed.  Musculoskeletal:    Denies  myalgias, joint complaints or back pain.   Integumentary:   Denies any rashes, ulcers, or excoriations.  Denies bruising.  Hematologic/Lymphatic:  Denies bruising or bleeding.    Physical Exam:  No intake/output data recorded.    Intake/Output Summary (Last 24 hours) at 7/10/2024 1522  Last data filed at 7/9/2024 1534  Gross per 24 hour   Intake 1358.61 ml   Output --   Net 1358.61 ml     I/O last 3 completed shifts:  In: 1358.6 [I.V.:312.5; IV Piggyback:1046.2]  Out: -   Patient Vitals for the past 96 hrs (Last 3 readings):   Weight   07/08/24 2303 36.7 kg (80 lb 14 oz)       Vital Signs:   Blood pressure 122/79, pulse 82, temperature 97.5 °F (36.4 °C), temperature source Oral, resp. rate 16, height 1.448 m (4' 9.01\"), weight 36.7 kg (80 lb 14 oz), SpO2 92 %, not currently breastfeeding.    General appearance:  Alert to person.  Resting comfortably  Head:  Normocephalic. No masses, lesions or tenderness.  Eyes:  PERRLA.  EOMI.  Sclera clear.  Buccal mucosa moist.  ENT:  Ears normal. Mucosa normal.  Neck:    Supple. Trachea midline. No thyromegaly. No JVD. No bruits.  Heart:    Irregular rhythm probable atrial fibrillation.  1/6 murmur  Lungs:    Symmetrical. Clear to auscultation bilaterally.  No wheezes. No rhonchi. No rales.  Abdomen:   Soft. Non-tender. Non-distended. Bowel sounds positive. No organomegaly or masses.  No tenderness right upper quadrant  Extremities:    Peripheral pulses present.  No peripheral edema.  No ulcers. No cyanosis. No

## 2024-07-10 NOTE — CONSULTS
CONSULT  Kirk Hernandez M.D.  [facility]  Dr. Renetta Douglas M.D.,  Dr. Tonny Shah M.D.,  FLORENCIA Mata.O.,  Dr. Param Beal D.O. ,  Dr. Shaggy Johnson M.D.,          Bobbi Marquis  84 y.o.  female      Re:\"abd pain\"  Requesting physician: MAYCOL Thrasher/Dr. Garcia  Date:9:21 AM 7/10/2024          HPI: 84-year-old female patient seen in the hospital for above described issue.  Patient has past medical history of coronary artery disease, GERD, hemorrhoids, hyperlipidemia, hypertension, atrial flutter and oral anticoagulation on Eliquis.  Patient has been in the hospital since presenting to the emergency department 2 days ago because of abdominal pain.  Our service was not consulted until this morning after patient receiving regular breakfast.  Patient is pleasant but appears to be somewhat poor historian when it comes to providing details of her complaints.  She apparently is experiencing abdominal pain which she localizes in the right upper abdomen and right flank.  n.  She cannot specify for how long this pain has been present but appears to be somewhat chronic in nature and somewhat associated with oral intake.  Patient also is not sure when was her last bowel movement but according to notes it is stated that she had bowel movement on Sunday.  Patient cannot provide information as in regards to most recent colonoscopy believing it has been done several weeks ago.  Laboratory work shows white blood cell count of 6, hemoglobin of 13 with hematocrit of 30.8 and platelet count of 198.  INR is 1.2 yesterday.  Chemistry panel shows CO2 of 21 with chloride of 108 but otherwise unremarkable electrolytes including renal function with BUN of 18 creatinine 0.5.  Liver profile is also unremarkable with nonelevated transaminases, nonelevated bilirubin and nonelevated alkaline phosphatase.  Imaging has been obtained with CT scan of the abdomen and pelvis however without contrast on presentation  2019)    Thank you for the opportunity to see this patient in consultation    Kirk Hernandez MD  7/10/2024  9:21 AM    NOTE:  This report was transcribed using voice recognition software.  Every effort was made to ensure accuracy; however, inadvertent computerized transcription errors may be present.

## 2024-07-10 NOTE — CONSULTS
too small to definitely  characterize on this study.  No renal, ureteral, or urinary bladder calculi  are identified.  The patient is again status post cholecystectomy.  There is  again dilatation of the intra and extrahepatic biliary ducts, which can be  seen in patients status post cholecystectomy.     GI/Bowel: The stomach is suboptimally distended, but grossly unremarkable.  The small bowel is normal in appearance for a non oral contrast study.  There  is a small to moderate amount of fluid seen within the colon, which can be  seen in diarrheal disease.  Small foci of increased density are noted within  the ascending colon, which could represent ingested medication.  The appendix  is not visualized.     Pelvis: The urinary bladder is suboptimally distended and grossly  unremarkable.  The uterus and both adnexa are normal in appearance.     Peritoneum/Retroperitoneum: No retroperitoneal or pelvic lymphadenopathy is  identified.  No free fluid is seen in the abdomen and pelvis.  No abdominal  or pelvic soft tissue mass is appreciated.  The abdominal aorta is again  atherosclerotic.     Bones/Soft Tissues: Osteo-degenerative changes are again noted involving the  visualized thoracolumbar spine.  Diffuse osteopenia is again noted.  The  patient is again status post T11 and L4 vertebroplasty.  There is again noted  to be a thoracolumbar scoliosis, with convexity to the right.     IMPRESSION:  1. No evidence of obstructive uropathy.  2. Small to moderate amount of fluid seen within the colon, which can be seen  in diarrheal disease.  3. Multiple hepatic cysts again noted, when compared to the previous study  performed 07/03/2024.  4. Tiny rounded hyperdensities are seen within the right renal cortex,  presumably representing hemorrhagic cysts. They are too small to definitely  characterize on this study.  5. Other findings, as described above.    Assessment/plan:  Suspected hemorrhagic cyst on CT    Creatinine stable  at 0.5  No leukocytosis  Afebrile  CT imaging reviewed from 7/8/2024 and 7/3/2024  It is noted that this CT on 7/3/2024 is a contrast study that showed no evidence of hemorrhagic cysts  Her pain is mild and bilateral in nature which is not characteristic of cysts. Her back pain is not likely from a urological cause  No acute  intervention planned   Stable for discharge from  standpoint

## 2024-07-10 NOTE — PLAN OF CARE
Problem: Discharge Planning  Goal: Discharge to home or other facility with appropriate resources  7/9/2024 2359 by Carolyn Romero RN  Outcome: Progressing  7/9/2024 1721 by Dinora Godoy RN  Outcome: Progressing     Problem: Pain  Goal: Verbalizes/displays adequate comfort level or baseline comfort level  7/9/2024 2359 by Carolyn Romero RN  Outcome: Progressing  7/9/2024 1721 by Dinora Godoy RN  Outcome: Progressing     Problem: ABCDS Injury Assessment  Goal: Absence of physical injury  7/9/2024 2359 by Carolyn Romero RN  Outcome: Progressing  7/9/2024 1721 by Dinora Godoy RN  Outcome: Progressing     Problem: Gastrointestinal - Adult  Goal: Minimal or absence of nausea and vomiting  7/9/2024 2359 by Carolyn Romero RN  Outcome: Progressing  7/9/2024 1721 by Dinora Godoy RN  Outcome: Progressing  Goal: Maintains or returns to baseline bowel function  7/9/2024 2359 by Carolyn Romero RN  Outcome: Progressing  7/9/2024 1721 by Dinora Godoy RN  Outcome: Progressing  Goal: Maintains adequate nutritional intake  7/9/2024 2359 by Carolyn Romero RN  Outcome: Progressing  7/9/2024 1721 by Dinora Godoy RN  Outcome: Progressing     Problem: Safety - Adult  Goal: Free from fall injury  7/9/2024 2359 by Carolyn Romero RN  Outcome: Progressing  7/9/2024 1721 by Dinora Godoy RN  Outcome: Progressing     Problem: Chronic Conditions and Co-morbidities  Goal: Patient's chronic conditions and co-morbidity symptoms are monitored and maintained or improved  7/9/2024 2359 by Carolyn Romero RN  Outcome: Progressing  7/9/2024 1721 by Dinora Godoy RN  Outcome: Progressing

## 2024-07-11 VITALS
SYSTOLIC BLOOD PRESSURE: 121 MMHG | HEART RATE: 71 BPM | BODY MASS INDEX: 17.45 KG/M2 | DIASTOLIC BLOOD PRESSURE: 58 MMHG | TEMPERATURE: 97.8 F | HEIGHT: 57 IN | RESPIRATION RATE: 16 BRPM | OXYGEN SATURATION: 97 % | WEIGHT: 80.88 LBS

## 2024-07-11 LAB
ALBUMIN SERPL-MCNC: 4 G/DL (ref 3.5–5.2)
ALP SERPL-CCNC: 82 U/L (ref 35–104)
ALT SERPL-CCNC: 10 U/L (ref 0–32)
ANION GAP SERPL CALCULATED.3IONS-SCNC: 15 MMOL/L (ref 7–16)
AST SERPL-CCNC: 21 U/L (ref 0–31)
BASOPHILS # BLD: 0.06 K/UL (ref 0–0.2)
BASOPHILS NFR BLD: 1 % (ref 0–2)
BILIRUB SERPL-MCNC: 0.9 MG/DL (ref 0–1.2)
BUN SERPL-MCNC: 9 MG/DL (ref 6–23)
CALCIUM SERPL-MCNC: 9.2 MG/DL (ref 8.6–10.2)
CHLORIDE SERPL-SCNC: 100 MMOL/L (ref 98–107)
CO2 SERPL-SCNC: 25 MMOL/L (ref 22–29)
CREAT SERPL-MCNC: 0.6 MG/DL (ref 0.5–1)
EOSINOPHIL # BLD: 0.3 K/UL (ref 0.05–0.5)
EOSINOPHILS RELATIVE PERCENT: 5 % (ref 0–6)
ERYTHROCYTE [DISTWIDTH] IN BLOOD BY AUTOMATED COUNT: 12.1 % (ref 11.5–15)
GFR, ESTIMATED: 89 ML/MIN/1.73M2
GLUCOSE SERPL-MCNC: 109 MG/DL (ref 74–99)
HCT VFR BLD AUTO: 41.1 % (ref 34–48)
HGB BLD-MCNC: 14.5 G/DL (ref 11.5–15.5)
IMM GRANULOCYTES # BLD AUTO: 0.03 K/UL (ref 0–0.58)
IMM GRANULOCYTES NFR BLD: 1 % (ref 0–5)
LYMPHOCYTES NFR BLD: 1.08 K/UL (ref 1.5–4)
LYMPHOCYTES RELATIVE PERCENT: 17 % (ref 20–42)
MCH RBC QN AUTO: 33.3 PG (ref 26–35)
MCHC RBC AUTO-ENTMCNC: 35.3 G/DL (ref 32–34.5)
MCV RBC AUTO: 94.3 FL (ref 80–99.9)
MONOCYTES NFR BLD: 0.75 K/UL (ref 0.1–0.95)
MONOCYTES NFR BLD: 12 % (ref 2–12)
NEUTROPHILS NFR BLD: 65 % (ref 43–80)
NEUTS SEG NFR BLD: 4.07 K/UL (ref 1.8–7.3)
PLATELET # BLD AUTO: 229 K/UL (ref 130–450)
PMV BLD AUTO: 10.4 FL (ref 7–12)
POTASSIUM SERPL-SCNC: 2.9 MMOL/L (ref 3.5–5)
PROT SERPL-MCNC: 6.8 G/DL (ref 6.4–8.3)
RBC # BLD AUTO: 4.36 M/UL (ref 3.5–5.5)
SODIUM SERPL-SCNC: 140 MMOL/L (ref 132–146)
WBC OTHER # BLD: 6.3 K/UL (ref 4.5–11.5)

## 2024-07-11 PROCEDURE — G0378 HOSPITAL OBSERVATION PER HR: HCPCS

## 2024-07-11 PROCEDURE — 80053 COMPREHEN METABOLIC PANEL: CPT

## 2024-07-11 PROCEDURE — 85025 COMPLETE CBC W/AUTO DIFF WBC: CPT

## 2024-07-11 PROCEDURE — 36415 COLL VENOUS BLD VENIPUNCTURE: CPT

## 2024-07-11 PROCEDURE — 2580000003 HC RX 258: Performed by: INTERNAL MEDICINE

## 2024-07-11 PROCEDURE — 6370000000 HC RX 637 (ALT 250 FOR IP): Performed by: INTERNAL MEDICINE

## 2024-07-11 PROCEDURE — 6370000000 HC RX 637 (ALT 250 FOR IP)

## 2024-07-11 RX ORDER — POTASSIUM CHLORIDE 20 MEQ/1
40 TABLET, EXTENDED RELEASE ORAL EVERY 4 HOURS
Status: COMPLETED | OUTPATIENT
Start: 2024-07-11 | End: 2024-07-11

## 2024-07-11 RX ADMIN — ASPIRIN 81 MG: 81 TABLET, COATED ORAL at 08:23

## 2024-07-11 RX ADMIN — CEFDINIR 300 MG: 300 CAPSULE ORAL at 08:22

## 2024-07-11 RX ADMIN — BUMETANIDE 1 MG: 1 TABLET ORAL at 08:22

## 2024-07-11 RX ADMIN — RANOLAZINE 500 MG: 500 TABLET, EXTENDED RELEASE ORAL at 08:23

## 2024-07-11 RX ADMIN — MEMANTINE HYDROCHLORIDE 5 MG: 5 TABLET ORAL at 08:22

## 2024-07-11 RX ADMIN — POTASSIUM CHLORIDE 40 MEQ: 1500 TABLET, EXTENDED RELEASE ORAL at 13:26

## 2024-07-11 RX ADMIN — SILDENAFIL CITRATE 10 MG: 20 TABLET ORAL at 08:23

## 2024-07-11 RX ADMIN — DOFETILIDE 125 MCG: 0.12 CAPSULE ORAL at 08:23

## 2024-07-11 RX ADMIN — POLYETHYLENE GLYCOL 3350 17 G: 17 POWDER, FOR SOLUTION ORAL at 08:24

## 2024-07-11 RX ADMIN — APIXABAN 2.5 MG: 2.5 TABLET, FILM COATED ORAL at 08:23

## 2024-07-11 RX ADMIN — POTASSIUM CHLORIDE 40 MEQ: 1500 TABLET, EXTENDED RELEASE ORAL at 08:49

## 2024-07-11 RX ADMIN — MAGNESIUM OXIDE 400 MG: 400 TABLET ORAL at 08:22

## 2024-07-11 RX ADMIN — SILDENAFIL CITRATE 10 MG: 20 TABLET ORAL at 13:26

## 2024-07-11 RX ADMIN — MEMANTINE HYDROCHLORIDE 5 MG: 5 TABLET ORAL at 13:26

## 2024-07-11 RX ADMIN — METOPROLOL SUCCINATE 200 MG: 100 TABLET, EXTENDED RELEASE ORAL at 08:22

## 2024-07-11 RX ADMIN — SODIUM CHLORIDE, PRESERVATIVE FREE 10 ML: 5 INJECTION INTRAVENOUS at 08:28

## 2024-07-11 RX ADMIN — ATORVASTATIN CALCIUM 40 MG: 40 TABLET, FILM COATED ORAL at 08:23

## 2024-07-11 RX ADMIN — AMLODIPINE BESYLATE 10 MG: 5 TABLET ORAL at 08:23

## 2024-07-11 ASSESSMENT — PAIN SCALES - PAIN ASSESSMENT IN ADVANCED DEMENTIA (PAINAD)
FACIALEXPRESSION: SMILING OR INEXPRESSIVE
CONSOLABILITY: NO NEED TO CONSOLE
BREATHING: NORMAL
BODYLANGUAGE: RELAXED
TOTALSCORE: 0

## 2024-07-11 ASSESSMENT — PAIN SCALES - WONG BAKER: WONGBAKER_NUMERICALRESPONSE: NO HURT

## 2024-07-11 NOTE — CARE COORDINATION
CM note: Awaiting urology and GI consults.  Per daughter, no needs at discharge.  Pt lives with daughter, she will provide transportation.  Declined offer for C.

## 2024-07-11 NOTE — DISCHARGE INSTRUCTIONS
Your information:  Name: Bobbi Marquis  : 1940    Your instructions:    Discharge home.  Follow up with primary care provider and specialists as directed.    Signs and symptoms to look out for:  Call 911 anytime you think you may need emergency care. For example, call if:    You passed out (lost consciousness).     You pass maroon or very bloody stools.     You vomit blood or what looks like coffee grounds.     You have severe belly pain.   Call your doctor now or seek immediate medical care if:    Your pain gets worse, especially if it becomes focused in one area of your belly.     You have a new or higher fever.     Your stools are black and look like tar, or they have streaks of blood.     You have unexpected vaginal bleeding.     You have symptoms of a urinary tract infection. These may include:  Pain when you urinate.  Urinating more often than usual.  Blood in your urine.     You are dizzy or lightheaded, or you feel like you may faint.   Watch closely for changes in your health, and be sure to contact your doctor if:    You are not getting better as expected.       What to do after you leave the hospital:    Recommended diet: regular diet    Recommended activity: activity as tolerated    The following personal items were collected during your admission and were returned to you:    Belongings  Dental Appliances: Uppers, Lowers  Vision - Corrective Lenses: None  Hearing Aid: None  Clothing: Footwear, Shorts, Shirt, Undergarments  Jewelry: None  Electronic Devices: None  Weapons (Notify Protective Services/Security): None  Home Medications: None  Valuables Given To: Patient  Provide Name(s) of Who Valuable(s) Were Given To: na    Information obtained by:  By signing below, I understand that if any problems occur once I leave the hospital I am to contact Dr. Garcia.  I understand and acknowledge receipt of the instructions indicated above.

## 2024-07-11 NOTE — PLAN OF CARE
Problem: Discharge Planning  Goal: Discharge to home or other facility with appropriate resources  7/11/2024 0932 by Марина Roberts RN  Outcome: Progressing  7/11/2024 0103 by Jason Capellan RN  Outcome: Progressing     Problem: Pain  Goal: Verbalizes/displays adequate comfort level or baseline comfort level  7/11/2024 0932 by Марина Roberts RN  Outcome: Progressing  7/11/2024 0103 by Jason Capellan RN  Outcome: Progressing     Problem: ABCDS Injury Assessment  Goal: Absence of physical injury  7/11/2024 0932 by Марина Roberts RN  Outcome: Progressing  7/11/2024 0103 by Jason Capellan RN  Outcome: Progressing     Problem: Gastrointestinal - Adult  Goal: Minimal or absence of nausea and vomiting  Outcome: Progressing  Goal: Maintains or returns to baseline bowel function  Outcome: Progressing  Goal: Maintains adequate nutritional intake  7/11/2024 0932 by Марина Roberts RN  Outcome: Progressing  7/11/2024 0103 by Jason Capellan RN  Outcome: Progressing

## 2024-07-11 NOTE — PROGRESS NOTES
PROGRESS NOTE  By Kirk Hernandez M.D.    [facility]  Dr. Renetta Douglas M.D.,  Dr. Tonny Shah M.D.,   Dr. Jimmy Dykes D.O.,  Dr. Shaggy Johnson M.D.,  Dr. Param Beal DBossmanO.,          Bobbi Benitez  84 y.o.  female    SUBJECTIVE:  Appears to deny abdominal pain.  According to nursing poor oral intake    OBJECTIVE:    BP (!) 121/58   Pulse 71   Temp 97.8 °F (36.6 °C) (Oral)   Resp 16   Ht 1.448 m (4' 9.01\")   Wt 36.7 kg (80 lb 14 oz)   LMP  (LMP Unknown)   SpO2 97%   BMI 17.50 kg/m²     General: NAD/older adult  female.  Confused  HEENT: EOMI/anicteric sclera  Neck: Appears supple with trachea midline  Chest: Symmetric excursion/nonlabored respirations  Cor: Regular  Abd.: Soft/nontender and nondistended  Extr.:  No significant peripheral edema.  Decreased muscle tone and bulk throughout, consistent with age and condition  Skin: Warm and dry        DATA:     Lab Results   Component Value Date/Time    WBC 6.3 07/11/2024 03:58 AM    RBC 4.36 07/11/2024 03:58 AM    HGB 14.5 07/11/2024 03:58 AM    HCT 41.1 07/11/2024 03:58 AM    MCV 94.3 07/11/2024 03:58 AM    MCH 33.3 07/11/2024 03:58 AM    MCHC 35.3 07/11/2024 03:58 AM    RDW 12.1 07/11/2024 03:58 AM     07/11/2024 03:58 AM    MPV 10.4 07/11/2024 03:58 AM     Lab Results   Component Value Date/Time     07/11/2024 03:58 AM    K 2.9 07/11/2024 03:58 AM    K 5.1 12/31/2020 06:14 PM     07/11/2024 03:58 AM    CO2 25 07/11/2024 03:58 AM    BUN 9 07/11/2024 03:58 AM    CREATININE 0.6 07/11/2024 03:58 AM    CALCIUM 9.2 07/11/2024 03:58 AM    BILITOT 0.9 07/11/2024 03:58 AM    ALKPHOS 82 07/11/2024 03:58 AM    AST 21 07/11/2024 03:58 AM    ALT 10 07/11/2024 03:58 AM     Lab Results   Component Value Date/Time    LIPASE 37 07/03/2024 04:52 AM     No results found for: \"AMYLASE\"      ASSESSMENT/PLAN:  Patient Active Problem List   Diagnosis    Chest pain radiating to jaw    CAD (coronary artery disease), native coronary

## 2024-07-11 NOTE — PROGRESS NOTES
Physician Progress Note      PATIENT:               ABDOULAYE RUFFIN  [facility] #:                  [id number]  :                       1940  ADMIT DATE:       2024 4:41 PM  DISCH DATE:        2024 2:43 PM  RESPONDING  PROVIDER #:        Luke Yi DO          QUERY TEXT:    Patient admitted with abdominal pain. Noted to have \"...Severe malnutrition   (24 1201)...\" per Dietician Consult Note . If possible, please   document in progress notes and discharge summary if you are evaluating and /or   treating any of the following:    The medical record reflects the following:  Risk Factors: Dehydration, decrease in appetite, Dementia, Advanced Age  Clinical Indicators: H&P \"...Diminished appetite and dehydration...\",   Dietician Consult Note  \"...Malnutrition Status:  Severe malnutrition   (24 120)...Context:  Chronic Illness...Findings of the 6 clinical   characteristics of malnutrition: Energy Intake:  Unable to assess...Weight   Loss:  Greater than 10% over 6 months (-11.1% in five months)...Body Fat Loss:    Severe body fat loss Orbital, Triceps, Fat Overlying Ribs...Muscle Mass   Loss:  Severe muscle mass loss Temples (temporalis), Clavicles (pectoralis &   deltoids)...Fluid Accumulation:  No significant fluid accumul  Treatment: IVF, Labs, Dietician Consult, Regular Diet    ASPEN Criteria:    https://aspenjournals.onlinelibrary.zarco.com/doi/full/10.1177/408729331326034  5      Thank you,  HECTOR CristobalN, RN, [facility]  [phone]  Options provided:  -- Protein calorie malnutrition severe  -- Other - I will add my own diagnosis  -- Disagree - Not applicable / Not valid  -- Disagree - Clinically unable to determine / Unknown  -- Refer to Clinical Documentation Reviewer    PROVIDER RESPONSE TEXT:    This patient has severe protein calorie malnutrition.    Query created by: Mookie Yee on 2024 8:05 AM      Electronically signed by:  Luke Yi DO  7/11/2024 6:38 PM

## 2024-07-11 NOTE — PLAN OF CARE
Problem: Discharge Planning  Goal: Discharge to home or other facility with appropriate resources  7/11/2024 0103 by Jason Capellan RN  Outcome: Progressing  7/10/2024 1158 by Dinora Godoy RN  Outcome: Progressing     Problem: Pain  Goal: Verbalizes/displays adequate comfort level or baseline comfort level  7/11/2024 0103 by Jason Capellan RN  Outcome: Progressing  7/10/2024 1158 by Dinora Godoy RN  Outcome: Progressing     Problem: ABCDS Injury Assessment  Goal: Absence of physical injury  7/11/2024 0103 by Jason Capellan RN  Outcome: Progressing  7/10/2024 1158 by Dinora Godoy RN  Outcome: Progressing     Problem: Gastrointestinal - Adult  Goal: Minimal or absence of nausea and vomiting  7/10/2024 1158 by Dinora Godoy RN  Outcome: Progressing  Goal: Maintains or returns to baseline bowel function  7/10/2024 1158 by Dinora Godoy RN  Outcome: Progressing  Goal: Maintains adequate nutritional intake  7/11/2024 0103 by Jason Capellan RN  Outcome: Progressing  7/10/2024 1158 by Dinora Godoy RN  Outcome: Progressing     Problem: Safety - Adult  Goal: Free from fall injury  7/11/2024 0103 by Jason Capellan RN  Outcome: Progressing  7/10/2024 1158 by Dinora Godoy RN  Outcome: Progressing     Problem: Chronic Conditions and Co-morbidities  Goal: Patient's chronic conditions and co-morbidity symptoms are monitored and maintained or improved  7/11/2024 0103 by Jason Capellan RN  Outcome: Progressing  7/10/2024 1158 by Dinora Godoy RN  Outcome: Progressing

## 2024-07-12 ENCOUNTER — CARE COORDINATION (OUTPATIENT)
Dept: CARE COORDINATION | Age: 84
End: 2024-07-12

## 2024-07-12 DIAGNOSIS — R10.9 INTRACTABLE ABDOMINAL PAIN: Primary | ICD-10-CM

## 2024-07-12 PROCEDURE — 1111F DSCHRG MED/CURRENT MED MERGE: CPT | Performed by: FAMILY MEDICINE

## 2024-07-12 RX ORDER — ACETAMINOPHEN 500 MG
1000 TABLET ORAL EVERY 6 HOURS PRN
COMMUNITY

## 2024-07-12 NOTE — DISCHARGE SUMMARY
[address] [address]                            DISCHARGE SUMMARY      PATIENT NAME: ABDOULAYE RUFFIN             : 1940  MED REC NO: [id number]                        ROOM: 0314  ACCOUNT NO: [id number]                       ADMIT DATE: 2024  PROVIDER: Luke Garcia DO      ADMITTING DIAGNOSIS:  Belly pain.    FINAL DISCHARGE DIAGNOSIS:  Abdominal pain, undetermined etiology, possible psychosomatic versus that of hemorrhagic cyst on the right with associated gastroenteritis.    SECONDARY DISCHARGE DIAGNOSES:    1. Ambulatory dysfunction in the setting of back pain and hip pain.  2. History of hepatic cyst.  3. Underlying dementia.  4. Coronary artery disease with history of coronary artery bypass grafting.  5. Chronic diastolic congestive heart failure.  6. Persistent atrial fibrillation, on Eliquis.  7. Valvular heart disease.  8. Severe tricuspid regurgitation.  9. Gastroesophageal reflux disease.  10. Hyperlipidemia.  11. Essential hypertension.    COMPLICATION:  No complication.    OPERATION:  No operation.    CONSULTATIONS OBTAINED:  [facility] along with Urology.  No OMT was given.    ADDITIONAL ADMITTING PHYSICIAN:  Dr. Castro.    CHIEF COMPLAINT AND HISTORY OF CHIEF COMPLAINT:  This is an 84-year-old white female who was admitted to [facility].  The patient admitted to the hospital on 2024.  The patient was noted to have abdominal pain.  The patient states she does have chronic abdominal pain, has improved, but has come back recently.  The patient does have underlying dementia.  The patient was seen and evaluated in the emergency room.  She was admitted to the hospital at this time.    PAST MEDICAL HISTORY:  Positive for history of coronary artery disease, history of gastroesophageal reflux disease, hyperlipidemia, hypertension, myocardial infarction, migraine, atrial

## 2024-07-12 NOTE — CARE COORDINATION
Care Transitions Note    Initial Call - Call within 2 business days of discharge: Yes    Attempted to reach patient for transitions of care follow up. Unable to reach patient.    Outreach Attempts:   HIPAA compliant voicemail left for patient, first attempt.    -Noted in EMR patient has a PCP ED F/U scheduled for 7/15/24.  CTN will route to PCP front office pool under high priority consideration to change visit type to a TCM/hospital follow up appointment.        Future Appointments         Provider Specialty Dept Phone    7/15/2024 4:15 PM Donte Miller DO Family Medicine [phone]    8/12/2024 1:45 PM Duran Santos MD Geriatric Medicine [phone]                 Jennifer Angulo RN

## 2024-07-12 NOTE — CARE COORDINATION
Care Transitions Note    Initial Call - Call within 2 business days of discharge: Yes    Patient Current Location:  Home: [address] [place] [id number]    Care Transition Nurse spoke with the family, daughter Albertina, (PHI form verified; on file) by telephone to perform post hospital discharge assessment, verified name and  as identifiers. Provided introduction to self, and explanation of the Care Transition Nurse role.     Patient: Bobbi Marquis    Patient : 1940   MRN: [id number]    Reason for Admission: abdominal pain  Discharge Date: 24  RURS: Readmission Risk Score: 13.4      Last Discharge Facility       Date Complaint Diagnosis Description Type Department Provider    24 Abdominal Pain; Flank Pain Abdominal pain, unspecified abdominal l+ocation ... ED to Hosp-Admission (Discharged) (ADMITTED) [facility] 3 SURG Luke Garcia DO; Luis Pederson...            Was this an external facility discharge? No    Additional needs identified to be addressed with provider   No needs identified             Method of communication with provider: none.    Patients top risk factors for readmission: functional cognitive ability, medical condition-abdominal pain, HTN, CHF, pulmonary emphysema, alzheimer's, polypharmacy, and utilization of services    Interventions to address risk factors:   Education: as r/t HTN/diarrhea.  Review of patient management of conditions/medications: complete review of medications with education provided for indications of medications.  Referrals: declined pharmacy referral.    Care Summary Note:   -Patient admitted to [facility] on 24 with symptoms of abdominal pain and diminished appetite. Patient has history of abdominal pain.  -Patient's daughter did not remark of any abdominal pain but remarked her mother was c/o achiness to her lower back with relief obtained by placing heating pad.  Albertina also gave a dose of tylenol prior to bed last night.  CTN learned from patient's

## 2024-07-15 ENCOUNTER — OFFICE VISIT (OUTPATIENT)
Dept: FAMILY MEDICINE CLINIC | Age: 84
End: 2024-07-15
Payer: MEDICARE

## 2024-07-15 VITALS
WEIGHT: 82.2 LBS | OXYGEN SATURATION: 97 % | TEMPERATURE: 98 F | HEART RATE: 89 BPM | RESPIRATION RATE: 16 BRPM | SYSTOLIC BLOOD PRESSURE: 128 MMHG | DIASTOLIC BLOOD PRESSURE: 64 MMHG | HEIGHT: 57 IN | BODY MASS INDEX: 17.74 KG/M2

## 2024-07-15 DIAGNOSIS — M54.42 ACUTE BILATERAL LOW BACK PAIN WITH BILATERAL SCIATICA: Primary | ICD-10-CM

## 2024-07-15 DIAGNOSIS — E87.6 HYPOKALEMIA: ICD-10-CM

## 2024-07-15 DIAGNOSIS — I49.5 SICK SINUS SYNDROME WITH TACHYCARDIA (HCC): ICD-10-CM

## 2024-07-15 DIAGNOSIS — I48.91 ATRIAL FIBRILLATION WITH RVR (HCC): ICD-10-CM

## 2024-07-15 DIAGNOSIS — M54.41 ACUTE BILATERAL LOW BACK PAIN WITH BILATERAL SCIATICA: Primary | ICD-10-CM

## 2024-07-15 DIAGNOSIS — I25.84 CORONARY ARTERY DISEASE DUE TO CALCIFIED CORONARY LESION: ICD-10-CM

## 2024-07-15 DIAGNOSIS — I50.31 ACUTE DIASTOLIC CHF (CONGESTIVE HEART FAILURE) (HCC): ICD-10-CM

## 2024-07-15 DIAGNOSIS — I25.10 CORONARY ARTERY DISEASE DUE TO CALCIFIED CORONARY LESION: ICD-10-CM

## 2024-07-15 DIAGNOSIS — I10 ESSENTIAL HYPERTENSION: ICD-10-CM

## 2024-07-15 PROCEDURE — 3074F SYST BP LT 130 MM HG: CPT | Performed by: FAMILY MEDICINE

## 2024-07-15 PROCEDURE — 99214 OFFICE O/P EST MOD 30 MIN: CPT | Performed by: FAMILY MEDICINE

## 2024-07-15 PROCEDURE — 3078F DIAST BP <80 MM HG: CPT | Performed by: FAMILY MEDICINE

## 2024-07-15 PROCEDURE — 1123F ACP DISCUSS/DSCN MKR DOCD: CPT | Performed by: FAMILY MEDICINE

## 2024-07-15 PROCEDURE — G8400 PT W/DXA NO RESULTS DOC: HCPCS | Performed by: FAMILY MEDICINE

## 2024-07-15 PROCEDURE — 1111F DSCHRG MED/CURRENT MED MERGE: CPT | Performed by: FAMILY MEDICINE

## 2024-07-15 PROCEDURE — 1090F PRES/ABSN URINE INCON ASSESS: CPT | Performed by: FAMILY MEDICINE

## 2024-07-15 PROCEDURE — 1036F TOBACCO NON-USER: CPT | Performed by: FAMILY MEDICINE

## 2024-07-15 PROCEDURE — G8419 CALC BMI OUT NRM PARAM NOF/U: HCPCS | Performed by: FAMILY MEDICINE

## 2024-07-15 PROCEDURE — G8427 DOCREV CUR MEDS BY ELIG CLIN: HCPCS | Performed by: FAMILY MEDICINE

## 2024-07-15 RX ORDER — POTASSIUM CHLORIDE 20 MEQ/1
TABLET, EXTENDED RELEASE ORAL
Qty: 60 TABLET | Refills: 0 | Status: SHIPPED | OUTPATIENT
Start: 2024-07-15

## 2024-07-15 RX ORDER — BUMETANIDE 1 MG/1
1 TABLET ORAL DAILY
Qty: 90 TABLET | Refills: 0 | Status: CANCELLED | OUTPATIENT
Start: 2024-07-15

## 2024-07-15 RX ORDER — LIDOCAINE 50 MG/G
1 PATCH TOPICAL DAILY
Qty: 10 PATCH | Refills: 0 | Status: SHIPPED | OUTPATIENT
Start: 2024-07-15 | End: 2024-07-25

## 2024-07-15 RX ORDER — MEMANTINE HYDROCHLORIDE 5 MG/1
5 TABLET ORAL 3 TIMES DAILY
Qty: 180 TABLET | Refills: 0 | Status: SHIPPED | OUTPATIENT
Start: 2024-07-15

## 2024-07-15 RX ORDER — FOLIC ACID 1 MG/1
1000 TABLET ORAL DAILY
Qty: 90 TABLET | Refills: 0 | Status: SHIPPED | OUTPATIENT
Start: 2024-07-15

## 2024-07-15 RX ORDER — BUMETANIDE 1 MG/1
0.5 TABLET ORAL DAILY
Qty: 90 TABLET | Refills: 0 | Status: SHIPPED | OUTPATIENT
Start: 2024-07-15

## 2024-07-15 RX ORDER — LANOLIN ALCOHOL/MO/W.PET/CERES
400 CREAM (GRAM) TOPICAL DAILY
Qty: 30 TABLET | Refills: 2 | Status: SHIPPED | OUTPATIENT
Start: 2024-07-15

## 2024-07-15 RX ORDER — AMLODIPINE BESYLATE 10 MG/1
10 TABLET ORAL DAILY
Qty: 30 TABLET | Refills: 2 | Status: SHIPPED | OUTPATIENT
Start: 2024-07-15

## 2024-07-15 ASSESSMENT — PATIENT HEALTH QUESTIONNAIRE - PHQ9
SUM OF ALL RESPONSES TO PHQ QUESTIONS 1-9: 0
1. LITTLE INTEREST OR PLEASURE IN DOING THINGS: NOT AT ALL
2. FEELING DOWN, DEPRESSED OR HOPELESS: NOT AT ALL
SUM OF ALL RESPONSES TO PHQ9 QUESTIONS 1 & 2: 0
SUM OF ALL RESPONSES TO PHQ QUESTIONS 1-9: 0

## 2024-07-15 ASSESSMENT — ENCOUNTER SYMPTOMS
PHOTOPHOBIA: 0
COUGH: 0

## 2024-07-15 NOTE — PROGRESS NOTES
Bobbi Marquis (:  1940) is a 84 y.o. female,Established patient, here for evaluation of the following chief complaint(s):  Follow-Up from Hospital ([facility] 24 /Patient experiencing back pain with upper RT chest pain. /Daughter questioning if she should give stool softener daily.  )      Assessment & Plan   ASSESSMENT/PLAN:  1. Acute bilateral low back pain with bilateral sciatica  -     XR THORACIC SPINE (3 VIEWS); Future  -     lidocaine (LIDODERM) 5 %; Place 1 patch onto the skin daily for 10 days 12 hours on, 12 hours off., Disp-10 patch, R-0Normal  2. Essential hypertension  -     amLODIPine (NORVASC) 10 MG tablet; Take 1 tablet by mouth daily, Disp-30 tablet, R-2Normal  3. Atrial fibrillation with RVR ([facility])  4. Acute diastolic CHF (congestive heart failure) ([facility])  -     folic acid (FOLVITE) 1 MG tablet; Take 1 tablet by mouth daily, Disp-90 tablet, R-0Normal  -     bumetanide (BUMEX) 1 MG tablet; Take 0.5 tablets by mouth daily, Disp-90 tablet, R-0Normal  5. Sick sinus syndrome with tachycardia ([facility])  -     magnesium oxide (MAG-OX) 400 (240 Mg) MG tablet; Take 1 tablet by mouth daily, Disp-30 tablet, R-2Normal  6. Hypokalemia  -     potassium chloride (KLOR-CON M) 20 MEQ extended release tablet; TAKE ONE TABLET BY MOUTH TWO TIMES DAILY, Disp-60 tablet, R-0Normal  -     Basic Metabolic Panel; Future  7. Coronary artery disease due to calcified coronary lesion      No follow-ups on file.         Subjective   SUBJECTIVE/OBJECTIVE:  Follow-Up from Hospital ([facility] 24 /Patient experiencing back pain with upper RT chest pain. /Daughter questioning if she should give stool softener daily.  )          Review of Systems   Constitutional:  Negative for diaphoresis.   HENT:  Negative for hearing loss and tinnitus.    Eyes:  Negative for photophobia.   Respiratory:  Negative for cough.    Cardiovascular:  Negative for chest pain and leg swelling.   Genitourinary:  Negative for

## 2024-07-16 LAB
BUN BLDV-MCNC: NORMAL MG/DL
CALCIUM SERPL-MCNC: NORMAL MG/DL
CHLORIDE BLD-SCNC: NORMAL MMOL/L
CO2: NORMAL
CREAT SERPL-MCNC: NORMAL MG/DL
EGFR: NORMAL
GLUCOSE BLD-MCNC: NORMAL MG/DL
POTASSIUM SERPL-SCNC: NORMAL MMOL/L
SODIUM BLD-SCNC: NORMAL MMOL/L

## 2024-07-25 ENCOUNTER — CARE COORDINATION (OUTPATIENT)
Dept: CARE COORDINATION | Age: 84
End: 2024-07-25

## 2024-07-25 NOTE — CARE COORDINATION
Care Transitions Note    Follow Up Call     Attempted to reach patient/daughter for transitions of care follow up.  Unable to reach patient.      First attempt made to contact patient/daughter for Subsequent Care Transition call. CTN left HIPAA compliant message requesting return call.      Outreach Attempts:   HIPAA compliant voicemail left for patient, family, Albertina (dtr).   Attempted to  on HIPAA form.     Care Summary Note:     [facility] 7/16/24 XR Thoracic 3V AP/LA/Swimmers completed  Impression:   Mild degenerative changes  No acute osseous pathology identified.  Mild scoliosis and kyphosis    PCP HFU 7/15/24 completed   - Lidoderm Patch daily x 10 days (12 hrs on, 12 hrs off)   - Decrease Bumex from 1 mg to 0.5 mg daily   - XT Thoracic Spine (3 views) Future    Follow Up Appointment:   Future Appointments         Provider Specialty Dept Phone    8/12/2024 1:45 PM Duran Santos MD Geriatric Medicine [phone]            Plan for follow-up call in 6-10 days based on severity of symptoms and risk factors. Plan for next call: Symptom Check, F/U Appts (PCP, Urology, GI)-review any provider visits if applicable.    Evelin Barth LPN

## 2024-07-27 DIAGNOSIS — I50.31 ACUTE DIASTOLIC CHF (CONGESTIVE HEART FAILURE) (HCC): ICD-10-CM

## 2024-07-29 RX ORDER — SILDENAFIL CITRATE 20 MG/1
TABLET ORAL
Qty: 45 TABLET | Refills: 0 | Status: SHIPPED | OUTPATIENT
Start: 2024-07-29

## 2024-08-07 ENCOUNTER — CARE COORDINATION (OUTPATIENT)
Dept: CARE COORDINATION | Age: 84
End: 2024-08-07

## 2024-08-07 NOTE — CARE COORDINATION
Care Transitions Note    Follow Up Call     Attempted to reach patient for transitions of care follow up.  Unable to reach patient.      Outreach Attempts:   HIPAA compliant voicemail left for family, daughter Albertina.     Care Summary Note: Attempted to contact patient/daughter Albertina for transitions call. Left VM requesting call back. CTN sign off if no return call received.     Follow Up Appointment:       Plan for follow-up call in 2-5 days based on severity of symptoms and risk factors. Plan for next call: ymptom management-check lower back pain. Any return of abdominal pain?     Jaylyn Murphy RN

## 2024-08-12 DIAGNOSIS — I50.31 ACUTE DIASTOLIC CHF (CONGESTIVE HEART FAILURE) (HCC): ICD-10-CM

## 2024-08-13 RX ORDER — FOLIC ACID 1 MG/1
1000 TABLET ORAL DAILY
Qty: 90 TABLET | Refills: 0 | OUTPATIENT
Start: 2024-08-13

## 2024-09-16 DIAGNOSIS — I25.10 CORONARY ARTERY DISEASE DUE TO CALCIFIED CORONARY LESION: ICD-10-CM

## 2024-09-16 DIAGNOSIS — I25.84 CORONARY ARTERY DISEASE DUE TO CALCIFIED CORONARY LESION: ICD-10-CM

## 2024-09-16 DIAGNOSIS — I48.91 ATRIAL FIBRILLATION WITH RVR (HCC): ICD-10-CM

## 2024-09-16 DIAGNOSIS — K21.9 GASTROESOPHAGEAL REFLUX DISEASE WITHOUT ESOPHAGITIS: ICD-10-CM

## 2024-09-17 RX ORDER — DOFETILIDE 0.12 MG/1
125 CAPSULE ORAL 2 TIMES DAILY
Qty: 90 CAPSULE | Refills: 0 | Status: SHIPPED | OUTPATIENT
Start: 2024-09-17

## 2024-09-17 RX ORDER — OMEPRAZOLE 40 MG/1
CAPSULE, DELAYED RELEASE ORAL
Qty: 90 CAPSULE | Refills: 0 | Status: SHIPPED | OUTPATIENT
Start: 2024-09-17

## 2024-09-17 RX ORDER — RANOLAZINE 500 MG/1
500 TABLET, EXTENDED RELEASE ORAL 2 TIMES DAILY
Qty: 180 TABLET | Refills: 0 | Status: SHIPPED | OUTPATIENT
Start: 2024-09-17

## 2024-10-06 DIAGNOSIS — I48.91 ATRIAL FIBRILLATION WITH RVR (HCC): ICD-10-CM

## 2024-10-07 DIAGNOSIS — E87.6 HYPOKALEMIA: ICD-10-CM

## 2024-10-07 DIAGNOSIS — I25.810 ATHEROSCLEROSIS OF CORONARY ARTERY BYPASS GRAFT OF NATIVE HEART WITHOUT ANGINA PECTORIS: ICD-10-CM

## 2024-10-07 DIAGNOSIS — I50.31 ACUTE DIASTOLIC CHF (CONGESTIVE HEART FAILURE) (HCC): ICD-10-CM

## 2024-10-08 ENCOUNTER — TELEPHONE (OUTPATIENT)
Dept: FAMILY MEDICINE CLINIC | Age: 84
End: 2024-10-08

## 2024-10-08 RX ORDER — POTASSIUM CHLORIDE 1500 MG/1
TABLET, EXTENDED RELEASE ORAL
Qty: 60 TABLET | Refills: 0 | Status: SHIPPED | OUTPATIENT
Start: 2024-10-08

## 2024-10-08 RX ORDER — ATORVASTATIN CALCIUM 40 MG/1
TABLET, FILM COATED ORAL
Qty: 30 TABLET | Refills: 0 | Status: SHIPPED | OUTPATIENT
Start: 2024-10-08

## 2024-10-08 RX ORDER — SILDENAFIL CITRATE 20 MG/1
TABLET ORAL
Qty: 45 TABLET | Refills: 0 | Status: SHIPPED | OUTPATIENT
Start: 2024-10-08

## 2024-10-08 NOTE — TELEPHONE ENCOUNTER
Prior authorization approved  Payer: [employer] - Medicare Case ID: [id number]    [phone]    [phone]  Note from payer: Request Reference Number: [id number]. SILDENAFIL TAB 20MG is approved through 12/31/2025. Your patient may now fill this prescription and it will be covered.  Approval Details    Authorized from October 8, 2024 to December 31, 2025  Electronic appeal: Not supported

## 2024-11-07 DIAGNOSIS — E87.6 HYPOKALEMIA: ICD-10-CM

## 2024-11-07 DIAGNOSIS — I25.810 ATHEROSCLEROSIS OF CORONARY ARTERY BYPASS GRAFT OF NATIVE HEART WITHOUT ANGINA PECTORIS: ICD-10-CM

## 2024-11-07 DIAGNOSIS — I50.31 ACUTE DIASTOLIC CHF (CONGESTIVE HEART FAILURE) (HCC): ICD-10-CM

## 2024-11-07 DIAGNOSIS — I48.91 ATRIAL FIBRILLATION WITH RVR (HCC): ICD-10-CM

## 2024-11-07 DIAGNOSIS — I49.5 SICK SINUS SYNDROME WITH TACHYCARDIA (HCC): ICD-10-CM

## 2024-11-08 RX ORDER — DOFETILIDE 0.12 MG/1
125 CAPSULE ORAL 2 TIMES DAILY
Qty: 180 CAPSULE | Refills: 0 | Status: SHIPPED | OUTPATIENT
Start: 2024-11-08

## 2024-11-08 RX ORDER — FOLIC ACID 1 MG/1
1000 TABLET ORAL DAILY
Qty: 90 TABLET | Refills: 0 | Status: SHIPPED | OUTPATIENT
Start: 2024-11-08

## 2024-11-08 RX ORDER — SILDENAFIL CITRATE 20 MG/1
TABLET ORAL
Qty: 45 TABLET | Refills: 0 | Status: SHIPPED | OUTPATIENT
Start: 2024-11-08

## 2024-11-08 RX ORDER — LANOLIN ALCOHOL/MO/W.PET/CERES
400 CREAM (GRAM) TOPICAL DAILY
Qty: 90 TABLET | Refills: 0 | Status: SHIPPED | OUTPATIENT
Start: 2024-11-08

## 2024-11-08 RX ORDER — POTASSIUM CHLORIDE 1500 MG/1
TABLET, EXTENDED RELEASE ORAL
Qty: 180 TABLET | Refills: 0 | Status: SHIPPED | OUTPATIENT
Start: 2024-11-08

## 2024-11-08 RX ORDER — ATORVASTATIN CALCIUM 40 MG/1
TABLET, FILM COATED ORAL
Qty: 90 TABLET | Refills: 0 | Status: SHIPPED | OUTPATIENT
Start: 2024-11-08

## 2024-11-08 NOTE — TELEPHONE ENCOUNTER
Requested Prescriptions     Pending Prescriptions Disp Refills    folic acid (FOLVITE) 1 MG tablet 90 tablet 0     Sig: Take 1 tablet by mouth daily    magnesium oxide (MAG-OX) 400 (240 Mg) MG tablet 90 tablet 0     Sig: Take 1 tablet by mouth daily    dofetilide (TIKOSYN) 125 MCG capsule 180 capsule 0     Sig: Take 1 capsule by mouth 2 times daily    apixaban (ELIQUIS) 2.5 MG TABS tablet 180 tablet 0     Sig: TAKE ONE TABLET BY MOUTH TWO TIMES A DAY    atorvastatin (LIPITOR) 40 MG tablet 90 tablet 0     Sig: Take one tablet by mouth every day.    potassium chloride (KLOR-CON M) 20 MEQ extended release tablet 180 tablet 0     Sig: TAKE ONE TABLET BY MOUTH TWO TIMES DAILY    sildenafil (REVATIO) 20 MG tablet 45 tablet 0     Sig: TAKE ONE-HALF TABLET BY MOUTH THREE TIMES DAILY       Next appt is Visit date not found  Last appt was 7/15/2024

## 2024-12-06 DIAGNOSIS — I50.31 ACUTE DIASTOLIC CHF (CONGESTIVE HEART FAILURE) (HCC): ICD-10-CM

## 2024-12-06 RX ORDER — SILDENAFIL CITRATE 20 MG/1
TABLET ORAL
Qty: 45 TABLET | Refills: 0 | Status: SHIPPED | OUTPATIENT
Start: 2024-12-06

## 2024-12-06 NOTE — TELEPHONE ENCOUNTER
Name of Medication(s) Requested:  Requested Prescriptions     Pending Prescriptions Disp Refills    sildenafil (REVATIO) 20 MG tablet [Pharmacy Med Name: Sildenafil Citrate Oral Tablet 20 MG] 45 tablet 0     Sig: TAKE ONE-HALF TABLET BY MOUTH THREE TIMES DAILY.       Medication is on current medication list Yes    Dosage and directions were verified? Yes    Quantity verified: 30 day supply     Pharmacy Verified?  Yes    Last Appointment:  7/15/2024    Future appts:  No future appointments.     (If no appt send self scheduling link. .REFILLAPPT)  Scheduling request sent?     [] Yes  [x] No    Does patient need updated?  [] Yes  [x] No

## 2025-01-01 ENCOUNTER — OUTSIDE SERVICES (OUTPATIENT)
Dept: PRIMARY CARE CLINIC | Age: 85
End: 2025-01-01

## 2025-01-01 ENCOUNTER — HOSPITAL ENCOUNTER (EMERGENCY)
Age: 85
End: 2025-07-13
Attending: STUDENT IN AN ORGANIZED HEALTH CARE EDUCATION/TRAINING PROGRAM
Payer: MEDICARE

## 2025-01-01 DIAGNOSIS — I46.9 CARDIAC ARREST (HCC): Primary | ICD-10-CM

## 2025-01-01 DIAGNOSIS — I48.91 ATRIAL FIBRILLATION WITH RVR (HCC): ICD-10-CM

## 2025-01-01 DIAGNOSIS — E78.2 MIXED HYPERLIPIDEMIA: ICD-10-CM

## 2025-01-01 DIAGNOSIS — I27.21 SECONDARY PULMONARY ARTERIAL HYPERTENSION (HCC): ICD-10-CM

## 2025-01-01 DIAGNOSIS — L03.115 CELLULITIS OF RIGHT LOWER EXTREMITY: Primary | ICD-10-CM

## 2025-01-01 DIAGNOSIS — F03.C4 SEVERE DEMENTIA WITH ANXIETY, UNSPECIFIED DEMENTIA TYPE (HCC): ICD-10-CM

## 2025-01-01 DIAGNOSIS — S32.040A CLOSED COMPRESSION FRACTURE OF L4 LUMBAR VERTEBRA, INITIAL ENCOUNTER (HCC): ICD-10-CM

## 2025-01-01 DIAGNOSIS — I25.10 CORONARY ARTERY DISEASE INVOLVING NATIVE CORONARY ARTERY OF NATIVE HEART WITHOUT ANGINA PECTORIS: ICD-10-CM

## 2025-01-01 DIAGNOSIS — M48.56XG NON-TRAUMATIC COMPRESSION FRACTURE OF L1 LUMBAR VERTEBRA WITH DELAYED HEALING, SUBSEQUENT ENCOUNTER: ICD-10-CM

## 2025-01-01 DIAGNOSIS — I50.31 ACUTE DIASTOLIC CHF (CONGESTIVE HEART FAILURE) (HCC): ICD-10-CM

## 2025-01-01 DIAGNOSIS — I10 ESSENTIAL HYPERTENSION: ICD-10-CM

## 2025-01-01 PROCEDURE — 2580000003 HC RX 258: Performed by: STUDENT IN AN ORGANIZED HEALTH CARE EDUCATION/TRAINING PROGRAM

## 2025-01-01 PROCEDURE — 92950 HEART/LUNG RESUSCITATION CPR: CPT

## 2025-01-01 PROCEDURE — 31500 INSERT EMERGENCY AIRWAY: CPT

## 2025-01-01 PROCEDURE — 6360000002 HC RX W HCPCS: Performed by: STUDENT IN AN ORGANIZED HEALTH CARE EDUCATION/TRAINING PROGRAM

## 2025-01-01 PROCEDURE — 2500000003 HC RX 250 WO HCPCS: Performed by: STUDENT IN AN ORGANIZED HEALTH CARE EDUCATION/TRAINING PROGRAM

## 2025-01-01 PROCEDURE — 99285 EMERGENCY DEPT VISIT HI MDM: CPT

## 2025-01-01 RX ORDER — INDOMETHACIN 25 MG/1
CAPSULE ORAL DAILY PRN
Status: COMPLETED | OUTPATIENT
Start: 2025-01-01 | End: 2025-01-01

## 2025-01-01 RX ORDER — AMIODARONE HYDROCHLORIDE 50 MG/ML
INJECTION, SOLUTION INTRAVENOUS DAILY PRN
Status: COMPLETED | OUTPATIENT
Start: 2025-01-01 | End: 2025-01-01

## 2025-01-01 RX ORDER — EPINEPHRINE IN SOD CHLOR,ISO 1 MG/10 ML
SYRINGE (ML) INTRAVENOUS DAILY PRN
Status: COMPLETED | OUTPATIENT
Start: 2025-01-01 | End: 2025-01-01

## 2025-01-01 RX ORDER — CALCIUM CHLORIDE 100 MG/ML
INJECTION INTRAVENOUS; INTRAVENTRICULAR DAILY PRN
Status: COMPLETED | OUTPATIENT
Start: 2025-01-01 | End: 2025-01-01

## 2025-01-01 RX ORDER — MAGNESIUM SULFATE HEPTAHYDRATE 500 MG/ML
INJECTION, SOLUTION INTRAMUSCULAR; INTRAVENOUS DAILY PRN
Status: COMPLETED | OUTPATIENT
Start: 2025-01-01 | End: 2025-01-01

## 2025-01-01 RX ORDER — SODIUM CHLORIDE 9 MG/ML
INJECTION, SOLUTION INTRAVENOUS CONTINUOUS PRN
Status: COMPLETED | OUTPATIENT
Start: 2025-01-01 | End: 2025-01-01

## 2025-01-01 RX ADMIN — SODIUM CHLORIDE 600 ML: 9 INJECTION, SOLUTION INTRAVENOUS at 17:49

## 2025-01-01 RX ADMIN — AMIODARONE HYDROCHLORIDE 150 MG: 50 INJECTION, SOLUTION INTRAVENOUS at 18:03

## 2025-01-01 RX ADMIN — SODIUM BICARBONATE 50 MEQ: 84 INJECTION, SOLUTION INTRAVENOUS at 17:50

## 2025-01-01 RX ADMIN — Medication 1 MG: at 18:01

## 2025-01-01 RX ADMIN — Medication 1 MG: at 18:04

## 2025-01-01 RX ADMIN — Medication 1 MG: at 18:11

## 2025-01-01 RX ADMIN — MAGNESIUM SULFATE HEPTAHYDRATE 1000 MG: 500 INJECTION, SOLUTION INTRAMUSCULAR; INTRAVENOUS at 17:51

## 2025-01-01 RX ADMIN — Medication 1 MG: at 17:52

## 2025-01-01 RX ADMIN — Medication 1 MG: at 17:56

## 2025-01-01 RX ADMIN — AMIODARONE HYDROCHLORIDE 300 MG: 50 INJECTION, SOLUTION INTRAVENOUS at 17:58

## 2025-01-01 RX ADMIN — Medication 1 MG: at 18:08

## 2025-01-01 RX ADMIN — CALCIUM CHLORIDE 1000 MG: 100 INJECTION, SOLUTION INTRAVENOUS; INTRAVENTRICULAR at 17:49

## 2025-01-01 RX ADMIN — Medication 1 MG: at 17:49

## 2025-01-01 ASSESSMENT — ENCOUNTER SYMPTOMS: SHORTNESS OF BREATH: 1

## 2025-01-06 ENCOUNTER — OFFICE VISIT (OUTPATIENT)
Dept: FAMILY MEDICINE CLINIC | Age: 85
End: 2025-01-06
Payer: MEDICARE

## 2025-01-06 VITALS
BODY MASS INDEX: 19.8 KG/M2 | HEIGHT: 57 IN | TEMPERATURE: 97.4 F | OXYGEN SATURATION: 97 % | SYSTOLIC BLOOD PRESSURE: 102 MMHG | DIASTOLIC BLOOD PRESSURE: 62 MMHG | RESPIRATION RATE: 16 BRPM | WEIGHT: 91.8 LBS | HEART RATE: 62 BPM

## 2025-01-06 DIAGNOSIS — I49.5 SICK SINUS SYNDROME WITH TACHYCARDIA (HCC): ICD-10-CM

## 2025-01-06 DIAGNOSIS — F02.80 ALZHEIMER DISEASE (HCC): ICD-10-CM

## 2025-01-06 DIAGNOSIS — G30.9 ALZHEIMER DISEASE (HCC): ICD-10-CM

## 2025-01-06 DIAGNOSIS — I11.0 HYPERTENSIVE HEART DISEASE WITH HEART FAILURE (HCC): ICD-10-CM

## 2025-01-06 DIAGNOSIS — J44.0 CHRONIC OBSTRUCTIVE PULMONARY DISEASE WITH ACUTE LOWER RESPIRATORY INFECTION (HCC): Primary | ICD-10-CM

## 2025-01-06 PROCEDURE — 99213 OFFICE O/P EST LOW 20 MIN: CPT | Performed by: FAMILY MEDICINE

## 2025-01-06 PROCEDURE — 1090F PRES/ABSN URINE INCON ASSESS: CPT | Performed by: FAMILY MEDICINE

## 2025-01-06 PROCEDURE — 3023F SPIROM DOC REV: CPT | Performed by: FAMILY MEDICINE

## 2025-01-06 PROCEDURE — G8400 PT W/DXA NO RESULTS DOC: HCPCS | Performed by: FAMILY MEDICINE

## 2025-01-06 PROCEDURE — 1123F ACP DISCUSS/DSCN MKR DOCD: CPT | Performed by: FAMILY MEDICINE

## 2025-01-06 PROCEDURE — G8427 DOCREV CUR MEDS BY ELIG CLIN: HCPCS | Performed by: FAMILY MEDICINE

## 2025-01-06 PROCEDURE — 3078F DIAST BP <80 MM HG: CPT | Performed by: FAMILY MEDICINE

## 2025-01-06 PROCEDURE — M1308 PR FLU IMMUNIZE NO ADMIN: HCPCS | Performed by: FAMILY MEDICINE

## 2025-01-06 PROCEDURE — G8420 CALC BMI NORM PARAMETERS: HCPCS | Performed by: FAMILY MEDICINE

## 2025-01-06 PROCEDURE — 1036F TOBACCO NON-USER: CPT | Performed by: FAMILY MEDICINE

## 2025-01-06 PROCEDURE — 3074F SYST BP LT 130 MM HG: CPT | Performed by: FAMILY MEDICINE

## 2025-01-06 PROCEDURE — 1159F MED LIST DOCD IN RCRD: CPT | Performed by: FAMILY MEDICINE

## 2025-01-06 RX ORDER — ALBUTEROL SULFATE AND BUDESONIDE 90; 80 UG/1; UG/1
2 AEROSOL, METERED RESPIRATORY (INHALATION) 4 TIMES DAILY
Qty: 10.7 G | Refills: 1 | Status: SHIPPED
Start: 2025-01-06 | End: 2025-01-09

## 2025-01-06 SDOH — ECONOMIC STABILITY: FOOD INSECURITY: WITHIN THE PAST 12 MONTHS, YOU WORRIED THAT YOUR FOOD WOULD RUN OUT BEFORE YOU GOT MONEY TO BUY MORE.: NEVER TRUE

## 2025-01-06 SDOH — ECONOMIC STABILITY: FOOD INSECURITY: WITHIN THE PAST 12 MONTHS, THE FOOD YOU BOUGHT JUST DIDN'T LAST AND YOU DIDN'T HAVE MONEY TO GET MORE.: NEVER TRUE

## 2025-01-06 SDOH — ECONOMIC STABILITY: INCOME INSECURITY: HOW HARD IS IT FOR YOU TO PAY FOR THE VERY BASICS LIKE FOOD, HOUSING, MEDICAL CARE, AND HEATING?: NOT HARD AT ALL

## 2025-01-06 ASSESSMENT — ENCOUNTER SYMPTOMS: SHORTNESS OF BREATH: 1

## 2025-01-06 ASSESSMENT — PATIENT HEALTH QUESTIONNAIRE - PHQ9
SUM OF ALL RESPONSES TO PHQ QUESTIONS 1-9: 0
SUM OF ALL RESPONSES TO PHQ QUESTIONS 1-9: 0
SUM OF ALL RESPONSES TO PHQ9 QUESTIONS 1 & 2: 0
SUM OF ALL RESPONSES TO PHQ QUESTIONS 1-9: 0
SUM OF ALL RESPONSES TO PHQ QUESTIONS 1-9: 0
2. FEELING DOWN, DEPRESSED OR HOPELESS: NOT AT ALL
1. LITTLE INTEREST OR PLEASURE IN DOING THINGS: NOT AT ALL

## 2025-01-06 NOTE — PROGRESS NOTES
Bobbi Marquis (:  1940) is a 84 y.o. female,Established patient, here for evaluation of the following chief complaint(s):  Shortness of Breath (Having a hard time breathing when walking ) and Hemorrhoids (They are getting worse )      Assessment & Plan   ASSESSMENT/PLAN:  Assessment & Plan  Chronic obstructive pulmonary disease with acute lower respiratory infection (HCC)   Chronic, not at goal (unstable), trying new medicatiion    Orders:    Albuterol-Budesonide (AIRSUPRA) 90-80 MCG/ACT AERO; Inhale 2 puffs into the lungs 4 times daily    [facility]    Hypertensive heart disease with heart failure (HCC)   Chronic, at goal (stable), continue current treatment plan         Alzheimer disease (HCC)   Chronic, at goal (stable), continue current treatment plan         Sick sinus syndrome with tachycardia (HCC)   Chronic, at goal (stable), continue current treatment plan              No follow-ups on file.         Subjective   SUBJECTIVE/OBJECTIVE:  Shortness of Breath (Having a hard time breathing when walking ) and Hemorrhoids (They are getting worse )    Needs to be placed in memory unit    Shortness of Breath    Hemorrhoids        Review of Systems   Respiratory:  Positive for shortness of breath.    Gastrointestinal:  Positive for hemorrhoids.          Objective   /62   Pulse 62   Temp 97.4 °F (36.3 °C)   Resp 16   Ht 1.448 m (4' 9.01\")   Wt 41.6 kg (91 lb 12.8 oz)   LMP  (LMP Unknown)   SpO2 97%   BMI 19.86 kg/m²   Lab Results   Component Value Date    LABA1C 5.1 2019    LABA1C 5.3 2017     Physical Exam  Constitutional:       General: She is not in acute distress.     Appearance: She is well-developed.   Eyes:      General: No scleral icterus.  Neck:      Thyroid: No thyromegaly.      Vascular: No JVD.      Trachea: No tracheal deviation.   Cardiovascular:      Rate and Rhythm: Normal rate and regular rhythm.      Heart sounds:      No gallop.

## 2025-01-08 ENCOUNTER — TELEPHONE (OUTPATIENT)
Dept: FAMILY MEDICINE CLINIC | Age: 85
End: 2025-01-08

## 2025-01-08 DIAGNOSIS — J43.2 CENTRILOBULAR EMPHYSEMA (HCC): Primary | ICD-10-CM

## 2025-01-08 RX ORDER — BUDESONIDE AND FORMOTEROL FUMARATE DIHYDRATE 160; 4.5 UG/1; UG/1
2 AEROSOL RESPIRATORY (INHALATION) 2 TIMES DAILY
Qty: 30.6 G | Refills: 1 | Status: SHIPPED | OUTPATIENT
Start: 2025-01-08

## 2025-01-08 RX ORDER — ALBUTEROL SULFATE 90 UG/1
2 INHALANT RESPIRATORY (INHALATION) 4 TIMES DAILY PRN
Qty: 18 G | Refills: 0 | Status: SHIPPED | OUTPATIENT
Start: 2025-01-08

## 2025-01-08 NOTE — TELEPHONE ENCOUNTER
ASSESSMENT/PLAN:    1. Centrilobular emphysema (HCC)  - budesonide-formoterol (SYMBICORT) 160-4.5 MCG/ACT AERO; Inhale 2 puffs into the lungs 2 times daily  Dispense: 30.6 g; Refill: 1  - albuterol sulfate HFA (VENTOLIN HFA) 108 (90 Base) MCG/ACT inhaler; Inhale 2 puffs into the lungs 4 times daily as needed for Wheezing  Dispense: 18 g; Refill: 0        FOLLOW-UP:  bridger sent separately,,,djf

## 2025-01-08 NOTE — TELEPHONE ENCOUNTER
Airsupra is to expensive to pickup. Patient daughter questioning if there is a different inhaler that can be used and sent to pharmacy. Patient also experiencing back pain, daughter unsure if this could be another UTI. Daughter mentioned she has frequent UTI diagnosis. Daughter was requesting a medication to help relieve these symptoms. Would you like patient to be seen and have a urine test preformed?

## 2025-01-09 ENCOUNTER — OFFICE VISIT (OUTPATIENT)
Dept: FAMILY MEDICINE CLINIC | Age: 85
End: 2025-01-09
Payer: MEDICARE

## 2025-01-09 VITALS
RESPIRATION RATE: 16 BRPM | HEIGHT: 57 IN | SYSTOLIC BLOOD PRESSURE: 102 MMHG | OXYGEN SATURATION: 95 % | BODY MASS INDEX: 20.6 KG/M2 | WEIGHT: 95.5 LBS | TEMPERATURE: 97.6 F | HEART RATE: 56 BPM | DIASTOLIC BLOOD PRESSURE: 60 MMHG

## 2025-01-09 DIAGNOSIS — R82.90 URINE ABNORMALITY: Primary | ICD-10-CM

## 2025-01-09 DIAGNOSIS — N30.00 ACUTE CYSTITIS WITHOUT HEMATURIA: ICD-10-CM

## 2025-01-09 DIAGNOSIS — I50.31 ACUTE DIASTOLIC CHF (CONGESTIVE HEART FAILURE) (HCC): ICD-10-CM

## 2025-01-09 DIAGNOSIS — E87.6 HYPOKALEMIA: ICD-10-CM

## 2025-01-09 LAB
BILIRUBIN, POC: NORMAL
BLOOD URINE, POC: NORMAL
CLARITY, POC: NORMAL
COLOR, POC: NORMAL
GLUCOSE URINE, POC: NORMAL MG/DL
KETONES, POC: NORMAL MG/DL
LEUKOCYTE EST, POC: NORMAL
NITRITE, POC: NORMAL
PH, POC: 6
PROTEIN, POC: 100 MG/DL
SPECIFIC GRAVITY, POC: >=1.03
UROBILINOGEN, POC: 1 MG/DL

## 2025-01-09 PROCEDURE — 1123F ACP DISCUSS/DSCN MKR DOCD: CPT | Performed by: NURSE PRACTITIONER

## 2025-01-09 PROCEDURE — 1159F MED LIST DOCD IN RCRD: CPT | Performed by: NURSE PRACTITIONER

## 2025-01-09 PROCEDURE — G8427 DOCREV CUR MEDS BY ELIG CLIN: HCPCS | Performed by: NURSE PRACTITIONER

## 2025-01-09 PROCEDURE — 1036F TOBACCO NON-USER: CPT | Performed by: NURSE PRACTITIONER

## 2025-01-09 PROCEDURE — 99214 OFFICE O/P EST MOD 30 MIN: CPT | Performed by: NURSE PRACTITIONER

## 2025-01-09 PROCEDURE — G8400 PT W/DXA NO RESULTS DOC: HCPCS | Performed by: NURSE PRACTITIONER

## 2025-01-09 PROCEDURE — G8420 CALC BMI NORM PARAMETERS: HCPCS | Performed by: NURSE PRACTITIONER

## 2025-01-09 PROCEDURE — 81002 URINALYSIS NONAUTO W/O SCOPE: CPT | Performed by: NURSE PRACTITIONER

## 2025-01-09 PROCEDURE — 3078F DIAST BP <80 MM HG: CPT | Performed by: NURSE PRACTITIONER

## 2025-01-09 PROCEDURE — 1090F PRES/ABSN URINE INCON ASSESS: CPT | Performed by: NURSE PRACTITIONER

## 2025-01-09 PROCEDURE — 1160F RVW MEDS BY RX/DR IN RCRD: CPT | Performed by: NURSE PRACTITIONER

## 2025-01-09 PROCEDURE — 3074F SYST BP LT 130 MM HG: CPT | Performed by: NURSE PRACTITIONER

## 2025-01-09 RX ORDER — POTASSIUM CHLORIDE 1500 MG/1
TABLET, EXTENDED RELEASE ORAL
Qty: 180 TABLET | Refills: 0 | Status: SHIPPED
Start: 2025-01-09

## 2025-01-09 RX ORDER — BUMETANIDE 1 MG/1
TABLET ORAL
Qty: 90 TABLET | Refills: 0 | Status: SHIPPED
Start: 2025-01-09

## 2025-01-09 RX ORDER — CEFDINIR 300 MG/1
300 CAPSULE ORAL 2 TIMES DAILY
Qty: 14 CAPSULE | Refills: 0 | Status: SHIPPED | OUTPATIENT
Start: 2025-01-09 | End: 2025-01-16

## 2025-01-09 SDOH — ECONOMIC STABILITY: FOOD INSECURITY: WITHIN THE PAST 12 MONTHS, YOU WORRIED THAT YOUR FOOD WOULD RUN OUT BEFORE YOU GOT MONEY TO BUY MORE.: NEVER TRUE

## 2025-01-09 SDOH — ECONOMIC STABILITY: FOOD INSECURITY: WITHIN THE PAST 12 MONTHS, THE FOOD YOU BOUGHT JUST DIDN'T LAST AND YOU DIDN'T HAVE MONEY TO GET MORE.: NEVER TRUE

## 2025-01-09 ASSESSMENT — PATIENT HEALTH QUESTIONNAIRE - PHQ9
2. FEELING DOWN, DEPRESSED OR HOPELESS: NOT AT ALL
SUM OF ALL RESPONSES TO PHQ QUESTIONS 1-9: 0
SUM OF ALL RESPONSES TO PHQ9 QUESTIONS 1 & 2: 0
1. LITTLE INTEREST OR PLEASURE IN DOING THINGS: NOT AT ALL
SUM OF ALL RESPONSES TO PHQ QUESTIONS 1-9: 0

## 2025-01-09 ASSESSMENT — ENCOUNTER SYMPTOMS
CONSTIPATION: 0
NAUSEA: 0
WHEEZING: 0
VOMITING: 0
DIARRHEA: 0
COUGH: 0
BACK PAIN: 1
SHORTNESS OF BREATH: 1

## 2025-01-09 NOTE — PROGRESS NOTES
weight gain of 2 to 3 pounds in 24 hours. She should also take potassium 20 mEq once daily, with an additional dose on days when Bumex is administered.    3. Atrial fibrillation.  She is currently on Eliquis 2.5 mg twice a day for atrial fibrillation and history of clots. She should continue this medication as prescribed.    4. Wheezing.  She has a history of wheezing and is currently using an albuterol inhaler as needed. She is advised to continue using the inhaler as prescribed and to monitor her symptoms. If her wheezing persists or worsens, further evaluation may be necessary.    PROCEDURE  The patient underwent surgical intervention for a tailbone fracture approximately 3.5 to 4 years ago.    1. Urine abnormality  -     POCT Urinalysis no Micro  -     Culture, Urine  2. Acute diastolic CHF (congestive heart failure) (HCC)  -     bumetanide (BUMEX) 1 MG tablet; Half tab daily PRN for weight gain of 2-3 pounds in 24 hours, Disp-90 tablet, R-0Adjust Sig  3. Hypokalemia  -     potassium chloride (KLOR-CON M) 20 MEQ extended release tablet; Take one tab by mouth daily, take second dose on days when taking bumex, Disp-180 tablet, R-0Adjust Sig  4. Acute cystitis without hematuria  -     cefdinir (OMNICEF) 300 MG capsule; Take 1 capsule by mouth 2 times daily for 7 days, Disp-14 capsule, R-0Normal  -  Reviewed side effects of medication and patient verbalizes understanding.   -  Advised to call back directly if there are further questions, or if these symptoms fail to improve as anticipated or worsen.   -  patient daughter was at visit, did return with urine sample from home for culture    Return if symptoms worsen or fail to improve.         Subjective   SUBJECTIVE/OBJECTIVE:  History of Present Illness  The patient presents for evaluation of lower back pain, dehydration, atrial fibrillation, and wheezing.    She has been experiencing lower back pain since the evening of her last visit, which has persisted through

## 2025-01-11 LAB
CULTURE: ABNORMAL
SPECIMEN DESCRIPTION: ABNORMAL

## 2025-01-17 ENCOUNTER — TELEPHONE (OUTPATIENT)
Dept: FAMILY MEDICINE CLINIC | Age: 85
End: 2025-01-17

## 2025-01-17 DIAGNOSIS — N30.00 ACUTE CYSTITIS WITHOUT HEMATURIA: ICD-10-CM

## 2025-01-17 RX ORDER — CEFDINIR 300 MG/1
300 CAPSULE ORAL 2 TIMES DAILY
Qty: 6 CAPSULE | Refills: 0 | Status: SHIPPED | OUTPATIENT
Start: 2025-01-17 | End: 2025-01-20

## 2025-01-17 NOTE — TELEPHONE ENCOUNTER
Patient daughter called this morning. She mentioned her mother is still experiencing the urinary symptoms. The daughter also mentioned that there was mention about if her symptoms still persisted that a week long extra of ATX could be sent in.

## 2025-01-23 DIAGNOSIS — I50.31 ACUTE DIASTOLIC CHF (CONGESTIVE HEART FAILURE) (HCC): ICD-10-CM

## 2025-01-24 ENCOUNTER — TELEPHONE (OUTPATIENT)
Dept: FAMILY MEDICINE CLINIC | Age: 85
End: 2025-01-24

## 2025-01-24 RX ORDER — BUMETANIDE 1 MG/1
TABLET ORAL
Qty: 90 TABLET | Refills: 0 | Status: SHIPPED | OUTPATIENT
Start: 2025-01-24

## 2025-01-24 NOTE — TELEPHONE ENCOUNTER
Bobbi finished her antibiotic for her UTI but she's still complaining of pain     What do you recommend ?

## 2025-01-24 NOTE — TELEPHONE ENCOUNTER
Name of Medication(s) Requested:  Requested Prescriptions     Pending Prescriptions Disp Refills    bumetanide (BUMEX) 1 MG tablet 90 tablet 0     Sig: Half tab daily PRN for weight gain of 2-3 pounds in 24 hours       Medication is on current medication list Yes    Dosage and directions were verified? Yes    Quantity verified: 90 day supply     Pharmacy Verified?  Yes    Last Appointment:  1/9/2025    Future appts:  No future appointments.     (If no appt send self scheduling link. .REFILLAPPT)  Scheduling request sent?     [x] Yes  [] No    Does patient need updated?  [] Yes  [x] No

## 2025-01-27 ENCOUNTER — APPOINTMENT (OUTPATIENT)
Dept: GENERAL RADIOLOGY | Age: 85
DRG: 309 | End: 2025-01-27
Payer: MEDICARE

## 2025-01-27 ENCOUNTER — OFFICE VISIT (OUTPATIENT)
Dept: FAMILY MEDICINE CLINIC | Age: 85
End: 2025-01-27
Payer: MEDICARE

## 2025-01-27 ENCOUNTER — HOSPITAL ENCOUNTER (INPATIENT)
Age: 85
LOS: 3 days | Discharge: NURSING, REHAB OR LONG-TERM CARE | DRG: 309 | End: 2025-01-30
Attending: EMERGENCY MEDICINE | Admitting: INTERNAL MEDICINE
Payer: MEDICARE

## 2025-01-27 VITALS
DIASTOLIC BLOOD PRESSURE: 70 MMHG | WEIGHT: 86.5 LBS | TEMPERATURE: 97.4 F | SYSTOLIC BLOOD PRESSURE: 108 MMHG | RESPIRATION RATE: 16 BRPM | BODY MASS INDEX: 18.66 KG/M2 | HEIGHT: 57 IN | OXYGEN SATURATION: 95 % | HEART RATE: 131 BPM

## 2025-01-27 DIAGNOSIS — I50.31 ACUTE DIASTOLIC CHF (CONGESTIVE HEART FAILURE) (HCC): ICD-10-CM

## 2025-01-27 DIAGNOSIS — I48.91 ATRIAL FIBRILLATION WITH RVR (HCC): ICD-10-CM

## 2025-01-27 DIAGNOSIS — I48.0 PAROXYSMAL ATRIAL FIBRILLATION (HCC): ICD-10-CM

## 2025-01-27 DIAGNOSIS — I48.91 ATRIAL FIBRILLATION WITH RVR (HCC): Primary | ICD-10-CM

## 2025-01-27 DIAGNOSIS — R00.0 TACHYCARDIA: Primary | ICD-10-CM

## 2025-01-27 PROBLEM — F03.90 DEMENTIA (HCC): Status: ACTIVE | Noted: 2023-01-04

## 2025-01-27 LAB
ALBUMIN SERPL-MCNC: 3.8 G/DL (ref 3.5–5.2)
ALP SERPL-CCNC: 121 U/L (ref 35–104)
ALT SERPL-CCNC: 11 U/L (ref 0–32)
ANION GAP SERPL CALCULATED.3IONS-SCNC: 8 MMOL/L (ref 7–16)
AST SERPL-CCNC: 20 U/L (ref 0–31)
BASOPHILS # BLD: 0.02 K/UL (ref 0–0.2)
BASOPHILS NFR BLD: 1 % (ref 0–2)
BILIRUB SERPL-MCNC: 0.8 MG/DL (ref 0–1.2)
BILIRUB UR QL STRIP: NEGATIVE
BNP SERPL-MCNC: 1609 PG/ML (ref 0–450)
BUN SERPL-MCNC: 15 MG/DL (ref 6–23)
CALCIUM SERPL-MCNC: 8.8 MG/DL (ref 8.6–10.2)
CHLORIDE SERPL-SCNC: 106 MMOL/L (ref 98–107)
CLARITY UR: CLEAR
CO2 SERPL-SCNC: 31 MMOL/L (ref 22–29)
COLOR UR: YELLOW
CREAT SERPL-MCNC: 0.7 MG/DL (ref 0.5–1)
EOSINOPHIL # BLD: 0.19 K/UL (ref 0.05–0.5)
EOSINOPHILS RELATIVE PERCENT: 5 % (ref 0–6)
EPI CELLS #/AREA URNS HPF: ABNORMAL /HPF
ERYTHROCYTE [DISTWIDTH] IN BLOOD BY AUTOMATED COUNT: 13.2 % (ref 11.5–15)
GFR, ESTIMATED: 86 ML/MIN/1.73M2
GLUCOSE SERPL-MCNC: 129 MG/DL (ref 74–99)
GLUCOSE UR STRIP-MCNC: NEGATIVE MG/DL
HCT VFR BLD AUTO: 38.2 % (ref 34–48)
HGB BLD-MCNC: 11.9 G/DL (ref 11.5–15.5)
HGB UR QL STRIP.AUTO: NEGATIVE
IMM GRANULOCYTES # BLD AUTO: <0.03 K/UL (ref 0–0.58)
IMM GRANULOCYTES NFR BLD: 0 % (ref 0–5)
KETONES UR STRIP-MCNC: NEGATIVE MG/DL
LEUKOCYTE ESTERASE UR QL STRIP: NEGATIVE
LYMPHOCYTES NFR BLD: 0.7 K/UL (ref 1.5–4)
LYMPHOCYTES RELATIVE PERCENT: 17 % (ref 20–42)
MAGNESIUM SERPL-MCNC: 2 MG/DL (ref 1.6–2.6)
MCH RBC QN AUTO: 30.7 PG (ref 26–35)
MCHC RBC AUTO-ENTMCNC: 31.2 G/DL (ref 32–34.5)
MCV RBC AUTO: 98.7 FL (ref 80–99.9)
MONOCYTES NFR BLD: 0.61 K/UL (ref 0.1–0.95)
MONOCYTES NFR BLD: 14 % (ref 2–12)
NEUTROPHILS NFR BLD: 64 % (ref 43–80)
NEUTS SEG NFR BLD: 2.71 K/UL (ref 1.8–7.3)
NITRITE UR QL STRIP: POSITIVE
PH UR STRIP: 6 [PH] (ref 5–9)
PLATELET # BLD AUTO: 146 K/UL (ref 130–450)
PMV BLD AUTO: 10.6 FL (ref 7–12)
POTASSIUM SERPL-SCNC: 3.6 MMOL/L (ref 3.5–5)
PROT SERPL-MCNC: 6.5 G/DL (ref 6.4–8.3)
PROT UR STRIP-MCNC: 30 MG/DL
RBC # BLD AUTO: 3.87 M/UL (ref 3.5–5.5)
RBC #/AREA URNS HPF: ABNORMAL /HPF
SODIUM SERPL-SCNC: 145 MMOL/L (ref 132–146)
SP GR UR STRIP: 1.02 (ref 1–1.03)
TROPONIN I SERPL HS-MCNC: 15 NG/L (ref 0–9)
TROPONIN I SERPL HS-MCNC: 16 NG/L (ref 0–9)
UROBILINOGEN UR STRIP-ACNC: 0.2 EU/DL (ref 0–1)
WBC #/AREA URNS HPF: ABNORMAL /HPF
WBC OTHER # BLD: 4.2 K/UL (ref 4.5–11.5)

## 2025-01-27 PROCEDURE — 80053 COMPREHEN METABOLIC PANEL: CPT

## 2025-01-27 PROCEDURE — 2060000000 HC ICU INTERMEDIATE R&B

## 2025-01-27 PROCEDURE — 71046 X-RAY EXAM CHEST 2 VIEWS: CPT

## 2025-01-27 PROCEDURE — 87086 URINE CULTURE/COLONY COUNT: CPT

## 2025-01-27 PROCEDURE — G8400 PT W/DXA NO RESULTS DOC: HCPCS | Performed by: FAMILY MEDICINE

## 2025-01-27 PROCEDURE — 81001 URINALYSIS AUTO W/SCOPE: CPT

## 2025-01-27 PROCEDURE — 99214 OFFICE O/P EST MOD 30 MIN: CPT | Performed by: FAMILY MEDICINE

## 2025-01-27 PROCEDURE — 3078F DIAST BP <80 MM HG: CPT | Performed by: FAMILY MEDICINE

## 2025-01-27 PROCEDURE — 2580000003 HC RX 258: Performed by: EMERGENCY MEDICINE

## 2025-01-27 PROCEDURE — 2500000003 HC RX 250 WO HCPCS: Performed by: EMERGENCY MEDICINE

## 2025-01-27 PROCEDURE — 1036F TOBACCO NON-USER: CPT | Performed by: FAMILY MEDICINE

## 2025-01-27 PROCEDURE — 99285 EMERGENCY DEPT VISIT HI MDM: CPT

## 2025-01-27 PROCEDURE — 85025 COMPLETE CBC W/AUTO DIFF WBC: CPT

## 2025-01-27 PROCEDURE — 1159F MED LIST DOCD IN RCRD: CPT | Performed by: FAMILY MEDICINE

## 2025-01-27 PROCEDURE — 96374 THER/PROPH/DIAG INJ IV PUSH: CPT

## 2025-01-27 PROCEDURE — 6370000000 HC RX 637 (ALT 250 FOR IP): Performed by: INTERNAL MEDICINE

## 2025-01-27 PROCEDURE — 93005 ELECTROCARDIOGRAM TRACING: CPT | Performed by: EMERGENCY MEDICINE

## 2025-01-27 PROCEDURE — 1123F ACP DISCUSS/DSCN MKR DOCD: CPT | Performed by: FAMILY MEDICINE

## 2025-01-27 PROCEDURE — 3074F SYST BP LT 130 MM HG: CPT | Performed by: FAMILY MEDICINE

## 2025-01-27 PROCEDURE — 83880 ASSAY OF NATRIURETIC PEPTIDE: CPT

## 2025-01-27 PROCEDURE — 83735 ASSAY OF MAGNESIUM: CPT

## 2025-01-27 PROCEDURE — G8420 CALC BMI NORM PARAMETERS: HCPCS | Performed by: FAMILY MEDICINE

## 2025-01-27 PROCEDURE — 84484 ASSAY OF TROPONIN QUANT: CPT

## 2025-01-27 PROCEDURE — 1090F PRES/ABSN URINE INCON ASSESS: CPT | Performed by: FAMILY MEDICINE

## 2025-01-27 PROCEDURE — G8427 DOCREV CUR MEDS BY ELIG CLIN: HCPCS | Performed by: FAMILY MEDICINE

## 2025-01-27 RX ORDER — GLUCAGON 1 MG/ML
1 KIT INJECTION PRN
Status: DISCONTINUED | OUTPATIENT
Start: 2025-01-27 | End: 2025-01-30 | Stop reason: HOSPADM

## 2025-01-27 RX ORDER — 0.9 % SODIUM CHLORIDE 0.9 %
500 INTRAVENOUS SOLUTION INTRAVENOUS ONCE
Status: DISCONTINUED | OUTPATIENT
Start: 2025-01-27 | End: 2025-01-30 | Stop reason: HOSPADM

## 2025-01-27 RX ORDER — POTASSIUM CHLORIDE 1500 MG/1
20 TABLET, EXTENDED RELEASE ORAL
Status: DISCONTINUED | OUTPATIENT
Start: 2025-01-28 | End: 2025-01-30 | Stop reason: HOSPADM

## 2025-01-27 RX ORDER — SODIUM CHLORIDE 0.9 % (FLUSH) 0.9 %
5-40 SYRINGE (ML) INJECTION PRN
Status: DISCONTINUED | OUTPATIENT
Start: 2025-01-27 | End: 2025-01-30 | Stop reason: HOSPADM

## 2025-01-27 RX ORDER — DEXTROSE MONOHYDRATE 100 MG/ML
INJECTION, SOLUTION INTRAVENOUS CONTINUOUS PRN
Status: DISCONTINUED | OUTPATIENT
Start: 2025-01-27 | End: 2025-01-30 | Stop reason: HOSPADM

## 2025-01-27 RX ORDER — ATORVASTATIN CALCIUM 40 MG/1
40 TABLET, FILM COATED ORAL DAILY
Status: DISCONTINUED | OUTPATIENT
Start: 2025-01-27 | End: 2025-01-30 | Stop reason: HOSPADM

## 2025-01-27 RX ORDER — METOPROLOL SUCCINATE 100 MG/1
200 TABLET, EXTENDED RELEASE ORAL DAILY
Status: DISCONTINUED | OUTPATIENT
Start: 2025-01-28 | End: 2025-01-30 | Stop reason: HOSPADM

## 2025-01-27 RX ORDER — ASPIRIN 81 MG/1
81 TABLET ORAL DAILY
Status: DISCONTINUED | OUTPATIENT
Start: 2025-01-27 | End: 2025-01-30 | Stop reason: HOSPADM

## 2025-01-27 RX ORDER — DOFETILIDE 0.12 MG/1
125 CAPSULE ORAL 2 TIMES DAILY
Status: DISCONTINUED | OUTPATIENT
Start: 2025-01-27 | End: 2025-01-28

## 2025-01-27 RX ORDER — MAGNESIUM OXIDE 400 MG/1
400 TABLET ORAL DAILY
Status: DISCONTINUED | OUTPATIENT
Start: 2025-01-27 | End: 2025-01-30 | Stop reason: HOSPADM

## 2025-01-27 RX ORDER — SODIUM CHLORIDE 9 MG/ML
INJECTION, SOLUTION INTRAVENOUS PRN
Status: DISCONTINUED | OUTPATIENT
Start: 2025-01-27 | End: 2025-01-30 | Stop reason: HOSPADM

## 2025-01-27 RX ORDER — ONDANSETRON 2 MG/ML
4 INJECTION INTRAMUSCULAR; INTRAVENOUS EVERY 6 HOURS PRN
Status: DISCONTINUED | OUTPATIENT
Start: 2025-01-27 | End: 2025-01-30 | Stop reason: HOSPADM

## 2025-01-27 RX ORDER — POTASSIUM CHLORIDE 7.45 MG/ML
10 INJECTION INTRAVENOUS PRN
Status: DISCONTINUED | OUTPATIENT
Start: 2025-01-27 | End: 2025-01-30 | Stop reason: HOSPADM

## 2025-01-27 RX ORDER — SODIUM CHLORIDE 0.9 % (FLUSH) 0.9 %
5-40 SYRINGE (ML) INJECTION EVERY 12 HOURS SCHEDULED
Status: DISCONTINUED | OUTPATIENT
Start: 2025-01-27 | End: 2025-01-30 | Stop reason: HOSPADM

## 2025-01-27 RX ORDER — POLYETHYLENE GLYCOL 3350 17 G/17G
17 POWDER, FOR SOLUTION ORAL DAILY PRN
Status: DISCONTINUED | OUTPATIENT
Start: 2025-01-27 | End: 2025-01-30 | Stop reason: HOSPADM

## 2025-01-27 RX ORDER — DILTIAZEM HYDROCHLORIDE 5 MG/ML
10 INJECTION INTRAVENOUS ONCE
Status: COMPLETED | OUTPATIENT
Start: 2025-01-27 | End: 2025-01-27

## 2025-01-27 RX ORDER — ONDANSETRON 4 MG/1
4 TABLET, ORALLY DISINTEGRATING ORAL EVERY 8 HOURS PRN
Status: DISCONTINUED | OUTPATIENT
Start: 2025-01-27 | End: 2025-01-30 | Stop reason: HOSPADM

## 2025-01-27 RX ORDER — ENOXAPARIN SODIUM 100 MG/ML
30 INJECTION SUBCUTANEOUS DAILY
Status: DISCONTINUED | OUTPATIENT
Start: 2025-01-27 | End: 2025-01-27

## 2025-01-27 RX ORDER — SILDENAFIL CITRATE 20 MG/1
10 TABLET ORAL 3 TIMES DAILY
Status: DISCONTINUED | OUTPATIENT
Start: 2025-01-27 | End: 2025-01-30 | Stop reason: HOSPADM

## 2025-01-27 RX ORDER — SILDENAFIL CITRATE 20 MG/1
TABLET ORAL
Qty: 45 TABLET | Refills: 0 | Status: SHIPPED | OUTPATIENT
Start: 2025-01-27

## 2025-01-27 RX ORDER — MAGNESIUM SULFATE IN WATER 40 MG/ML
2000 INJECTION, SOLUTION INTRAVENOUS PRN
Status: DISCONTINUED | OUTPATIENT
Start: 2025-01-27 | End: 2025-01-30 | Stop reason: HOSPADM

## 2025-01-27 RX ORDER — SILDENAFIL CITRATE 20 MG/1
30 TABLET ORAL 3 TIMES DAILY
Status: DISCONTINUED | OUTPATIENT
Start: 2025-01-27 | End: 2025-01-27

## 2025-01-27 RX ORDER — METOPROLOL TARTRATE 1 MG/ML
5 INJECTION, SOLUTION INTRAVENOUS ONCE
Status: COMPLETED | OUTPATIENT
Start: 2025-01-27 | End: 2025-01-27

## 2025-01-27 RX ORDER — ACETAMINOPHEN 325 MG/1
650 TABLET ORAL EVERY 6 HOURS PRN
Status: DISCONTINUED | OUTPATIENT
Start: 2025-01-27 | End: 2025-01-30 | Stop reason: HOSPADM

## 2025-01-27 RX ORDER — POTASSIUM CHLORIDE 1500 MG/1
40 TABLET, EXTENDED RELEASE ORAL PRN
Status: DISCONTINUED | OUTPATIENT
Start: 2025-01-27 | End: 2025-01-30 | Stop reason: HOSPADM

## 2025-01-27 RX ORDER — ACETAMINOPHEN 650 MG/1
650 SUPPOSITORY RECTAL EVERY 6 HOURS PRN
Status: DISCONTINUED | OUTPATIENT
Start: 2025-01-27 | End: 2025-01-30 | Stop reason: HOSPADM

## 2025-01-27 RX ADMIN — METOPROLOL TARTRATE 5 MG: 1 INJECTION, SOLUTION INTRAVENOUS at 19:04

## 2025-01-27 RX ADMIN — ATORVASTATIN CALCIUM 40 MG: 40 TABLET, FILM COATED ORAL at 23:02

## 2025-01-27 RX ADMIN — DOFETILIDE 125 MCG: 0.12 CAPSULE ORAL at 23:27

## 2025-01-27 RX ADMIN — ASPIRIN 81 MG: 81 TABLET, COATED ORAL at 23:03

## 2025-01-27 RX ADMIN — DILTIAZEM HYDROCHLORIDE 10 MG: 5 INJECTION, SOLUTION INTRAVENOUS at 21:05

## 2025-01-27 RX ADMIN — MAGNESIUM OXIDE 400 MG: 400 TABLET ORAL at 23:03

## 2025-01-27 RX ADMIN — DILTIAZEM HYDROCHLORIDE 5 MG/HR: 5 INJECTION, SOLUTION INTRAVENOUS at 21:07

## 2025-01-27 RX ADMIN — APIXABAN 2.5 MG: 2.5 TABLET, FILM COATED ORAL at 23:03

## 2025-01-27 RX ADMIN — SILDENAFIL CITRATE 10 MG: 20 TABLET ORAL at 23:03

## 2025-01-27 ASSESSMENT — ENCOUNTER SYMPTOMS
NAUSEA: 0
CHEST TIGHTNESS: 0
DIARRHEA: 0
SHORTNESS OF BREATH: 0
BLOOD IN STOOL: 0
VOMITING: 0
SHORTNESS OF BREATH: 1
CONSTIPATION: 0
COUGH: 0
ABDOMINAL PAIN: 0
WHEEZING: 0

## 2025-01-27 ASSESSMENT — PATIENT HEALTH QUESTIONNAIRE - PHQ9: DEPRESSION UNABLE TO ASSESS: FUNCTIONAL CAPACITY MOTIVATION LIMITS ACCURACY

## 2025-01-27 ASSESSMENT — LIFESTYLE VARIABLES
HOW MANY STANDARD DRINKS CONTAINING ALCOHOL DO YOU HAVE ON A TYPICAL DAY: PATIENT DOES NOT DRINK
HOW OFTEN DO YOU HAVE A DRINK CONTAINING ALCOHOL: NEVER

## 2025-01-27 ASSESSMENT — PAIN SCALES - GENERAL: PAINLEVEL_OUTOF10: 0

## 2025-01-27 NOTE — ASSESSMENT & PLAN NOTE
New, not at goal (unstable), worsening with shortness breath and tachycardia    Orders:    sildenafil (REVATIO) 20 MG tablet; TAKE ONE-HALF TABLET BY MOUTH THREE TIMES DAILY

## 2025-01-27 NOTE — PROGRESS NOTES
Bobbi Marquis (:  1940) is a 84 y.o. female,Established patient, here for evaluation of the following chief complaint(s):  Frequent/Recurrent UTI (Daughter unsure if still experiencing UTI./Patient unable to void at this time for sample to be tested. ), Hip Pain (Bilateral hip pain with pain across lower back), and Shortness of Breath      Assessment & Plan   ASSESSMENT/PLAN:  Assessment & Plan  Acute diastolic CHF (congestive heart failure) (HCC)   New, not at goal (unstable),  worsening with shortness breath and tachycardia    Orders:    sildenafil (REVATIO) 20 MG tablet; TAKE ONE-HALF TABLET BY MOUTH THREE TIMES DAILY    Atrial fibrillation with RVR (HCC)   Chronic, at goal (stable), admit              No follow-ups on file.         Subjective   SUBJECTIVE/OBJECTIVE:  Frequent/Recurrent UTI (Daughter unsure if still experiencing UTI./Patient unable to void at this time for sample to be tested. ), Hip Pain (Bilateral hip pain with pain across lower back), and Shortness of Breath      Frequent/Recurrent UTI  Pertinent negatives include no hematuria.   Hip Pain     Shortness of Breath  Pertinent negatives include no abdominal pain, chest pain, ear pain, fever, headaches, rash, vomiting or wheezing.       Review of Systems   Constitutional:  Negative for chills, diaphoresis and fever.   HENT:  Negative for ear discharge, ear pain, hearing loss, nosebleeds and tinnitus.    Respiratory:  Positive for shortness of breath. Negative for cough and wheezing.    Cardiovascular:  Negative for chest pain.   Gastrointestinal:  Negative for abdominal pain, blood in stool, constipation, diarrhea, nausea and vomiting.   Genitourinary:  Negative for dysuria, flank pain and hematuria.   Musculoskeletal:  Negative for myalgias.   Skin:  Negative for rash.   Neurological:  Negative for headaches.   Hematological:  Does not bruise/bleed easily.   Psychiatric/Behavioral:  Negative for hallucinations and suicidal ideas.

## 2025-01-27 NOTE — ED PROVIDER NOTES
84-year-old female presenting from the family doctor's office with concern about tachycardia.  She reportedly has a history of atrial fibrillation and her heart rate was elevated.  She sent in for evaluation.  She has no symptoms or complaints otherwise.  Was being treated for urine infection before, they were going to recheck the urine today, which was the point of the visit at her family doctor.  However, patient was unable to urinate at that time  They initially did a set of vitals, found her to be tachycardic and sent her into the ED.         History reviewed. No pertinent family history.  Past Surgical History:   Procedure Laterality Date    CARDIAC CATHETERIZATION      2002    CHOLECYSTECTOMY      COLONOSCOPY  4/16/13    DR CAMERON     CORONARY ARTERY BYPASS GRAFT      CABG x 4 in 2000    ECHO COMPL W DOP COLOR FLOW  2/27/2012         ECHO COMPL W DOP COLOR FLOW  3/25/2013         SPINE SURGERY N/A 10/14/2020    T12, L5 KYPHOPLASTY, EPIDURAL INJECTION performed by Víctor Josue MD at [facility] [place]       Review of Systems   Respiratory:  Negative for chest tightness and shortness of breath.    Cardiovascular:  Positive for palpitations.        Physical Exam  Constitutional:       General: She is not in acute distress.     Appearance: She is well-developed.   HENT:      Head: Normocephalic and atraumatic.   Eyes:      Conjunctiva/sclera: Conjunctivae normal.      Pupils: Pupils are equal, round, and reactive to light.   Neck:      Thyroid: No thyromegaly.   Cardiovascular:      Rate and Rhythm: Tachycardia present. Rhythm irregular.   Pulmonary:      Effort: Pulmonary effort is normal. No respiratory distress.      Breath sounds: Normal breath sounds.   Abdominal:      General: There is no distension.      Palpations: Abdomen is soft.      Tenderness: There is no abdominal tenderness. There is no guarding or rebound.   Musculoskeletal:         General: No tenderness. Normal range of motion.      Cervical back:

## 2025-01-28 PROBLEM — R00.0 TACHYCARDIA: Status: ACTIVE | Noted: 2025-01-28

## 2025-01-28 LAB
ALBUMIN SERPL-MCNC: 3.9 G/DL (ref 3.5–5.2)
ALP SERPL-CCNC: 116 U/L (ref 35–104)
ALT SERPL-CCNC: 12 U/L (ref 0–32)
ANION GAP SERPL CALCULATED.3IONS-SCNC: 11 MMOL/L (ref 7–16)
AST SERPL-CCNC: 23 U/L (ref 0–31)
B PARAP IS1001 DNA NPH QL NAA+NON-PROBE: NOT DETECTED
B PERT DNA SPEC QL NAA+PROBE: NOT DETECTED
BASOPHILS # BLD: 0.03 K/UL (ref 0–0.2)
BASOPHILS NFR BLD: 1 % (ref 0–2)
BILIRUB SERPL-MCNC: 1.4 MG/DL (ref 0–1.2)
BUN SERPL-MCNC: 20 MG/DL (ref 6–23)
C PNEUM DNA NPH QL NAA+NON-PROBE: NOT DETECTED
CALCIUM SERPL-MCNC: 9.2 MG/DL (ref 8.6–10.2)
CHLORIDE SERPL-SCNC: 105 MMOL/L (ref 98–107)
CO2 SERPL-SCNC: 27 MMOL/L (ref 22–29)
CREAT SERPL-MCNC: 0.6 MG/DL (ref 0.5–1)
EKG ATRIAL RATE: 182 BPM
EKG Q-T INTERVAL: 300 MS
EKG QRS DURATION: 76 MS
EKG QTC CALCULATION (BAZETT): 404 MS
EKG R AXIS: 89 DEGREES
EKG T AXIS: -128 DEGREES
EKG VENTRICULAR RATE: 109 BPM
EOSINOPHIL # BLD: 0.16 K/UL (ref 0.05–0.5)
EOSINOPHILS RELATIVE PERCENT: 3 % (ref 0–6)
ERYTHROCYTE [DISTWIDTH] IN BLOOD BY AUTOMATED COUNT: 13.2 % (ref 11.5–15)
FLUAV RNA NPH QL NAA+NON-PROBE: NOT DETECTED
FLUBV RNA NPH QL NAA+NON-PROBE: NOT DETECTED
GFR, ESTIMATED: 88 ML/MIN/1.73M2
GLUCOSE SERPL-MCNC: 101 MG/DL (ref 74–99)
HADV DNA NPH QL NAA+NON-PROBE: NOT DETECTED
HCOV 229E RNA NPH QL NAA+NON-PROBE: NOT DETECTED
HCOV HKU1 RNA NPH QL NAA+NON-PROBE: NOT DETECTED
HCOV NL63 RNA NPH QL NAA+NON-PROBE: NOT DETECTED
HCOV OC43 RNA NPH QL NAA+NON-PROBE: NOT DETECTED
HCT VFR BLD AUTO: 40.4 % (ref 34–48)
HGB BLD-MCNC: 12.7 G/DL (ref 11.5–15.5)
HMPV RNA NPH QL NAA+NON-PROBE: NOT DETECTED
HPIV1 RNA NPH QL NAA+NON-PROBE: NOT DETECTED
HPIV2 RNA NPH QL NAA+NON-PROBE: NOT DETECTED
HPIV3 RNA NPH QL NAA+NON-PROBE: NOT DETECTED
HPIV4 RNA NPH QL NAA+NON-PROBE: NOT DETECTED
IMM GRANULOCYTES # BLD AUTO: <0.03 K/UL (ref 0–0.58)
IMM GRANULOCYTES NFR BLD: 0 % (ref 0–5)
LYMPHOCYTES NFR BLD: 1.4 K/UL (ref 1.5–4)
LYMPHOCYTES RELATIVE PERCENT: 30 % (ref 20–42)
M PNEUMO DNA NPH QL NAA+NON-PROBE: NOT DETECTED
MAGNESIUM SERPL-MCNC: 2 MG/DL (ref 1.6–2.6)
MCH RBC QN AUTO: 30.5 PG (ref 26–35)
MCHC RBC AUTO-ENTMCNC: 31.4 G/DL (ref 32–34.5)
MCV RBC AUTO: 97.1 FL (ref 80–99.9)
MONOCYTES NFR BLD: 0.68 K/UL (ref 0.1–0.95)
MONOCYTES NFR BLD: 14 % (ref 2–12)
NEUTROPHILS NFR BLD: 52 % (ref 43–80)
NEUTS SEG NFR BLD: 2.46 K/UL (ref 1.8–7.3)
PHOSPHATE SERPL-MCNC: 3.3 MG/DL (ref 2.5–4.5)
PLATELET, FLUORESCENCE: 138 K/UL (ref 130–450)
PMV BLD AUTO: 10.9 FL (ref 7–12)
POTASSIUM SERPL-SCNC: 3.7 MMOL/L (ref 3.5–5)
PROCALCITONIN SERPL-MCNC: 0.04 NG/ML (ref 0–0.08)
PROT SERPL-MCNC: 6.9 G/DL (ref 6.4–8.3)
RBC # BLD AUTO: 4.16 M/UL (ref 3.5–5.5)
RSV RNA NPH QL NAA+NON-PROBE: NOT DETECTED
RV+EV RNA NPH QL NAA+NON-PROBE: NOT DETECTED
SARS-COV-2 RNA NPH QL NAA+NON-PROBE: NOT DETECTED
SODIUM SERPL-SCNC: 143 MMOL/L (ref 132–146)
SPECIMEN DESCRIPTION: NORMAL
T4 FREE SERPL-MCNC: 1.5 NG/DL (ref 0.9–1.7)
TSH SERPL DL<=0.05 MIU/L-ACNC: 1.81 UIU/ML (ref 0.27–4.2)
WBC OTHER # BLD: 4.7 K/UL (ref 4.5–11.5)

## 2025-01-28 PROCEDURE — 2060000000 HC ICU INTERMEDIATE R&B

## 2025-01-28 PROCEDURE — 36415 COLL VENOUS BLD VENIPUNCTURE: CPT

## 2025-01-28 PROCEDURE — 83735 ASSAY OF MAGNESIUM: CPT

## 2025-01-28 PROCEDURE — 84145 PROCALCITONIN (PCT): CPT

## 2025-01-28 PROCEDURE — 2500000003 HC RX 250 WO HCPCS: Performed by: INTERNAL MEDICINE

## 2025-01-28 PROCEDURE — 6370000000 HC RX 637 (ALT 250 FOR IP): Performed by: INTERNAL MEDICINE

## 2025-01-28 PROCEDURE — 84443 ASSAY THYROID STIM HORMONE: CPT

## 2025-01-28 PROCEDURE — 85025 COMPLETE CBC W/AUTO DIFF WBC: CPT

## 2025-01-28 PROCEDURE — 84439 ASSAY OF FREE THYROXINE: CPT

## 2025-01-28 PROCEDURE — APPSS60 APP SPLIT SHARED TIME 46-60 MINUTES: Performed by: PHYSICIAN ASSISTANT

## 2025-01-28 PROCEDURE — 93010 ELECTROCARDIOGRAM REPORT: CPT | Performed by: INTERNAL MEDICINE

## 2025-01-28 PROCEDURE — 0202U NFCT DS 22 TRGT SARS-COV-2: CPT

## 2025-01-28 PROCEDURE — 99223 1ST HOSP IP/OBS HIGH 75: CPT | Performed by: INTERNAL MEDICINE

## 2025-01-28 PROCEDURE — 80053 COMPREHEN METABOLIC PANEL: CPT

## 2025-01-28 PROCEDURE — 84100 ASSAY OF PHOSPHORUS: CPT

## 2025-01-28 RX ADMIN — Medication 10 ML: at 21:25

## 2025-01-28 RX ADMIN — METOPROLOL SUCCINATE 200 MG: 100 TABLET, EXTENDED RELEASE ORAL at 08:30

## 2025-01-28 RX ADMIN — ACETAMINOPHEN 650 MG: 325 TABLET ORAL at 16:44

## 2025-01-28 RX ADMIN — ATORVASTATIN CALCIUM 40 MG: 40 TABLET, FILM COATED ORAL at 08:31

## 2025-01-28 RX ADMIN — SILDENAFIL CITRATE 10 MG: 20 TABLET ORAL at 21:25

## 2025-01-28 RX ADMIN — SILDENAFIL CITRATE 10 MG: 20 TABLET ORAL at 08:31

## 2025-01-28 RX ADMIN — APIXABAN 2.5 MG: 2.5 TABLET, FILM COATED ORAL at 21:25

## 2025-01-28 RX ADMIN — SILDENAFIL CITRATE 10 MG: 20 TABLET ORAL at 16:44

## 2025-01-28 RX ADMIN — DOFETILIDE 125 MCG: 0.12 CAPSULE ORAL at 12:21

## 2025-01-28 RX ADMIN — ASPIRIN 81 MG: 81 TABLET, COATED ORAL at 08:30

## 2025-01-28 RX ADMIN — APIXABAN 2.5 MG: 2.5 TABLET, FILM COATED ORAL at 08:31

## 2025-01-28 RX ADMIN — POTASSIUM CHLORIDE 20 MEQ: 1500 TABLET, EXTENDED RELEASE ORAL at 08:31

## 2025-01-28 RX ADMIN — ACETAMINOPHEN 650 MG: 325 TABLET ORAL at 08:31

## 2025-01-28 RX ADMIN — MAGNESIUM OXIDE 400 MG: 400 TABLET ORAL at 08:31

## 2025-01-28 ASSESSMENT — PAIN DESCRIPTION - LOCATION
LOCATION: BACK
LOCATION: LEG

## 2025-01-28 ASSESSMENT — PAIN SCALES - GENERAL
PAINLEVEL_OUTOF10: 3
PAINLEVEL_OUTOF10: 4

## 2025-01-28 ASSESSMENT — PAIN DESCRIPTION - PAIN TYPE: TYPE: CHRONIC PAIN

## 2025-01-28 ASSESSMENT — PAIN DESCRIPTION - DESCRIPTORS: DESCRIPTORS: ACHING

## 2025-01-28 ASSESSMENT — PAIN DESCRIPTION - FREQUENCY: FREQUENCY: CONTINUOUS

## 2025-01-28 ASSESSMENT — PAIN DESCRIPTION - ONSET: ONSET: ON-GOING

## 2025-01-28 ASSESSMENT — PAIN - FUNCTIONAL ASSESSMENT: PAIN_FUNCTIONAL_ASSESSMENT: PREVENTS OR INTERFERES SOME ACTIVE ACTIVITIES AND ADLS

## 2025-01-28 ASSESSMENT — PAIN DESCRIPTION - ORIENTATION: ORIENTATION: RIGHT;LEFT

## 2025-01-28 NOTE — H&P
Department of Internal Medicine  History and Physical    PCP: Donte Miller DO    Admitting Physician: Dr. Garcia/Matthew  Consultants:   Date of Service: 1/27/2025    CHIEF COMPLAINT:  tachycardia    HISTORY OF PRESENT ILLNESS:    Patient is 84-year-old stated to the ED with elevated heart rate.  Patient is mildly agitated and confused throughout her stay in the ED.  She is able to answer questions however she appears to have some dementia and as a result we will to give a proper HPI.  According to ED and PCP note patient with transient shortness of breath.  She has also been having bilateral hip pain and pain across lower back.  When asked she does not have any complaints.  She is aware of the year the location.  However she denies any symptoms of chest pain or lightheadedness.  She also denies of breath.  She states she takes her medications as prescribed.  She lives by herself..    PAST MEDICAL Hx:  Past Medical History:   Diagnosis Date    Atrial fibrillation (HCC)     CAD (coronary artery disease)     Dementia (HCC)     GERD (gastroesophageal reflux disease)     Hemorrhoids     History of echocardiogram 03/14/2017    EF 62%    History of echocardiogram 05/01/2018    EF 62%    Hyperlipidemia     Hypertension     MI (myocardial infarction) (HCC)     Migraines     New onset atrial flutter (HCC) 03/13/2017    Normal cardiac stress test 05/2010       PAST SURGICAL Hx:   Past Surgical History:   Procedure Laterality Date    CARDIAC CATHETERIZATION      2002    CHOLECYSTECTOMY      COLONOSCOPY  4/16/13    DR CAMERON     CORONARY ARTERY BYPASS GRAFT      CABG x 4 in 2000    ECHO COMPL W DOP COLOR FLOW  2/27/2012         ECHO COMPL W DOP COLOR FLOW  3/25/2013         SPINE SURGERY N/A 10/14/2020    T12, L5 KYPHOPLASTY, EPIDURAL INJECTION performed by Víctor Josue MD at [facility] [place]       FAMILY Hx:  History reviewed. No pertinent family history.    HOME MEDICATIONS:  Prior to Admission medications    Medication Sig

## 2025-01-28 NOTE — PLAN OF CARE
Problem: Chronic Conditions and Co-morbidities  Goal: Patient's chronic conditions and co-morbidity symptoms are monitored and maintained or improved  1/28/2025 1451 by Katlin Valdez RN  Outcome: Progressing  Flowsheets  Taken 1/28/2025 1451  Care Plan - Patient's Chronic Conditions and Co-Morbidity Symptoms are Monitored and Maintained or Improved: Monitor and assess patient's chronic conditions and comorbid symptoms for stability, deterioration, or improvement  Taken 1/28/2025 0800  Care Plan - Patient's Chronic Conditions and Co-Morbidity Symptoms are Monitored and Maintained or Improved: Monitor and assess patient's chronic conditions and comorbid symptoms for stability, deterioration, or improvement     Problem: Discharge Planning  Goal: Discharge to home or other facility with appropriate resources  1/28/2025 1451 by Katlin Valdez RN  Outcome: Progressing  Flowsheets  Taken 1/28/2025 1451  Discharge to home or other facility with appropriate resources: Identify barriers to discharge with patient and caregiver  Taken 1/28/2025 0800  Discharge to home or other facility with appropriate resources: Identify barriers to discharge with patient and caregiver     Problem: Safety - Adult  Goal: Free from fall injury  1/28/2025 1451 by Katlin Valdez RN  Outcome: Progressing  Flowsheets (Taken 1/28/2025 1451)  Free From Fall Injury: Instruct family/caregiver on patient safety     Problem: ABCDS Injury Assessment  Goal: Absence of physical injury  1/28/2025 1451 by Katlin Valdez RN  Outcome: Progressing  Flowsheets (Taken 1/28/2025 1451)  Absence of Physical Injury: Implement safety measures based on patient assessment     Problem: Pain  Goal: Verbalizes/displays adequate comfort level or baseline comfort level  Outcome: Progressing  Flowsheets (Taken 1/28/2025 1451)  Verbalizes/displays adequate comfort level or baseline comfort level:   Encourage patient to monitor pain and request assistance   Assess pain

## 2025-01-28 NOTE — ACP (ADVANCE CARE PLANNING)
Advance Care Planning   Healthcare Decision Maker:    Primary Decision Maker: FrancisAlbertina - Child - [phone]    Click here to complete Healthcare Decision Makers including selection of the Healthcare Decision Maker Relationship (ie \"Primary\").  Albertina dtr states she is POA for Healthcare, request copy be brought to be placed in medical record.

## 2025-01-28 NOTE — CARE COORDINATION
Case Management Assessment  Initial Evaluation    Date/Time of Evaluation: 1/28/2025 12:21 PM  Assessment Completed by: VIANCA Montoya    If patient is discharged prior to next notation, then this note serves as note for discharge by case management.    Patient Name: Bobbi Marquis                   YOB: 1940  Diagnosis: Tachycardia [R00.0]  Atrial fibrillation with rapid ventricular response (HCC) [I48.91]  Atrial fibrillation with RVR (HCC) [I48.91]                   Date / Time: 1/27/2025  1:24 PM    Patient Admission Status: Inpatient   Readmission Risk (Low < 19, Mod (19-27), High > 27): Readmission Risk Score: 12.7    Current PCP: Donte Miller, DO  PCP verified by CM? Yes    Chart Reviewed: Yes      History Provided by: Child/Family  Patient Orientation: Alert and Oriented, Person, Place    Patient Cognition: Dementia / Early Alzheimer's    Hospitalization in the last 30 days (Readmission):  Yes    If yes, Readmission Assessment in  Navigator will be completed.    Advance Directives:      Code Status: Full Code   Patient's Primary Decision Maker is: Legal Next of Kin (dtr states that she has POA for Healthcare.)    Primary Decision Maker: Albertina Blanco - Child - [phone]    Discharge Planning:    Patient lives with: Alone Type of Home: House  Primary Care Giver: Family  Patient Support Systems include: Children, Family Members   Current Financial resources:    Current community resources:    Current services prior to admission: None            Current DME:              Type of Home Care services:  None    ADLS  Prior functional level: Assistance with the following:, Bathing, Shopping, Housework, Cooking  Current functional level: Bathing, Assistance with the following:, Shopping, Housework, Cooking    PT AM-PAC:   /24  OT AM-PAC:   /24    Family can provide assistance at DC: No  Would you like Case Management to discuss the discharge plan with any other family

## 2025-01-29 ENCOUNTER — APPOINTMENT (OUTPATIENT)
Age: 85
DRG: 309 | End: 2025-01-29
Attending: INTERNAL MEDICINE
Payer: MEDICARE

## 2025-01-29 LAB
ALBUMIN SERPL-MCNC: 3.7 G/DL (ref 3.5–5.2)
ALP SERPL-CCNC: 115 U/L (ref 35–104)
ALT SERPL-CCNC: 12 U/L (ref 0–32)
ANION GAP SERPL CALCULATED.3IONS-SCNC: 10 MMOL/L (ref 7–16)
AST SERPL-CCNC: 25 U/L (ref 0–31)
BASOPHILS # BLD: 0.03 K/UL (ref 0–0.2)
BASOPHILS NFR BLD: 1 % (ref 0–2)
BILIRUB SERPL-MCNC: 1.4 MG/DL (ref 0–1.2)
BUN SERPL-MCNC: 28 MG/DL (ref 6–23)
CALCIUM SERPL-MCNC: 9.3 MG/DL (ref 8.6–10.2)
CHLORIDE SERPL-SCNC: 105 MMOL/L (ref 98–107)
CO2 SERPL-SCNC: 25 MMOL/L (ref 22–29)
CREAT SERPL-MCNC: 0.6 MG/DL (ref 0.5–1)
EOSINOPHIL # BLD: 0.12 K/UL (ref 0.05–0.5)
EOSINOPHILS RELATIVE PERCENT: 3 % (ref 0–6)
ERYTHROCYTE [DISTWIDTH] IN BLOOD BY AUTOMATED COUNT: 13.3 % (ref 11.5–15)
GFR, ESTIMATED: 88 ML/MIN/1.73M2
GLUCOSE SERPL-MCNC: 91 MG/DL (ref 74–99)
HCT VFR BLD AUTO: 38.9 % (ref 34–48)
HGB BLD-MCNC: 12.5 G/DL (ref 11.5–15.5)
IMM GRANULOCYTES # BLD AUTO: <0.03 K/UL (ref 0–0.58)
IMM GRANULOCYTES NFR BLD: 0 % (ref 0–5)
LYMPHOCYTES NFR BLD: 0.72 K/UL (ref 1.5–4)
LYMPHOCYTES RELATIVE PERCENT: 17 % (ref 20–42)
MCH RBC QN AUTO: 30.7 PG (ref 26–35)
MCHC RBC AUTO-ENTMCNC: 32.1 G/DL (ref 32–34.5)
MCV RBC AUTO: 95.6 FL (ref 80–99.9)
MICROORGANISM SPEC CULT: ABNORMAL
MICROORGANISM SPEC CULT: ABNORMAL
MONOCYTES NFR BLD: 0.41 K/UL (ref 0.1–0.95)
MONOCYTES NFR BLD: 10 % (ref 2–12)
NEUTROPHILS NFR BLD: 69 % (ref 43–80)
NEUTS SEG NFR BLD: 2.88 K/UL (ref 1.8–7.3)
PHOSPHATE SERPL-MCNC: 3.3 MG/DL (ref 2.5–4.5)
PLATELET # BLD AUTO: 136 K/UL (ref 130–450)
PMV BLD AUTO: 11 FL (ref 7–12)
POTASSIUM SERPL-SCNC: 4.3 MMOL/L (ref 3.5–5)
PROT SERPL-MCNC: 6.6 G/DL (ref 6.4–8.3)
RBC # BLD AUTO: 4.07 M/UL (ref 3.5–5.5)
SERVICE CMNT-IMP: ABNORMAL
SODIUM SERPL-SCNC: 140 MMOL/L (ref 132–146)
SPECIMEN DESCRIPTION: ABNORMAL
WBC OTHER # BLD: 4.2 K/UL (ref 4.5–11.5)

## 2025-01-29 PROCEDURE — 84100 ASSAY OF PHOSPHORUS: CPT

## 2025-01-29 PROCEDURE — 80053 COMPREHEN METABOLIC PANEL: CPT

## 2025-01-29 PROCEDURE — 36415 COLL VENOUS BLD VENIPUNCTURE: CPT

## 2025-01-29 PROCEDURE — 6370000000 HC RX 637 (ALT 250 FOR IP): Performed by: INTERNAL MEDICINE

## 2025-01-29 PROCEDURE — 97161 PT EVAL LOW COMPLEX 20 MIN: CPT | Performed by: PHYSICAL THERAPIST

## 2025-01-29 PROCEDURE — 2500000003 HC RX 250 WO HCPCS: Performed by: INTERNAL MEDICINE

## 2025-01-29 PROCEDURE — 99233 SBSQ HOSP IP/OBS HIGH 50: CPT | Performed by: INTERNAL MEDICINE

## 2025-01-29 PROCEDURE — 85025 COMPLETE CBC W/AUTO DIFF WBC: CPT

## 2025-01-29 PROCEDURE — 97530 THERAPEUTIC ACTIVITIES: CPT | Performed by: PHYSICAL THERAPIST

## 2025-01-29 PROCEDURE — 2060000000 HC ICU INTERMEDIATE R&B

## 2025-01-29 PROCEDURE — 93306 TTE W/DOPPLER COMPLETE: CPT

## 2025-01-29 RX ORDER — LISINOPRIL 10 MG/1
10 TABLET ORAL DAILY
Status: DISCONTINUED | OUTPATIENT
Start: 2025-01-29 | End: 2025-01-30 | Stop reason: HOSPADM

## 2025-01-29 RX ADMIN — Medication 10 ML: at 10:31

## 2025-01-29 RX ADMIN — APIXABAN 2.5 MG: 2.5 TABLET, FILM COATED ORAL at 10:26

## 2025-01-29 RX ADMIN — SILDENAFIL CITRATE 10 MG: 20 TABLET ORAL at 21:50

## 2025-01-29 RX ADMIN — LISINOPRIL 10 MG: 10 TABLET ORAL at 14:11

## 2025-01-29 RX ADMIN — METOPROLOL SUCCINATE 200 MG: 100 TABLET, EXTENDED RELEASE ORAL at 10:27

## 2025-01-29 RX ADMIN — ATORVASTATIN CALCIUM 40 MG: 40 TABLET, FILM COATED ORAL at 10:26

## 2025-01-29 RX ADMIN — POTASSIUM CHLORIDE 20 MEQ: 1500 TABLET, EXTENDED RELEASE ORAL at 10:26

## 2025-01-29 RX ADMIN — ASPIRIN 81 MG: 81 TABLET, COATED ORAL at 10:26

## 2025-01-29 RX ADMIN — APIXABAN 2.5 MG: 2.5 TABLET, FILM COATED ORAL at 21:50

## 2025-01-29 RX ADMIN — SILDENAFIL CITRATE 10 MG: 20 TABLET ORAL at 14:11

## 2025-01-29 RX ADMIN — MAGNESIUM OXIDE 400 MG: 400 TABLET ORAL at 10:26

## 2025-01-29 RX ADMIN — SILDENAFIL CITRATE 10 MG: 20 TABLET ORAL at 10:27

## 2025-01-29 NOTE — CARE COORDINATION
SOCIAL WORK / DISCHARGE PLANNING:  Sw met with her dtr, Albertina who has toured some SNFs. She chose 1) [facility] 2) [facility]. Referral called to Rae, [facility] rep, will review for acceptance. NO PRECERT needed. If to be skilled, will need 3 day stay. Dtr has applied for medicaid with [employer]. JINA will need signed by physician. HENS form initiated, will need completed prior to dc.     Addendum: 1216pm. Pt accepted to Jess[facility] unit at [facility] at [facility], [address] [phone], fax [phone].         Electronically signed by VIANCA Montoya on 1/29/2025 at 11:43 AM

## 2025-01-30 VITALS
HEART RATE: 60 BPM | HEIGHT: 58 IN | OXYGEN SATURATION: 99 % | RESPIRATION RATE: 18 BRPM | WEIGHT: 90.5 LBS | DIASTOLIC BLOOD PRESSURE: 86 MMHG | TEMPERATURE: 97.2 F | SYSTOLIC BLOOD PRESSURE: 120 MMHG | BODY MASS INDEX: 19 KG/M2

## 2025-01-30 LAB
ALBUMIN SERPL-MCNC: 3.6 G/DL (ref 3.5–5.2)
ALP SERPL-CCNC: 110 U/L (ref 35–104)
ALT SERPL-CCNC: 11 U/L (ref 0–32)
ANION GAP SERPL CALCULATED.3IONS-SCNC: 9 MMOL/L (ref 7–16)
AST SERPL-CCNC: 22 U/L (ref 0–31)
BASOPHILS # BLD: 0.04 K/UL (ref 0–0.2)
BASOPHILS NFR BLD: 1 % (ref 0–2)
BILIRUB SERPL-MCNC: 1.5 MG/DL (ref 0–1.2)
BUN SERPL-MCNC: 30 MG/DL (ref 6–23)
CALCIUM SERPL-MCNC: 8.8 MG/DL (ref 8.6–10.2)
CHLORIDE SERPL-SCNC: 105 MMOL/L (ref 98–107)
CO2 SERPL-SCNC: 27 MMOL/L (ref 22–29)
CREAT SERPL-MCNC: 0.7 MG/DL (ref 0.5–1)
ECHO AO ASC DIAM: 3 CM
ECHO AO ASCENDING AORTA INDEX: 2.31 CM/M2
ECHO AV AREA PEAK VELOCITY: 1.1 CM2
ECHO AV AREA VTI: 1.1 CM2
ECHO AV AREA/BSA PEAK VELOCITY: 0.8 CM2/M2
ECHO AV AREA/BSA VTI: 0.8 CM2/M2
ECHO AV CUSP MM: 1.2 CM
ECHO AV MEAN GRADIENT: 4 MMHG
ECHO AV MEAN VELOCITY: 1 M/S
ECHO AV PEAK GRADIENT: 6 MMHG
ECHO AV PEAK VELOCITY: 1.3 M/S
ECHO AV VELOCITY RATIO: 0.46
ECHO AV VTI: 21.8 CM
ECHO BSA: 1.3 M2
ECHO LA DIAMETER INDEX: 3.62 CM/M2
ECHO LA DIAMETER: 4.7 CM
ECHO LA VOL A-L A2C: 76 ML (ref 22–52)
ECHO LA VOL A-L A4C: 42 ML (ref 22–52)
ECHO LA VOL BP: 57 ML (ref 22–52)
ECHO LA VOL MOD A2C: 72 ML (ref 22–52)
ECHO LA VOL MOD A4C: 41 ML (ref 22–52)
ECHO LA VOL/BSA BIPLANE: 44 ML/M2 (ref 16–34)
ECHO LA VOLUME AREA LENGTH: 60 ML
ECHO LA VOLUME INDEX A-L A2C: 58 ML/M2 (ref 16–34)
ECHO LA VOLUME INDEX A-L A4C: 32 ML/M2 (ref 16–34)
ECHO LA VOLUME INDEX AREA LENGTH: 46 ML/M2 (ref 16–34)
ECHO LA VOLUME INDEX MOD A2C: 55 ML/M2 (ref 16–34)
ECHO LA VOLUME INDEX MOD A4C: 32 ML/M2 (ref 16–34)
ECHO LV EJECTION FRACTION BIPLANE: 60 % (ref 55–100)
ECHO LV FRACTIONAL SHORTENING: 8 % (ref 28–44)
ECHO LV INTERNAL DIMENSION DIASTOLE INDEX: 2.85 CM/M2
ECHO LV INTERNAL DIMENSION DIASTOLIC: 3.7 CM (ref 3.9–5.3)
ECHO LV INTERNAL DIMENSION SYSTOLIC INDEX: 2.62 CM/M2
ECHO LV INTERNAL DIMENSION SYSTOLIC: 3.4 CM
ECHO LV ISOVOLUMETRIC RELAXATION TIME (IVRT): 60.9 MS
ECHO LV IVSD: 1 CM (ref 0.6–0.9)
ECHO LV IVSS: 1.4 CM
ECHO LV MASS 2D: 129.3 G (ref 67–162)
ECHO LV MASS INDEX 2D: 99.5 G/M2 (ref 43–95)
ECHO LV POSTERIOR WALL DIASTOLIC: 1.2 CM (ref 0.6–0.9)
ECHO LV POSTERIOR WALL SYSTOLIC: 1.2 CM
ECHO LV RELATIVE WALL THICKNESS RATIO: 0.65
ECHO LVOT AREA: 2.3 CM2
ECHO LVOT AV VTI INDEX: 0.49
ECHO LVOT DIAM: 1.7 CM
ECHO LVOT MEAN GRADIENT: 1 MMHG
ECHO LVOT PEAK GRADIENT: 1 MMHG
ECHO LVOT PEAK VELOCITY: 0.6 M/S
ECHO LVOT STROKE VOLUME INDEX: 18.5 ML/M2
ECHO LVOT SV: 24 ML
ECHO LVOT VTI: 10.6 CM
ECHO MV AREA PHT: 8.1 CM2
ECHO MV AREA VTI: 1.7 CM2
ECHO MV LVOT VTI INDEX: 1.36
ECHO MV MAX VELOCITY: 1.1 M/S
ECHO MV MEAN GRADIENT: 2 MMHG
ECHO MV MEAN VELOCITY: 0.7 M/S
ECHO MV PEAK GRADIENT: 5 MMHG
ECHO MV PRESSURE HALF TIME (PHT): 27.2 MS
ECHO MV VTI: 14.4 CM
ECHO PV MAX VELOCITY: 0.8 M/S
ECHO PV MEAN GRADIENT: 1 MMHG
ECHO PV MEAN VELOCITY: 0.5 M/S
ECHO PV PEAK GRADIENT: 3 MMHG
ECHO PV VTI: 12.8 CM
ECHO RV INTERNAL DIMENSION: 4.6 CM
ECHO RV LONGITUDINAL DIMENSION: 7.8 CM
ECHO RV MID DIMENSION: 4 CM
ECHO TV REGURGITANT MAX VELOCITY: 2.6 M/S
ECHO TV REGURGITANT PEAK GRADIENT: 27 MMHG
EOSINOPHIL # BLD: 0.13 K/UL (ref 0.05–0.5)
EOSINOPHILS RELATIVE PERCENT: 3 % (ref 0–6)
ERYTHROCYTE [DISTWIDTH] IN BLOOD BY AUTOMATED COUNT: 13.3 % (ref 11.5–15)
GFR, ESTIMATED: 86 ML/MIN/1.73M2
GLUCOSE SERPL-MCNC: 86 MG/DL (ref 74–99)
HCT VFR BLD AUTO: 37.1 % (ref 34–48)
HGB BLD-MCNC: 11.7 G/DL (ref 11.5–15.5)
IMM GRANULOCYTES # BLD AUTO: <0.03 K/UL (ref 0–0.58)
IMM GRANULOCYTES NFR BLD: 0 % (ref 0–5)
LYMPHOCYTES NFR BLD: 0.78 K/UL (ref 1.5–4)
LYMPHOCYTES RELATIVE PERCENT: 17 % (ref 20–42)
MCH RBC QN AUTO: 30.2 PG (ref 26–35)
MCHC RBC AUTO-ENTMCNC: 31.5 G/DL (ref 32–34.5)
MCV RBC AUTO: 95.6 FL (ref 80–99.9)
MONOCYTES NFR BLD: 0.52 K/UL (ref 0.1–0.95)
MONOCYTES NFR BLD: 11 % (ref 2–12)
NEUTROPHILS NFR BLD: 68 % (ref 43–80)
NEUTS SEG NFR BLD: 3.21 K/UL (ref 1.8–7.3)
PHOSPHATE SERPL-MCNC: 3.3 MG/DL (ref 2.5–4.5)
PLATELET # BLD AUTO: 147 K/UL (ref 130–450)
PMV BLD AUTO: 10.6 FL (ref 7–12)
POTASSIUM SERPL-SCNC: 4.5 MMOL/L (ref 3.5–5)
PROT SERPL-MCNC: 6.2 G/DL (ref 6.4–8.3)
RBC # BLD AUTO: 3.88 M/UL (ref 3.5–5.5)
SODIUM SERPL-SCNC: 141 MMOL/L (ref 132–146)
WBC OTHER # BLD: 4.7 K/UL (ref 4.5–11.5)

## 2025-01-30 PROCEDURE — 85025 COMPLETE CBC W/AUTO DIFF WBC: CPT

## 2025-01-30 PROCEDURE — 99233 SBSQ HOSP IP/OBS HIGH 50: CPT | Performed by: INTERNAL MEDICINE

## 2025-01-30 PROCEDURE — 97530 THERAPEUTIC ACTIVITIES: CPT

## 2025-01-30 PROCEDURE — 36415 COLL VENOUS BLD VENIPUNCTURE: CPT

## 2025-01-30 PROCEDURE — 6370000000 HC RX 637 (ALT 250 FOR IP): Performed by: INTERNAL MEDICINE

## 2025-01-30 PROCEDURE — 97165 OT EVAL LOW COMPLEX 30 MIN: CPT

## 2025-01-30 PROCEDURE — 84100 ASSAY OF PHOSPHORUS: CPT

## 2025-01-30 PROCEDURE — 80053 COMPREHEN METABOLIC PANEL: CPT

## 2025-01-30 RX ORDER — METOPROLOL SUCCINATE 200 MG/1
200 TABLET, EXTENDED RELEASE ORAL DAILY
DISCHARGE
Start: 2025-01-31

## 2025-01-30 RX ORDER — BUMETANIDE 1 MG/1
0.5 TABLET ORAL EVERY OTHER DAY
DISCHARGE
Start: 2025-01-30

## 2025-01-30 RX ORDER — LISINOPRIL 10 MG/1
10 TABLET ORAL DAILY
DISCHARGE
Start: 2025-01-31

## 2025-01-30 RX ADMIN — ATORVASTATIN CALCIUM 40 MG: 40 TABLET, FILM COATED ORAL at 08:08

## 2025-01-30 RX ADMIN — SILDENAFIL CITRATE 10 MG: 20 TABLET ORAL at 08:08

## 2025-01-30 RX ADMIN — MAGNESIUM OXIDE 400 MG: 400 TABLET ORAL at 08:08

## 2025-01-30 RX ADMIN — METOPROLOL SUCCINATE 200 MG: 100 TABLET, EXTENDED RELEASE ORAL at 08:07

## 2025-01-30 RX ADMIN — APIXABAN 2.5 MG: 2.5 TABLET, FILM COATED ORAL at 08:08

## 2025-01-30 RX ADMIN — LISINOPRIL 10 MG: 10 TABLET ORAL at 08:08

## 2025-01-30 RX ADMIN — ASPIRIN 81 MG: 81 TABLET, COATED ORAL at 08:07

## 2025-01-30 NOTE — CARE COORDINATION
SOCIAL WORK / DISCHARGE PLANNING:  Pt accepted to [facility] at the [facility], [address] [phone], fax [phone],skilled. NO PRECERT needed. JINA will need signed by physician. HENs form completed. Will discuss with dtr if she would like to transport pt to facility herself.       Addendum: 138pm. Pt cleared for discharge, spoke with dtr, Albertina via phone. She would rather not transport pt and upset her. Dick awaiting response from Rae, [facility] rep if the facility van can transport.     Addendum: 201pm. [facility] Van will provide transport 3pm. [facility] charge aware. Sw notified dtr, Albertina, she will bring pt some clothes.       Electronically signed by VIANCA Montoya on 1/30/2025 at 10:07 AM

## 2025-01-30 NOTE — DISCHARGE INSTR - COC
Continuity of Care Form    Patient Name: Bobbi Marquis   :  1940  MRN:  [id number]    Admit date:  2025  Discharge date:  2025    Code Status Order: Full Code   Advance Directives:   Advance Care Flowsheet Documentation             Admitting Physician:  Luke Garcia DO  PCP: Donte Miller DO    Discharging Nurse: Aurora Potts RN  Discharging Hospital Unit/Room#: 0623/0623-02  Discharging Unit Phone Number: [phone]    Emergency Contact:   Extended Emergency Contact Information  Primary Emergency Contact: Albertina Blanco  Address: [address] [place] [address]  Home Phone: [phone]  Relation: Child    Past Surgical History:  Past Surgical History:   Procedure Laterality Date    CARDIAC CATHETERIZATION          CHOLECYSTECTOMY      COLONOSCOPY  13    DR CAMERON     CORONARY ARTERY BYPASS GRAFT      CABG x 4 in     ECHO COMPL W DOP COLOR FLOW  2012         ECHO COMPL W DOP COLOR FLOW  3/25/2013         SPINE SURGERY N/A 10/14/2020    T12, L5 KYPHOPLASTY, EPIDURAL INJECTION performed by Víctor Josue MD at [facility] [place] [place] OR       Immunization History:   Immunization History   Administered Date(s) Administered    COVID-19, MODERNA BLUE border, Primary or Immunocompromised, (age 12y+), IM, 100 mcg/0.5mL 2021, 2021    Influenza A (X0M6-83) Vaccine PF IM 2009    Influenza, FLUAD, (age 65 y+), IM, Quadv, 0.5mL 10/20/2021, 2022    Influenza, FLUAD, (age 65 y+), IM, Trivalent PF, 0.5mL 2018    Influenza, FLUARIX, FLULAVAL, FLUZONE (age 6 mo+) and AFLURIA, (age 3 y+), Quadv PF, 0.5mL 03/15/2017    Influenza, FLUZONE High Dose, (age 65 y+), IM, Trivalent PF, 0.5mL 10/12/2015, 2017    Pneumococcal, PCV-13, PREVNAR 13, (age 6w+), IM, 0.5mL 03/15/2017    Pneumococcal, PPSV23, PNEUMOVAX 23, (age 2y+), SC/IM, 0.5mL 2016, 2019    Zoster Recombinant (Shingrix) 2019, 2019       Active

## 2025-01-31 NOTE — DISCHARGE SUMMARY
[address] [address]                            DISCHARGE SUMMARY      PATIENT NAME: ABDOULAYE RUFFIN             : 1940  MED REC NO: [id number]                        ROOM: 0623  ACCOUNT NO: [id number]                       ADMIT DATE: 2025  PROVIDER: Luke Garcia DO      ADMISSION DIAGNOSIS:  Atrial fibrillation.    FINAL DISCHARGE DIAGNOSIS:  Atrial fibrillation with rapid ventricular response.    SECONDARY DISCHARGE DIAGNOSES:  Hypertensive urgency with essential hypertension, progressive end-stage dementia, coronary artery disease,  chronic compensated diastolic congestive heart failure, hyperlipidemia.    COMPLICATION:  None.    OPERATION:  None.    CONSULTATIONS OBTAINED:  With Dr. Beckwith, from [facility].  No OMT was given.    ADDITIONAL ADMITTING PHYSICIAN:  Dr. Castro.    CHIEF COMPLAINT AND HISTORY OF CHIEF COMPLAINT:  An 84-year-old [place] female who was admitted to [facility] on 2025.  The patient presented to the hospital here with mildly agitated, confused throughout the stay in the emergency room.  The patient was able to answer questions, but appeared to  have some underlying dementia. The patient presented to the hospital here with atrial fibrillation, rapid ventricular response.  She was admitted under the service of Dr. Garcia and Dr. Castro.    PAST MEDICAL HISTORY:  Positive for history of atrial fibrillation, coronary artery disease, dementia, gastroesophageal reflux disease, hyperlipidemia, hypertension, myocardial infarction, migraines, and prior stress test.    PHYSICAL EXAMINATION:  VITAL SIGNS:  Blood pressure 128/74, pulse 90, respirations 20, O2 saturation was 95%, temperature 97.8.  GENERAL APPEARANCE:  This is an 84-year-old white female who is alert, responsive, oriented to person, not to place and time.  Poor historian.  HEENT: Normal.  NECK: With

## 2025-01-31 NOTE — PROGRESS NOTES
INPATIENT CARDIOLOGY CONSULT     Reason for Consult: AF RVR    Cardiologist: Dr. Beckwith    Requesting Physician: Dr. Garcia    Date of Consultation: 1/29/2025    HISTORY OF PRESENT ILLNESS:   Patient is an 84 year old female new to [facility].  She has a history of following with Dr. Griffiths, as well as [facility] EP Dr. Renee.    Interim:  Denies chest pain or shortness of breath  A-fib rate control has improved    PAST MEDICAL HISTORY:    Severe CAD s/p CABG x 4 (LIMA-LAD, SVG-rPDA, SVG-D1, SVG-OM2) 2000, further details unknown  S/p reported PCI 2008, unknown details  S/p [facility] Dr. Higuera 2012 with occluded bypass grafts (report as noted below)  S/p [facility] 7/2018 with details as noted below and occluded bypass grafts  Per Dr. Griffiths OV 11/2020: \"known occluded grafts with improvement of underlying CAD with medical therapy\"  VHD with history of severe TR  \"She had seen Dr. Wright in the past for work-up of TVR and per the records patient had refused to proceed with surgery. This was discussed with the patient and her daughter and infact they reported that they had not refused but was told by Pulmonary that she wouldn't make it through the surgery due to her lung status. CT surgery reviewed the chart and spoke with the patient's dauhgter about resuming the work up. This time the daughter did refuse and wishes no longer to purse surgical evaluation of the severe TR\" per [facility] documentation 9/23/2019  Pulmonary hypertension, on Revatio  Chronic HFpEF/RHF  Persistent versus permanent AF/Fl, OAC with reduced dose Eliquis  Unclear date of diagnosis, possibly ~2017 with referral to [facility] EP --> initiated on Dronedarone 4/20/2017 Dr. Troy Renee --> discontinued due to hives.  No EP follow up after  Noted to have AF and 4.5 second pause (possible conversion pause, details unclear) 2019 with inpatient transfer to [facility] --> s/p PPM placement and initiation of Tikosyn [facility] EP 9/2019.  No EP follow since, but remains on 
4 Eyes Skin Assessment     NAME:  Bobbi Marquis  YOB: 1940  MEDICAL RECORD NUMBER:  [id number]    The patient is being assessed for  Admission    I agree that at least one RN has performed a thorough Head to Toe Skin Assessment on the patient. ALL assessment sites listed below have been assessed.      Areas assessed by both nurses:    Head, Face, Ears, Shoulders, Back, Chest, Arms, Elbows, Hands, Sacrum. Buttock, Coccyx, Ischium, and Legs. Feet and Heels        Does the Patient have a Wound? No noted wound(s)       Jarvis Prevention initiated by RN: No  Wound Care Orders initiated by RN: No    Pressure Injury (Stage 3,4, Unstageable, DTI, NWPT, and Complex wounds) if present, place Wound referral order by RN under : No    New Ostomies, if present place, Ostomy referral order under : No     Nurse 1 eSignature: Electronically signed by Wing Wiseman RN on 1/27/25 at 10:56 PM EST    **SHARE this note so that the co-signing nurse can place an eSignature**    Nurse 2 eSignature: Electronically signed by Albertina Shankar RN on 1/27/25 at 11:24 pM EST   
Called [facility] for N-N no answer, unable to leave .  
Date:2025  Patient Name: Bobbi Marquis  MRN: [id number]  : 1940  ROOM #: 0623/0623-02    Occupational Therapy order received, chart reviewed and evaluation attempted this date. Patient is unavailable for OT evaluation twice due to off the floor and then working with physical therapy.     Will attempt OT evaluation at a later time. Thank you.   Salazar Vasquez OTR/L #[id number]      
Internal Medicine Progress Note     [facility]     Luke Garcia D.O., OLEG Castro D.O., OLEG Pal D.O.     Allie Merchnat, MSN, APRN, NP-C  Cristian Burr, MSN, APRN-CNP  Abelardo Morejon, MSN, APRN, NP-C  Albertina Ortiz, MSN, APRN-CNP  Tara Choudhary, MSN, APRN, NP-C     Primary Care Physician: Donte Miller DO   Admitting Physician:  Luke Garcia DO  Admission date and time: 1/27/2025  1:24 PM    Room:  [address] [place] [address]  Admitting diagnosis: Tachycardia [R00.0]  Atrial fibrillation with rapid ventricular response (HCC) [I48.91]  Atrial fibrillation with RVR (HCC) [I48.91]    Patient Name: Bobbi Marquis  MRN: [id number]    Date of Service: 1/28/2025     Subjective:  Bobbi is a 84 y.o. female who was seen and examined today,1/28/2025, at the bedside. Bobbi was evaluated in the presence of her daughter.  She presented to the hospital yesterday after outpatient appointment with her primary care physician identified atrial fibrillation with rapid ventricular response.  She was initially placed on a Cardizem infusion but in the setting of delirium on top of dementia, she has since removed her cardiac monitor and IV site.  Her daughter reports increased confusion over the past several months and has been considering skilled nursing facility placement.  The patient currently agrees to have the heart monitor replaced so that we can titrate medications for better heart rate control.  She has absolutely no complaints aside from intermittent palpitations.    Review of System:   Constitutional:   Denies fever or chills, weight loss or gain, fatigue or malaise.  HEENT:   Denies ear pain, sore throat, sinus or eye problems.  Cardiovascular:   Palpitations.  No overt chest pain or pressure.  Respiratory:   Denies shortness of breath, coughing, sputum production, hemoptysis, or wheezing.  Gastrointestinal:   Denies nausea, vomiting, 
Patient became aggressive and pulled IV out and heart monitor off. Patient refusing to put monitor back on. Patient's HR sustaining in the 60-70s and Cardizem drip was being held. Dr. Lowry made aware. He said it was ok to leave the IV site out and heart monitor off for now.   
Patient became increasingly agitated and uncooperative during exam. Refusing to finish echo, exam ended FDC through.  
Physical Therapy  Physical Therapy Initial Evaluation/Plan of Care    Room #:  0623/0623-02  Patient Name: Bobbi Marquis  YOB: 1940  MRN: [id number]    Date of Service: 1/29/2025     Tentative placement recommendation: Subacute unless patient meets goals then Home Health Physical Therapy with 24/7 assist/supervision  Equipment recommendation: To be determined      Evaluating Physical Therapist: Alejandro Shelton, PT, DPT #[id number]      Specific Provider Orders/Date/Referring Provider :     01/28/25 1115    PT eval and treat  Start:  01/28/25 1115,   End:  01/28/25 1115,   ONE TIME,   Standing Count:  1 Occurrences,   R       Paresh Castro, DO Acknowledge New    Admitting Diagnosis:   Tachycardia [R00.0]  Atrial fibrillation with rapid ventricular response (HCC) [I48.91]  Atrial fibrillation with RVR (HCC) [I48.91]      Surgery: none  Visit Diagnoses         Codes    Paroxysmal atrial fibrillation (HCC)     I48.0            Patient Active Problem List   Diagnosis    Chest pain radiating to jaw    CAD (coronary artery disease), native coronary artery    Atherosclerosis of coronary artery bypass graft    Postsurgical aortocoronary bypass status    Other and unspecified angina pectoris    Essential hypertension    Paresthesia of both hands    Benign paroxysmal positional vertigo of left ear    Acute diastolic CHF (congestive heart failure) (HCC)    Mild protein-calorie malnutrition (HCC)    Atrial fibrillation with RVR (HCC)    Sick sinus syndrome with tachycardia (HCC)    Closed compression fracture of L4 lumbar vertebra, initial encounter (HCC)    Lumbar compression fracture, closed, initial encounter (HCC)    Closed fracture of fifth lumbar vertebra (HCC)    Dementia (HCC)    Hypertensive heart disease with heart failure (HCC)    Secondary hypercoagulable state (HCC)    Intractable abdominal pain    Atrial fibrillation with rapid ventricular response (HCC)    Tachycardia        ASSESSMENT of Current 
Spiritual Health History and Assessment/Progress Note  [facility]    Initial Encounter, Spiritual/Emotional Needs,  ,  ,      Name: Bobbi Marquis MRN: [id number]    Age: 84 y.o.     Sex: female   Language: English   Methodist: None   Atrial fibrillation with rapid ventricular response (HCC)     Date: 1/30/2025                           Spiritual Assessment began in [facility] IMCU        Referral/Consult From: Rounding   Encounter Overview/Reason: Initial Encounter, Spiritual/Emotional Needs  Service Provided For: Patient    Azra, Belief, Meaning:   Patient unable to assess at this time  Family/Friends No family/friends present      Importance and Influence:  Patient unable to assess at this time  Family/Friends No family/friends present    Community:  Patient Was unable to effectively communicate to patient.     Family/Friends No family/friends present    Assessment and Plan of Care:     Patient Interventions include: Was unable to effectively communicate to patient.   Family/Friends Interventions include: No family/friends present    Patient Plan of Care: Spiritual Care available upon further referral  Family/Friends Plan of Care: No family/friends present    Electronically signed by Chaplain Pop on 1/30/2025 at 2:42 PM   
[x]Endurance    [] Fine Coordination              [x] Balance      [] Vision/perception   []Sensation     []Gross Motor Coordination  [] ROM  [] Delirium                   [] Motor Control     OT PLAN OF CARE   OT POC based on physician orders, patient diagnosis and results of clinical assessment    Frequency/Duration 1-3 days/wk for 2 weeks PRN     Specific OT Treatment Interventions to include:   * Instruction/training on adapted ADL techniques and AE recommendations to increase functional independence within precautions       * Training on energy conservation strategies, correct breathing pattern and techniques to improve independence/tolerance for self-care routine  * Functional transfer/mobility training/DME recommendations for increased independence, safety, and fall prevention  * Therapeutic exercise to improve motor endurance, ROM, and functional strength for ADLs/functional transfers  * Therapeutic activities to facilitate/challenge dynamic balance, stand tolerance for increased safety and independence with ADLs    Recommended Adaptive Equipment: TBD at Rehab    Home Living: Per chart review, patient live with daughter in a [facility] with pt bed and bath upstairs with full flight and 1 HR with 3 steps with 1 HR with tub shower.      Equipment owned: WW, cane, shower chair per previous chart review    Prior Level of Function: Assistance from daughter with ADLs , Dependent with IADLs; ambulated without AD      Pain Level: No pain reported     Cognition: A&O: 1/4; Answers to name; Difficulty Following 1-2 step directions   Memory: poor   Sequencing: poor   Problem solving: poor   Judgement/safety: poor    [facility]       AM-PAC Daily Activity - Inpatient   How much help is needed for putting on and taking off regular lower body clothing?: A Little  How much help is needed for bathing (which includes washing, rinsing, drying)?: A Little  How much help is needed for toileting (which includes using toilet, bedpan, or 
anxious or depressed.  Musculoskeletal:    Denies  myalgias, joint complaints or back pain.   Integumentary:   Denies any rashes, ulcers, or excoriations.  Denies bruising.  Hematologic/Lymphatic:  Denies bruising or bleeding.    Physical Exam:  I/O this shift:  In: 60 [P.O.:60]  Out: -     Intake/Output Summary (Last 24 hours) at 1/29/2025 1322  Last data filed at 1/29/2025 0841  Gross per 24 hour   Intake 60 ml   Output --   Net 60 ml   I/O last 3 completed shifts:  In: 180 [P.O.:180]  Out: -   Patient Vitals for the past 96 hrs (Last 3 readings):   Weight   01/27/25 2223 41.1 kg (90 lb 8 oz)   01/27/25 1305 38.6 kg (85 lb)     Vital Signs:   Blood pressure (!) 136/92, pulse (!) 111, temperature 98.3 °F (36.8 °C), temperature source Oral, resp. rate 19, height 1.473 m (4' 10\"), weight 41.1 kg (90 lb 8 oz), SpO2 96%, not currently breastfeeding.    General appearance:  Alert, responsive, oriented to person, place, and time. Well preserved, alert, no distress.  Head:  Normocephalic. No masses, lesions or tenderness.  Eyes:  PERRLA.  EOMI.  Sclera clear.  Buccal mucosa moist.  ENT:  Ears normal. Mucosa normal.  Neck:    Supple. Trachea midline. No thyromegaly. No JVD. No bruits.  Heart:    Current atrial fibrillation with rapid ventricular response.  Systolic murmur.  Lungs:    Symmetrical. Clear to auscultation bilaterally.  No wheezes. No rhonchi. No rales.  Abdomen:   Soft. Non-tender. Non-distended. Bowel sounds positive. No organomegaly or masses.  No pain on palpation.  Extremities:    Peripheral pulses present.  No peripheral edema.  No ulcers. No cyanosis. No clubbing.  Neurologic:    Alert x 3.  No focal deficit.  Cranial nerves grossly intact. No focal weakness.  Psych:   Behavior is normal. Mood appears normal. Speech is not rapid and/or pressured.  Musculoskeletal:   Spine ROM normal. Muscular strength intact. Gait not assessed.  Integumentary:  No rashes  Skin normal color and 
tablet Take 0.5 tablets by mouth every other day Half tab daily PRN for weight gain of 2-3 pounds in 24 hours 1/30/25  Yes Abelardo Morejon, APRN - CNP   sildenafil (REVATIO) 20 MG tablet TAKE ONE-HALF TABLET BY MOUTH THREE TIMES DAILY 1/27/25  Yes Donte Miller DO   folic acid (FOLVITE) 1 MG tablet Take 1 tablet by mouth daily 11/8/24  Yes Donte Miller DO   magnesium oxide (MAG-OX) 400 (240 Mg) MG tablet Take 1 tablet by mouth daily 11/8/24  Yes Donte Miller DO   apixaban (ELIQUIS) 2.5 MG TABS tablet TAKE ONE TABLET BY MOUTH TWO TIMES A DAY 11/8/24  Yes Donte Miller DO   omeprazole (PRILOSEC) 40 MG delayed release capsule TAKE ONE CAPSULE BY MOUTH DAILY, needs appointment for further refills 9/17/24  Yes Donte Miller DO   aspirin 81 MG EC tablet Take 1 tablet by mouth daily  Patient taking differently: Take 1 tablet by mouth Daily with lunch 5/9/24  Yes Donte Miller DO   atorvastatin (LIPITOR) 40 MG tablet Take one tablet by mouth every day. 11/8/24   Donte Miller DO       CURRENT MEDICATIONS:    No current facility-administered medications for this encounter.    Current Outpatient Medications:     [START ON 1/31/2025] lisinopril (PRINIVIL;ZESTRIL) 10 MG tablet, Take 1 tablet by mouth daily, Disp: , Rfl:     [START ON 1/31/2025] metoprolol succinate (TOPROL XL) 200 MG extended release tablet, Take 1 tablet by mouth daily, Disp: , Rfl:     bumetanide (BUMEX) 1 MG tablet, Take 0.5 tablets by mouth every other day Half tab daily PRN for weight gain of 2-3 pounds in 24 hours, Disp: , Rfl:     sildenafil (REVATIO) 20 MG tablet, TAKE ONE-HALF TABLET BY MOUTH THREE TIMES DAILY, Disp: 45 tablet, Rfl: 0    folic acid (FOLVITE) 1 MG tablet, Take 1 tablet by mouth daily, Disp: 90 tablet, Rfl: 0    magnesium oxide (MAG-OX) 400 (240 Mg) MG tablet, Take 1 tablet by mouth daily, Disp: 90 tablet, Rfl: 0    apixaban (ELIQUIS) 2.5 MG TABS tablet, TAKE ONE TABLET BY

## 2025-02-05 ENCOUNTER — OUTSIDE SERVICES (OUTPATIENT)
Dept: PRIMARY CARE CLINIC | Age: 85
End: 2025-02-05

## 2025-02-05 DIAGNOSIS — I50.31 ACUTE DIASTOLIC CHF (CONGESTIVE HEART FAILURE) (HCC): ICD-10-CM

## 2025-02-05 DIAGNOSIS — R00.0 TACHYCARDIA: ICD-10-CM

## 2025-02-05 DIAGNOSIS — F03.C4 SEVERE DEMENTIA WITH ANXIETY, UNSPECIFIED DEMENTIA TYPE (HCC): ICD-10-CM

## 2025-02-05 DIAGNOSIS — R07.9 CHEST PAIN, UNSPECIFIED TYPE: ICD-10-CM

## 2025-02-05 DIAGNOSIS — I48.91 ATRIAL FIBRILLATION WITH RVR (HCC): Primary | ICD-10-CM

## 2025-02-05 DIAGNOSIS — I10 ESSENTIAL HYPERTENSION: ICD-10-CM

## 2025-02-05 DIAGNOSIS — S32.040A CLOSED COMPRESSION FRACTURE OF L4 LUMBAR VERTEBRA, INITIAL ENCOUNTER (HCC): ICD-10-CM

## 2025-02-05 DIAGNOSIS — E78.2 MIXED HYPERLIPIDEMIA: ICD-10-CM

## 2025-02-05 DIAGNOSIS — I27.21 SECONDARY PULMONARY ARTERIAL HYPERTENSION (HCC): ICD-10-CM

## 2025-02-05 DIAGNOSIS — I25.10 CORONARY ARTERY DISEASE INVOLVING NATIVE CORONARY ARTERY OF NATIVE HEART WITHOUT ANGINA PECTORIS: Chronic | ICD-10-CM

## 2025-02-05 RX ORDER — FOLIC ACID 1 MG/1
1000 TABLET ORAL DAILY
Qty: 90 TABLET | Refills: 0 | Status: SHIPPED | OUTPATIENT
Start: 2025-02-05

## 2025-02-05 NOTE — PROGRESS NOTES
Visit Date: 2/5/25  Bobbi Marquis  1940  female 84 y.o.      Subjective:    CC: Patient presents with Afib. Patient presents for follow up of HTN, CAD, CHF, and dementia.    Atrial Fibrillation  Presents for initial visit. The condition has lasted for 2 years. Symptoms include chest pain, shortness of breath, tachycardia and weakness. AICD problem: 2. Pacemaker problem: 2.The symptoms have been worsening. Past treatments include anticoagulant. Past medical history includes CAD and CHF.   Hypertension  This is a chronic problem. The current episode started more than 1 year ago. The problem has been waxing and waning since onset. The problem is uncontrolled. Associated symptoms include chest pain and shortness of breath.   Coronary Artery Disease  Presents for follow-up visit. Symptoms include chest pain, muscle weakness and shortness of breath. Her past medical history is significant for CHF. The symptoms have been worsening. Compliance with diet is poor. Compliance with exercise is poor. Compliance with medications is poor.   Congestive Heart Failure  Presents for follow-up visit. Associated symptoms include chest pain, muscle weakness and shortness of breath. The symptoms have been worsening. Her past medical history is significant for CAD.   Memory Loss    Patient reports onset of memory loss was more than 1 year ago. Onset quality is gradual.     Symptoms associated with memory loss include changes in short-term memory, changes in long-term memory and day/night behavior changes.     Behavorial problems for memory loss include suspiciousness and agitation.     Lives with: alone. Patient lives in a/an own home.    Additional narrative: Symptoms have become severe.       ROS:  Review of Systems   Unable to perform ROS: Dementia   Respiratory:  Positive for shortness of breath.    Cardiovascular:  Positive for chest pain.   Musculoskeletal:  Positive for muscle weakness.   Neurological:  Positive for

## 2025-02-05 NOTE — TELEPHONE ENCOUNTER
Name of Medication(s) Requested:  Requested Prescriptions     Pending Prescriptions Disp Refills    folic acid (FOLVITE) 1 MG tablet [Pharmacy Med Name: Folic Acid Oral Tablet 1 MG] 90 tablet 0     Sig: TAKE ONE TABLET BY MOUTH DAILY       Medication is on current medication list Yes    Dosage and directions were verified? Yes    Quantity verified: 90 day supply     Pharmacy Verified?  Yes    Last Appointment:  1/27/2025    Future appts:  No future appointments.     (If no appt send self scheduling link. .REFILLAPPT)  Scheduling request sent?     [] Yes  [x] No    Does patient need updated?  [] Yes  [x] No

## 2025-02-13 ENCOUNTER — OUTSIDE SERVICES (OUTPATIENT)
Dept: PRIMARY CARE CLINIC | Age: 85
End: 2025-02-13

## 2025-02-13 DIAGNOSIS — M48.56XG NON-TRAUMATIC COMPRESSION FRACTURE OF L1 LUMBAR VERTEBRA WITH DELAYED HEALING, SUBSEQUENT ENCOUNTER: ICD-10-CM

## 2025-02-13 DIAGNOSIS — I10 ESSENTIAL HYPERTENSION: ICD-10-CM

## 2025-02-13 DIAGNOSIS — I27.21 SECONDARY PULMONARY ARTERIAL HYPERTENSION (HCC): ICD-10-CM

## 2025-02-13 DIAGNOSIS — S32.040A CLOSED COMPRESSION FRACTURE OF L4 LUMBAR VERTEBRA, INITIAL ENCOUNTER (HCC): ICD-10-CM

## 2025-02-13 DIAGNOSIS — E78.2 MIXED HYPERLIPIDEMIA: ICD-10-CM

## 2025-02-13 DIAGNOSIS — F03.C4 SEVERE DEMENTIA WITH ANXIETY, UNSPECIFIED DEMENTIA TYPE (HCC): ICD-10-CM

## 2025-02-13 DIAGNOSIS — I50.31 ACUTE DIASTOLIC CHF (CONGESTIVE HEART FAILURE) (HCC): ICD-10-CM

## 2025-02-13 DIAGNOSIS — I25.10 CORONARY ARTERY DISEASE INVOLVING NATIVE CORONARY ARTERY OF NATIVE HEART WITHOUT ANGINA PECTORIS: ICD-10-CM

## 2025-02-13 DIAGNOSIS — I48.91 ATRIAL FIBRILLATION WITH RVR (HCC): Primary | ICD-10-CM

## 2025-02-13 ASSESSMENT — ENCOUNTER SYMPTOMS: SHORTNESS OF BREATH: 1

## 2025-02-13 NOTE — PROGRESS NOTES
Visit Date: 2/13/25  Bobbi Marquis  1940  female 84 y.o.      Subjective:    CC: Patient presents with Afib. Patient presents for follow up of HTN, CAD, CHF, and dementia.    Atrial Fibrillation  Presents for initial visit. The condition has lasted for 2 years. Symptoms include chest pain, shortness of breath, tachycardia and weakness. AICD problem: 2. Pacemaker problem: 2.The symptoms have been worsening. Past treatments include anticoagulant. Past medical history includes CAD and CHF.   Hypertension  This is a chronic problem. The current episode started more than 1 year ago. The problem has been waxing and waning since onset. The problem is uncontrolled. Associated symptoms include chest pain and shortness of breath.   Coronary Artery Disease  Presents for follow-up visit. Symptoms include chest pain, muscle weakness and shortness of breath. Her past medical history is significant for CHF. The symptoms have been worsening. Compliance with diet is poor. Compliance with exercise is poor. Compliance with medications is poor.   Congestive Heart Failure  Presents for follow-up visit. Associated symptoms include chest pain, muscle weakness and shortness of breath. The symptoms have been worsening. Her past medical history is significant for CAD.   Memory Loss    Patient reports onset of memory loss was more than 1 year ago. Onset quality is gradual.     Symptoms associated with memory loss include changes in short-term memory, changes in long-term memory and day/night behavior changes.     Behavorial problems for memory loss include suspiciousness and agitation.     Lives with: alone. Patient lives in a/an own home.    Additional narrative: Symptoms have become severe.       ROS:  Review of Systems   Unable to perform ROS: Dementia   Respiratory:  Positive for shortness of breath.    Cardiovascular:  Positive for chest pain.   Musculoskeletal:  Positive for muscle weakness.   Neurological:  Positive for

## 2025-02-15 PROBLEM — I27.21 SECONDARY PULMONARY ARTERIAL HYPERTENSION (HCC): Status: ACTIVE | Noted: 2025-02-15

## 2025-02-15 PROBLEM — E78.2 MIXED HYPERLIPIDEMIA: Status: ACTIVE | Noted: 2025-02-15

## 2025-02-15 PROBLEM — M48.56XG NON-TRAUMATIC COMPRESSION FRACTURE OF LUMBAR VERTEBRA WITH DELAYED HEALING: Status: ACTIVE | Noted: 2020-10-13

## 2025-03-05 NOTE — PROGRESS NOTES
Acute diastolic CHF (congestive heart failure) ([facility])  5. Severe dementia with anxiety, unspecified dementia type ([facility])  6. Closed compression fracture of L4 lumbar vertebra, initial encounter ([facility])  7. Mixed hyperlipidemia  8. Secondary pulmonary arterial hypertension ([facility])  9. Non-traumatic compression fracture of L1 lumbar vertebra with delayed healing, subsequent encounter  10. Tachycardia       Afib- symptoms stable presenting with Afib w/RVR currently being managed with Eliquis 2.5 mg BID  CAD pt is currently taking Lipitor 40 mg in AM  CHF- symptoms becoming severe pt started on Bumetanide 0.5 mg in AM-   Troponin level reviewed From hospital last reported level 16 ng/L.   Chest x-ray from 1/27/25 independently reviewed showing cardiomegaly with no acute finding.   Uncontrolled HTN- has become severe currently being managed with Metoprolol 200 mg in AM and Lisinopril 10 mg in AM, and Sildenafil Citrate  20 mg TID for pulmonary HTN.  Repeat CBC, CMP, and Lipid panel ordered  Resident to be monitored for any exhibited behaviors   Discussed with the patient daughter the results of the x-ray lumbar spine and reviewed previous CT scans showed that her fractures are old no treatment is needed continue uncontrolled pain with Tylenol avoid narcotics because of patient age and possible decreased mental status due to use of narcotics  Medications and chart reviewed  Continue current tx plan

## 2025-03-12 ENCOUNTER — OUTSIDE SERVICES (OUTPATIENT)
Dept: PRIMARY CARE CLINIC | Age: 85
End: 2025-03-12

## 2025-03-12 DIAGNOSIS — M48.56XG NON-TRAUMATIC COMPRESSION FRACTURE OF L1 LUMBAR VERTEBRA WITH DELAYED HEALING, SUBSEQUENT ENCOUNTER: ICD-10-CM

## 2025-03-12 DIAGNOSIS — E78.2 MIXED HYPERLIPIDEMIA: ICD-10-CM

## 2025-03-12 DIAGNOSIS — I25.10 CORONARY ARTERY DISEASE INVOLVING NATIVE CORONARY ARTERY OF NATIVE HEART WITHOUT ANGINA PECTORIS: ICD-10-CM

## 2025-03-12 DIAGNOSIS — S32.040A CLOSED COMPRESSION FRACTURE OF L4 LUMBAR VERTEBRA, INITIAL ENCOUNTER (HCC): ICD-10-CM

## 2025-03-12 DIAGNOSIS — F03.C4 SEVERE DEMENTIA WITH ANXIETY, UNSPECIFIED DEMENTIA TYPE (HCC): ICD-10-CM

## 2025-03-12 DIAGNOSIS — I27.21 SECONDARY PULMONARY ARTERIAL HYPERTENSION (HCC): ICD-10-CM

## 2025-03-12 DIAGNOSIS — I48.91 ATRIAL FIBRILLATION WITH RVR (HCC): Primary | ICD-10-CM

## 2025-03-12 DIAGNOSIS — I10 ESSENTIAL HYPERTENSION: ICD-10-CM

## 2025-03-12 DIAGNOSIS — I50.31 ACUTE DIASTOLIC CHF (CONGESTIVE HEART FAILURE) (HCC): ICD-10-CM

## 2025-03-12 DIAGNOSIS — R00.0 TACHYCARDIA: ICD-10-CM

## 2025-04-08 ASSESSMENT — ENCOUNTER SYMPTOMS: SHORTNESS OF BREATH: 1

## 2025-04-08 NOTE — PROGRESS NOTES
weakness.        Current Meds: Refer to nursing home record    PMH:    Medical Problems: reviewed and updated       Diagnosis Date    Atrial fibrillation (HCC)     CAD (coronary artery disease)     Dementia (HCC)     GERD (gastroesophageal reflux disease)     Hemorrhoids     History of echocardiogram 03/14/2017    EF 62%    History of echocardiogram 05/01/2018    EF 62%    Hyperlipidemia     Hypertension     MI (myocardial infarction) (HCC)     Migraines     New onset atrial flutter (HCC) 03/13/2017    Normal cardiac stress test 05/2010        Surgical Hx: reviewed and updated   Past Surgical History:   Procedure Laterality Date    CARDIAC CATHETERIZATION      2002    CHOLECYSTECTOMY      COLONOSCOPY  4/16/13    DR CAMERON     CORONARY ARTERY BYPASS GRAFT      CABG x 4 in 2000    ECHO COMPL W DOP COLOR FLOW  2/27/2012         ECHO COMPL W DOP COLOR FLOW  3/25/2013         SPINE SURGERY N/A 10/14/2020    T12, L5 KYPHOPLASTY, EPIDURAL INJECTION performed by Víctor Josue MD at [facility] OR        FH: reviewed and updated       Problem Relation Age of Onset    Hypertension Father         SH: reviewed and updated    reports that she quit smoking about 28 years ago. Her smoking use included cigarettes. She started smoking about 58 years ago. She has a 30 pack-year smoking history. She has never used smokeless tobacco. She reports that she does not drink alcohol and does not use drugs.     Objective:  Vital signs for Bobbi was reviewed in the nursing home record.     Physical Exam:  Physical Exam  Vitals and nursing note reviewed.   Constitutional:       General: She is not in acute distress.     Appearance: Normal appearance. She is normal weight.   HENT:      Head: Normocephalic and atraumatic.      Right Ear: Tympanic membrane, ear canal and external ear normal.      Left Ear: Tympanic membrane, ear canal and external ear normal. There is no impacted cerumen.      Nose: Nose normal. No congestion or rhinorrhea.

## 2025-04-09 ENCOUNTER — OUTSIDE SERVICES (OUTPATIENT)
Dept: PRIMARY CARE CLINIC | Age: 85
End: 2025-04-09

## 2025-04-09 DIAGNOSIS — M48.56XG NON-TRAUMATIC COMPRESSION FRACTURE OF L1 LUMBAR VERTEBRA WITH DELAYED HEALING, SUBSEQUENT ENCOUNTER: ICD-10-CM

## 2025-04-09 DIAGNOSIS — F03.C4 SEVERE DEMENTIA WITH ANXIETY, UNSPECIFIED DEMENTIA TYPE (HCC): ICD-10-CM

## 2025-04-09 DIAGNOSIS — E78.2 MIXED HYPERLIPIDEMIA: ICD-10-CM

## 2025-04-09 DIAGNOSIS — I27.21 SECONDARY PULMONARY ARTERIAL HYPERTENSION (HCC): ICD-10-CM

## 2025-04-09 DIAGNOSIS — R07.9 CHEST PAIN, UNSPECIFIED TYPE: ICD-10-CM

## 2025-04-09 DIAGNOSIS — I25.10 CORONARY ARTERY DISEASE INVOLVING NATIVE CORONARY ARTERY OF NATIVE HEART WITHOUT ANGINA PECTORIS: ICD-10-CM

## 2025-04-09 DIAGNOSIS — I50.31 ACUTE DIASTOLIC CHF (CONGESTIVE HEART FAILURE) (HCC): ICD-10-CM

## 2025-04-09 DIAGNOSIS — I48.91 ATRIAL FIBRILLATION WITH RVR (HCC): Primary | ICD-10-CM

## 2025-04-09 DIAGNOSIS — S32.040A CLOSED COMPRESSION FRACTURE OF L4 LUMBAR VERTEBRA, INITIAL ENCOUNTER (HCC): ICD-10-CM

## 2025-04-09 DIAGNOSIS — I10 ESSENTIAL HYPERTENSION: ICD-10-CM

## 2025-04-16 ENCOUNTER — OUTSIDE SERVICES (OUTPATIENT)
Dept: PRIMARY CARE CLINIC | Age: 85
End: 2025-04-16

## 2025-04-16 DIAGNOSIS — L03.115 CELLULITIS OF RIGHT LOWER EXTREMITY: Primary | ICD-10-CM

## 2025-04-17 ASSESSMENT — ENCOUNTER SYMPTOMS: SHORTNESS OF BREATH: 1

## 2025-04-17 NOTE — PROGRESS NOTES
Visit Date: 04/16/25  Bobbi Marquis  1940  female 85 y.o.      Subjective:    CC: Patient presents with Afib. Patient presents for follow up of HTN, CAD, CHF, and dementia.    Atrial Fibrillation  Presents for initial visit. The condition has lasted for 2 years. Symptoms include chest pain, shortness of breath, tachycardia and weakness. AICD problem: 2. Pacemaker problem: 2.The symptoms have been worsening. Past treatments include anticoagulant. Past medical history includes CAD and CHF.   Hypertension  This is a chronic problem. The current episode started more than 1 year ago. The problem has been waxing and waning since onset. The problem is uncontrolled. Associated symptoms include chest pain and shortness of breath.   Coronary Artery Disease  Presents for follow-up visit. Symptoms include chest pain, muscle weakness and shortness of breath. Her past medical history is significant for CHF. The symptoms have been worsening. Compliance with diet is poor. Compliance with exercise is poor. Compliance with medications is poor.   Congestive Heart Failure  Presents for follow-up visit. Associated symptoms include chest pain, muscle weakness and shortness of breath. The symptoms have been worsening. Her past medical history is significant for CAD.   Memory Loss    Patient reports onset of memory loss was more than 1 year ago. Onset quality is gradual.     Symptoms associated with memory loss include change in short-term memory, change in long-term memory and day/night behavior changes.     Behavorial problems for memory loss include suspiciousness and agitation.     Lives with: alone. Patient lives in a/an own home.    Additional narrative: Symptoms have become severe.       ROS:  Review of Systems   Unable to perform ROS: Dementia   Respiratory:  Positive for shortness of breath.    Cardiovascular:  Positive for chest pain.   Musculoskeletal:  Positive for muscle weakness.   Neurological:  Positive for

## 2025-05-05 ENCOUNTER — HOSPITAL ENCOUNTER (INPATIENT)
Age: 85
LOS: 5 days | Discharge: ANOTHER HOSPITAL | End: 2025-05-10
Attending: EMERGENCY MEDICINE | Admitting: CHIROPRACTOR
Payer: MEDICARE

## 2025-05-05 ENCOUNTER — APPOINTMENT (OUTPATIENT)
Dept: CT IMAGING | Age: 85
End: 2025-05-05
Payer: MEDICARE

## 2025-05-05 ENCOUNTER — APPOINTMENT (OUTPATIENT)
Dept: GENERAL RADIOLOGY | Age: 85
End: 2025-05-05
Payer: MEDICARE

## 2025-05-05 DIAGNOSIS — M79.89 LEFT LEG SWELLING: ICD-10-CM

## 2025-05-05 DIAGNOSIS — J40 BRONCHITIS DUE TO HUMAN METAPNEUMOVIRUS (HMPV): ICD-10-CM

## 2025-05-05 DIAGNOSIS — Z79.01 CHRONIC ANTICOAGULATION: ICD-10-CM

## 2025-05-05 DIAGNOSIS — I50.43 ACUTE ON CHRONIC COMBINED SYSTOLIC AND DIASTOLIC CHF (CONGESTIVE HEART FAILURE) (HCC): ICD-10-CM

## 2025-05-05 DIAGNOSIS — B97.81 BRONCHITIS DUE TO HUMAN METAPNEUMOVIRUS (HMPV): ICD-10-CM

## 2025-05-05 DIAGNOSIS — E87.6 HYPOKALEMIA: ICD-10-CM

## 2025-05-05 DIAGNOSIS — K64.8 INTERNAL HEMORRHOIDS: ICD-10-CM

## 2025-05-05 DIAGNOSIS — K62.5 RECTAL BLEEDING: Primary | ICD-10-CM

## 2025-05-05 PROBLEM — J96.01 ACUTE RESPIRATORY FAILURE WITH HYPOXIA (HCC): Status: ACTIVE | Noted: 2025-05-05

## 2025-05-05 LAB
ABO + RH BLD: NORMAL
ALBUMIN SERPL-MCNC: 2.6 G/DL (ref 3.5–5.2)
ALP SERPL-CCNC: 137 U/L (ref 35–104)
ALT SERPL-CCNC: 11 U/L (ref 0–35)
ANION GAP SERPL CALCULATED.3IONS-SCNC: 13 MMOL/L (ref 7–16)
ANION GAP SERPL CALCULATED.3IONS-SCNC: 9 MMOL/L (ref 7–16)
APAP SERPL-MCNC: 7 UG/ML (ref 10–30)
ARM BAND NUMBER: NORMAL
AST SERPL-CCNC: 39 U/L (ref 0–35)
B PARAP IS1001 DNA NPH QL NAA+NON-PROBE: NOT DETECTED
B PERT DNA SPEC QL NAA+PROBE: NOT DETECTED
BASOPHILS # BLD: 0 K/UL (ref 0–0.2)
BASOPHILS # BLD: 0.02 K/UL (ref 0–0.2)
BASOPHILS NFR BLD: 0 % (ref 0–2)
BASOPHILS NFR BLD: 0 % (ref 0–2)
BILIRUB SERPL-MCNC: 1.1 MG/DL (ref 0–1.2)
BLOOD BANK SAMPLE EXPIRATION: NORMAL
BLOOD GROUP ANTIBODIES SERPL: NEGATIVE
BNP SERPL-MCNC: 4774 PG/ML (ref 0–450)
BUN SERPL-MCNC: 21 MG/DL (ref 8–23)
BUN SERPL-MCNC: 21 MG/DL (ref 8–23)
C PNEUM DNA NPH QL NAA+NON-PROBE: NOT DETECTED
CALCIUM SERPL-MCNC: 8.1 MG/DL (ref 8.8–10.2)
CALCIUM SERPL-MCNC: 8.2 MG/DL (ref 8.8–10.2)
CHLORIDE SERPL-SCNC: 100 MMOL/L (ref 98–107)
CHLORIDE SERPL-SCNC: 101 MMOL/L (ref 98–107)
CO2 SERPL-SCNC: 26 MMOL/L (ref 22–29)
CO2 SERPL-SCNC: 33 MMOL/L (ref 22–29)
CREAT SERPL-MCNC: 0.7 MG/DL (ref 0.5–1)
CREAT SERPL-MCNC: 0.8 MG/DL (ref 0.5–1)
CRP SERPL HS-MCNC: 16.7 MG/L (ref 0–5)
EKG ATRIAL RATE: 300 BPM
EKG Q-T INTERVAL: 394 MS
EKG QRS DURATION: 94 MS
EKG QTC CALCULATION (BAZETT): 457 MS
EKG R AXIS: 125 DEGREES
EKG T AXIS: -89 DEGREES
EKG VENTRICULAR RATE: 81 BPM
EOSINOPHIL # BLD: 0 K/UL (ref 0.05–0.5)
EOSINOPHIL # BLD: 0.02 K/UL (ref 0.05–0.5)
EOSINOPHILS RELATIVE PERCENT: 0 % (ref 0–6)
EOSINOPHILS RELATIVE PERCENT: 0 % (ref 0–6)
ERYTHROCYTE [DISTWIDTH] IN BLOOD BY AUTOMATED COUNT: 17 % (ref 11.5–15)
ERYTHROCYTE [DISTWIDTH] IN BLOOD BY AUTOMATED COUNT: 17.1 % (ref 11.5–15)
ERYTHROCYTE [SEDIMENTATION RATE] IN BLOOD BY WESTERGREN METHOD: 2 MM/HR (ref 0–20)
ETHANOLAMINE SERPL-MCNC: <10 MG/DL (ref 0–0.08)
FLUAV RNA NPH QL NAA+NON-PROBE: NOT DETECTED
FLUBV RNA NPH QL NAA+NON-PROBE: NOT DETECTED
GFR, ESTIMATED: 71 ML/MIN/1.73M2
GFR, ESTIMATED: 81 ML/MIN/1.73M2
GLUCOSE SERPL-MCNC: 143 MG/DL (ref 74–99)
GLUCOSE SERPL-MCNC: 93 MG/DL (ref 74–99)
HADV DNA NPH QL NAA+NON-PROBE: NOT DETECTED
HCOV 229E RNA NPH QL NAA+NON-PROBE: NOT DETECTED
HCOV HKU1 RNA NPH QL NAA+NON-PROBE: NOT DETECTED
HCOV NL63 RNA NPH QL NAA+NON-PROBE: NOT DETECTED
HCOV OC43 RNA NPH QL NAA+NON-PROBE: NOT DETECTED
HCT VFR BLD AUTO: 39.9 % (ref 34–48)
HCT VFR BLD AUTO: 44.8 % (ref 34–48)
HGB BLD-MCNC: 12.7 G/DL (ref 11.5–15.5)
HGB BLD-MCNC: 13.5 G/DL (ref 11.5–15.5)
HMPV RNA NPH QL NAA+NON-PROBE: DETECTED
HPIV1 RNA NPH QL NAA+NON-PROBE: NOT DETECTED
HPIV2 RNA NPH QL NAA+NON-PROBE: NOT DETECTED
HPIV3 RNA NPH QL NAA+NON-PROBE: NOT DETECTED
HPIV4 RNA NPH QL NAA+NON-PROBE: NOT DETECTED
IMM GRANULOCYTES # BLD AUTO: 0.03 K/UL (ref 0–0.58)
IMM GRANULOCYTES # BLD AUTO: 0.04 K/UL (ref 0–0.58)
IMM GRANULOCYTES NFR BLD: 0 % (ref 0–5)
IMM GRANULOCYTES NFR BLD: 1 % (ref 0–5)
LYMPHOCYTES NFR BLD: 0.65 K/UL (ref 1.5–4)
LYMPHOCYTES NFR BLD: 1.17 K/UL (ref 1.5–4)
LYMPHOCYTES RELATIVE PERCENT: 16 % (ref 20–42)
LYMPHOCYTES RELATIVE PERCENT: 9 % (ref 20–42)
M PNEUMO DNA NPH QL NAA+NON-PROBE: NOT DETECTED
MAGNESIUM SERPL-MCNC: 2.1 MG/DL (ref 1.6–2.4)
MCH RBC QN AUTO: 29 PG (ref 26–35)
MCH RBC QN AUTO: 30.2 PG (ref 26–35)
MCHC RBC AUTO-ENTMCNC: 30.1 G/DL (ref 32–34.5)
MCHC RBC AUTO-ENTMCNC: 31.8 G/DL (ref 32–34.5)
MCV RBC AUTO: 94.8 FL (ref 80–99.9)
MCV RBC AUTO: 96.3 FL (ref 80–99.9)
MONOCYTES NFR BLD: 0.06 K/UL (ref 0.1–0.95)
MONOCYTES NFR BLD: 0.56 K/UL (ref 0.1–0.95)
MONOCYTES NFR BLD: 1 % (ref 2–12)
MONOCYTES NFR BLD: 8 % (ref 2–12)
NEUTROPHILS NFR BLD: 75 % (ref 43–80)
NEUTROPHILS NFR BLD: 89 % (ref 43–80)
NEUTS SEG NFR BLD: 5.34 K/UL (ref 1.8–7.3)
NEUTS SEG NFR BLD: 6.29 K/UL (ref 1.8–7.3)
PLATELET # BLD AUTO: 163 K/UL (ref 130–450)
PLATELET # BLD AUTO: 208 K/UL (ref 130–450)
PMV BLD AUTO: 10.8 FL (ref 7–12)
PMV BLD AUTO: 11.1 FL (ref 7–12)
POTASSIUM SERPL-SCNC: 2.8 MMOL/L (ref 3.5–5.1)
POTASSIUM SERPL-SCNC: 3.6 MMOL/L (ref 3.5–5.1)
PROCALCITONIN SERPL-MCNC: 0.07 NG/ML (ref 0–0.08)
PROT SERPL-MCNC: 5.9 G/DL (ref 6.4–8.3)
RBC # BLD AUTO: 4.21 M/UL (ref 3.5–5.5)
RBC # BLD AUTO: 4.65 M/UL (ref 3.5–5.5)
RSV RNA NPH QL NAA+NON-PROBE: NOT DETECTED
RV+EV RNA NPH QL NAA+NON-PROBE: NOT DETECTED
SALICYLATES SERPL-MCNC: <0.5 MG/DL (ref 0–30)
SARS-COV-2 RNA NPH QL NAA+NON-PROBE: NOT DETECTED
SODIUM SERPL-SCNC: 139 MMOL/L (ref 136–145)
SODIUM SERPL-SCNC: 143 MMOL/L (ref 136–145)
SPECIMEN DESCRIPTION: ABNORMAL
TOXIC TRICYCLIC SC,BLOOD: NEGATIVE
TROPONIN I SERPL HS-MCNC: 28 NG/L (ref 0–14)
TROPONIN I SERPL HS-MCNC: 31 NG/L (ref 0–14)
TROPONIN I SERPL HS-MCNC: 31 NG/L (ref 0–14)
WBC OTHER # BLD: 7 K/UL (ref 4.5–11.5)
WBC OTHER # BLD: 7.1 K/UL (ref 4.5–11.5)

## 2025-05-05 PROCEDURE — 83735 ASSAY OF MAGNESIUM: CPT

## 2025-05-05 PROCEDURE — 94664 DEMO&/EVAL PT USE INHALER: CPT

## 2025-05-05 PROCEDURE — 6360000002 HC RX W HCPCS

## 2025-05-05 PROCEDURE — 99285 EMERGENCY DEPT VISIT HI MDM: CPT

## 2025-05-05 PROCEDURE — 71250 CT THORAX DX C-: CPT

## 2025-05-05 PROCEDURE — 86901 BLOOD TYPING SEROLOGIC RH(D): CPT

## 2025-05-05 PROCEDURE — 80179 DRUG ASSAY SALICYLATE: CPT

## 2025-05-05 PROCEDURE — G0480 DRUG TEST DEF 1-7 CLASSES: HCPCS

## 2025-05-05 PROCEDURE — 1200000000 HC SEMI PRIVATE

## 2025-05-05 PROCEDURE — 0202U NFCT DS 22 TRGT SARS-COV-2: CPT

## 2025-05-05 PROCEDURE — 94640 AIRWAY INHALATION TREATMENT: CPT

## 2025-05-05 PROCEDURE — 6370000000 HC RX 637 (ALT 250 FOR IP)

## 2025-05-05 PROCEDURE — 84145 PROCALCITONIN (PCT): CPT

## 2025-05-05 PROCEDURE — 96374 THER/PROPH/DIAG INJ IV PUSH: CPT

## 2025-05-05 PROCEDURE — 36415 COLL VENOUS BLD VENIPUNCTURE: CPT

## 2025-05-05 PROCEDURE — 84484 ASSAY OF TROPONIN QUANT: CPT

## 2025-05-05 PROCEDURE — 85652 RBC SED RATE AUTOMATED: CPT

## 2025-05-05 PROCEDURE — 86850 RBC ANTIBODY SCREEN: CPT

## 2025-05-05 PROCEDURE — 80053 COMPREHEN METABOLIC PANEL: CPT

## 2025-05-05 PROCEDURE — 93005 ELECTROCARDIOGRAM TRACING: CPT

## 2025-05-05 PROCEDURE — 80307 DRUG TEST PRSMV CHEM ANLYZR: CPT

## 2025-05-05 PROCEDURE — 86900 BLOOD TYPING SEROLOGIC ABO: CPT

## 2025-05-05 PROCEDURE — 86140 C-REACTIVE PROTEIN: CPT

## 2025-05-05 PROCEDURE — 83880 ASSAY OF NATRIURETIC PEPTIDE: CPT

## 2025-05-05 PROCEDURE — 93010 ELECTROCARDIOGRAM REPORT: CPT | Performed by: INTERNAL MEDICINE

## 2025-05-05 PROCEDURE — 96375 TX/PRO/DX INJ NEW DRUG ADDON: CPT

## 2025-05-05 PROCEDURE — 80143 DRUG ASSAY ACETAMINOPHEN: CPT

## 2025-05-05 PROCEDURE — 80048 BASIC METABOLIC PNL TOTAL CA: CPT

## 2025-05-05 PROCEDURE — 85025 COMPLETE CBC W/AUTO DIFF WBC: CPT

## 2025-05-05 PROCEDURE — 2500000003 HC RX 250 WO HCPCS

## 2025-05-05 PROCEDURE — 2700000000 HC OXYGEN THERAPY PER DAY

## 2025-05-05 PROCEDURE — 71045 X-RAY EXAM CHEST 1 VIEW: CPT

## 2025-05-05 RX ORDER — SODIUM CHLORIDE 9 MG/ML
INJECTION, SOLUTION INTRAVENOUS PRN
Status: DISCONTINUED | OUTPATIENT
Start: 2025-05-05 | End: 2025-05-10 | Stop reason: HOSPADM

## 2025-05-05 RX ORDER — FOLIC ACID 1 MG/1
1000 TABLET ORAL DAILY
Status: DISCONTINUED | OUTPATIENT
Start: 2025-05-05 | End: 2025-05-10 | Stop reason: HOSPADM

## 2025-05-05 RX ORDER — ACETAMINOPHEN 650 MG/1
650 SUPPOSITORY RECTAL EVERY 6 HOURS PRN
Status: DISCONTINUED | OUTPATIENT
Start: 2025-05-05 | End: 2025-05-10 | Stop reason: HOSPADM

## 2025-05-05 RX ORDER — ARFORMOTEROL TARTRATE 15 UG/2ML
15 SOLUTION RESPIRATORY (INHALATION)
Status: DISCONTINUED | OUTPATIENT
Start: 2025-05-05 | End: 2025-05-10 | Stop reason: HOSPADM

## 2025-05-05 RX ORDER — BUDESONIDE 0.5 MG/2ML
0.5 INHALANT ORAL
Status: DISCONTINUED | OUTPATIENT
Start: 2025-05-05 | End: 2025-05-10 | Stop reason: HOSPADM

## 2025-05-05 RX ORDER — SILDENAFIL CITRATE 20 MG/1
10 TABLET ORAL 3 TIMES DAILY
Status: DISCONTINUED | OUTPATIENT
Start: 2025-05-05 | End: 2025-05-10 | Stop reason: HOSPADM

## 2025-05-05 RX ORDER — POTASSIUM CHLORIDE 1500 MG/1
40 TABLET, EXTENDED RELEASE ORAL ONCE
Status: DISCONTINUED | OUTPATIENT
Start: 2025-05-05 | End: 2025-05-07

## 2025-05-05 RX ORDER — ACETAMINOPHEN 325 MG/1
650 TABLET ORAL EVERY 6 HOURS PRN
COMMUNITY

## 2025-05-05 RX ORDER — ATORVASTATIN CALCIUM 40 MG/1
40 TABLET, FILM COATED ORAL DAILY
Status: DISCONTINUED | OUTPATIENT
Start: 2025-05-05 | End: 2025-05-10 | Stop reason: HOSPADM

## 2025-05-05 RX ORDER — ONDANSETRON 4 MG/1
4 TABLET, ORALLY DISINTEGRATING ORAL EVERY 8 HOURS PRN
Status: DISCONTINUED | OUTPATIENT
Start: 2025-05-05 | End: 2025-05-10 | Stop reason: HOSPADM

## 2025-05-05 RX ORDER — PANTOPRAZOLE SODIUM 40 MG/1
40 TABLET, DELAYED RELEASE ORAL
Status: DISCONTINUED | OUTPATIENT
Start: 2025-05-06 | End: 2025-05-10 | Stop reason: HOSPADM

## 2025-05-05 RX ORDER — ALBUTEROL SULFATE 90 UG/1
2 INHALANT RESPIRATORY (INHALATION) EVERY 4 HOURS PRN
COMMUNITY

## 2025-05-05 RX ORDER — SODIUM CHLORIDE 0.9 % (FLUSH) 0.9 %
5-40 SYRINGE (ML) INJECTION PRN
Status: DISCONTINUED | OUTPATIENT
Start: 2025-05-05 | End: 2025-05-10 | Stop reason: HOSPADM

## 2025-05-05 RX ORDER — BUMETANIDE 0.5 MG/1
0.5 TABLET ORAL 2 TIMES DAILY
COMMUNITY

## 2025-05-05 RX ORDER — AMMONIUM LACTATE 12 G/100G
LOTION TOPICAL PRN
Status: DISCONTINUED | OUTPATIENT
Start: 2025-05-05 | End: 2025-05-10 | Stop reason: HOSPADM

## 2025-05-05 RX ORDER — POTASSIUM CHLORIDE 7.45 MG/ML
10 INJECTION INTRAVENOUS
Status: COMPLETED | OUTPATIENT
Start: 2025-05-05 | End: 2025-05-05

## 2025-05-05 RX ORDER — POLYETHYLENE GLYCOL 3350 17 G/17G
17 POWDER, FOR SOLUTION ORAL DAILY PRN
Status: DISCONTINUED | OUTPATIENT
Start: 2025-05-05 | End: 2025-05-10 | Stop reason: HOSPADM

## 2025-05-05 RX ORDER — IPRATROPIUM BROMIDE AND ALBUTEROL SULFATE 2.5; .5 MG/3ML; MG/3ML
2 SOLUTION RESPIRATORY (INHALATION) ONCE
Status: COMPLETED | OUTPATIENT
Start: 2025-05-05 | End: 2025-05-05

## 2025-05-05 RX ORDER — ASPIRIN 81 MG/1
81 TABLET ORAL DAILY
Status: DISCONTINUED | OUTPATIENT
Start: 2025-05-05 | End: 2025-05-10 | Stop reason: HOSPADM

## 2025-05-05 RX ORDER — ACETAMINOPHEN 500 MG
500 TABLET ORAL 3 TIMES DAILY
COMMUNITY

## 2025-05-05 RX ORDER — SODIUM CHLORIDE 0.9 % (FLUSH) 0.9 %
5-40 SYRINGE (ML) INJECTION EVERY 12 HOURS SCHEDULED
Status: DISCONTINUED | OUTPATIENT
Start: 2025-05-05 | End: 2025-05-10 | Stop reason: HOSPADM

## 2025-05-05 RX ORDER — LISINOPRIL 10 MG/1
10 TABLET ORAL DAILY
Status: DISCONTINUED | OUTPATIENT
Start: 2025-05-05 | End: 2025-05-10 | Stop reason: HOSPADM

## 2025-05-05 RX ORDER — IPRATROPIUM BROMIDE AND ALBUTEROL SULFATE 2.5; .5 MG/3ML; MG/3ML
1 SOLUTION RESPIRATORY (INHALATION)
Status: DISCONTINUED | OUTPATIENT
Start: 2025-05-05 | End: 2025-05-10 | Stop reason: HOSPADM

## 2025-05-05 RX ORDER — METOPROLOL SUCCINATE 100 MG/1
200 TABLET, EXTENDED RELEASE ORAL DAILY
Status: DISCONTINUED | OUTPATIENT
Start: 2025-05-05 | End: 2025-05-10 | Stop reason: HOSPADM

## 2025-05-05 RX ORDER — ACETAMINOPHEN 325 MG/1
650 TABLET ORAL EVERY 6 HOURS PRN
Status: DISCONTINUED | OUTPATIENT
Start: 2025-05-05 | End: 2025-05-10 | Stop reason: HOSPADM

## 2025-05-05 RX ORDER — ONDANSETRON 2 MG/ML
4 INJECTION INTRAMUSCULAR; INTRAVENOUS EVERY 6 HOURS PRN
Status: DISCONTINUED | OUTPATIENT
Start: 2025-05-05 | End: 2025-05-10 | Stop reason: HOSPADM

## 2025-05-05 RX ADMIN — ARFORMOTEROL TARTRATE 15 MCG: 15 SOLUTION RESPIRATORY (INHALATION) at 19:40

## 2025-05-05 RX ADMIN — IPRATROPIUM BROMIDE AND ALBUTEROL SULFATE 2 DOSE: 2.5; .5 SOLUTION RESPIRATORY (INHALATION) at 11:33

## 2025-05-05 RX ADMIN — SODIUM CHLORIDE, PRESERVATIVE FREE 10 ML: 5 INJECTION INTRAVENOUS at 23:05

## 2025-05-05 RX ADMIN — APIXABAN 2.5 MG: 2.5 TABLET, FILM COATED ORAL at 23:03

## 2025-05-05 RX ADMIN — POTASSIUM CHLORIDE 10 MEQ: 10 INJECTION, SOLUTION INTRAVENOUS at 13:53

## 2025-05-05 RX ADMIN — POTASSIUM CHLORIDE 10 MEQ: 10 INJECTION, SOLUTION INTRAVENOUS at 14:57

## 2025-05-05 RX ADMIN — WATER 125 MG: 1 INJECTION INTRAMUSCULAR; INTRAVENOUS; SUBCUTANEOUS at 12:07

## 2025-05-05 RX ADMIN — POTASSIUM CHLORIDE 10 MEQ: 10 INJECTION, SOLUTION INTRAVENOUS at 16:43

## 2025-05-05 RX ADMIN — IPRATROPIUM BROMIDE AND ALBUTEROL SULFATE 1 DOSE: 2.5; .5 SOLUTION RESPIRATORY (INHALATION) at 15:43

## 2025-05-05 RX ADMIN — POTASSIUM CHLORIDE 10 MEQ: 10 INJECTION, SOLUTION INTRAVENOUS at 17:59

## 2025-05-05 RX ADMIN — IPRATROPIUM BROMIDE AND ALBUTEROL SULFATE 1 DOSE: 2.5; .5 SOLUTION RESPIRATORY (INHALATION) at 19:40

## 2025-05-05 RX ADMIN — BUDESONIDE 500 MCG: 0.5 SUSPENSION RESPIRATORY (INHALATION) at 19:40

## 2025-05-05 ASSESSMENT — PAIN - FUNCTIONAL ASSESSMENT: PAIN_FUNCTIONAL_ASSESSMENT: NONE - DENIES PAIN

## 2025-05-05 NOTE — DISCHARGE INSTRUCTIONS
Internal medicine    Follow ups  Please follow up with your primary care physician ( Dr. Donte Miller) within 10 days of discharge from hospital. Please call as soon as possible to make an appointment. Please contact the internal medicine clinic for an appointment if you are unable to get an appointment with your PCP.     Changes in healthcare   Please take all medications as indicated  Diet: regular diet , plus protein supplement  Activity: activity as tolerated  New Medications started during this hospital stay  Mucinex 400 mg three times daily as needed  Prednisone 40 mg daily for 2 more doses  Keflex 500 mg every 6 hrs for 4 days - to complete 7 days course  Changes to your medications  None  Medications you should stop taking   None  Additional labs, testing or imaging needed after discharge   None  Even if you are feeling better and not having symptoms do not stop taking antibiotic earlier then prescribed   Please contact us if you have any concerns, wish to change or make an appointment:  Internal medicine clinic   Phone: [phone]  Fax: [phone] [address] [facility] [address]  Should you have further questions in regards to this visit, you can review your clinical note and after visit summary document on your [employer] account.  Other questions can be directed to our nurse line at [phone].     Other than any new prescriptions given to you today, the list of home medications on this After Visit Summary are based on information provided to us from you and your healthcare providers. This information, including the list, dose, and frequency of medications is only as accurate as the information you provided. If you have any questions or concerns about your home medications, please contact your Primary Care Physician for further clarification.

## 2025-05-05 NOTE — PLAN OF CARE
Spoke with daughter Albertina Blanco regarding code status. She said that the patient wishes are:  No chest compressions, no defibrillation, no resuscitation meds, no intubation.     Code status updated to Limited.

## 2025-05-05 NOTE — ED PROVIDER NOTES
[facility] EMERGENCY DEPARTMENT  EMERGENCY DEPARTMENT ENCOUNTER        Pt Name: Bobbi Marquis  MRN: [id number]  Birthdate 1940  Date of evaluation: 5/5/2025  Provider: Marquez Coley DO  PCP: Donte Miller DO  Note Started: 11:19 AM EDT 5/5/25    CHIEF COMPLAINT       Chief Complaint   Patient presents with    Shortness of Breath     With wheezing beginning this morning, given duoneb en route; hx of pulmonary HTN     Rectal Bleeding     Bright red blood x 1; hemorrhoids noted       HISTORY OF PRESENT ILLNESS: 1 or more Elements   History From: PATIENT     Limitations to history : None    Bobbi Marquis is a 85 y.o. female arriving from [facility] at nursing home after she reportedly became short of breath starting this morning and is found to be 87%.  Patient was also found to have bright red blood in her diaper by nursing staff this morning.  Patient on Eliquis.      Nursing Notes were all reviewed and agreed with or any disagreements were addressed in the HPI.    REVIEW OF SYSTEMS :      Review of Systems    POSITIVE (+): Shortness of breath, rectal bleeding  NEGATIVE (-): Unable to obtain due to mental status which is baseline    SURGICAL HISTORY     Past Surgical History:   Procedure Laterality Date    CARDIAC CATHETERIZATION      2002    CHOLECYSTECTOMY      COLONOSCOPY  4/16/13    DR CAMERON     CORONARY ARTERY BYPASS GRAFT      CABG x 4 in 2000    ECHO COMPL W DOP COLOR FLOW  2/27/2012         ECHO COMPL W DOP COLOR FLOW  3/25/2013         SPINE SURGERY N/A 10/14/2020    T12, L5 KYPHOPLASTY, EPIDURAL INJECTION performed by V[facility]ctor Josue [facility] at [facility]       CURRENTMEDICATIONS       Previous Medications    ACETAMINOPHEN (TYLENOL) 325 MG TABLET    Take 2 tablets by mouth every 6 hours as needed for Pain    ACETAMINOPHEN (TYLENOL) 500 MG TABLET    Take 1 tablet by mouth 3 times daily    ALBUTEROL SULFATE HFA (VENTOLIN HFA) 108 (90 BASE) MCG/ACT  (36.6 °C)      SpO2:    100%   Weight: 40.8 kg (90 lb)      Height: 1.473 m (4' 10\")            ED Course as of 05/05/25 1426   Mon May 05, 2025   1204 Review of most recent available cardiac results:     Last cardiac stress test:   No results found for this or any previous visit.        Last cardiac catheterization:   No results found for this or any previous visit.      Last cardiac echocardiogram:   01/27/25    ECHO (TTE) COMPLETE (PRN CONTRAST/BUBBLE/STRAIN/3D) 01/30/2025  9:39 AM (Final)    Interpretation Summary  ·  Left Ventricle: The EF by visual approximation is 55 - 60%. Left ventricle is smaller than normal. Findings consistent with mild concentric hypertrophy.  ·  Right Ventricle: Right ventricle is severely dilated. Normal systolic function. TAPSE is normal.  ·  Aortic Valve: Moderately thickened cusps. Moderately calcified cusps. Mild regurgitation. Moderate stenosis of the aortic valve based on aortic valve area.  AV area by continuity VTI is 1.1 cm2. AV area by peak velocity is 1.1 cm2.  ·  Mitral Valve: Mild regurgitation. No stenosis noted. MV mean gradient is 2 mmHg. MV area by PHT is 8.1 cm2. MV area by continuity equation is 1.7 cm2.  ·  Tricuspid Valve: Severe regurgitation.  ·  Pulmonic Valve: Mild regurgitation.  ·  Left Atrium: Left atrium is moderately dilated.  ·  Right Atrium: Lead present in the right atrium. Right atrium is severely dilated.  ·  Image quality is technically difficult. Limited study due to patient's ability to tolerate test.    Signed by: Suhail Beckwith MD on 1/30/2025  9:39 AM         [ME]   1204 EKG @ 1106:  This EKG is signed and interpreted by Dr Salty Anderson DO.    Rate: 81  Rhythm: Atrial fibrillation  Interpretation: Atrial fibrillation with occasional atrially paced complex, low voltage, technically normal axis with left posterior fascicular block, QRS is 86, QTc is 441, does not meet STEMI criteria, mild noise and motion artifact instrumentation, there  Diagnoses of Internal hemorrhoids, Acute on chronic combined systolic and diastolic CHF (congestive heart failure) (HCC), Hypokalemia, Chronic anticoagulation, and Bronchitis due to human metapneumovirus (hMPV) were also pertinent to this visit.  New Prescriptions    No medications on file     DO Monica Fulton Matthew D, DO  05/05/25 1522

## 2025-05-06 PROBLEM — K62.5 RECTAL BLEEDING: Status: ACTIVE | Noted: 2025-05-06

## 2025-05-06 LAB
ANION GAP SERPL CALCULATED.3IONS-SCNC: 9 MMOL/L (ref 7–16)
BASOPHILS # BLD: 0.01 K/UL (ref 0–0.2)
BASOPHILS NFR BLD: 0 % (ref 0–2)
BUN SERPL-MCNC: 23 MG/DL (ref 8–23)
CALCIUM SERPL-MCNC: 8.3 MG/DL (ref 8.8–10.2)
CHLORIDE SERPL-SCNC: 99 MMOL/L (ref 98–107)
CO2 SERPL-SCNC: 32 MMOL/L (ref 22–29)
CREAT SERPL-MCNC: 0.8 MG/DL (ref 0.5–1)
EOSINOPHIL # BLD: 0 K/UL (ref 0.05–0.5)
EOSINOPHILS RELATIVE PERCENT: 0 % (ref 0–6)
ERYTHROCYTE [DISTWIDTH] IN BLOOD BY AUTOMATED COUNT: 16.9 % (ref 11.5–15)
GFR, ESTIMATED: 72 ML/MIN/1.73M2
GLUCOSE SERPL-MCNC: 152 MG/DL (ref 74–99)
HCT VFR BLD AUTO: 42.9 % (ref 34–48)
HGB BLD-MCNC: 13.3 G/DL (ref 11.5–15.5)
IMM GRANULOCYTES # BLD AUTO: 0.08 K/UL (ref 0–0.58)
IMM GRANULOCYTES NFR BLD: 1 % (ref 0–5)
LYMPHOCYTES NFR BLD: 0.89 K/UL (ref 1.5–4)
LYMPHOCYTES RELATIVE PERCENT: 13 % (ref 20–42)
MAGNESIUM SERPL-MCNC: 2.2 MG/DL (ref 1.6–2.4)
MCH RBC QN AUTO: 29.4 PG (ref 26–35)
MCHC RBC AUTO-ENTMCNC: 31 G/DL (ref 32–34.5)
MCV RBC AUTO: 94.7 FL (ref 80–99.9)
MONOCYTES NFR BLD: 0.24 K/UL (ref 0.1–0.95)
MONOCYTES NFR BLD: 4 % (ref 2–12)
NEUTROPHILS NFR BLD: 82 % (ref 43–80)
NEUTS SEG NFR BLD: 5.67 K/UL (ref 1.8–7.3)
PHOSPHATE SERPL-MCNC: 3.3 MG/DL (ref 2.5–4.5)
PLATELET # BLD AUTO: 207 K/UL (ref 130–450)
PMV BLD AUTO: 10.7 FL (ref 7–12)
POTASSIUM SERPL-SCNC: 4 MMOL/L (ref 3.5–5.1)
RBC # BLD AUTO: 4.53 M/UL (ref 3.5–5.5)
SODIUM SERPL-SCNC: 141 MMOL/L (ref 136–145)
WBC OTHER # BLD: 6.9 K/UL (ref 4.5–11.5)

## 2025-05-06 PROCEDURE — 87449 NOS EACH ORGANISM AG IA: CPT

## 2025-05-06 PROCEDURE — 99222 1ST HOSP IP/OBS MODERATE 55: CPT | Performed by: CHIROPRACTOR

## 2025-05-06 PROCEDURE — 6360000002 HC RX W HCPCS

## 2025-05-06 PROCEDURE — 80048 BASIC METABOLIC PNL TOTAL CA: CPT

## 2025-05-06 PROCEDURE — 2700000000 HC OXYGEN THERAPY PER DAY

## 2025-05-06 PROCEDURE — 85025 COMPLETE CBC W/AUTO DIFF WBC: CPT

## 2025-05-06 PROCEDURE — 6370000000 HC RX 637 (ALT 250 FOR IP)

## 2025-05-06 PROCEDURE — 94640 AIRWAY INHALATION TREATMENT: CPT

## 2025-05-06 PROCEDURE — 2500000003 HC RX 250 WO HCPCS

## 2025-05-06 PROCEDURE — 84100 ASSAY OF PHOSPHORUS: CPT

## 2025-05-06 PROCEDURE — 87324 CLOSTRIDIUM AG IA: CPT

## 2025-05-06 PROCEDURE — 83735 ASSAY OF MAGNESIUM: CPT

## 2025-05-06 PROCEDURE — 1200000000 HC SEMI PRIVATE

## 2025-05-06 PROCEDURE — 36415 COLL VENOUS BLD VENIPUNCTURE: CPT

## 2025-05-06 RX ORDER — IPRATROPIUM BROMIDE AND ALBUTEROL SULFATE 2.5; .5 MG/3ML; MG/3ML
1 SOLUTION RESPIRATORY (INHALATION) EVERY 4 HOURS PRN
Status: DISCONTINUED | OUTPATIENT
Start: 2025-05-06 | End: 2025-05-07

## 2025-05-06 RX ORDER — BUMETANIDE 0.25 MG/ML
1 INJECTION, SOLUTION INTRAMUSCULAR; INTRAVENOUS DAILY
Status: DISCONTINUED | OUTPATIENT
Start: 2025-05-06 | End: 2025-05-10

## 2025-05-06 RX ADMIN — ASPIRIN 81 MG: 81 TABLET, COATED ORAL at 08:17

## 2025-05-06 RX ADMIN — IPRATROPIUM BROMIDE AND ALBUTEROL SULFATE 1 DOSE: 2.5; .5 SOLUTION RESPIRATORY (INHALATION) at 07:51

## 2025-05-06 RX ADMIN — METOPROLOL SUCCINATE 200 MG: 100 TABLET, EXTENDED RELEASE ORAL at 08:17

## 2025-05-06 RX ADMIN — APIXABAN 2.5 MG: 2.5 TABLET, FILM COATED ORAL at 08:17

## 2025-05-06 RX ADMIN — SILDENAFIL 10 MG: 20 TABLET ORAL at 08:17

## 2025-05-06 RX ADMIN — SODIUM CHLORIDE, PRESERVATIVE FREE 10 ML: 5 INJECTION INTRAVENOUS at 20:50

## 2025-05-06 RX ADMIN — IPRATROPIUM BROMIDE AND ALBUTEROL SULFATE 1 DOSE: 2.5; .5 SOLUTION RESPIRATORY (INHALATION) at 16:02

## 2025-05-06 RX ADMIN — SILDENAFIL 10 MG: 20 TABLET ORAL at 13:43

## 2025-05-06 RX ADMIN — FOLIC ACID 1000 MCG: 1 TABLET ORAL at 08:17

## 2025-05-06 RX ADMIN — METHYLPREDNISOLONE SODIUM SUCCINATE 40 MG: 40 INJECTION, POWDER, LYOPHILIZED, FOR SOLUTION INTRAMUSCULAR; INTRAVENOUS at 08:17

## 2025-05-06 RX ADMIN — PANTOPRAZOLE SODIUM 40 MG: 40 TABLET, DELAYED RELEASE ORAL at 06:03

## 2025-05-06 RX ADMIN — SODIUM CHLORIDE, PRESERVATIVE FREE 10 ML: 5 INJECTION INTRAVENOUS at 08:18

## 2025-05-06 RX ADMIN — BUMETANIDE 1 MG: 0.25 INJECTION INTRAMUSCULAR; INTRAVENOUS at 11:45

## 2025-05-06 RX ADMIN — ARFORMOTEROL TARTRATE 15 MCG: 15 SOLUTION RESPIRATORY (INHALATION) at 07:51

## 2025-05-06 RX ADMIN — IPRATROPIUM BROMIDE AND ALBUTEROL SULFATE 1 DOSE: 2.5; .5 SOLUTION RESPIRATORY (INHALATION) at 12:11

## 2025-05-06 RX ADMIN — ARFORMOTEROL TARTRATE 15 MCG: 15 SOLUTION RESPIRATORY (INHALATION) at 20:23

## 2025-05-06 RX ADMIN — BUDESONIDE 500 MCG: 0.5 SUSPENSION RESPIRATORY (INHALATION) at 07:51

## 2025-05-06 RX ADMIN — IPRATROPIUM BROMIDE AND ALBUTEROL SULFATE 1 DOSE: 2.5; .5 SOLUTION RESPIRATORY (INHALATION) at 20:24

## 2025-05-06 RX ADMIN — SILDENAFIL 10 MG: 20 TABLET ORAL at 20:47

## 2025-05-06 RX ADMIN — ATORVASTATIN CALCIUM 40 MG: 40 TABLET, FILM COATED ORAL at 08:17

## 2025-05-06 RX ADMIN — APIXABAN 2.5 MG: 2.5 TABLET, FILM COATED ORAL at 20:47

## 2025-05-06 RX ADMIN — BUDESONIDE 500 MCG: 0.5 SUSPENSION RESPIRATORY (INHALATION) at 20:24

## 2025-05-06 NOTE — ACP (ADVANCE CARE PLANNING)
Advance Care Planning   Healthcare Decision Maker:    Primary Decision Maker: Albertina Blanco - Child - [phone]    Click here to complete Healthcare Decision Makers including selection of the Healthcare Decision Maker Relationship (ie \"Primary\").  Today we documented Decision Maker(s) consistent with ACP documents on file.       If the relationship to the patient does NOT follow our state's Next of Kin hierarchy, the patient MUST complete an ACP Document to allow him/her to act on the patient's behalf. :

## 2025-05-06 NOTE — PROGRESS NOTES
[facility]  Internal Medicine Residency Program  Progress Note - House Team       Patient:  Bobbi Marquis 85 y.o. female   MRN: [id number]       Date of Service: 2025    CC: Shortness of Breath (With wheezing beginning this morning, given duoneb en route; hx of pulmonary HTN ) and Rectal Bleeding (Bright red blood x 1; hemorrhoids noted)    Overnight events: No overnight events   Hospital stay: 1    Subjective     Patient was evaluated at bedside. She was sleeping comfortably in bed. Reports dry cough, no shortness of breath.     Objective     Vitals  Range   /76 BP  Min: 122/76  Max: 132/88   HR 94 Pulse  Av.5  Min: 87  Max: 94   RR 17 Resp  Av  Min: 17  Max: 17   Temp. 97.6 °F (36.4 °C) Temp  Min: 97.6 °F (36.4 °C)  Max: 97.8 °F (36.6 °C)   O2sat 99 % SpO2  Av.7 %  Min: 92 %  Max: 99 %   Weight 40.8 kg (90 lb)      Intake/Output Summary (Last 24 hours) at 2025 0930  Last data filed at 2025 0641  Gross per 24 hour   Intake 454.68 ml   Output --   Net 454.68 ml     Net IO Since Admission: 454.68 mL [25 0930]    PHYSICAL EXAM:  General Appearance: alert and oriented to person, place and time, well developed and well- nourished, in no acute distress  Head: normocephalic and atraumatic  Neck: supple and non-tender without mass, no thyromegaly or thyroid nodules, no cervical lymphadenopathy  Pulmonary/Chest: rhonchi and crackles heard bilaterally  Cardiovascular: normal rate, regular rhythm, normal S1 and S2, no murmurs, rubs, clicks, or gallops, distal pulses intact, no carotid bruits  Abdomen: soft, non-tender, non-distended, normal bowel sounds, no masses or organomegaly  Extremities: +2 edema bilaterally, left lower extremity with redness  Neurologic: alert, oriented only to self, normal speech    Diet:   ADULT DIET; Regular     potassium chloride  40 mEq Oral Once    sodium chloride flush  5-40 mL IntraVENous 2 times per day    methylPREDNISolone  40 mg     QTc Calculation (Bazett) 457    R Axis 125    T Axis -89    Narrative    Suspect unspecified pacemaker failure  Atrial fibrillation  Right axis deviation  Possible Right ventricular hypertrophy  Cannot rule out Anterior infarct , age undetermined    Confirmed by Jimmy Loo ([id number]) on 5/5/2025 3:16:23 PM       IP CONSULT TO INTERNAL MEDICINE       Resident's Assessment and Plan     Assessment:     COPD exacerbation likely secondary to human metapneumovirus infection  CT chest: Hyperinflated lungs with upper lobe predominant pleuroparenchymal scarring and emphysematous changes.  Human metapneumovirus infection  Long-standing atrial fibrillation, on Eliquis 2.5 mg BID  Coronary artery disease  Severe CAD s/p CABG x 4 (LIMA-LAD, SVG-rPDA, SVG-D1, SVG-OM2) in 2000   S/p reported PCI 2008  S/p [facility] 2012 with occluded bypass grafts   S/p [facility] 7/2018   HFpEF, EF 55-60%  VHD with history of severe TR   Sick sinus syndrome, s/p [employer] PPM (single chamber atrial lead) placement [facility] 9/23/2019  Pulmonary hypertension  Multiple pulmonary nodules, largest 8 mm right solid pulmonary nodule  noncalcified nodules in the right lower lobe.  GERD, on Prilosec 40 mg daily  Perihepatic ascites  Hyperlipidemia  Hypertension  ??? Rectal bleeding secondary to hemorrhoids  Venous stasis dermatitis, left lower extremity  Dementia  History of tobacco abuse    Plan:     Solu-medrol 40 mg daily for 4 days (5/6 - 5/9), received 1 dose of Solu-medrol 125 mg in the ED  Continue breathing treatments with DuoNeb, Brovana and Pulmicort  Start incentive spirometry  Monitor respiratory status  Monitor H/H, and signs of bleeding, transfuse if Hb <7  Consider consult with Pulmonology  Daily labs     PT/OT evaluation: not indicated at this time   DVT prophylaxis: Eliquis 2.5 mg BID  GI prophylaxis: Protonix 40 mg daily  Diet:   ADULT DIET; Regular   Bowel regimen: Glycolax PRN  Pain management: as needed  Code status: Limited

## 2025-05-06 NOTE — PLAN OF CARE
Today, during my in-person encounter with patient's daughter Albertina Blanco, I gave her updates regarding patient's current medical management. I explained new findings of multiple pulmonary nodules on CT chest, and talked about possible next step in work up. I asked Albertina if she would want to involve Pulmonology and pursue further work up, she declined stating that at her mother's age, she does not want to do any procedure or anything that involve going under anesthesia.

## 2025-05-06 NOTE — DISCHARGE INSTR - COC
Continuity of Care Form    Patient Name: Bobbi Marquis   :  1940  MRN:  [id number]    Admit date:  2025  Discharge date:  05/10/25    Code Status Order: Limited   Advance Directives:     Admitting Physician:  Joseph Bliss MD  PCP: Donte Miller DO    Discharging Nurse: Heather Dumont RN  Discharging Hospital Unit/Room#: 8415/8415-B  Discharging Unit Phone Number: [phone]    Emergency Contact:   Extended Emergency Contact Information  Primary Emergency Contact: Albertina Blanco  Address: [address] [place] [address]  Home Phone: [phone]  Relation: Child    Past Surgical History:  Past Surgical History:   Procedure Laterality Date    CARDIAC CATHETERIZATION          CHOLECYSTECTOMY      COLONOSCOPY  13    DR CAMERON     CORONARY ARTERY BYPASS GRAFT      CABG x 4 in     ECHO COMPL W DOP COLOR FLOW  2012         ECHO COMPL W DOP COLOR FLOW  3/25/2013         SPINE SURGERY N/A 10/14/2020    T12, L5 KYPHOPLASTY, EPIDURAL INJECTION performed by Víctor Josue MD at [facility] OR       Immunization History:   Immunization History   Administered Date(s) Administered    COVID-19, MODERNA BLUE border, Primary or Immunocompromised, (age 12y+), IM, 100 mcg/0.5mL 2021, 2021    Influenza A ([id number]) Vaccine PF IM 2009    Influenza, FLUAD, (age 65 y+), IM, Quadv, 0.5mL 10/20/2021, 2022    Influenza, FLUAD, (age 65 y+), IM, Trivalent PF, 0.5mL 2018    Influenza, FLUARIX, FLULAVAL, FLUZONE (age 6 mo+) and AFLURIA, (age 3 y+), Quadv PF, 0.5mL 03/15/2017    Influenza, FLUZONE High Dose, (age 65 y+), IM, Trivalent PF, 0.5mL 10/12/2015, 2017    Pneumococcal, PCV-13, PREVNAR 13, (age 6w+), IM, 0.5mL 03/15/2017    Pneumococcal, PPSV23, PNEUMOVAX 23, (age 2y+), SC/IM, 0.5mL 2016, 2019    Zoster Recombinant (Shingrix) 2019, 2019       Active Problems:  Patient Active Problem List   Diagnosis Code     in the setting of human metapneumovirus.     During hospital stay, she received breathing treatments, and needed O2 per nasal cannula, and monitored for volume overload, which received Bumex IV. She is back to her baseline and respiratory status stable, she still has some diffuse rhonchi on physical examination, she will be discharged on Mucinex, and complete prednisone for 2 more doses. There was also concerns for cellulitis of her left lower extremity, which was started on Keflex 500 mg q6 hrs for 7 days total, remaining 4 days after discharge. Of note, CT chest was performed and showed right lower lobe adjacent abutting but separate nodules 5 mm and 8 mm noncalcified nodules in the right lower lobe, this new finding has been discussed with daughter Albertina, who states that would preferred not doing further investigation.     Patient is discharged today in stable conditions, she needs regular volume status monitoring and adequate oral intake, dietician was consulted in hospital and recommend to add ensure and BID to promote nutrient/PO intake.    PHYSICIAN SIGNATURE:  Electronically signed by Lashae Betancourt MD on 5/10/25 at 11:15 AM EDT

## 2025-05-06 NOTE — PLAN OF CARE
Problem: Risk for Elopement  Goal: Patient will not exit the unit/facility without proper excort  Outcome: Progressing  Flowsheets (Taken 5/5/2025 1450 by Venkata Dean RN)  Nursing Interventions for Elopement Risk: (Hospital  requested)   Collaborate with family members/caregivers to mitigate the elopement risk   Assist with personal care needs such as toileting, eating, dressing, as needed to reduce the risk of wandering   Communicate/escalate to charge nurse the risk of elopement     Problem: Chronic Conditions and Co-morbidities  Goal: Patient's chronic conditions and co-morbidity symptoms are monitored and maintained or improved  Outcome: Progressing

## 2025-05-06 NOTE — CARE COORDINATION
CM transition of care.  Patient presented to [facility] ER from [facility] secondary to shortness of breath and rectal bleeding.  Admitted inpatient with acute respiratory failure with hypoxia.  Isolation for Human Metapneumovirus.  IV solu-medrol daily, IV Bumex daily, duonebs ordered.  Weaned off of oxygen.  Spoke with Rae from [facility].  Patient can return when ready.  Patient is long term care bed hold.  No precert or hens required.  Gricelda/destination completed.  Spoke with patient daughter Albertina who confirmed the discharge plan is to return to [facility] when medically ready.  Ambulance form with envelope placed on the soft chart.  CM/SW to follow.  Ke Carlos RN  [phone].      Case Management Assessment  Initial Evaluation    Date/Time of Evaluation: 5/6/2025 12:58 PM  Assessment Completed by: Dinora Garcia RN    If patient is discharged prior to next notation, then this note serves as note for discharge by case management.    Patient Name: Bobbi Marquis                   YOB: 1940  Diagnosis: Internal hemorrhoids [K64.8]  Hypokalemia [E87.6]  Rectal bleeding [K62.5]  Chronic anticoagulation [Z79.01]  Acute respiratory failure with hypoxia (HCC) [J96.01]  Acute on chronic combined systolic and diastolic CHF (congestive heart failure) (HCC) [I50.43]  Bronchitis due to human metapneumovirus (hMPV) [J40, B97.81]                   Date / Time: 5/5/2025 10:57 AM    Patient Admission Status: Inpatient   Readmission Risk (Low < 19, Mod (19-27), High > 27): Readmission Risk Score: 16.2    Current PCP: Donte Miller, DO  PCP verified by CM? Yes    Chart Reviewed: Yes      History Provided by: Child/Family, Medical Record  Patient Orientation: Person    Patient Cognition: Dementia / Early Alzheimer's    Hospitalization in the last 30 days (Readmission):  No    If yes, Readmission Assessment in  Navigator will be completed.    Advance Directives:      Code Status: Limited   Patient's Primary  Caregiver support, and Pleasant    Barriers to discharge: Medical complications    Additional Case Management Notes:     The Plan for Transition of Care is related to the following treatment goals of Internal hemorrhoids [K64.8]  Hypokalemia [E87.6]  Rectal bleeding [K62.5]  Chronic anticoagulation [Z79.01]  Acute respiratory failure with hypoxia (HCC) [J96.01]  Acute on chronic combined systolic and diastolic CHF (congestive heart failure) (HCC) [I50.43]  Bronchitis due to human metapneumovirus (hMPV) [J40, B97.81]    IF APPLICABLE: The Patient and/or patient representative Bobbi and her family were provided with a choice of provider and agrees with the discharge plan. Freedom of choice list with basic dialogue that supports the patient's individualized plan of care/goals and shares the quality data associated with the providers was provided to: Patient Representative   Patient Representative Name: Daughter Albertina     The Patient and/or Patient Representative Agree with the Discharge Plan? Yes    Dinora Garcia RN  Case Management Department  Ph: [phone] Fax:

## 2025-05-06 NOTE — PROGRESS NOTES
4 Eyes Skin Assessment     NAME:  Bobbi Marquis  YOB: 1940  MEDICAL RECORD NUMBER:  [id number]    The patient is being assessed for  Admission    I agree that at least one RN has performed a thorough Head to Toe Skin Assessment on the patient. ALL assessment sites listed below have been assessed.        Discoloration/blanchable erythema to buttocks/hemmorhoids  Ecchymosis BUE  Nicolás lower edema pitting 3+  LLE very red  LUE edema nonpitting 2+  Red scabs to BLE  Dry flaky heels/feet      Areas assessed by both nurses:    Head, Face, Ears, Shoulders, Back, Chest, Arms, Elbows, Hands, Sacrum. Buttock, Coccyx, Ischium, Legs. Feet and Heels, and Under Medical Devices         Does the Patient have a Wound? No noted wound(s)       Jarvis Prevention initiated by RN: Yes  Wound Care Orders initiated by RN: No    Pressure Injury (Stage 3,4, Unstageable, DTI, NWPT, and Complex wounds) if present, place Wound referral order by RN under : No    New Ostomies, if present place, Ostomy referral order under : No     Nurse 1 eSignature: Electronically signed by Nette Luna RN on 5/6/25 at 5:54 AM EDT    **SHARE this note so that the co-signing nurse can place an eSignature**    Nurse 2 eSignature: Electronically signed by Dawn Lindsey RN on 5/6/25 at 5:56 AM EDT

## 2025-05-06 NOTE — PLAN OF CARE
Problem: Chronic Conditions and Co-morbidities  Goal: Patient's chronic conditions and co-morbidity symptoms are monitored and maintained or improved  5/6/2025 0922 by Zoya Hayes RN  Outcome: Progressing  5/6/2025 0105 by Nette Luna RN  Outcome: Progressing     Problem: Risk for Elopement  Goal: Patient will not exit the unit/facility without proper excort  5/6/2025 0922 by Zoya Hayes RN  Outcome: Progressing  5/6/2025 0105 by Nette Luna RN  Outcome: Progressing  Flowsheets (Taken 5/5/2025 1450 by Venkata Dean RN)  Nursing Interventions for Elopement Risk: (Hospital  requested)   Collaborate with family members/caregivers to mitigate the elopement risk   Assist with personal care needs such as toileting, eating, dressing, as needed to reduce the risk of wandering   Communicate/escalate to charge nurse the risk of elopement     Problem: ABCDS Injury Assessment  Goal: Absence of physical injury  5/6/2025 0922 by Zoya Hayes RN  Outcome: Progressing  5/6/2025 0105 by Nette Luna RN  Outcome: Progressing     Problem: Skin/Tissue Integrity  Goal: Skin integrity remains intact  Description: 1.  Monitor for areas of redness and/or skin breakdown2.  Assess vascular access sites hourly3.  Every 4-6 hours minimum:  Change oxygen saturation probe site4.  Every 4-6 hours:  If on nasal continuous positive airway pressure, respiratory therapy assess nares and determine need for appliance change or resting period  Outcome: Progressing     Problem: Safety - Adult  Goal: Free from fall injury  Outcome: Progressing

## 2025-05-06 NOTE — PROGRESS NOTES
ED TO INPATIENT SBAR HANDOFF    Patient Name: Bobbi Marquis   Preferred Name: Bobbi  : 1940  85 y.o.   Code Status Order: Limited  Telemetry Order: Yes  C-SSRS:    Sitter no and yes Safety  Restraints:       Situation  Chief Complaint   Patient presents with    Shortness of Breath     With wheezing beginning this morning, given duoneb en route; hx of pulmonary HTN     Rectal Bleeding     Bright red blood x 1; hemorrhoids noted     Brief Description of Patient's Condition: SOB, cough, and wheezing for 1 day.  Mental Status: disoriented    Background  Allergies:   Allergies   Allergen Reactions    Dronedarone Hives       Assessment  Vitals/MEWS: MEWS Score: 1  Level of Consciousness: Alert (0)   Vitals:    25 19125   BP: (!) 142/82      Pulse: (!) 106 (!) 109 (!) 115    Resp: 21 29 22 21   Temp:       SpO2: 97%      Weight:       Height:         Cardiac Rhythm:    Deterioration Index (DI): Deterioration Index: 46.32  Deterioration Index (DI) Interventions Performed:    O2 Flow Rate: O2 Flow Rate (L/min): 2 L/min  O2 Device: O2 Device: None (Room air)    Active Central Lines:                          Active Wounds:    Active Keller's:      Recommendation  Patient Belongings:    Additional Comments: Pulls at lines  If any further questions, please call Sending RN at [phone]  Opportunity for questions and clarification were provided to (name of person notified and time): staff RN       Electronically signed by: Electronically signed by Venkata Dean RN on 2025 at 8:42 PM

## 2025-05-07 LAB
ALBUMIN SERPL-MCNC: 2.8 G/DL (ref 3.5–5.2)
ALP SERPL-CCNC: 134 U/L (ref 35–104)
ALT SERPL-CCNC: 15 U/L (ref 0–35)
ANION GAP SERPL CALCULATED.3IONS-SCNC: 10 MMOL/L (ref 7–16)
AST SERPL-CCNC: 48 U/L (ref 0–35)
BASOPHILS # BLD: 0.01 K/UL (ref 0–0.2)
BASOPHILS NFR BLD: 0 % (ref 0–2)
BILIRUB DIRECT SERPL-MCNC: 0.4 MG/DL (ref 0–0.2)
BILIRUB INDIRECT SERPL-MCNC: 0.4 MG/DL (ref 0–1)
BILIRUB SERPL-MCNC: 0.8 MG/DL (ref 0–1.2)
BNP SERPL-MCNC: 3299 PG/ML (ref 0–450)
BUN SERPL-MCNC: 23 MG/DL (ref 8–23)
C DIFF GDH + TOXINS A+B STL QL IA.RAPID: NEGATIVE
CALCIUM SERPL-MCNC: 8.3 MG/DL (ref 8.8–10.2)
CHLORIDE SERPL-SCNC: 99 MMOL/L (ref 98–107)
CK SERPL-CCNC: 129 U/L (ref 0–170)
CO2 SERPL-SCNC: 31 MMOL/L (ref 22–29)
CREAT SERPL-MCNC: 0.7 MG/DL (ref 0.5–1)
EOSINOPHIL # BLD: 0 K/UL (ref 0.05–0.5)
EOSINOPHILS RELATIVE PERCENT: 0 % (ref 0–6)
ERYTHROCYTE [DISTWIDTH] IN BLOOD BY AUTOMATED COUNT: 16.9 % (ref 11.5–15)
GFR, ESTIMATED: 78 ML/MIN/1.73M2
GGT SERPL-CCNC: 27 U/L (ref 5–36)
GLUCOSE SERPL-MCNC: 92 MG/DL (ref 74–99)
HAV IGM SERPL QL IA: NONREACTIVE
HBV CORE IGM SERPL QL IA: NONREACTIVE
HBV SURFACE AG SERPL QL IA: NONREACTIVE
HCT VFR BLD AUTO: 37.7 % (ref 34–48)
HCV AB SERPL QL IA: NONREACTIVE
HGB BLD-MCNC: 11.5 G/DL (ref 11.5–15.5)
IMM GRANULOCYTES # BLD AUTO: 0.04 K/UL (ref 0–0.58)
IMM GRANULOCYTES NFR BLD: 1 % (ref 0–5)
LYMPHOCYTES NFR BLD: 0.92 K/UL (ref 1.5–4)
LYMPHOCYTES RELATIVE PERCENT: 13 % (ref 20–42)
MAGNESIUM SERPL-MCNC: 2.2 MG/DL (ref 1.6–2.4)
MCH RBC QN AUTO: 28.9 PG (ref 26–35)
MCHC RBC AUTO-ENTMCNC: 30.5 G/DL (ref 32–34.5)
MCV RBC AUTO: 94.7 FL (ref 80–99.9)
MONOCYTES NFR BLD: 0.65 K/UL (ref 0.1–0.95)
MONOCYTES NFR BLD: 9 % (ref 2–12)
NEUTROPHILS NFR BLD: 77 % (ref 43–80)
NEUTS SEG NFR BLD: 5.4 K/UL (ref 1.8–7.3)
PHOSPHATE SERPL-MCNC: 2.8 MG/DL (ref 2.5–4.5)
PLATELET # BLD AUTO: 197 K/UL (ref 130–450)
PMV BLD AUTO: 10.5 FL (ref 7–12)
POTASSIUM SERPL-SCNC: 3.5 MMOL/L (ref 3.5–5.1)
PROCALCITONIN SERPL-MCNC: 0.05 NG/ML (ref 0–0.08)
PROCALCITONIN SERPL-MCNC: 0.05 NG/ML (ref 0–0.08)
PROT SERPL-MCNC: 6.2 G/DL (ref 6.4–8.3)
RBC # BLD AUTO: 3.98 M/UL (ref 3.5–5.5)
SODIUM SERPL-SCNC: 139 MMOL/L (ref 136–145)
SPECIMEN DESCRIPTION: NORMAL
WBC OTHER # BLD: 7 K/UL (ref 4.5–11.5)

## 2025-05-07 PROCEDURE — 99232 SBSQ HOSP IP/OBS MODERATE 35: CPT | Performed by: CHIROPRACTOR

## 2025-05-07 PROCEDURE — 2700000000 HC OXYGEN THERAPY PER DAY

## 2025-05-07 PROCEDURE — 82977 ASSAY OF GGT: CPT

## 2025-05-07 PROCEDURE — 85025 COMPLETE CBC W/AUTO DIFF WBC: CPT

## 2025-05-07 PROCEDURE — 6360000002 HC RX W HCPCS

## 2025-05-07 PROCEDURE — 83880 ASSAY OF NATRIURETIC PEPTIDE: CPT

## 2025-05-07 PROCEDURE — 80053 COMPREHEN METABOLIC PANEL: CPT

## 2025-05-07 PROCEDURE — 84145 PROCALCITONIN (PCT): CPT

## 2025-05-07 PROCEDURE — 6370000000 HC RX 637 (ALT 250 FOR IP)

## 2025-05-07 PROCEDURE — 83735 ASSAY OF MAGNESIUM: CPT

## 2025-05-07 PROCEDURE — 80074 ACUTE HEPATITIS PANEL: CPT

## 2025-05-07 PROCEDURE — 82550 ASSAY OF CK (CPK): CPT

## 2025-05-07 PROCEDURE — 82248 BILIRUBIN DIRECT: CPT

## 2025-05-07 PROCEDURE — 84100 ASSAY OF PHOSPHORUS: CPT

## 2025-05-07 PROCEDURE — 94640 AIRWAY INHALATION TREATMENT: CPT

## 2025-05-07 PROCEDURE — 2500000003 HC RX 250 WO HCPCS

## 2025-05-07 PROCEDURE — 1200000000 HC SEMI PRIVATE

## 2025-05-07 PROCEDURE — 36415 COLL VENOUS BLD VENIPUNCTURE: CPT

## 2025-05-07 RX ORDER — MECOBALAMIN 5000 MCG
5 TABLET,DISINTEGRATING ORAL NIGHTLY
Status: DISCONTINUED | OUTPATIENT
Start: 2025-05-07 | End: 2025-05-10 | Stop reason: HOSPADM

## 2025-05-07 RX ORDER — PREDNISONE 20 MG/1
40 TABLET ORAL DAILY
Status: DISCONTINUED | OUTPATIENT
Start: 2025-05-07 | End: 2025-05-10 | Stop reason: HOSPADM

## 2025-05-07 RX ORDER — CEPHALEXIN 500 MG/1
500 CAPSULE ORAL EVERY 6 HOURS SCHEDULED
Status: DISCONTINUED | OUTPATIENT
Start: 2025-05-07 | End: 2025-05-10 | Stop reason: HOSPADM

## 2025-05-07 RX ADMIN — IPRATROPIUM BROMIDE AND ALBUTEROL SULFATE 1 DOSE: 2.5; .5 SOLUTION RESPIRATORY (INHALATION) at 15:53

## 2025-05-07 RX ADMIN — ASPIRIN 81 MG: 81 TABLET, COATED ORAL at 08:57

## 2025-05-07 RX ADMIN — IPRATROPIUM BROMIDE AND ALBUTEROL SULFATE 1 DOSE: 2.5; .5 SOLUTION RESPIRATORY (INHALATION) at 07:46

## 2025-05-07 RX ADMIN — PREDNISONE 40 MG: 20 TABLET ORAL at 08:57

## 2025-05-07 RX ADMIN — SODIUM CHLORIDE, PRESERVATIVE FREE 10 ML: 5 INJECTION INTRAVENOUS at 20:41

## 2025-05-07 RX ADMIN — BUDESONIDE 500 MCG: 0.5 SUSPENSION RESPIRATORY (INHALATION) at 19:36

## 2025-05-07 RX ADMIN — SODIUM CHLORIDE, PRESERVATIVE FREE 10 ML: 5 INJECTION INTRAVENOUS at 08:58

## 2025-05-07 RX ADMIN — SILDENAFIL 10 MG: 20 TABLET ORAL at 08:57

## 2025-05-07 RX ADMIN — ACETAMINOPHEN 650 MG: 325 TABLET ORAL at 20:29

## 2025-05-07 RX ADMIN — IPRATROPIUM BROMIDE AND ALBUTEROL SULFATE 1 DOSE: 2.5; .5 SOLUTION RESPIRATORY (INHALATION) at 19:36

## 2025-05-07 RX ADMIN — SILDENAFIL 10 MG: 20 TABLET ORAL at 13:19

## 2025-05-07 RX ADMIN — SILDENAFIL 10 MG: 20 TABLET ORAL at 20:29

## 2025-05-07 RX ADMIN — POTASSIUM BICARBONATE 40 MEQ: 782 TABLET, EFFERVESCENT ORAL at 08:58

## 2025-05-07 RX ADMIN — METOPROLOL SUCCINATE 200 MG: 100 TABLET, EXTENDED RELEASE ORAL at 08:57

## 2025-05-07 RX ADMIN — APIXABAN 2.5 MG: 2.5 TABLET, FILM COATED ORAL at 20:29

## 2025-05-07 RX ADMIN — ATORVASTATIN CALCIUM 40 MG: 40 TABLET, FILM COATED ORAL at 08:57

## 2025-05-07 RX ADMIN — BUDESONIDE 500 MCG: 0.5 SUSPENSION RESPIRATORY (INHALATION) at 07:46

## 2025-05-07 RX ADMIN — CEPHALEXIN 500 MG: 500 CAPSULE ORAL at 17:58

## 2025-05-07 RX ADMIN — FOLIC ACID 1000 MCG: 1 TABLET ORAL at 08:57

## 2025-05-07 RX ADMIN — ARFORMOTEROL TARTRATE 15 MCG: 15 SOLUTION RESPIRATORY (INHALATION) at 19:35

## 2025-05-07 RX ADMIN — CEPHALEXIN 500 MG: 500 CAPSULE ORAL at 13:19

## 2025-05-07 RX ADMIN — PANTOPRAZOLE SODIUM 40 MG: 40 TABLET, DELAYED RELEASE ORAL at 08:57

## 2025-05-07 RX ADMIN — BUMETANIDE 1 MG: 0.25 INJECTION INTRAMUSCULAR; INTRAVENOUS at 08:58

## 2025-05-07 RX ADMIN — APIXABAN 2.5 MG: 2.5 TABLET, FILM COATED ORAL at 08:57

## 2025-05-07 RX ADMIN — CEPHALEXIN 500 MG: 500 CAPSULE ORAL at 23:47

## 2025-05-07 RX ADMIN — IPRATROPIUM BROMIDE AND ALBUTEROL SULFATE 1 DOSE: 2.5; .5 SOLUTION RESPIRATORY (INHALATION) at 11:32

## 2025-05-07 RX ADMIN — Medication 5 MG: at 20:29

## 2025-05-07 RX ADMIN — ARFORMOTEROL TARTRATE 15 MCG: 15 SOLUTION RESPIRATORY (INHALATION) at 07:46

## 2025-05-07 NOTE — CARE COORDINATION
CM update note.  Discharge plan is for patient to return to [facility].  Patient is a long term care bed hold and does require precert or hens to return.  Gricelda/destination completed.  Ambulance form with envelope is on the soft chart.  Isolation for Human Metapneumovirus. C-diff negative.  Placed back on supplemental oxygen.  Baseline is room air.  Remains on IV Bumex daily.  Solu-medrol stopped, on oral prednisone.  CM/SW to follow.  Ke Garcia RN [phone].

## 2025-05-07 NOTE — PLAN OF CARE
Problem: Chronic Conditions and Co-morbidities  Goal: Patient's chronic conditions and co-morbidity symptoms are monitored and maintained or improved  5/7/2025 1031 by Zoya Hayes RN  Outcome: Progressing  5/7/2025 0304 by Nette Luna RN  Outcome: Progressing     Problem: Risk for Elopement  Goal: Patient will not exit the unit/facility without proper excort  5/7/2025 1031 by Zoya Hayes RN  Outcome: Progressing  5/7/2025 0304 by Nette Luna RN  Outcome: Progressing     Problem: ABCDS Injury Assessment  Goal: Absence of physical injury  5/7/2025 1031 by Zoya Hayes RN  Outcome: Progressing  5/7/2025 0304 by Nette Luna RN  Outcome: Progressing     Problem: Skin/Tissue Integrity  Goal: Skin integrity remains intact  Description: 1.  Monitor for areas of redness and/or skin breakdown2.  Assess vascular access sites hourly3.  Every 4-6 hours minimum:  Change oxygen saturation probe site4.  Every 4-6 hours:  If on nasal continuous positive airway pressure, respiratory therapy assess nares and determine need for appliance change or resting period  5/7/2025 1031 by Zoya Hayes RN  Outcome: Progressing  5/7/2025 0304 by Nette Luna RN  Outcome: Progressing     Problem: Safety - Adult  Goal: Free from fall injury  5/7/2025 1031 by Zoya Hayes RN  Outcome: Progressing  5/7/2025 0304 by Nette Luna RN  Outcome: Progressing

## 2025-05-07 NOTE — PROGRESS NOTES
Physician Progress Note      PATIENT:               ABDOULAYE RUFFIN  CSN #:                  [id number]  :                       1940  ADMIT DATE:       2025 10:57 AM  DISCH DATE:  RESPONDING  PROVIDER #:        Joseph Bliss MD          QUERY TEXT:    Based on your medical judgment, please clarify these findings and document if   any of the following are being evaluated and/or treated:    The clinical indicators include:  Based on your medical judgment, please clarify these findings and document if   any of the following are being evaluated and/or treated:    This is a 85 y.o. female presents with complaints of worsening shortness of   breath    - \" Long-standing atrial fibrillation, on Eliquis 2.5 mg BID. Hypertension \"   (from H&P by Lashae Betancourt MD at 2025)    - \" Decompensated HFpEF. A persistent a fib, non valvular \" (from IM Progress   Notes by Lashae Betancourt MD at 2025)    Apixaban (ELIQUIS) tablet 2.5 mg    Thank Marcia mcgarry  CDS  Options provided:  -- Secondary hypercoagulable state in a patient with atrial fibrillation  -- Secondary hypercoagulable state in a patient with atrial fibrillation: This   patient has secondary hypercoagulable state in a patient with atrial   fibrillation  -- Other - I will add my own diagnosis  -- Disagree - Not applicable / Not valid  -- Disagree - Clinically unable to determine / Unknown  -- Refer to Clinical Documentation Reviewer    PROVIDER RESPONSE TEXT:    This patient has secondary hypercoagulable state in a patient with atrial   fibrillation.    Query created by: Marcia Gonzalez on 2025 7:54 AM      Electronically signed by:  Joseph Bliss MD 2025 7:10 PM

## 2025-05-07 NOTE — PROGRESS NOTES
[facility]  Internal Medicine Residency Program  Progress Note - House Team 1    Patient:  Bobbi Marquis 85 y.o. female MRN: [id number]     Date of Service: 5/7/2025     CC: Shortness of breath  Overnight events: Agitated    Subjective     The patient was seen and examined at bedside.  She was awake and not in distress but was confused this morning.  Per bedside sitter, she was agitated this morning and tried pulling out her lines.  Bedside sitter also reported that she has had 2 occurrences of urination since 3 AM.    Objective     Physical Exam:  Vitals: BP (!) 134/98   Pulse 76   Temp 97.8 °F (36.6 °C) (Temporal)   Resp 18   Ht 1.473 m (4' 10\")   Wt 41.2 kg (90 lb 13.3 oz)   LMP  (LMP Unknown)   SpO2 95%   BMI 18.98 kg/m²     I & O - 24hr: No intake/output data recorded.   General Appearance: alert, appears stated age, and no distress  HEENT:  Head: Normocephalic, no lesions, without obvious abnormality.  Eye: Normal external eye, conjunctiva, lids cornea, TAMIR.  Neck: no adenopathy, no carotid bruit, supple, symmetrical, trachea midline, and thyroid not enlarged, symmetric, no tenderness/mass/nodules  Lung: rales bilaterally and wheezes bilaterally  Heart: regular rate and rhythm, S1, S2 normal, no murmur, click, rub or gallop  Abdomen: soft, non-tender; bowel sounds normal; no masses,  no organomegaly  Extremities:  3+ edema of left lower extremity  Musculokeletal: No joint swelling, no muscle tenderness. ROM normal in all joints of extremities.   Neurologic: Mental status: confused    Pertinent Labs & Imaging Studies     CBC with Differential:    Lab Results   Component Value Date/Time    WBC 7.0 05/07/2025 04:46 AM    RBC 3.98 05/07/2025 04:46 AM    HGB 11.5 05/07/2025 04:46 AM    HCT 37.7 05/07/2025 04:46 AM     05/07/2025 04:46 AM    MCV 94.7 05/07/2025 04:46 AM    MCH 28.9 05/07/2025 04:46 AM    MCHC 30.5 05/07/2025 04:46 AM    RDW 16.9 05/07/2025 04:46 AM

## 2025-05-07 NOTE — PLAN OF CARE
Problem: Chronic Conditions and Co-morbidities  Goal: Patient's chronic conditions and co-morbidity symptoms are monitored and maintained or improved  Outcome: Progressing     Problem: Risk for Elopement  Goal: Patient will not exit the unit/facility without proper excort  Outcome: Progressing     Problem: Skin/Tissue Integrity  Goal: Skin integrity remains intact  Description: 1.  Monitor for areas of redness and/or skin breakdown2.  Assess vascular access sites hourly3.  Every 4-6 hours minimum:  Change oxygen saturation probe site4.  Every 4-6 hours:  If on nasal continuous positive airway pressure, respiratory therapy assess nares and determine need for appliance change or resting period  Outcome: Progressing     Problem: Safety - Adult  Goal: Free from fall injury  Outcome: Progressing

## 2025-05-08 ENCOUNTER — APPOINTMENT (OUTPATIENT)
Dept: ULTRASOUND IMAGING | Age: 85
End: 2025-05-08
Payer: MEDICARE

## 2025-05-08 LAB
ALBUMIN SERPL-MCNC: 2.6 G/DL (ref 3.5–5.2)
ALP SERPL-CCNC: 125 U/L (ref 35–104)
ALT SERPL-CCNC: 15 U/L (ref 0–35)
ANION GAP SERPL CALCULATED.3IONS-SCNC: 10 MMOL/L (ref 7–16)
ANION GAP SERPL CALCULATED.3IONS-SCNC: 9 MMOL/L (ref 7–16)
AST SERPL-CCNC: 60 U/L (ref 0–35)
BASOPHILS # BLD: 0 K/UL (ref 0–0.2)
BASOPHILS NFR BLD: 0 % (ref 0–2)
BILIRUB DIRECT SERPL-MCNC: <0.1 MG/DL (ref 0–0.2)
BILIRUB INDIRECT SERPL-MCNC: ABNORMAL MG/DL (ref 0–1)
BILIRUB SERPL-MCNC: 0.7 MG/DL (ref 0–1.2)
BUN SERPL-MCNC: 18 MG/DL (ref 8–23)
BUN SERPL-MCNC: 21 MG/DL (ref 8–23)
CALCIUM SERPL-MCNC: 8.2 MG/DL (ref 8.8–10.2)
CALCIUM SERPL-MCNC: 8.4 MG/DL (ref 8.8–10.2)
CHLORIDE SERPL-SCNC: 97 MMOL/L (ref 98–107)
CHLORIDE SERPL-SCNC: 99 MMOL/L (ref 98–107)
CO2 SERPL-SCNC: 26 MMOL/L (ref 22–29)
CO2 SERPL-SCNC: 30 MMOL/L (ref 22–29)
CREAT SERPL-MCNC: 0.6 MG/DL (ref 0.5–1)
CREAT SERPL-MCNC: 0.7 MG/DL (ref 0.5–1)
D-DIMER QUANTITATIVE: 1044 NG/ML DDU (ref 0–230)
EOSINOPHIL # BLD: 0 K/UL (ref 0.05–0.5)
EOSINOPHILS RELATIVE PERCENT: 0 % (ref 0–6)
ERYTHROCYTE [DISTWIDTH] IN BLOOD BY AUTOMATED COUNT: 16.8 % (ref 11.5–15)
FIBRINOGEN PPP-MCNC: 175 MG/DL (ref 200–400)
FOLATE SERPL-MCNC: 34.6 NG/ML (ref 4.6–34.8)
GFR, ESTIMATED: 84 ML/MIN/1.73M2
GFR, ESTIMATED: 87 ML/MIN/1.73M2
GLUCOSE SERPL-MCNC: 116 MG/DL (ref 74–99)
GLUCOSE SERPL-MCNC: 92 MG/DL (ref 74–99)
HCT VFR BLD AUTO: 38.9 % (ref 34–48)
HGB BLD-MCNC: 11.7 G/DL (ref 11.5–15.5)
IMM RETICS NFR: 21.3 % (ref 3–15.9)
INR PPP: 1.3
LDH SERPL-CCNC: 424 U/L (ref 135–214)
LYMPHOCYTES NFR BLD: 0.37 K/UL (ref 1.5–4)
LYMPHOCYTES RELATIVE PERCENT: 7 % (ref 20–42)
MAGNESIUM SERPL-MCNC: 2.1 MG/DL (ref 1.6–2.4)
MCH RBC QN AUTO: 29 PG (ref 26–35)
MCHC RBC AUTO-ENTMCNC: 30.1 G/DL (ref 32–34.5)
MCV RBC AUTO: 96.5 FL (ref 80–99.9)
MONOCYTES NFR BLD: 0.05 K/UL (ref 0.1–0.95)
MONOCYTES NFR BLD: 1 % (ref 2–12)
NEUTROPHILS NFR BLD: 92 % (ref 43–80)
NEUTS SEG NFR BLD: 4.89 K/UL (ref 1.8–7.3)
PARTIAL THROMBOPLASTIN TIME: 37.5 SEC (ref 24.5–35.1)
PHOSPHATE SERPL-MCNC: 2.6 MG/DL (ref 2.5–4.5)
PLATELET # BLD AUTO: 117 K/UL (ref 130–450)
PMV BLD AUTO: 11.9 FL (ref 7–12)
POTASSIUM SERPL-SCNC: 4.3 MMOL/L (ref 3.5–5.1)
POTASSIUM SERPL-SCNC: 5.2 MMOL/L (ref 3.5–5.1)
PROT SERPL-MCNC: 6 G/DL (ref 6.4–8.3)
PROTHROMBIN TIME: 14.1 SEC (ref 9.3–12.4)
RBC # BLD AUTO: 4.03 M/UL (ref 3.5–5.5)
RBC # BLD: ABNORMAL 10*6/UL
RETIC HEMOGLOBIN: 25.3 PG (ref 28.2–36.6)
RETICS # AUTO: 0.08 M/UL
RETICS/RBC NFR AUTO: 1.8 % (ref 0.4–1.9)
SODIUM SERPL-SCNC: 136 MMOL/L (ref 136–145)
SODIUM SERPL-SCNC: 136 MMOL/L (ref 136–145)
VIT B12 SERPL-MCNC: 1179 PG/ML (ref 232–1245)
WBC OTHER # BLD: 5.3 K/UL (ref 4.5–11.5)

## 2025-05-08 PROCEDURE — 97530 THERAPEUTIC ACTIVITIES: CPT

## 2025-05-08 PROCEDURE — 97161 PT EVAL LOW COMPLEX 20 MIN: CPT

## 2025-05-08 PROCEDURE — 83735 ASSAY OF MAGNESIUM: CPT

## 2025-05-08 PROCEDURE — 82607 VITAMIN B-12: CPT

## 2025-05-08 PROCEDURE — 87040 BLOOD CULTURE FOR BACTERIA: CPT

## 2025-05-08 PROCEDURE — 94640 AIRWAY INHALATION TREATMENT: CPT

## 2025-05-08 PROCEDURE — 85730 THROMBOPLASTIN TIME PARTIAL: CPT

## 2025-05-08 PROCEDURE — 1200000000 HC SEMI PRIVATE

## 2025-05-08 PROCEDURE — 82248 BILIRUBIN DIRECT: CPT

## 2025-05-08 PROCEDURE — 85045 AUTOMATED RETICULOCYTE COUNT: CPT

## 2025-05-08 PROCEDURE — 93971 EXTREMITY STUDY: CPT

## 2025-05-08 PROCEDURE — 6370000000 HC RX 637 (ALT 250 FOR IP)

## 2025-05-08 PROCEDURE — 6360000002 HC RX W HCPCS

## 2025-05-08 PROCEDURE — 85379 FIBRIN DEGRADATION QUANT: CPT

## 2025-05-08 PROCEDURE — 97165 OT EVAL LOW COMPLEX 30 MIN: CPT

## 2025-05-08 PROCEDURE — 99232 SBSQ HOSP IP/OBS MODERATE 35: CPT | Performed by: CHIROPRACTOR

## 2025-05-08 PROCEDURE — 2500000003 HC RX 250 WO HCPCS

## 2025-05-08 PROCEDURE — 84100 ASSAY OF PHOSPHORUS: CPT

## 2025-05-08 PROCEDURE — 36415 COLL VENOUS BLD VENIPUNCTURE: CPT

## 2025-05-08 PROCEDURE — 97535 SELF CARE MNGMENT TRAINING: CPT

## 2025-05-08 PROCEDURE — 82746 ASSAY OF FOLIC ACID SERUM: CPT

## 2025-05-08 PROCEDURE — 85384 FIBRINOGEN ACTIVITY: CPT

## 2025-05-08 PROCEDURE — 80053 COMPREHEN METABOLIC PANEL: CPT

## 2025-05-08 PROCEDURE — 85610 PROTHROMBIN TIME: CPT

## 2025-05-08 PROCEDURE — 85025 COMPLETE CBC W/AUTO DIFF WBC: CPT

## 2025-05-08 PROCEDURE — 83615 LACTATE (LD) (LDH) ENZYME: CPT

## 2025-05-08 PROCEDURE — 80048 BASIC METABOLIC PNL TOTAL CA: CPT

## 2025-05-08 RX ORDER — BUMETANIDE 0.25 MG/ML
0.5 INJECTION, SOLUTION INTRAMUSCULAR; INTRAVENOUS ONCE
Status: COMPLETED | OUTPATIENT
Start: 2025-05-08 | End: 2025-05-08

## 2025-05-08 RX ADMIN — APIXABAN 2.5 MG: 2.5 TABLET, FILM COATED ORAL at 08:42

## 2025-05-08 RX ADMIN — FOLIC ACID 1000 MCG: 1 TABLET ORAL at 08:41

## 2025-05-08 RX ADMIN — CEPHALEXIN 500 MG: 500 CAPSULE ORAL at 05:20

## 2025-05-08 RX ADMIN — ATORVASTATIN CALCIUM 40 MG: 40 TABLET, FILM COATED ORAL at 08:43

## 2025-05-08 RX ADMIN — SILDENAFIL 10 MG: 20 TABLET ORAL at 20:03

## 2025-05-08 RX ADMIN — SILDENAFIL 10 MG: 20 TABLET ORAL at 14:20

## 2025-05-08 RX ADMIN — IPRATROPIUM BROMIDE AND ALBUTEROL SULFATE 1 DOSE: 2.5; .5 SOLUTION RESPIRATORY (INHALATION) at 16:23

## 2025-05-08 RX ADMIN — IPRATROPIUM BROMIDE AND ALBUTEROL SULFATE 1 DOSE: 2.5; .5 SOLUTION RESPIRATORY (INHALATION) at 20:38

## 2025-05-08 RX ADMIN — ARFORMOTEROL TARTRATE 15 MCG: 15 SOLUTION RESPIRATORY (INHALATION) at 20:38

## 2025-05-08 RX ADMIN — CEPHALEXIN 500 MG: 500 CAPSULE ORAL at 14:20

## 2025-05-08 RX ADMIN — ASPIRIN 81 MG: 81 TABLET, COATED ORAL at 08:43

## 2025-05-08 RX ADMIN — BUDESONIDE 500 MCG: 0.5 SUSPENSION RESPIRATORY (INHALATION) at 08:19

## 2025-05-08 RX ADMIN — SILDENAFIL 10 MG: 20 TABLET ORAL at 08:42

## 2025-05-08 RX ADMIN — ARFORMOTEROL TARTRATE 15 MCG: 15 SOLUTION RESPIRATORY (INHALATION) at 08:19

## 2025-05-08 RX ADMIN — PREDNISONE 40 MG: 20 TABLET ORAL at 08:41

## 2025-05-08 RX ADMIN — SODIUM CHLORIDE, PRESERVATIVE FREE 10 ML: 5 INJECTION INTRAVENOUS at 08:43

## 2025-05-08 RX ADMIN — IPRATROPIUM BROMIDE AND ALBUTEROL SULFATE 1 DOSE: 2.5; .5 SOLUTION RESPIRATORY (INHALATION) at 08:19

## 2025-05-08 RX ADMIN — SODIUM CHLORIDE, PRESERVATIVE FREE 10 ML: 5 INJECTION INTRAVENOUS at 20:08

## 2025-05-08 RX ADMIN — BUDESONIDE 500 MCG: 0.5 SUSPENSION RESPIRATORY (INHALATION) at 20:38

## 2025-05-08 RX ADMIN — LISINOPRIL 10 MG: 10 TABLET ORAL at 09:22

## 2025-05-08 RX ADMIN — PANTOPRAZOLE SODIUM 40 MG: 40 TABLET, DELAYED RELEASE ORAL at 05:20

## 2025-05-08 RX ADMIN — BUMETANIDE 0.5 MG: 0.25 INJECTION INTRAMUSCULAR; INTRAVENOUS at 14:20

## 2025-05-08 RX ADMIN — CEPHALEXIN 500 MG: 500 CAPSULE ORAL at 17:33

## 2025-05-08 RX ADMIN — BUMETANIDE 1 MG: 0.25 INJECTION INTRAMUSCULAR; INTRAVENOUS at 08:41

## 2025-05-08 RX ADMIN — APIXABAN 2.5 MG: 2.5 TABLET, FILM COATED ORAL at 20:03

## 2025-05-08 RX ADMIN — METOPROLOL SUCCINATE 200 MG: 100 TABLET, EXTENDED RELEASE ORAL at 08:41

## 2025-05-08 RX ADMIN — Medication 5 MG: at 20:03

## 2025-05-08 NOTE — PROGRESS NOTES
Occupational Therapy  OCCUPATIONAL THERAPY INITIAL EVALUATION    [facility]   [address], [place]       Date:2025                                                  Patient Name: Bobbi Marquis  MRN: [id number]  : 1940  Room: [address] [place] [address]    Evaluating OT: Lorena Mcknight, ELLAD, OTR/L; [id number]    Occupational therapy physician order:  Start   Ordering Provider    25 1430  OT eval and treat  Start:  25 1430,   End:  25 1430,   ONE TIME,   Standing Count:  1 Occurrences,   R         Joseph Bliss MD         Pt presents to ED with rectal bleeding and SOB    Diagnosis:   1. Rectal bleeding    2. Internal hemorrhoids    3. Acute on chronic combined systolic and diastolic CHF (congestive heart failure) ([facility])    4. Hypokalemia    5. Chronic anticoagulation    6. Bronchitis due to human metapneumovirus (hMPV)    7. Left leg swelling       Patient Active Problem List   Diagnosis    Chest pain radiating to jaw    CAD (coronary artery disease), native coronary artery    Atherosclerosis of coronary artery bypass graft    Postsurgical aortocoronary bypass status    Other and unspecified angina pectoris    Essential hypertension    Paresthesia of both hands    Benign paroxysmal positional vertigo of left ear    Acute diastolic CHF (congestive heart failure) (HCC)    Mild protein-calorie malnutrition    Atrial fibrillation with RVR ([facility])    Sick sinus syndrome with tachycardia ([facility])    Closed compression fracture of L4 lumbar vertebra, initial encounter ([facility])    Non-traumatic compression fracture of lumbar vertebra with delayed healing    Closed fracture of fifth lumbar vertebra (HCC)    Dementia (HCC)    Hypertensive heart disease with heart failure (HCC)    Secondary hypercoagulable state    Intractable abdominal pain    Atrial fibrillation with rapid ventricular response (HCC)    Tachycardia    Mixed hyperlipidemia    Secondary  ROM , and cognition. Pt demonstrated decreased independence during ADLs and bed mobility . Pt would benefit from continued skilled OT to increase safety and independence with completion of ADL tasks and functional mobility for improved quality of life and return to PLOF.       Treatment: OT treatment provided this date includes:  ADL- Instruction/training on safety and adapted techniques for completion of ADLs: Therapist facilitated & pt educated on activity modifications/adaptations to promote implementation of fall prevention strategies, EC/WS strategies, & safety awareness throughout ADLs.  Mobility- Instruction/training on safety and improved independence with bed mobility  Activity tolerance- Instruction/training on energy conservation/work simplification for completion of ADLs.  Cognitive retraining - Cues for safety, sequencing, and problem solving.  Skilled positioning/alignment- Therapist facilitated proper positioning/alignment throughout session to maintain skin/joint integrity & proper body mechanics.  Skilled monitoring of vitals- To maximize safe participation throughout functional activities.    Pt appeared to have tolerated session well. Pt instructed on use of call light for assistance and fall prevention. Pt demo'ing poor understanding of education provided. Continue to educate.     Rehab Potential: Fair  for established goals     LTG: maximize independence and safety with ADLs to return to PLOF    Patient and/or family were instructed on functional diagnosis, prognosis/goals and OT plan of care. Demonstrated poor  understanding.      Eval Complexity:      Description  Performance deficits  Clinical decision making  Co-morbidities affecting occupational performance  Modification or assistance to complete eval    Low Complexity   1 to 3 []  Low []  None []  None []   Moderate Complexity   3 to 5 [x]  Mod []  Maybe []  Min to Mod [x]   High Complexity   5 or more []  High [x]  Yes [x]  Max []     The

## 2025-05-08 NOTE — PROGRESS NOTES
Physical Therapy  Physical Therapy Initial Assessment       Name: Bobbi Marquis  : 1940  MRN: [id number]      Date of Service: 2025    Evaluating PT:  Xiao Ngo PT, DPT [id number]    Room #:  8415/8415-B  Diagnosis:  Internal hemorrhoids [K64.8]  Hypokalemia [E87.6]  Rectal bleeding [K62.5]  Chronic anticoagulation [Z79.01]  Acute respiratory failure with hypoxia (HCC) [J96.01]  Acute on chronic combined systolic and diastolic CHF (congestive heart failure) (HCC) [I50.43]  Bronchitis due to human metapneumovirus (hMPV) [J40, B97.81]  PMHx/PSHx:   has a past medical history of Atrial fibrillation (HCC), CAD (coronary artery disease), Dementia (HCC), GERD (gastroesophageal reflux disease), Hemorrhoids, History of echocardiogram, History of echocardiogram, Hyperlipidemia, Hypertension, MI (myocardial infarction) (HCC), Migraines, New onset atrial flutter (HCC), and Normal cardiac stress test.    Procedure/Surgery:  none this admission  Precautions: Falls,  cognition, , alarms, contact isolation     SUBJECTIVE:    Pt is a poor historian, per chart - pt admitted from [facility].     Equipment Owned: ?  Equipment Needs:  TBD    OBJECTIVE:   Initial Evaluation  Date: 25 Treatment  Date:  Short Term/ Long Term   Goals   AM-PAC 6 Clicks      Was pt agreeable to Eval/treatment? Yes      Does pt have pain? L LE with touch      Bed Mobility  Rolling: MaxA  Supine to sit: MaxA   Sit to supine: MaxA  Scooting: MaxA  Rolling: Jaylen  Supine to sit: Jaylen  Sit to supine: Jaylen  Scooting: Jaylen   Transfers Sit to stand: NT d/t agitation  Stand to sit: NT  Stand pivot: NT  Sit to stand: Jaylen  Stand to sit: Jaylen  Stand pivot: Jaylen AAD   Ambulation    NT   >50 feet with AAD Jaylen   Stair negotiation: ascended and descended  NT   4 steps with 2 rail(s) Jaylen   ROM BUE:  Defer to OT  BLE:  WFL     Strength BUE:  Defer to OT  BLE:  unable to assess d/t cognition   Improve 1 MMT   Balance Sitting EOB:  MaxA  Dynamic  preparation for safe discharge home and/or into the community   [x] Positioning to prevent skin breakdown and contractures  [x] Safety and Education Training   [x] Patient/Caregiver Education   [] HEP  [] Other     PT long term treatment goals are located in above grid    Frequency of treatments: 2-5x/week x 1-2 weeks.    Time in  0836  Time out  0859    Total Treatment Time  8 minutes     Evaluation Time includes thorough review of current medical information, gathering information on past medical history/social history and prior level of function, completion of standardized testing/informal observation of tasks, assessment of data and education on plan of care and goals.    CPT codes:  [x] Low Complexity PT evaluation 17407  [] Moderate Complexity PT evaluation 78797  [] High Complexity PT evaluation 57989  [] PT Re-evaluation 68091  [] Gait training 32229 - 0 minutes  [] Manual therapy 34972 - 0 minutes  [x] Therapeutic activities 80273 8 minutes  [] Therapeutic exercises 32120 - 0 minutes  [] Neuromuscular reeducation 50341 - 0 minutes       Xiao Ngo PT, DPT  [id number]

## 2025-05-08 NOTE — PLAN OF CARE
Problem: Chronic Conditions and Co-morbidities  Goal: Patient's chronic conditions and co-morbidity symptoms are monitored and maintained or improved  Outcome: Progressing     Problem: Risk for Elopement  Goal: Patient will not exit the unit/facility without proper excort  Outcome: Progressing     Problem: ABCDS Injury Assessment  Goal: Absence of physical injury  Outcome: Progressing     Problem: Skin/Tissue Integrity  Goal: Skin integrity remains intact  Description: 1.  Monitor for areas of redness and/or skin breakdown2.  Assess vascular access sites hourly3.  Every 4-6 hours minimum:  Change oxygen saturation probe site4.  Every 4-6 hours:  If on nasal continuous positive airway pressure, respiratory therapy assess nares and determine need for appliance change or resting period  Outcome: Progressing     Problem: Safety - Adult  Goal: Free from fall injury  Outcome: Progressing

## 2025-05-08 NOTE — PROGRESS NOTES
[facility]  Internal Medicine Residency Program  Progress Note - House Team       Patient:  Bobbi Marquis 85 y.o. female   MRN: [id number]       Date of Service: 2025    CC: Shortness of Breath (With wheezing beginning this morning, given duoneb en route; hx of pulmonary HTN ) and Rectal Bleeding (Bright red blood x 1; hemorrhoids noted)    Overnight events:   - Plt 117 - send for thrombocytopenia work up  - Still on 3L nasal cannula    Hospital stay: 3    Subjective     Patient was evaluated at bedside. She was receiving breathing treatment. Overnight on nasal cannula 3 L. No acute complaints.     Objective     Vitals  Range   /68 BP  Min: 107/68  Max: 120/79   HR 80 Pulse  Av.5  Min: 53  Max: 80   RR 20 Resp  Av  Min: 20  Max: 20   Temp. 97.5 °F (36.4 °C) Temp  Min: 97.5 °F (36.4 °C)  Max: 97.5 °F (36.4 °C)   O2sat 93 % SpO2  Av.3 %  Min: 93 %  Max: 97 %   Weight 41.2 kg (90 lb 13.3 oz)    No intake or output data in the 24 hours ending 25    Net IO Since Admission: 454.68 mL [25]    PHYSICAL EXAM:  General Appearance: alert and oriented to person, place and time, well developed and well- nourished, in no acute distress  Head: normocephalic and atraumatic  Neck: supple and non-tender without mass, no thyromegaly or thyroid nodules, no cervical lymphadenopathy  Pulmonary/Chest: rhonchi and crackles heard bilaterally  Cardiovascular: normal rate, regular rhythm, normal S1 and S2, no murmurs, rubs, clicks, or gallops, distal pulses intact, no carotid bruits  Abdomen: soft, non-tender, non-distended, normal bowel sounds, no masses or organomegaly  Extremities: +2 edema bilaterally, left lower extremity with redness  Neurologic: alert, oriented only to self, normal speech    Diet:   ADULT DIET; Regular     predniSONE  40 mg Oral Daily    cephALEXin  500 mg Oral 4 times per day    melatonin  5 mg Oral Nightly    bumetanide  1 mg IntraVENous Daily     sodium chloride flush  5-40 mL IntraVENous 2 times per day    arformoterol tartrate  15 mcg Nebulization BID RT    budesonide  0.5 mg Nebulization BID RT    ipratropium 0.5 mg-albuterol 2.5 mg  1 Dose Inhalation Q4H WA RT    aspirin  81 mg Oral Daily    apixaban  2.5 mg Oral BID    atorvastatin  40 mg Oral Daily    folic acid  1,000 mcg Oral Daily    metoprolol succinate  200 mg Oral Daily    pantoprazole  40 mg Oral QAM AC    sildenafil  10 mg Oral TID    lisinopril  10 mg Oral Daily       Pertinent Labs & Imaging Studies     Labs    Complete Blood Count:   Recent Labs     25  [id number] 25   WBC 6.9 7.0 5.3   HGB 13.3 11.5 11.7   HCT 42.9 37.7 38.9    197 117*        Infectious   Temperature range: Temp  Av.4 °F (36.3 °C)  Min: 97.2 °F (36.2 °C)  Max: 97.5 °F (36.4 °C)  Recent Labs     25   WBC 6.9 7.0 5.3       Results       Procedure Component Value Units Date/Time    Clostridium Difficile Toxin/Antigen [[id number]] Collected: 25 1412    Order Status: Completed Specimen: Stool Updated: 25 1043     Specimen Description .FECES     C DIFF AG + TOXIN NEGATIVE     Comment: No C. difficile antigen and Toxin Detected.       STREP PNEUMONIAE ANTIGEN [[id number]] Collected: 25    Order Status: Canceled Specimen: Urine, clean catch     LEGIONELLA ANTIGEN, URINE [[id number]] Collected: 25    Order Status: Canceled Specimen: Urine     Culture, MRSA, Screening [[id number]]     Order Status: Sent Specimen: Nares     Respiratory Panel, Molecular, with COVID-19 (Restricted: peds pts or suitable admitted adults) [[id number]]  (Abnormal) Collected: 25 1117    Order Status: Completed Specimen: Nasopharyngeal Swab Updated: 25 1340     Specimen Description .NASOPHARYNGEAL SWAB     Adenovirus PCR Not Detected     Coronavirus 229E PCR Not Detected     Coronavirus HKU1 PCR Not Detected     Coronavirus NL63 PCR    XR CHEST PORTABLE   Final Result   Cardiomegaly with mild increased markings left lung base with small left   pleural effusion.  Chronic changes stable within the lung fields bilaterally.              EKG:   Encounter Date: 05/05/25   EKG 12 Lead   Result Value    Ventricular Rate 81    Atrial Rate 300    QRS Duration 94    Q-T Interval 394    QTc Calculation (Bazett) 457    R Axis 125    T Axis -89    Narrative    Suspect unspecified pacemaker failure  Atrial fibrillation  Right axis deviation  Possible Right ventricular hypertrophy  Cannot rule out Anterior infarct , age undetermined    Confirmed by Jimmy Loo ([id number]) on 5/5/2025 3:16:23 PM       IP CONSULT TO INTERNAL MEDICINE  IP CONSULT TO DIETITIAN       Resident's Assessment and Plan     Assessment:     COPD exacerbation likely secondary to human metapneumovirus infection  CT chest: Hyperinflated lungs with upper lobe predominant pleuroparenchymal scarring and emphysematous changes.  Human metapneumovirus infection  Persistent atrial fibrillation, on Eliquis 2.5 mg BID  Acute diarrhea, ruled out C. Diff  Venous stasis dermatitis, left lower extremity  Concerns of DVT left lower extremity  Coronary artery disease  Severe CAD s/p CABG x 4 (LIMA-LAD, SVG-rPDA, SVG-D1, SVG-OM2) in 2000   S/p reported PCI 2008  S/p [facility] Dr. Higuera 2012 with occluded bypass grafts   S/p [facility] 7/2018   Decompensated HFpEF, EF 55-60%  VHD with history of severe TR  Family refuse surgical management  Sick sinus syndrome, s/p [employer] PPM (single chamber atrial lead) placement [facility] 9/23/2019  Pulmonary hypertension, on Revatio  Multiple pulmonary nodules, largest 8 mm right solid pulmonary nodule  noncalcified nodules in the right lower lobe.  GERD, on Prilosec 40 mg daily  Thrombocytopenia  Perihepatic ascites  Hyperlipidemia  Hypertension  ??? Rectal bleeding secondary to hemorrhoids  Dementia  History of tobacco abuse    Plan:     PT/OT onboard  Dietitian onboard  Continue

## 2025-05-08 NOTE — CARE COORDINATION
CM update note.  Discharge plan is for patient to return to [facility].  Patient is a long term care bed hold and does not require precert or hens to return.  Gricelda/destination completed.  Ambulance form with envelope is on the soft chart.  Isolation for Human Metapneumovirus.  On oxygen 3 L overnight, on room air today.  Remains on IV Bumex daily.  Left lower extremity Uls d/t swelling.  PT am-pac is 8.  CM/SW to follow.  Ke Garcia RN -[phone].

## 2025-05-08 NOTE — PROGRESS NOTES
Comprehensive Nutrition Assessment    Type and Reason for Visit:  Initial, Consult (ONS)    Nutrition Recommendations/Plan:   Continue diet as tolerated. Recommend and start ONS: Ensure + BID to promote nutrient/PO intake.      Malnutrition Assessment:  Malnutrition Status:  At risk for malnutrition (05/08/25 1018)    Context:  Chronic Illness     Findings of the 6 clinical characteristics of malnutrition:  Energy Intake:  Mild decrease in energy intake  Weight Loss:  Unable to assess (2/2 fluid shifts)     Body Fat Loss:  Unable to assess (2/2 ISO status)     Muscle Mass Loss:  Unable to assess (2/2 ISO status)    Fluid Accumulation:  Unable to assess     Strength:  Not Performed    Nutrition Assessment:    Pt. admitted for SOB and rectal bleeding. Admit w/ acute hypoxic respiratory failure 2/2 to COPD exacerbation vs CHF, Human Metapneumovirus. Noted Rectal bleeding likely 2/2 to hemorrhoid. Noted active delirium, persistent a fib, diarrhea; Cdiff r/o, Leg swelling likely 2/2 to venous stasis dermatitis; suspect LLE cellulitis. PMHx of CAD, GERD, CHF, COPD, Hemorrhoids, Dementia. Hx of malnutrition (7/2024). PO intake reduced/limited thus far. Will start ONS and monitor.    Nutrition Related Findings:    Disoriented x4, +I/O, abd/BS WDL, +2 edema, hyperglycemia, hyperkalemia, elevated AST/ALK phos, ISO status Wound Type: Venous Stasis (per notes; no wound care c/s pending at this time)       Current Nutrition Intake & Therapies:    Average Meal Intake: 1-25%  Average Supplements Intake: None Ordered  ADULT DIET; Regular    Anthropometric Measures:  Height: 147.3 cm (4' 9.99\")  Ideal Body Weight (IBW): 90 lbs (41 kg)    Admission Body Weight: 41.2 kg (90 lb 13.3 oz) (5/07 BS first measured)  Current Body Weight: 41.2 kg (90 lb 13.3 oz) (5/07 BS), 100.9 % IBW. Weight Source: Bed scale  Current BMI (kg/m2): 19  Usual Body Weight:  (UTO d/t fluid shifts/varied wt hx)        Weight Adjustment For: No Adjustment

## 2025-05-09 LAB
ALBUMIN SERPL-MCNC: 2.9 G/DL (ref 3.5–5.2)
ALP SERPL-CCNC: 149 U/L (ref 35–104)
ALT SERPL-CCNC: 15 U/L (ref 0–35)
ANION GAP SERPL CALCULATED.3IONS-SCNC: 10 MMOL/L (ref 7–16)
ANION GAP SERPL CALCULATED.3IONS-SCNC: 10 MMOL/L (ref 7–16)
ANION GAP SERPL CALCULATED.3IONS-SCNC: 11 MMOL/L (ref 7–16)
AST SERPL-CCNC: 39 U/L (ref 0–35)
BASOPHILS # BLD: 0 K/UL (ref 0–0.2)
BASOPHILS NFR BLD: 0 % (ref 0–2)
BILIRUB DIRECT SERPL-MCNC: 0.4 MG/DL (ref 0–0.2)
BILIRUB INDIRECT SERPL-MCNC: 0.4 MG/DL (ref 0–1)
BILIRUB SERPL-MCNC: 0.8 MG/DL (ref 0–1.2)
BNP SERPL-MCNC: 3712 PG/ML (ref 0–450)
BUN SERPL-MCNC: 15 MG/DL (ref 8–23)
BUN SERPL-MCNC: 16 MG/DL (ref 8–23)
BUN SERPL-MCNC: 18 MG/DL (ref 8–23)
CALCIUM SERPL-MCNC: 8.1 MG/DL (ref 8.8–10.2)
CALCIUM SERPL-MCNC: 8.1 MG/DL (ref 8.8–10.2)
CALCIUM SERPL-MCNC: 8.4 MG/DL (ref 8.8–10.2)
CHLORIDE SERPL-SCNC: 97 MMOL/L (ref 98–107)
CHLORIDE SERPL-SCNC: 98 MMOL/L (ref 98–107)
CHLORIDE SERPL-SCNC: 99 MMOL/L (ref 98–107)
CO2 SERPL-SCNC: 31 MMOL/L (ref 22–29)
CO2 SERPL-SCNC: 33 MMOL/L (ref 22–29)
CO2 SERPL-SCNC: 33 MMOL/L (ref 22–29)
CREAT SERPL-MCNC: 0.6 MG/DL (ref 0.5–1)
EOSINOPHIL # BLD: 0 K/UL (ref 0.05–0.5)
EOSINOPHILS RELATIVE PERCENT: 0 % (ref 0–6)
ERYTHROCYTE [DISTWIDTH] IN BLOOD BY AUTOMATED COUNT: 16.5 % (ref 11.5–15)
GFR, ESTIMATED: 87 ML/MIN/1.73M2
GFR, ESTIMATED: 88 ML/MIN/1.73M2
GFR, ESTIMATED: 88 ML/MIN/1.73M2
GLUCOSE SERPL-MCNC: 117 MG/DL (ref 74–99)
GLUCOSE SERPL-MCNC: 120 MG/DL (ref 74–99)
GLUCOSE SERPL-MCNC: 143 MG/DL (ref 74–99)
HCT VFR BLD AUTO: 38.2 % (ref 34–48)
HGB BLD-MCNC: 11.9 G/DL (ref 11.5–15.5)
IMM GRANULOCYTES # BLD AUTO: 0.04 K/UL (ref 0–0.58)
IMM GRANULOCYTES NFR BLD: 1 % (ref 0–5)
LYMPHOCYTES NFR BLD: 0.64 K/UL (ref 1.5–4)
LYMPHOCYTES RELATIVE PERCENT: 9 % (ref 20–42)
MAGNESIUM SERPL-MCNC: 2 MG/DL (ref 1.6–2.4)
MCH RBC QN AUTO: 29 PG (ref 26–35)
MCHC RBC AUTO-ENTMCNC: 31.2 G/DL (ref 32–34.5)
MCV RBC AUTO: 92.9 FL (ref 80–99.9)
MONOCYTES NFR BLD: 0.73 K/UL (ref 0.1–0.95)
MONOCYTES NFR BLD: 10 % (ref 2–12)
NEUTROPHILS NFR BLD: 80 % (ref 43–80)
NEUTS SEG NFR BLD: 5.68 K/UL (ref 1.8–7.3)
PATH REV BLD -IMP: NORMAL
PHOSPHATE SERPL-MCNC: 2.4 MG/DL (ref 2.5–4.5)
PHOSPHATE SERPL-MCNC: 2.4 MG/DL (ref 2.5–4.5)
PHOSPHATE SERPL-MCNC: 3.3 MG/DL (ref 2.5–4.5)
PLATELET # BLD AUTO: 191 K/UL (ref 130–450)
PMV BLD AUTO: 10.9 FL (ref 7–12)
POTASSIUM SERPL-SCNC: 3.3 MMOL/L (ref 3.5–5.1)
POTASSIUM SERPL-SCNC: 3.8 MMOL/L (ref 3.5–5.1)
POTASSIUM SERPL-SCNC: 4.5 MMOL/L (ref 3.5–5.1)
PROT SERPL-MCNC: 6.3 G/DL (ref 6.4–8.3)
RBC # BLD AUTO: 4.11 M/UL (ref 3.5–5.5)
SODIUM SERPL-SCNC: 139 MMOL/L (ref 136–145)
SODIUM SERPL-SCNC: 142 MMOL/L (ref 136–145)
SODIUM SERPL-SCNC: 142 MMOL/L (ref 136–145)
WBC OTHER # BLD: 7.1 K/UL (ref 4.5–11.5)

## 2025-05-09 PROCEDURE — 94640 AIRWAY INHALATION TREATMENT: CPT

## 2025-05-09 PROCEDURE — 82248 BILIRUBIN DIRECT: CPT

## 2025-05-09 PROCEDURE — 83735 ASSAY OF MAGNESIUM: CPT

## 2025-05-09 PROCEDURE — 6360000002 HC RX W HCPCS

## 2025-05-09 PROCEDURE — 80048 BASIC METABOLIC PNL TOTAL CA: CPT

## 2025-05-09 PROCEDURE — 6370000000 HC RX 637 (ALT 250 FOR IP)

## 2025-05-09 PROCEDURE — 80053 COMPREHEN METABOLIC PANEL: CPT

## 2025-05-09 PROCEDURE — 99232 SBSQ HOSP IP/OBS MODERATE 35: CPT | Performed by: CHIROPRACTOR

## 2025-05-09 PROCEDURE — 2500000003 HC RX 250 WO HCPCS

## 2025-05-09 PROCEDURE — 84100 ASSAY OF PHOSPHORUS: CPT

## 2025-05-09 PROCEDURE — 36415 COLL VENOUS BLD VENIPUNCTURE: CPT

## 2025-05-09 PROCEDURE — 83880 ASSAY OF NATRIURETIC PEPTIDE: CPT

## 2025-05-09 PROCEDURE — 2580000003 HC RX 258

## 2025-05-09 PROCEDURE — 1200000000 HC SEMI PRIVATE

## 2025-05-09 PROCEDURE — 85025 COMPLETE CBC W/AUTO DIFF WBC: CPT

## 2025-05-09 RX ORDER — POTASSIUM CHLORIDE 1500 MG/1
20 TABLET, EXTENDED RELEASE ORAL ONCE
Status: COMPLETED | OUTPATIENT
Start: 2025-05-09 | End: 2025-05-09

## 2025-05-09 RX ORDER — POTASSIUM CHLORIDE 1500 MG/1
40 TABLET, EXTENDED RELEASE ORAL ONCE
Status: COMPLETED | OUTPATIENT
Start: 2025-05-09 | End: 2025-05-09

## 2025-05-09 RX ORDER — BUMETANIDE 0.25 MG/ML
1 INJECTION, SOLUTION INTRAMUSCULAR; INTRAVENOUS ONCE
Status: COMPLETED | OUTPATIENT
Start: 2025-05-09 | End: 2025-05-09

## 2025-05-09 RX ADMIN — BUMETANIDE 1 MG: 0.25 INJECTION INTRAMUSCULAR; INTRAVENOUS at 14:53

## 2025-05-09 RX ADMIN — FOLIC ACID 1000 MCG: 1 TABLET ORAL at 07:58

## 2025-05-09 RX ADMIN — APIXABAN 2.5 MG: 2.5 TABLET, FILM COATED ORAL at 07:59

## 2025-05-09 RX ADMIN — PREDNISONE 40 MG: 20 TABLET ORAL at 07:59

## 2025-05-09 RX ADMIN — POTASSIUM CHLORIDE 40 MEQ: 1500 TABLET, EXTENDED RELEASE ORAL at 09:58

## 2025-05-09 RX ADMIN — ATORVASTATIN CALCIUM 40 MG: 40 TABLET, FILM COATED ORAL at 07:59

## 2025-05-09 RX ADMIN — ARFORMOTEROL TARTRATE 15 MCG: 15 SOLUTION RESPIRATORY (INHALATION) at 06:01

## 2025-05-09 RX ADMIN — METOPROLOL SUCCINATE 200 MG: 100 TABLET, EXTENDED RELEASE ORAL at 07:59

## 2025-05-09 RX ADMIN — PANTOPRAZOLE SODIUM 40 MG: 40 TABLET, DELAYED RELEASE ORAL at 05:16

## 2025-05-09 RX ADMIN — IPRATROPIUM BROMIDE AND ALBUTEROL SULFATE 1 DOSE: 2.5; .5 SOLUTION RESPIRATORY (INHALATION) at 19:33

## 2025-05-09 RX ADMIN — POTASSIUM CHLORIDE 20 MEQ: 1500 TABLET, EXTENDED RELEASE ORAL at 21:25

## 2025-05-09 RX ADMIN — ASPIRIN 81 MG: 81 TABLET, COATED ORAL at 07:58

## 2025-05-09 RX ADMIN — ARFORMOTEROL TARTRATE 15 MCG: 15 SOLUTION RESPIRATORY (INHALATION) at 19:33

## 2025-05-09 RX ADMIN — SILDENAFIL 10 MG: 20 TABLET ORAL at 20:49

## 2025-05-09 RX ADMIN — SILDENAFIL 10 MG: 20 TABLET ORAL at 08:00

## 2025-05-09 RX ADMIN — CEPHALEXIN 500 MG: 500 CAPSULE ORAL at 18:15

## 2025-05-09 RX ADMIN — CEPHALEXIN 500 MG: 500 CAPSULE ORAL at 05:16

## 2025-05-09 RX ADMIN — Medication 5 MG: at 20:51

## 2025-05-09 RX ADMIN — BUMETANIDE 1 MG: 0.25 INJECTION INTRAMUSCULAR; INTRAVENOUS at 07:59

## 2025-05-09 RX ADMIN — BUDESONIDE 500 MCG: 0.5 SUSPENSION RESPIRATORY (INHALATION) at 19:33

## 2025-05-09 RX ADMIN — CEPHALEXIN 500 MG: 500 CAPSULE ORAL at 12:14

## 2025-05-09 RX ADMIN — SODIUM CHLORIDE, PRESERVATIVE FREE 10 ML: 5 INJECTION INTRAVENOUS at 20:51

## 2025-05-09 RX ADMIN — APIXABAN 2.5 MG: 2.5 TABLET, FILM COATED ORAL at 20:48

## 2025-05-09 RX ADMIN — BUDESONIDE 500 MCG: 0.5 SUSPENSION RESPIRATORY (INHALATION) at 06:02

## 2025-05-09 RX ADMIN — IPRATROPIUM BROMIDE AND ALBUTEROL SULFATE 1 DOSE: 2.5; .5 SOLUTION RESPIRATORY (INHALATION) at 13:08

## 2025-05-09 RX ADMIN — POTASSIUM PHOSPHATE, MONOBASIC AND POTASSIUM PHOSPHATE, DIBASIC 20 MMOL: 224; 236 INJECTION, SOLUTION, CONCENTRATE INTRAVENOUS at 10:42

## 2025-05-09 RX ADMIN — LISINOPRIL 10 MG: 10 TABLET ORAL at 07:58

## 2025-05-09 RX ADMIN — SODIUM CHLORIDE, PRESERVATIVE FREE 10 ML: 5 INJECTION INTRAVENOUS at 08:00

## 2025-05-09 RX ADMIN — IPRATROPIUM BROMIDE AND ALBUTEROL SULFATE 1 DOSE: 2.5; .5 SOLUTION RESPIRATORY (INHALATION) at 16:29

## 2025-05-09 RX ADMIN — CEPHALEXIN 500 MG: 500 CAPSULE ORAL at 00:03

## 2025-05-09 RX ADMIN — IPRATROPIUM BROMIDE AND ALBUTEROL SULFATE 1 DOSE: 2.5; .5 SOLUTION RESPIRATORY (INHALATION) at 06:00

## 2025-05-09 RX ADMIN — SILDENAFIL 10 MG: 20 TABLET ORAL at 14:53

## 2025-05-09 NOTE — PROGRESS NOTES
[facility]  Internal Medicine Residency Program  Progress Note - House Team       Patient:  Bobbi Marquis 85 y.o. female   MRN: [id number]       Date of Service: 2025    CC: Shortness of Breath (With wheezing beginning this morning, given duoneb en route; hx of pulmonary HTN ) and Rectal Bleeding (Bright red blood x 1; hemorrhoids noted)    Overnight events:   - Plt 117 - now stable to 191  - Urine output 500 cc/24 hrs  - K, Phos replaced    Hospital stay: 4    Subjective     Patient was evaluated at bedside. She was receiving breathing treatment. On room air SpO2 95-98%. No acute complaints.     Objective     Vitals  Range   /76 BP  Min: 127/73  Max: 129/76   HR 93 Pulse  Av  Min: 80  Max: 97   RR 12 Resp  Av  Min: 12  Max: 20   Temp. 97.3 °F (36.3 °C) Temp  Min: 97.3 °F (36.3 °C)  Max: 97.3 °F (36.3 °C)   O2sat 95 % SpO2  Av.5 %  Min: 95 %  Max: 98 %   Weight 41.2 kg (90 lb 13.3 oz)      Intake/Output Summary (Last 24 hours) at 2025 0947  Last data filed at 2025 0918  Gross per 24 hour   Intake 350 ml   Output 500 ml   Net -150 ml       Net IO Since Admission: 304.68 mL [25 0947]    PHYSICAL EXAM:  General Appearance: alert and oriented to person, place and time, well developed and well- nourished, in no acute distress  Head: normocephalic and atraumatic  Neck: supple and non-tender without mass, no thyromegaly or thyroid nodules, no cervical lymphadenopathy  Pulmonary/Chest: rhonchi and crackles heard bilaterally  Cardiovascular: normal rate, regular rhythm, normal S1 and S2, no murmurs, rubs, clicks, or gallops, distal pulses intact, no carotid bruits  Abdomen: soft, non-tender, non-distended, normal bowel sounds, no masses or organomegaly  Extremities: +2 edema bilaterally, left lower extremity with redness  Neurologic: alert, oriented only to self, normal speech    Diet:   ADULT DIET; Regular  ADULT ORAL NUTRITION SUPPLEMENT; Breakfast, Dinner;  but separate nodules 5 mm and 8 mm   noncalcified nodules in the right lower lobe.   3. Components of bronchial wall thickening and at least components of minimal   mucous plugging in the lower lungs left greater than right involvement.   Components of bronchiolitis considered however no separate consolidative or   classic bronchopneumonia evidence.  Follow-up to resolution   4. Hyperinflated lungs with upper lobe predominant pleuroparenchymal scarring   and emphysematous changes.   5. Perihepatic ascites.   6. Body wall edema left greater than right somewhat asymmetric.   7. Multiple pulmonary nodules. Most severe: 8 mm right solid pulmonary   nodule.  CT chest at 3 months minimum      RECOMMENDATIONS:   Lung-RADS 4A - Suspicious (v2022)      Management:  3 month LDCT; PET/CT may be considered if there is a >= 8 mm   solid nodule or solid component         XR CHEST PORTABLE   Final Result   Cardiomegaly with mild increased markings left lung base with small left   pleural effusion.  Chronic changes stable within the lung fields bilaterally.              EKG:   Encounter Date: 05/05/25   EKG 12 Lead   Result Value    Ventricular Rate 81    Atrial Rate 300    QRS Duration 94    Q-T Interval 394    QTc Calculation (Bazett) 457    R Axis 125    T Axis -89    Narrative    Suspect unspecified pacemaker failure  Atrial fibrillation  Right axis deviation  Possible Right ventricular hypertrophy  Cannot rule out Anterior infarct , age undetermined    Confirmed by Jimmy Loo ([id number]) on 5/5/2025 3:16:23 PM       IP CONSULT TO INTERNAL MEDICINE  IP CONSULT TO DIETITIAN       Resident's Assessment and Plan     Assessment:     COPD exacerbation likely secondary to human metapneumovirus infection  CT chest: Hyperinflated lungs with upper lobe predominant pleuroparenchymal scarring and emphysematous changes.  Human metapneumovirus infection  Persistent atrial fibrillation, on Eliquis 2.5 mg BID  Acute diarrhea, ruled out C.

## 2025-05-09 NOTE — PLAN OF CARE
Problem: Chronic Conditions and Co-morbidities  Goal: Patient's chronic conditions and co-morbidity symptoms are monitored and maintained or improved  5/9/2025 1444 by Heather Brooks RN  Outcome: Progressing  5/9/2025 0054 by Pastora Singh RN  Outcome: Progressing     Problem: Risk for Elopement  Goal: Patient will not exit the unit/facility without proper excort  5/9/2025 1444 by Heather Brooks RN  Outcome: Progressing  5/9/2025 0054 by Pastora Singh RN  Outcome: Progressing     Problem: ABCDS Injury Assessment  Goal: Absence of physical injury  5/9/2025 1444 by Heather Brooks RN  Outcome: Progressing  5/9/2025 0054 by Pastora Singh RN  Outcome: Progressing     Problem: Skin/Tissue Integrity  Goal: Skin integrity remains intact  Description: 1.  Monitor for areas of redness and/or skin breakdown2.  Assess vascular access sites hourly3.  Every 4-6 hours minimum:  Change oxygen saturation probe site4.  Every 4-6 hours:  If on nasal continuous positive airway pressure, respiratory therapy assess nares and determine need for appliance change or resting period  5/9/2025 1444 by Heather Brooks RN  Outcome: Progressing  5/9/2025 0054 by Pastora Singh RN  Outcome: Progressing     Problem: Safety - Adult  Goal: Free from fall injury  5/9/2025 1444 by Heather Brooks RN  Outcome: Progressing  5/9/2025 0054 by Pastora Singh RN  Outcome: Progressing     Problem: Nutrition Deficit:  Goal: Optimize nutritional status  5/9/2025 1444 by Heather Brooks RN  Outcome: Progressing  5/9/2025 0054 by Pastora Singh RN  Outcome: Progressing

## 2025-05-09 NOTE — CARE COORDINATION
CM update note.  Discharge plan is for patient to return to [facility].  Patient is a long term care bed hold and does not require precert or hens to return.  Gricelda/destination completed.  Ambulance form with envelope is on the soft chart.  Isolation for Human Metapneumovirus.  IV Bumex increased to BID x 2 days as patient appears clinically volume overloaded.  Possible discharge tomorrow.  Liaison updated for possible discharge over the weekend.  Ambulance form will need signed by nursing when discharged.  CM/SW to follow. Ke Garcia RN -[phone].

## 2025-05-09 NOTE — PROGRESS NOTES
Patient refused purwick, She's incontinent, voided several times on her bed. Perineal care done and made comfortable.

## 2025-05-10 VITALS
SYSTOLIC BLOOD PRESSURE: 133 MMHG | HEART RATE: 107 BPM | DIASTOLIC BLOOD PRESSURE: 85 MMHG | RESPIRATION RATE: 16 BRPM | TEMPERATURE: 97.2 F | HEIGHT: 58 IN | WEIGHT: 90.83 LBS | BODY MASS INDEX: 19.07 KG/M2 | OXYGEN SATURATION: 94 %

## 2025-05-10 PROBLEM — J20.8 ACUTE BRONCHITIS DUE TO HUMAN METAPNEUMOVIRUS: Status: ACTIVE | Noted: 2025-05-10

## 2025-05-10 PROBLEM — K64.9 HEMORRHOIDS: Status: ACTIVE | Noted: 2025-05-06

## 2025-05-10 PROBLEM — J96.01 ACUTE RESPIRATORY FAILURE WITH HYPOXIA (HCC): Status: RESOLVED | Noted: 2025-05-05 | Resolved: 2025-05-10

## 2025-05-10 PROBLEM — J43.9 PULMONARY EMPHYSEMA (HCC): Status: ACTIVE | Noted: 2025-05-10

## 2025-05-10 PROBLEM — L03.116 CELLULITIS OF LEFT LOWER EXTREMITY: Status: ACTIVE | Noted: 2025-05-10

## 2025-05-10 PROBLEM — L03.115 CELLULITIS OF RIGHT LOWER EXTREMITY: Status: ACTIVE | Noted: 2025-05-10

## 2025-05-10 PROBLEM — B97.81 ACUTE BRONCHITIS DUE TO HUMAN METAPNEUMOVIRUS: Status: ACTIVE | Noted: 2025-05-10

## 2025-05-10 LAB
ALBUMIN SERPL-MCNC: 3.1 G/DL (ref 3.5–5.2)
ALP SERPL-CCNC: 154 U/L (ref 35–104)
ALT SERPL-CCNC: 17 U/L (ref 0–35)
ANION GAP SERPL CALCULATED.3IONS-SCNC: 11 MMOL/L (ref 7–16)
AST SERPL-CCNC: 41 U/L (ref 0–35)
BASOPHILS # BLD: 0.01 K/UL (ref 0–0.2)
BASOPHILS NFR BLD: 0 % (ref 0–2)
BILIRUB DIRECT SERPL-MCNC: 0.4 MG/DL (ref 0–0.2)
BILIRUB INDIRECT SERPL-MCNC: 0.4 MG/DL (ref 0–1)
BILIRUB SERPL-MCNC: 0.8 MG/DL (ref 0–1.2)
BUN SERPL-MCNC: 14 MG/DL (ref 8–23)
CALCIUM SERPL-MCNC: 8.3 MG/DL (ref 8.8–10.2)
CHLORIDE SERPL-SCNC: 100 MMOL/L (ref 98–107)
CO2 SERPL-SCNC: 31 MMOL/L (ref 22–29)
CREAT SERPL-MCNC: 0.6 MG/DL (ref 0.5–1)
EOSINOPHIL # BLD: 0.07 K/UL (ref 0.05–0.5)
EOSINOPHILS RELATIVE PERCENT: 1 % (ref 0–6)
ERYTHROCYTE [DISTWIDTH] IN BLOOD BY AUTOMATED COUNT: 16.5 % (ref 11.5–15)
GFR, ESTIMATED: 88 ML/MIN/1.73M2
GLUCOSE SERPL-MCNC: 91 MG/DL (ref 74–99)
HCT VFR BLD AUTO: 39.6 % (ref 34–48)
HGB BLD-MCNC: 12.2 G/DL (ref 11.5–15.5)
IMM GRANULOCYTES # BLD AUTO: 0.05 K/UL (ref 0–0.58)
IMM GRANULOCYTES NFR BLD: 1 % (ref 0–5)
LYMPHOCYTES NFR BLD: 0.76 K/UL (ref 1.5–4)
LYMPHOCYTES RELATIVE PERCENT: 11 % (ref 20–42)
MAGNESIUM SERPL-MCNC: 2 MG/DL (ref 1.6–2.4)
MCH RBC QN AUTO: 29 PG (ref 26–35)
MCHC RBC AUTO-ENTMCNC: 30.8 G/DL (ref 32–34.5)
MCV RBC AUTO: 94.1 FL (ref 80–99.9)
MONOCYTES NFR BLD: 0.9 K/UL (ref 0.1–0.95)
MONOCYTES NFR BLD: 12 % (ref 2–12)
NEUTROPHILS NFR BLD: 75 % (ref 43–80)
NEUTS SEG NFR BLD: 5.45 K/UL (ref 1.8–7.3)
PHOSPHATE SERPL-MCNC: 2.9 MG/DL (ref 2.5–4.5)
PLATELET # BLD AUTO: 143 K/UL (ref 130–450)
PMV BLD AUTO: 11 FL (ref 7–12)
POTASSIUM SERPL-SCNC: 4.2 MMOL/L (ref 3.5–5.1)
PROT SERPL-MCNC: 6.5 G/DL (ref 6.4–8.3)
RBC # BLD AUTO: 4.21 M/UL (ref 3.5–5.5)
SODIUM SERPL-SCNC: 142 MMOL/L (ref 136–145)
URATE SERPL-MCNC: 6.2 MG/DL (ref 2.4–5.7)
WBC OTHER # BLD: 7.2 K/UL (ref 4.5–11.5)

## 2025-05-10 PROCEDURE — 36415 COLL VENOUS BLD VENIPUNCTURE: CPT

## 2025-05-10 PROCEDURE — 99239 HOSP IP/OBS DSCHRG MGMT >30: CPT | Performed by: INTERNAL MEDICINE

## 2025-05-10 PROCEDURE — 6370000000 HC RX 637 (ALT 250 FOR IP)

## 2025-05-10 PROCEDURE — 83735 ASSAY OF MAGNESIUM: CPT

## 2025-05-10 PROCEDURE — 84550 ASSAY OF BLOOD/URIC ACID: CPT

## 2025-05-10 PROCEDURE — 80053 COMPREHEN METABOLIC PANEL: CPT

## 2025-05-10 PROCEDURE — 82248 BILIRUBIN DIRECT: CPT

## 2025-05-10 PROCEDURE — 84100 ASSAY OF PHOSPHORUS: CPT

## 2025-05-10 PROCEDURE — 85025 COMPLETE CBC W/AUTO DIFF WBC: CPT

## 2025-05-10 RX ORDER — GUAIFENESIN 400 MG/1
400 TABLET ORAL 3 TIMES DAILY
DISCHARGE
Start: 2025-05-10 | End: 2025-05-12

## 2025-05-10 RX ORDER — PREDNISONE 20 MG/1
40 TABLET ORAL DAILY
DISCHARGE
Start: 2025-05-10 | End: 2025-05-12

## 2025-05-10 RX ORDER — MECOBALAMIN 5000 MCG
5 TABLET,DISINTEGRATING ORAL NIGHTLY
DISCHARGE
Start: 2025-05-10

## 2025-05-10 RX ORDER — GUAIFENESIN 400 MG/1
400 TABLET ORAL 3 TIMES DAILY
Status: DISCONTINUED | OUTPATIENT
Start: 2025-05-10 | End: 2025-05-10 | Stop reason: HOSPADM

## 2025-05-10 RX ORDER — CEPHALEXIN 500 MG/1
500 CAPSULE ORAL EVERY 6 HOURS
DISCHARGE
Start: 2025-05-10 | End: 2025-05-14

## 2025-05-10 RX ORDER — AMMONIUM LACTATE 12 G/100G
LOTION TOPICAL
DISCHARGE
Start: 2025-05-10

## 2025-05-10 RX ADMIN — ASPIRIN 81 MG: 81 TABLET, COATED ORAL at 08:32

## 2025-05-10 RX ADMIN — METOPROLOL SUCCINATE 200 MG: 100 TABLET, EXTENDED RELEASE ORAL at 08:32

## 2025-05-10 RX ADMIN — CEPHALEXIN 500 MG: 500 CAPSULE ORAL at 00:53

## 2025-05-10 RX ADMIN — CEPHALEXIN 500 MG: 500 CAPSULE ORAL at 12:15

## 2025-05-10 RX ADMIN — CEPHALEXIN 500 MG: 500 CAPSULE ORAL at 05:34

## 2025-05-10 RX ADMIN — APIXABAN 2.5 MG: 2.5 TABLET, FILM COATED ORAL at 08:33

## 2025-05-10 RX ADMIN — FOLIC ACID 1000 MCG: 1 TABLET ORAL at 08:33

## 2025-05-10 RX ADMIN — PREDNISONE 40 MG: 20 TABLET ORAL at 08:32

## 2025-05-10 RX ADMIN — ATORVASTATIN CALCIUM 40 MG: 40 TABLET, FILM COATED ORAL at 08:33

## 2025-05-10 RX ADMIN — PANTOPRAZOLE SODIUM 40 MG: 40 TABLET, DELAYED RELEASE ORAL at 05:34

## 2025-05-10 RX ADMIN — GUAIFENESIN 400 MG: 400 TABLET ORAL at 12:15

## 2025-05-10 RX ADMIN — SILDENAFIL 10 MG: 20 TABLET ORAL at 08:33

## 2025-05-10 RX ADMIN — LISINOPRIL 10 MG: 10 TABLET ORAL at 08:33

## 2025-05-10 NOTE — CARE COORDINATION
Social Work Transition of Care Planning:  Discharge order noted. Patient will be returning to [facility] at 2 PM transport [facility] ambulance. SW notified nursing and patient's daughter Albertina.  Electronically signed by VIANCA Haley on 5/10/2025 at 11:11 AM

## 2025-05-10 NOTE — PLAN OF CARE
Problem: Chronic Conditions and Co-morbidities  Goal: Patient's chronic conditions and co-morbidity symptoms are monitored and maintained or improved  5/9/2025 2247 by Carolyn Mcgarry RN  Outcome: Progressing  5/9/2025 2247 by Carolyn Mcgarry RN  Outcome: Progressing  5/9/2025 1444 by Heather Brooks RN  Outcome: Progressing     Problem: Risk for Elopement  Goal: Patient will not exit the unit/facility without proper excort  5/9/2025 2247 by Carolyn Mcgarry RN  Outcome: Progressing  5/9/2025 2247 by Carolyn Mcgarry RN  Outcome: Progressing  Flowsheets (Taken 5/9/2025 2217)  Nursing Interventions for Elopement Risk: (Hospital  requested)   Collaborate with family members/caregivers to mitigate the elopement risk   Assist with personal care needs such as toileting, eating, dressing, as needed to reduce the risk of wandering   Communicate/escalate to charge nurse the risk of elopement  5/9/2025 1444 by Heather Brooks RN  Outcome: Progressing     Problem: ABCDS Injury Assessment  Goal: Absence of physical injury  5/9/2025 2247 by Carolyn Mcgarry RN  Outcome: Progressing  5/9/2025 2247 by Carolyn Mcgarry RN  Outcome: Progressing  5/9/2025 1444 by Heather Brooks RN  Outcome: Progressing     Problem: Skin/Tissue Integrity  Goal: Skin integrity remains intact  Description: 1.  Monitor for areas of redness and/or skin breakdown2.  Assess vascular access sites hourly3.  Every 4-6 hours minimum:  Change oxygen saturation probe site4.  Every 4-6 hours:  If on nasal continuous positive airway pressure, respiratory therapy assess nares and determine need for appliance change or resting period  5/9/2025 1444 by Heather Brooks RN  Outcome: Progressing     Problem: Safety - Adult  Goal: Free from fall injury  5/9/2025 1444 by Heather Brooks RN  Outcome: Progressing     Problem: Nutrition Deficit:  Goal: Optimize nutritional status  5/9/2025 1444 by Heather Brooks

## 2025-05-10 NOTE — PLAN OF CARE
Problem: Chronic Conditions and Co-morbidities  Goal: Patient's chronic conditions and co-morbidity symptoms are monitored and maintained or improved  5/10/2025 1426 by Heather Brooks RN  Outcome: Adequate for Discharge  5/10/2025 1028 by Heather Brooks RN  Outcome: Progressing     Problem: Risk for Elopement  Goal: Patient will not exit the unit/facility without proper excort  5/10/2025 1426 by Heather Brooks RN  Outcome: Adequate for Discharge  5/10/2025 1028 by Heather Brooks RN  Outcome: Progressing  Flowsheets (Taken 5/10/2025 0859)  Nursing Interventions for Elopement Risk: (Hospital  requested)   Collaborate with family members/caregivers to mitigate the elopement risk   Assist with personal care needs such as toileting, eating, dressing, as needed to reduce the risk of wandering   Communicate/escalate to charge nurse the risk of elopement     Problem: ABCDS Injury Assessment  Goal: Absence of physical injury  5/10/2025 1426 by Heather Brooks RN  Outcome: Adequate for Discharge  5/10/2025 1028 by Heather Brooks RN  Outcome: Progressing     Problem: Skin/Tissue Integrity  Goal: Skin integrity remains intact  Description: 1.  Monitor for areas of redness and/or skin breakdown2.  Assess vascular access sites hourly3.  Every 4-6 hours minimum:  Change oxygen saturation probe site4.  Every 4-6 hours:  If on nasal continuous positive airway pressure, respiratory therapy assess nares and determine need for appliance change or resting period  5/10/2025 1426 by Heather Brooks RN  Outcome: Adequate for Discharge  5/10/2025 1028 by Heather Brooks RN  Outcome: Progressing     Problem: Safety - Adult  Goal: Free from fall injury  5/10/2025 1426 by Heather Brooks RN  Outcome: Adequate for Discharge  5/10/2025 1028 by Heather Brooks RN  Outcome: Progressing     Problem: Nutrition Deficit:  Goal: Optimize nutritional

## 2025-05-10 NOTE — DISCHARGE SUMMARY
[facility]  Discharge Summary    PCP: Donte Miller DO    Admit Date:5/5/2025  Discharge Date: 5/10/2025    Admission Diagnosis:   COPD exacerbation likely secondary to human metapneumovirus infection  Human metapneumovirus infection  Long-standing atrial fibrillation, on Eliquis 2.5 mg BID  Coronary artery disease  Severe CAD s/p CABG x 4 (LIMA-LAD, SVG-rPDA, SVG-D1, SVG-OM2) in 2000   S/p reported PCI 2008  S/p [facility] Dr. Higuera 2012 with occluded bypass grafts   S/p [facility] 7/2018   HFpEF, EF 55-60%  VHD with history of severe TR   Sick sinus syndrome, s/p [employer] PPM (single chamber atrial lead) placement [facility] 9/23/2019  Pulmonary hypertension  GERD, on Prilosec 40 mg daily  Hyperlipidemia  Hypertension  ??? Rectal bleeding secondary to hemorrhoids  Venous stasis dermatitis, left lower extremity  Dementia  History of tobacco abuse    Discharge Diagnosis:  COPD exacerbation likely secondary to human metapneumovirus infection - resolved  Human metapneumovirus infection - resolved  Persistent atrial fibrillation, on Eliquis 2.5 mg BID  Acute diarrhea, ruled out C. Diff  Venous stasis dermatitis, left lower extremity  Cellulitis, left lower extremity - resolving  Coronary artery disease  Severe CAD s/p CABG x 4 (LIMA-LAD, SVG-rPDA, SVG-D1, SVG-OM2) in 2000   S/p reported PCI 2008  S/p [facility] Dr. Higuera 2012 with occluded bypass grafts   S/p [facility] 7/2018   Decompensated HFpEF, EF 55-60%  VHD with history of severe TR  Sick sinus syndrome, s/p [employer] PPM (single chamber atrial lead) placement [facility] 9/23/2019  Pulmonary hypertension, on Revatio  Multiple pulmonary nodules, largest 8 mm right solid pulmonary nodule  noncalcified nodules in the right lower lobe.  GERD, on Prilosec 40 mg daily  Thrombocytopenia likely secondary to inflammation  Perihepatic ascites  Hyperlipidemia  Hypertension  External hemorrhoids  Dementia  History of tobacco abuse    Hospital  carefully, and ask your doctor or other care provider to review them with you.                ASK your doctor about these medications      * bumetanide 1 MG tablet  Commonly known as: BUMEX  Take 0.5 tablets by mouth every other day Half tab daily PRN for weight gain of 2-3 pounds in 24 hours           * This list has 1 medication(s) that are the same as other medications prescribed for you. Read the directions carefully, and ask your doctor or other care provider to review them with you.                   Where to Get Your Medications        Information about where to get these medications is not yet available    Ask your nurse or doctor about these medications  ammonium lactate 12 % lotion  cephALEXin 500 MG capsule  guaiFENesin 400 MG tablet  melatonin 5 MG Tbdp disintegrating tablet  predniSONE 20 MG tablet        Internal medicine    Follow ups  Please follow up with your primary care physician ( Dr. Donte Miller) within 10 days of discharge from hospital. Please call as soon as possible to make an appointment. Please contact the internal medicine clinic for an appointment if you are unable to get an appointment with your PCP.     Changes in healthcare   Please take all medications as indicated  Diet: regular diet , plus protein supplement  Activity: activity as tolerated  New Medications started during this hospital stay  Mucinex 400 mg three times daily as needed  Prednisone 40 mg daily for 2 more doses  Keflex 500 mg every 6 hrs for 4 days - to complete 7 days course  Changes to your medications  None  Medications you should stop taking   None  Additional labs, testing or imaging needed after discharge   None  Even if you are feeling better and not having symptoms do not stop taking antibiotic earlier then prescribed   Please contact us if you have any concerns, wish to change or make an appointment:  Internal medicine clinic   Phone: [phone]  Fax: [phone] [address] [facility]

## 2025-05-10 NOTE — PLAN OF CARE
Problem: Chronic Conditions and Co-morbidities  Goal: Patient's chronic conditions and co-morbidity symptoms are monitored and maintained or improved  5/10/2025 1028 by Heather Brooks RN  Outcome: Progressing  5/9/2025 2247 by Carolyn Mcgarry RN  Outcome: Progressing  5/9/2025 2247 by Carolyn Mcgarry RN  Outcome: Progressing     Problem: Risk for Elopement  Goal: Patient will not exit the unit/facility without proper excort  5/10/2025 1028 by Heather Brooks RN  Outcome: Progressing  Flowsheets (Taken 5/10/2025 0859)  Nursing Interventions for Elopement Risk: (Hospital  requested)   Collaborate with family members/caregivers to mitigate the elopement risk   Assist with personal care needs such as toileting, eating, dressing, as needed to reduce the risk of wandering   Communicate/escalate to charge nurse the risk of elopement  5/9/2025 2247 by Carolyn Mcgarry RN  Outcome: Progressing  5/9/2025 2247 by Carolyn Mcgarry RN  Outcome: Progressing  Flowsheets (Taken 5/9/2025 2217)  Nursing Interventions for Elopement Risk: (Hospital  requested)   Collaborate with family members/caregivers to mitigate the elopement risk   Assist with personal care needs such as toileting, eating, dressing, as needed to reduce the risk of wandering   Communicate/escalate to charge nurse the risk of elopement     Problem: ABCDS Injury Assessment  Goal: Absence of physical injury  5/10/2025 1028 by Heather Brooks RN  Outcome: Progressing  5/9/2025 2247 by Carolyn Mcgarry RN  Outcome: Progressing  5/9/2025 2247 by Carolyn Mcgarry RN  Outcome: Progressing     Problem: Skin/Tissue Integrity  Goal: Skin integrity remains intact  Description: 1.  Monitor for areas of redness and/or skin breakdown2.  Assess vascular access sites hourly3.  Every 4-6 hours minimum:  Change oxygen saturation probe site4.  Every 4-6 hours:  If on nasal continuous positive airway pressure, respiratory therapy  assess nares and determine need for appliance change or resting period  Outcome: Progressing     Problem: Safety - Adult  Goal: Free from fall injury  Outcome: Progressing     Problem: Nutrition Deficit:  Goal: Optimize nutritional status  Outcome: Progressing

## 2025-05-10 NOTE — PLAN OF CARE
Problem: Chronic Conditions and Co-morbidities  Goal: Patient's chronic conditions and co-morbidity symptoms are monitored and maintained or improved  5/9/2025 2247 by Carolyn Mcgarry RN  Outcome: Progressing  5/9/2025 1444 by Heather Brooks RN  Outcome: Progressing     Problem: Risk for Elopement  Goal: Patient will not exit the unit/facility without proper excort  5/9/2025 2247 by Carolyn Mcgarry RN  Outcome: Progressing  Flowsheets (Taken 5/9/2025 2217)  Nursing Interventions for Elopement Risk: (Hospital  requested)   Collaborate with family members/caregivers to mitigate the elopement risk   Assist with personal care needs such as toileting, eating, dressing, as needed to reduce the risk of wandering   Communicate/escalate to charge nurse the risk of elopement  5/9/2025 1444 by Heather Brooks RN  Outcome: Progressing     Problem: ABCDS Injury Assessment  Goal: Absence of physical injury  5/9/2025 2247 by Carolyn Mcgarry RN  Outcome: Progressing  5/9/2025 1444 by Heather Brooks RN  Outcome: Progressing     Problem: Skin/Tissue Integrity  Goal: Skin integrity remains intact  Description: 1.  Monitor for areas of redness and/or skin breakdown2.  Assess vascular access sites hourly3.  Every 4-6 hours minimum:  Change oxygen saturation probe site4.  Every 4-6 hours:  If on nasal continuous positive airway pressure, respiratory therapy assess nares and determine need for appliance change or resting period  5/9/2025 1444 by Heather Brooks RN  Outcome: Progressing     Problem: Safety - Adult  Goal: Free from fall injury  5/9/2025 1444 by Heather Brooks RN  Outcome: Progressing     Problem: Nutrition Deficit:  Goal: Optimize nutritional status  5/9/2025 1444 by Heather Brooks RN  Outcome: Progressing

## 2025-05-10 NOTE — PROGRESS NOTES
[facility]  Internal Medicine Residency Program  Progress Note - House Team       Patient:  Bobbi Marquis 85 y.o. female   MRN: [id number]       Date of Service: 5/10/2025    CC: Shortness of Breath (With wheezing beginning this morning, given duoneb en route; hx of pulmonary HTN ) and Rectal Bleeding (Bright red blood x 1; hemorrhoids noted)    Overnight events:   - No acute overnight events    Hospital stay: 5    Subjective     Patient was evaluated at bedside. Her respiratory status has been improving compared to admission. She was on room air. She was eating some breakfast. Bedside nurse reports that she has had adequate urine output, she urinates on the pad several times throughout the day.   Spoke with daughter Albertina regarding discharge today, all questions answered.     Objective     Vitals  Range   /85 BP  Min: 123/75  Max: 133/85   HR (!) 107 Pulse  Av.3  Min: 107  Max: 113   RR 16 Resp  Avg: 15  Min: 14  Max: 16   Temp. 97.2 °F (36.2 °C) Temp  Min: 97.2 °F (36.2 °C)  Max: 97.2 °F (36.2 °C)   O2sat 94 % SpO2  Av.5 %  Min: 90 %  Max: 98 %   Weight 41.2 kg (90 lb 13.3 oz)      Intake/Output Summary (Last 24 hours) at 5/10/2025 1109  Last data filed at 5/10/2025 0901  Gross per 24 hour   Intake 720 ml   Output --   Net 720 ml       Net IO Since Admission: 1,024.68 mL [05/10/25 1109]    PHYSICAL EXAM:  General Appearance: alert and oriented to person, place and time, well developed and well- nourished, in no acute distress  Head: normocephalic and atraumatic  Neck: supple and non-tender without mass, no thyromegaly or thyroid nodules, no cervical lymphadenopathy  Pulmonary/Chest: diffuse rhonchi heard bilaterally  Cardiovascular: normal rate, regular rhythm, normal S1 and S2, no murmurs, rubs, clicks, or gallops, distal pulses intact, no carotid bruits  Abdomen: soft, non-tender, non-distended, normal bowel sounds, no masses or organomegaly  Extremities: negative for

## 2025-05-11 LAB
MICROORGANISM SPEC CULT: NORMAL
MICROORGANISM SPEC CULT: NORMAL
SERVICE CMNT-IMP: NORMAL
SERVICE CMNT-IMP: NORMAL
SPECIMEN DESCRIPTION: NORMAL
SPECIMEN DESCRIPTION: NORMAL

## 2025-05-18 NOTE — PROGRESS NOTES
Physician Progress Note      PATIENT:               ABDOULAYE RUFFIN  CSN #:                  [id number]  :                       1940  ADMIT DATE:       2025 10:57 AM  DISCH DATE:        5/10/2025 2:40 PM  RESPONDING  PROVIDER #:        Joseph Bliss MD          QUERY TEXT:    Acute hypoxic respiratory failure is documented in the medical record IM PN by   Soo Tyson MD at 2025 with SpO2 above 90. Please provide   additional clinical indicators supportive of your documentation. Or please   document if the diagnosis of Acute hypoxic respiratory failure has been ruled   out after study.    The clinical indicators include:  This is a 85 y.o. female who came to the ED complaining of worsening shortness   of breath, patient is a poor historian, history provided by daughter at   bedside.    - \" Pulmonary Effort: Pulmonary effort is normal. No tachypnea or respiratory   distress.  Breath sounds: No stridor. Decreased breath sounds present. No wheezing or   rales. \" (from ED Provider Notes by Salty Anderson DO at 2025)    - \" Pulmonary/Chest: rhonchi and crackles heard bilaterally \" (from IM   Progress Notes by Lashae Betancourt MD at 2025)    - \" Acute hypoxic respiratory failure secondary to COPD exacerbation versus   CHF. COPD exacerbation secondary to human metapneumovirus infection.  Lung: rales bilaterally and wheezes bilaterally \" (from IM Progress Notes by   Soo Tyson MD at 2025)    SpO2-   RR- 12-29  CO2- 33,26,32,31    NC 2-4 L, On Brovana, Pulmicort, Duoneb, pulse oximetry monitoring    Thank you,  Thanku Carlos WATSON CDS  Options provided:  -- Acute hypoxic respiratory failure as evidenced by, Please document   evidence.  -- Acute hypoxic respiratory failure ruled out after study  -- Other - I will add my own diagnosis  -- Disagree - Not applicable / Not valid  -- Disagree - Clinically unable to determine / Unknown  -- Refer to Clinical Documentation

## 2025-05-20 NOTE — PROGRESS NOTES
Visit Date: 5/21/25  Bobbi Marquis  1940  female 85 y.o.      Subjective:    CC: Patient presents with Afib. Patient presents for follow up of HTN, CAD, CHF, and dementia.  Pneumonia  Patient is admitted to [facility] with COPD exacerbation and human [place] pneumo virus infection.  She was admitted on 5/5/2025 and discharged on 5/10/2025 condition is currently stable she was started on prednisone and cephalexin and she finished the course of the cephalexin and the prednisone no cough no shortness of breath no wheezing  Atrial Fibrillation  Presents for initial visit. The condition has lasted for 2 years. Symptoms include chest pain, shortness of breath, tachycardia and weakness. AICD problem: 2. Pacemaker problem: 2.The symptoms have been worsening. Past treatments include anticoagulant. Past medical history includes CAD and CHF.   Hypertension  This is a chronic problem. The current episode started more than 1 year ago. The problem has been waxing and waning since onset. The problem is uncontrolled. Associated symptoms include chest pain and shortness of breath.   Coronary Artery Disease  Presents for follow-up visit. Symptoms include chest pain, muscle weakness and shortness of breath. Her past medical history is significant for CHF. The symptoms have been worsening. Compliance with diet is poor. Compliance with exercise is poor. Compliance with medications is poor.   Congestive Heart Failure  Presents for follow-up visit. Associated symptoms include chest pain, muscle weakness and shortness of breath. The symptoms have been worsening. Her past medical history is significant for CAD.   Memory Loss    Patient reports onset of memory loss was more than 1 year ago. Onset quality is gradual.     Symptoms associated with memory loss include change in short-term memory, change in long-term memory and day/night behavior changes.     Behavorial problems for memory loss include suspiciousness and

## 2025-05-21 ENCOUNTER — OUTSIDE SERVICES (OUTPATIENT)
Dept: PRIMARY CARE CLINIC | Age: 85
End: 2025-05-21
Payer: MEDICARE

## 2025-05-21 DIAGNOSIS — I25.10 CORONARY ARTERY DISEASE INVOLVING NATIVE CORONARY ARTERY OF NATIVE HEART WITHOUT ANGINA PECTORIS: ICD-10-CM

## 2025-05-21 DIAGNOSIS — E78.2 MIXED HYPERLIPIDEMIA: ICD-10-CM

## 2025-05-21 DIAGNOSIS — F03.C4 SEVERE DEMENTIA WITH ANXIETY, UNSPECIFIED DEMENTIA TYPE (HCC): ICD-10-CM

## 2025-05-21 DIAGNOSIS — L03.115 CELLULITIS OF RIGHT LOWER EXTREMITY: Primary | ICD-10-CM

## 2025-05-21 DIAGNOSIS — I50.31 ACUTE DIASTOLIC CHF (CONGESTIVE HEART FAILURE) (HCC): ICD-10-CM

## 2025-05-21 DIAGNOSIS — S32.040A CLOSED COMPRESSION FRACTURE OF L4 LUMBAR VERTEBRA, INITIAL ENCOUNTER (HCC): ICD-10-CM

## 2025-05-21 DIAGNOSIS — M48.56XG NON-TRAUMATIC COMPRESSION FRACTURE OF L1 LUMBAR VERTEBRA WITH DELAYED HEALING, SUBSEQUENT ENCOUNTER: ICD-10-CM

## 2025-05-21 DIAGNOSIS — I48.91 ATRIAL FIBRILLATION WITH RVR (HCC): ICD-10-CM

## 2025-05-21 DIAGNOSIS — I27.21 SECONDARY PULMONARY ARTERIAL HYPERTENSION (HCC): ICD-10-CM

## 2025-05-21 DIAGNOSIS — I10 ESSENTIAL HYPERTENSION: ICD-10-CM

## 2025-05-21 PROCEDURE — 99305 1ST NF CARE MODERATE MDM 35: CPT | Performed by: INTERNAL MEDICINE

## 2025-06-08 ENCOUNTER — APPOINTMENT (OUTPATIENT)
Dept: CT IMAGING | Age: 85
End: 2025-06-08
Attending: EMERGENCY MEDICINE
Payer: MEDICARE

## 2025-06-08 ENCOUNTER — APPOINTMENT (OUTPATIENT)
Dept: GENERAL RADIOLOGY | Age: 85
End: 2025-06-08
Payer: MEDICARE

## 2025-06-08 ENCOUNTER — HOSPITAL ENCOUNTER (EMERGENCY)
Age: 85
Discharge: HOME | End: 2025-06-08
Attending: EMERGENCY MEDICINE
Payer: MEDICARE

## 2025-06-08 VITALS
RESPIRATION RATE: 20 BRPM | SYSTOLIC BLOOD PRESSURE: 132 MMHG | HEART RATE: 80 BPM | TEMPERATURE: 97.8 F | OXYGEN SATURATION: 92 % | DIASTOLIC BLOOD PRESSURE: 60 MMHG

## 2025-06-08 DIAGNOSIS — S32.10XA CLOSED FRACTURE OF SACRUM, UNSPECIFIED PORTION OF SACRUM, INITIAL ENCOUNTER (HCC): ICD-10-CM

## 2025-06-08 DIAGNOSIS — S32.592A CLOSED FRACTURE OF RAMUS OF LEFT PUBIS, INITIAL ENCOUNTER (HCC): Primary | ICD-10-CM

## 2025-06-08 LAB
ALBUMIN SERPL-MCNC: 3.4 G/DL (ref 3.5–5.2)
ALP SERPL-CCNC: 182 U/L (ref 35–104)
ALT SERPL-CCNC: 20 U/L (ref 0–35)
ANION GAP SERPL CALCULATED.3IONS-SCNC: 10 MMOL/L (ref 7–16)
AST SERPL-CCNC: 70 U/L (ref 0–35)
BASOPHILS # BLD: 0.03 K/UL (ref 0–0.2)
BASOPHILS NFR BLD: 0 % (ref 0–2)
BILIRUB SERPL-MCNC: 1.5 MG/DL (ref 0–1.2)
BUN SERPL-MCNC: 17 MG/DL (ref 8–23)
CALCIUM SERPL-MCNC: 8.5 MG/DL (ref 8.8–10.2)
CHLORIDE SERPL-SCNC: 103 MMOL/L (ref 98–107)
CO2 SERPL-SCNC: 30 MMOL/L (ref 22–29)
CREAT SERPL-MCNC: 0.6 MG/DL (ref 0.5–1)
EOSINOPHIL # BLD: 0.05 K/UL (ref 0.05–0.5)
EOSINOPHILS RELATIVE PERCENT: 1 % (ref 0–6)
ERYTHROCYTE [DISTWIDTH] IN BLOOD BY AUTOMATED COUNT: 18.6 % (ref 11.5–15)
GFR, ESTIMATED: 89 ML/MIN/1.73M2
GLUCOSE SERPL-MCNC: 106 MG/DL (ref 74–99)
HCT VFR BLD AUTO: 40.7 % (ref 34–48)
HGB BLD-MCNC: 12.3 G/DL (ref 11.5–15.5)
IMM GRANULOCYTES # BLD AUTO: 0.1 K/UL (ref 0–0.58)
IMM GRANULOCYTES NFR BLD: 1 % (ref 0–5)
LYMPHOCYTES NFR BLD: 1.46 K/UL (ref 1.5–4)
LYMPHOCYTES RELATIVE PERCENT: 18 % (ref 20–42)
MCH RBC QN AUTO: 29.6 PG (ref 26–35)
MCHC RBC AUTO-ENTMCNC: 30.2 G/DL (ref 32–34.5)
MCV RBC AUTO: 98.1 FL (ref 80–99.9)
MONOCYTES NFR BLD: 0.88 K/UL (ref 0.1–0.95)
MONOCYTES NFR BLD: 11 % (ref 2–12)
NEUTROPHILS NFR BLD: 68 % (ref 43–80)
NEUTS SEG NFR BLD: 5.46 K/UL (ref 1.8–7.3)
PLATELET, FLUORESCENCE: 116 K/UL (ref 130–450)
PMV BLD AUTO: 11.1 FL (ref 7–12)
POTASSIUM SERPL-SCNC: 4.6 MMOL/L (ref 3.5–5.1)
PROT SERPL-MCNC: 7.2 G/DL (ref 6.4–8.3)
RBC # BLD AUTO: 4.15 M/UL (ref 3.5–5.5)
SODIUM SERPL-SCNC: 142 MMOL/L (ref 136–145)
WBC OTHER # BLD: 8 K/UL (ref 4.5–11.5)

## 2025-06-08 PROCEDURE — 85025 COMPLETE CBC W/AUTO DIFF WBC: CPT

## 2025-06-08 PROCEDURE — 72192 CT PELVIS W/O DYE: CPT

## 2025-06-08 PROCEDURE — 71045 X-RAY EXAM CHEST 1 VIEW: CPT

## 2025-06-08 PROCEDURE — 99285 EMERGENCY DEPT VISIT HI MDM: CPT

## 2025-06-08 PROCEDURE — 73502 X-RAY EXAM HIP UNI 2-3 VIEWS: CPT

## 2025-06-08 PROCEDURE — 70450 CT HEAD/BRAIN W/O DYE: CPT

## 2025-06-08 PROCEDURE — 73030 X-RAY EXAM OF SHOULDER: CPT

## 2025-06-08 PROCEDURE — 80053 COMPREHEN METABOLIC PANEL: CPT

## 2025-06-08 PROCEDURE — 72125 CT NECK SPINE W/O DYE: CPT

## 2025-06-08 PROCEDURE — 93005 ELECTROCARDIOGRAM TRACING: CPT | Performed by: EMERGENCY MEDICINE

## 2025-06-08 ASSESSMENT — PAIN - FUNCTIONAL ASSESSMENT: PAIN_FUNCTIONAL_ASSESSMENT: ADULT NONVERBAL PAIN SCALE (NPVS)

## 2025-06-08 NOTE — ED PROVIDER NOTES
HPI:  6/8/25, Time: 3:41 AM EDT         Bobbi Marquis is a 85 y.o. female has a past medical history of Atrial fibrillation (HCC), CAD (coronary artery disease), Dementia (HCC), GERD (gastroesophageal reflux disease), Hemorrhoids, History of echocardiogram, History of echocardiogram, Hyperlipidemia, Hypertension, MI (myocardial infarction) (HCC), Migraines, New onset atrial flutter (HCC), and Normal cardiac stress test. presenting to the ED for unwitnessed fall, beginning short time ago.  The complaint has been persistent, moderate in severity, and worsened by movement of hip.  Patient presenting here with fall patient had unwitnessed fall.  Patient history limited due to history of dementia.  Patient responds to her name.  Patient complained of hip pain.  Patient having no abdominal pain there is no history of vomiting.  Patient reportedly was found on ground.  There was urine on the floor.      ROS:   Pertinent positives and negatives are stated within HPI, all other systems reviewed and are negative.  --------------------------------------------- PAST HISTORY ---------------------------------------------  Past Medical History:  has a past medical history of Atrial fibrillation (HCC), CAD (coronary artery disease), Dementia (HCC), GERD (gastroesophageal reflux disease), Hemorrhoids, History of echocardiogram, History of echocardiogram, Hyperlipidemia, Hypertension, MI (myocardial infarction) (HCC), Migraines, New onset atrial flutter (HCC), and Normal cardiac stress test.    Past Surgical History:  has a past surgical history that includes Cholecystectomy; Coronary artery bypass graft; Cardiac catheterization; ECHO Compl W Dop Color Flow (2/27/2012); ECHO Compl W Dop Color Flow (3/25/2013); Colonoscopy (4/16/13); and Spine surgery (N/A, 10/14/2020).    Social History:  reports that she quit smoking about 28 years ago. Her smoking use included cigarettes. She started smoking about 58 years ago. She has a 30

## 2025-06-08 NOTE — ED NOTES
Attempted IV access and blood sampling x 2 without success with assistance of PCA Stephen. Pt uncooperative and pulling arm away during both attempts and struck this RN in forehead during 1st attempt. Will request another RN attempt access.

## 2025-06-08 NOTE — DISCHARGE INSTRUCTIONS
[facility] Department of Orthopedic Surgery  [address] [place] [place] [address]    Dr. Jimmy Bermeo, DO         MD Dr. Jimmy Baltazar MD Frank Ansevin, PA-C Sara Zatchok, PA-C Tyler Tsangaris PA-C      Orthopaedics Discharge Instructions   Weight bearing Status - Weight bearing as tolerated - on left lower Extremity  Pain Medication   Contact Office for Medication Refill- [phone]  Office can refill pain medications no sooner than every 7 days  If patient discharging to facility then pain control will be continued per facility physician  Ice to operative/injured site for 15-30 minutes of each hour for next 5 days    Recommend that you continue to ice the area 2-3 times per day after this   Elevate operative/injured limb on 2 pillows at home  Goal is to have limb above the heart if able  Fracture Care -follow weightbearing precautions    Follow up in office in approximately 2 weeks. Your first follow up appointment is often with one of our physician assistants.     Call the office at [phone] if having any concerns.     Watch for these significant complications.  Call physician if they or any other problems occur:  Fever over 101°, redness, swelling or warmth at the operative site  Unrelieved nausea    Foul smelling or cloudy drainage at the operative site   Unrelieved pain    Blood soaked dressing. (Some oozing may be normal)     Numb, pale, blue, cold or tingling extremity          It is the Department of Orthopaedic Trauma's standard of practice that providers will de-escalate (wean) all patients from narcotic (opioid) medications during the post-operative period.   We provide multimodal pain control, but opioid medications are tapered in all of our patients.  If patient requires referral to pain management for prolonged taper from opioid pain medication, we will facilitate this process.        Weight bearing is an important component to your healing fracture or injury.

## 2025-06-08 NOTE — CONSULTS
Problem Relation Age of Onset    Hypertension Father        REVIEW OF SYSTEMS:  CONSTITUTIONAL:  negative for  fevers, chills  EYES:  negative for acute vision changes  HEENT:  negative for cough, hearing loss no  RESPIRATORY:  negative for  dyspnea, wheezing  CARDIOVASCULAR:  negative for  chest pain  GASTROINTESTINAL:  negative for nausea, vomiting  GENITOURINARY:  negative for frequency, urinary incontinence no  HEMATOLOGIC/LYMPHATIC:  negative for bleeding  MUSCULOSKELETAL:  positive for  pain  NEUROLOGICAL:  negative for headaches, dizziness  BEHAVIOR/PSYCH:  negative for increased agitation and anxiety    PHYSICAL EXAM:    VITALS:  BP (!) 153/88   Pulse 74   Temp 97.7 °F (36.5 °C) (Oral)   Resp 16   LMP  (LMP Unknown)   SpO2 92%   CONSTITUTIONAL: Somnolent, severely demented, having difficulty cooperating with exam  MUSCULOSKELETAL:  Left lower Extremity:  Skin intact circumferentially of the left lower extremity  Tenderness palpation overlying the left hemipelvis  No other tenderness palpation of the left lower extremity  Sensation intact distally to sural, saphenous, tibial, peroneal nerve distributions  Motor intact to ankle dorsiflexion, plantarflexion, EHL  Distal extremities warm and well-perfused  Capillary refill <3 seconds  Pulses difficult to palpate secondary to woody skin changes    Secondary Exam:   bilateralUE: No obvious signs of trauma.  -TTP to fingers, hand, wrist, forearm, elbow, humerus, shoulder or clavicle.    rightLE: No obvious signs of trauma.   -TTP to foot, ankle, leg, knee, thigh, hip.    DATA:    CBC:   Lab Results   Component Value Date/Time    WBC 8.0 06/08/2025 07:42 AM    RBC 4.15 06/08/2025 07:42 AM    HGB 12.3 06/08/2025 07:42 AM    HCT 40.7 06/08/2025 07:42 AM    MCV 98.1 06/08/2025 07:42 AM    MCH 29.6 06/08/2025 07:42 AM    MCHC 30.2 06/08/2025 07:42 AM    RDW 18.6 06/08/2025 07:42 AM     05/10/2025 04:39 AM    MPV 11.1 06/08/2025 07:42 AM     PT/INR:    Lab

## 2025-06-09 ENCOUNTER — TELEPHONE (OUTPATIENT)
Age: 85
End: 2025-06-09

## 2025-06-09 LAB
EKG ATRIAL RATE: 86 BPM
EKG Q-T INTERVAL: 340 MS
EKG QRS DURATION: 92 MS
EKG QTC CALCULATION (BAZETT): 415 MS
EKG R AXIS: 123 DEGREES
EKG T AXIS: 256 DEGREES
EKG VENTRICULAR RATE: 90 BPM

## 2025-06-09 PROCEDURE — 93010 ELECTROCARDIOGRAM REPORT: CPT | Performed by: INTERNAL MEDICINE

## 2025-06-09 NOTE — TELEPHONE ENCOUNTER
Patient called office requesting appointment for ED follow up.    ED visit date:6/8/2025    [place] [facility]    Diagnosis/Injury: Closed fracture of ramus of left pubis,     Provider on call: Dr. Jimmy Bermeo DO    Routed to providers for recommendations.    Future Appointments   Date Time Provider Department Center   6/18/2025  9:15 AM Mulu Jackman MD [facility]

## 2025-06-09 NOTE — TELEPHONE ENCOUNTER
Call placed to Lita at [facility] at [facility] at this time to schedule appointment per provider recommendations. Left message for Facility to return call to office.    (Will offer 6/26 with APCs)  Future Appointments   Date Time Provider Department Center   6/18/2025  9:15 AM Mulu Jackman MD [facility]       Electronically signed by Gladis Mccormack ATC on 6/9/2025 at 12:39 PM

## 2025-06-10 NOTE — PROGRESS NOTES
Visit Date: 6/11/25  Bobbi Marquis  1940  female 85 y.o.      Subjective:    CC: Patient presents with Afib. Patient presents for follow up of HTN, CAD, CHF, and dementia.  Pneumonia  [facility] is [facility] with COPD exacerbation and human [place] pneumo virus infection.  She was admitted on 5/5/2025 and discharged on 5/10/2025 condition is currently stable she was started on prednisone and cephalexin and she finished the course of the cephalexin and the prednisone no cough no shortness of breath no wheezing  Atrial Fibrillation  Presents for initial visit. The condition has lasted for 2 years. Symptoms include chest pain, shortness of breath, tachycardia and weakness. AICD problem: 2. Pacemaker problem: 2.The symptoms have been worsening. Past treatments include anticoagulant. Past medical history includes CAD and CHF.   Hypertension  This is a chronic problem. The current episode started more than 1 year ago. The problem has been waxing and waning since onset. The problem is uncontrolled. Associated symptoms include chest pain and shortness of breath.   Coronary Artery Disease  Presents for follow-up visit. Symptoms include chest pain, muscle weakness and shortness of breath. Her past medical history is significant for CHF. The symptoms have been worsening. Compliance with diet is poor. Compliance with exercise is poor. Compliance with medications is poor.   Congestive Heart Failure  Presents for follow-up visit. Associated symptoms include chest pain, muscle weakness and shortness of breath. The symptoms have been worsening. Her past medical history is significant for CAD.   Memory Loss    Patient reports onset of memory loss was more than 1 year ago. Onset quality is gradual.     Symptoms associated with memory loss include change in short-term memory, change in long-term memory and day/night behavior changes.     Behavorial problems for memory loss include suspiciousness and

## 2025-06-11 ENCOUNTER — OUTSIDE SERVICES (OUTPATIENT)
Dept: PRIMARY CARE CLINIC | Age: 85
End: 2025-06-11

## 2025-06-11 DIAGNOSIS — I27.21 SECONDARY PULMONARY ARTERIAL HYPERTENSION (HCC): ICD-10-CM

## 2025-06-11 DIAGNOSIS — I10 ESSENTIAL HYPERTENSION: ICD-10-CM

## 2025-06-11 DIAGNOSIS — F03.C4 SEVERE DEMENTIA WITH ANXIETY, UNSPECIFIED DEMENTIA TYPE (HCC): ICD-10-CM

## 2025-06-11 DIAGNOSIS — L03.115 CELLULITIS OF RIGHT LOWER EXTREMITY: Primary | ICD-10-CM

## 2025-06-11 DIAGNOSIS — I50.31 ACUTE DIASTOLIC CHF (CONGESTIVE HEART FAILURE) (HCC): ICD-10-CM

## 2025-06-11 DIAGNOSIS — S32.040A CLOSED COMPRESSION FRACTURE OF L4 LUMBAR VERTEBRA, INITIAL ENCOUNTER (HCC): ICD-10-CM

## 2025-06-11 DIAGNOSIS — I48.91 ATRIAL FIBRILLATION WITH RVR (HCC): ICD-10-CM

## 2025-06-11 DIAGNOSIS — M48.56XG NON-TRAUMATIC COMPRESSION FRACTURE OF L1 LUMBAR VERTEBRA WITH DELAYED HEALING, SUBSEQUENT ENCOUNTER: ICD-10-CM

## 2025-06-11 DIAGNOSIS — E78.2 MIXED HYPERLIPIDEMIA: ICD-10-CM

## 2025-06-11 DIAGNOSIS — I25.10 CORONARY ARTERY DISEASE INVOLVING NATIVE CORONARY ARTERY OF NATIVE HEART WITHOUT ANGINA PECTORIS: ICD-10-CM

## 2025-06-16 NOTE — TELEPHONE ENCOUNTER
SECOND ATTEMPT    Call placed to Lita at [facility] at [facility] at this time to schedule appointment per provider recommendations. Left message for Facility to return call to office.     (Will offer 6/26 with [facility])    Electronically signed by Gladis Mccormack ATC on 6/16/2025 at 11:28 AM

## 2025-06-17 NOTE — TELEPHONE ENCOUNTER
Future Appointments   Date Time Provider Department Center   6/18/2025  9:15 AM Mulu Jackman MD [facility]/MED [facility]   6/26/2025  1:30 PM SCHEDULE, SE ORTHO [facility] Orth[facility]

## 2025-06-18 ENCOUNTER — TELEPHONE (OUTPATIENT)
Dept: VASCULAR SURGERY | Age: 85
End: 2025-06-18

## 2025-06-24 DIAGNOSIS — T14.8XXA FRACTURE: Primary | ICD-10-CM

## 2025-06-26 ENCOUNTER — HOSPITAL ENCOUNTER (OUTPATIENT)
Dept: GENERAL RADIOLOGY | Age: 85
Discharge: HOME | End: 2025-06-28

## 2025-06-26 ENCOUNTER — HOSPITAL ENCOUNTER (EMERGENCY)
Age: 85
Discharge: HOME | End: 2025-06-26
Attending: EMERGENCY MEDICINE
Payer: MEDICARE

## 2025-06-26 VITALS
SYSTOLIC BLOOD PRESSURE: 99 MMHG | HEART RATE: 90 BPM | OXYGEN SATURATION: 99 % | DIASTOLIC BLOOD PRESSURE: 87 MMHG | RESPIRATION RATE: 18 BRPM | TEMPERATURE: 97.8 F

## 2025-06-26 DIAGNOSIS — T14.8XXA FRACTURE: ICD-10-CM

## 2025-06-26 DIAGNOSIS — I48.20 CHRONIC ATRIAL FIBRILLATION (HCC): Primary | ICD-10-CM

## 2025-06-26 LAB
ALBUMIN SERPL-MCNC: 2.5 G/DL (ref 3.5–5.2)
ALP SERPL-CCNC: 196 U/L (ref 35–104)
ALT SERPL-CCNC: 26 U/L (ref 0–35)
ANION GAP SERPL CALCULATED.3IONS-SCNC: 10 MMOL/L (ref 7–16)
AST SERPL-CCNC: 46 U/L (ref 0–35)
BASOPHILS # BLD: 0.02 K/UL (ref 0–0.2)
BASOPHILS NFR BLD: 0 % (ref 0–2)
BILIRUB SERPL-MCNC: 1.6 MG/DL (ref 0–1.2)
BUN SERPL-MCNC: 22 MG/DL (ref 8–23)
CALCIUM SERPL-MCNC: 7.7 MG/DL (ref 8.8–10.2)
CHLORIDE SERPL-SCNC: 100 MMOL/L (ref 98–107)
CO2 SERPL-SCNC: 30 MMOL/L (ref 22–29)
CREAT SERPL-MCNC: 0.6 MG/DL (ref 0.5–1)
EOSINOPHIL # BLD: 0.12 K/UL (ref 0.05–0.5)
EOSINOPHILS RELATIVE PERCENT: 2 % (ref 0–6)
ERYTHROCYTE [DISTWIDTH] IN BLOOD BY AUTOMATED COUNT: 17.8 % (ref 11.5–15)
GFR, ESTIMATED: 86 ML/MIN/1.73M2
GLUCOSE SERPL-MCNC: 96 MG/DL (ref 74–99)
HCT VFR BLD AUTO: 38.1 % (ref 34–48)
HGB BLD-MCNC: 11.9 G/DL (ref 11.5–15.5)
IMM GRANULOCYTES # BLD AUTO: 0.06 K/UL (ref 0–0.58)
IMM GRANULOCYTES NFR BLD: 1 % (ref 0–5)
INR PPP: 1.9
LYMPHOCYTES NFR BLD: 1.01 K/UL (ref 1.5–4)
LYMPHOCYTES RELATIVE PERCENT: 13 % (ref 20–42)
MAGNESIUM SERPL-MCNC: 2 MG/DL (ref 1.6–2.4)
MCH RBC QN AUTO: 29.1 PG (ref 26–35)
MCHC RBC AUTO-ENTMCNC: 31.2 G/DL (ref 32–34.5)
MCV RBC AUTO: 93.2 FL (ref 80–99.9)
MONOCYTES NFR BLD: 0.93 K/UL (ref 0.1–0.95)
MONOCYTES NFR BLD: 12 % (ref 2–12)
NEUTROPHILS NFR BLD: 73 % (ref 43–80)
NEUTS SEG NFR BLD: 5.7 K/UL (ref 1.8–7.3)
PLATELET # BLD AUTO: 212 K/UL (ref 130–450)
PMV BLD AUTO: 11.1 FL (ref 7–12)
POTASSIUM SERPL-SCNC: 3.6 MMOL/L (ref 3.5–5.1)
PROT SERPL-MCNC: 5.9 G/DL (ref 6.4–8.3)
PROTHROMBIN TIME: 21.5 SEC (ref 9.3–12.4)
RBC # BLD AUTO: 4.09 M/UL (ref 3.5–5.5)
SODIUM SERPL-SCNC: 140 MMOL/L (ref 136–145)
TROPONIN I SERPL HS-MCNC: 52 NG/L (ref 0–14)
TROPONIN I SERPL HS-MCNC: NORMAL NG/L (ref 0–14)
WBC OTHER # BLD: 7.8 K/UL (ref 4.5–11.5)

## 2025-06-26 PROCEDURE — 80053 COMPREHEN METABOLIC PANEL: CPT

## 2025-06-26 PROCEDURE — 93005 ELECTROCARDIOGRAM TRACING: CPT | Performed by: EMERGENCY MEDICINE

## 2025-06-26 PROCEDURE — 83735 ASSAY OF MAGNESIUM: CPT

## 2025-06-26 PROCEDURE — 84484 ASSAY OF TROPONIN QUANT: CPT

## 2025-06-26 PROCEDURE — 99284 EMERGENCY DEPT VISIT MOD MDM: CPT

## 2025-06-26 PROCEDURE — 85610 PROTHROMBIN TIME: CPT

## 2025-06-26 PROCEDURE — 85025 COMPLETE CBC W/AUTO DIFF WBC: CPT

## 2025-06-26 RX ORDER — METOPROLOL TARTRATE 1 MG/ML
5 INJECTION, SOLUTION INTRAVENOUS ONCE
Status: DISCONTINUED | OUTPATIENT
Start: 2025-06-26 | End: 2025-06-26 | Stop reason: HOSPADM

## 2025-06-26 RX ORDER — METOPROLOL TARTRATE 25 MG/1
25 TABLET, FILM COATED ORAL ONCE
Status: DISCONTINUED | OUTPATIENT
Start: 2025-06-26 | End: 2025-06-26 | Stop reason: HOSPADM

## 2025-06-26 NOTE — ED PROVIDER NOTES
HPI:  6/26/25,   Time: 3:04 PM EDT       Bobbi Marquis is a 85 y.o. female presenting to the ED for irr hr/rrt at [facility], beginning unk ago.  The complaint has been persistent, mild in severity, and worsened by nothing.  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  Known pelvic fracture.  Was at [facility] found to be in A-fib RVR.  RRT called.  No history obtained from patient as patient is demented nursing home patient    Review of Systems:   Pertinent positives and negatives are stated within HPI, all other systems reviewed and are negative.          --------------------------------------------- PAST HISTORY ---------------------------------------------  Past Medical History:  has a past medical history of Atrial fibrillation (HCC), CAD (coronary artery disease), Dementia (HCC), GERD (gastroesophageal reflux disease), Hemorrhoids, History of echocardiogram, History of echocardiogram, Hyperlipidemia, Hypertension, MI (myocardial infarction) ([facility]), Migraines, New onset atrial flutter (HCC), and Normal cardiac stress test.    Past Surgical History:  has a past surgical history that includes Cholecystectomy; Coronary artery bypass graft; Cardiac catheterization; ECHO Compl W Dop Color Flow (2/27/2012); ECHO Compl W Dop Color Flow (3/25/2013); Colonoscopy (4/16/13); and Spine surgery (N/A, 10/14/2020).    Social History:  reports that she quit smoking about 28 years ago. Her smoking use included cigarettes. She started smoking about 58 years ago. She has a 30 pack-year smoking history. She has never used smokeless tobacco. She reports that she does not drink alcohol and does not use drugs.    Family History: family history includes Hypertension in her father.     The patient’s home medications have been reviewed.    Allergies: Dronedarone        ---------------------------------------------------PHYSICAL EXAM--------------------------------------    Constitutional/General: Alert and oriented x1 chronically ill appearing

## 2025-06-26 NOTE — ED NOTES
Albertina called from [facility] healthcare at [facility] looking to see if patient was being discharged. Informed per chart pt is en route. Confirmed patient arrived to facility during time of call.

## 2025-06-27 LAB
EKG ATRIAL RATE: 90 BPM
EKG Q-T INTERVAL: 290 MS
EKG QRS DURATION: 94 MS
EKG QTC CALCULATION (BAZETT): 387 MS
EKG R AXIS: 126 DEGREES
EKG T AXIS: 195 DEGREES
EKG VENTRICULAR RATE: 107 BPM

## 2025-06-27 PROCEDURE — 93010 ELECTROCARDIOGRAM REPORT: CPT | Performed by: INTERNAL MEDICINE

## 2025-07-10 NOTE — PROGRESS NOTES
Current Meds: Refer to nursing home record    PMH:    Medical Problems: reviewed and updated       Diagnosis Date    Atrial fibrillation (HCC)     CAD (coronary artery disease)     Dementia (HCC)     GERD (gastroesophageal reflux disease)     Hemorrhoids     History of echocardiogram 03/14/2017    EF 62%    History of echocardiogram 05/01/2018    EF 62%    Hyperlipidemia     Hypertension     MI (myocardial infarction) (HCC)     Migraines     New onset atrial flutter (HCC) 03/13/2017    Normal cardiac stress test 05/2010        Surgical Hx: reviewed and updated   Past Surgical History:   Procedure Laterality Date    CARDIAC CATHETERIZATION      2002    CHOLECYSTECTOMY      COLONOSCOPY  4/16/13    DR CAMERON     CORONARY ARTERY BYPASS GRAFT      CABG x 4 in 2000    ECHO COMPL W DOP COLOR FLOW  2/27/2012         ECHO COMPL W DOP COLOR FLOW  3/25/2013         SPINE SURGERY N/A 10/14/2020    T12, L5 KYPHOPLASTY, EPIDURAL INJECTION [place] V[facility] Josue [place] [facility]        : reviewed and updated       Problem Relation Age of Onset    Hypertension Father         SH: reviewed and updated    reports that she quit smoking about 28 years ago. Her smoking use included cigarettes. She started smoking about 58 years ago. She has a 30 pack-year smoking history. She has never used smokeless tobacco. She reports that she does not drink alcohol and does not use drugs.     Objective:  Vital signs for Bobbi was reviewed in the nursing home record.     Physical Exam:  Physical Exam  Vitals and nursing note reviewed.   Constitutional:       General: She is not in acute distress.     Appearance: Normal appearance. She is normal weight.   HENT:      Head: Normocephalic and atraumatic.      Right Ear: Tympanic membrane, ear canal and external ear normal.      Left Ear: Tympanic membrane, ear canal and external ear normal. There is no impacted cerumen.      Nose: Nose normal. No congestion or rhinorrhea.      Mouth/Throat:

## 2025-07-13 NOTE — CODE DOCUMENTATION
Patient arrived with CPR in progress. 20 minutes of downtime prior to EMS arrival. Asystole for EMS. Tx PTA include 2 epi. Achevived rosc with idioventricular rhythm, lost pulses again at 1735 anf given one epi. Demarcus airway by EMS

## 2025-07-13 NOTE — ED NOTES
In the last hour of life, patient received  600mls of normal saline  300mg amiodarone  150mg amiodarone  100mg calcium chloride  6 epis  100mg mag sulfate  1 bicarb    EMS gave 3 epi    Patient was never on ventilator    CPR for total of 62 minutes

## 2025-07-13 NOTE — ED NOTES
The  signed off on patient to be released to  home. This RN attempted to call [facility] x 3 with no success.

## (undated) DEVICE — APPLICATOR PREP 26ML 0.7% IOD POVACRYLEX 74% ISO ALC ST

## (undated) DEVICE — DRAPE 24X23IN RADIOLOGY CASS ADHES STRIP

## (undated) DEVICE — INTENDED FOR TISSUE SEPARATION, AND OTHER PROCEDURES THAT REQUIRE A SHARP SURGICAL BLADE TO PUNCTURE OR CUT.: Brand: BARD-PARKER ® STAINLESS STEEL BLADES

## (undated) DEVICE — PACK,LAPAROTOMY,NO GOWNS: Brand: MEDLINE

## (undated) DEVICE — LABEL MED 4 IN SURG PANEL W/ PEN STRL

## (undated) DEVICE — TOWEL,OR,DSP,ST,BLUE,DLX,10/PK,8PK/CS: Brand: MEDLINE

## (undated) DEVICE — 1000 S-DRAPE TOWEL DRAPE 10/BX: Brand: STERI-DRAPE™

## (undated) DEVICE — DRAPE,REIN 53X77,STERILE: Brand: MEDLINE

## (undated) DEVICE — SYRINGE A08E KIS INFLATION HP: Brand: KYPHON®  INFLATION SYRINGE

## (undated) DEVICE — BONE FILLER DEVICE F04B SIZE 3

## (undated) DEVICE — 3M™ STERI-DRAPE™ INCISE DRAPE 1050 (60CM X 45CM): Brand: STERI-DRAPE™

## (undated) DEVICE — SWABSTICK SURG PREP BENZOIN TINCTURE SINGLE ST

## (undated) DEVICE — GOWN,SIRUS,FABRNF,XL,20/CS: Brand: MEDLINE

## (undated) DEVICE — MEDIA CONTRAST RX ISOVUE-300 61% 30ML VIALS

## (undated) DEVICE — GLOVE SURG SZ 65 THK91MIL LTX FREE SYN POLYISOPRENE

## (undated) DEVICE — STRIP,CLOSURE,WOUND,MEDI-STRIP,1/4X3: Brand: MEDLINE

## (undated) DEVICE — BONE BIOPSY DEVICE F05A BBD SIZE 3: Brand: MEDTRONIC REUSABLE INSTRUMENTS

## (undated) DEVICE — TAMP K08A XPAN INFLAT BONE  SIZE 20/3-RB: Brand: KYPHON XPANDER™ INFLATABLE BONE TAMP

## (undated) DEVICE — TRAP,MUCUS SPECIMEN,40CC: Brand: MEDLINE

## (undated) DEVICE — DRAPE THER FLUID WARMING 66X44 IN FLAT SLUSH DBL DISC ORS

## (undated) DEVICE — DRAPE C ARM UNIV W41XL74IN CLR PLAS XR VELC CLSR POLY STRP

## (undated) DEVICE — DOUBLE BASIN SET: Brand: MEDLINE INDUSTRIES, INC.

## (undated) DEVICE — BANDAGE WND ADH LF FLX CURAD 3/4X3IN

## (undated) DEVICE — SYRINGE MED 10ML LUERLOCK TIP W/O SFTY DISP

## (undated) DEVICE — INTRODUCER T15K ONE STEP OID BEVEL: Brand: KYPHON® ONE-STEP™ OSTEO INTRODUCER™ SYSTEM

## (undated) DEVICE — COUNTER NDL 30 COUNT DBL MAG

## (undated) DEVICE — NEEDLE HYPO 25GA L0.625IN BLU POLYPR HUB S STL REG BVL STR

## (undated) DEVICE — CLOTH SURG PREP PREOPERATIVE CHLORHEXIDINE GLUC 2% READYPREP

## (undated) DEVICE — GOWN,SIRUS,FABRNF,L,20/CS: Brand: MEDLINE

## (undated) DEVICE — GAUZE,SPONGE,4"X4",16PLY,XRAY,STRL,LF: Brand: MEDLINE

## (undated) DEVICE — MEDIA CONTRAST ISOVUE  300 10X50ML

## (undated) DEVICE — Device